# Patient Record
Sex: MALE | Race: WHITE | NOT HISPANIC OR LATINO | Employment: OTHER | URBAN - METROPOLITAN AREA
[De-identification: names, ages, dates, MRNs, and addresses within clinical notes are randomized per-mention and may not be internally consistent; named-entity substitution may affect disease eponyms.]

---

## 2017-02-17 ENCOUNTER — HOSPITAL ENCOUNTER (OUTPATIENT)
Dept: LAB | Facility: MEDICAL CENTER | Age: 67
End: 2017-02-17
Attending: UROLOGY
Payer: MEDICARE

## 2017-02-17 LAB — PSA SERPL DL<=0.01 NG/ML-MCNC: <0.01 NG/ML (ref 0–4)

## 2017-02-17 PROCEDURE — 84153 ASSAY OF PSA TOTAL: CPT

## 2017-02-17 PROCEDURE — 36415 COLL VENOUS BLD VENIPUNCTURE: CPT

## 2017-02-23 DIAGNOSIS — Z01.810 PRE-OPERATIVE CARDIOVASCULAR EXAMINATION: ICD-10-CM

## 2017-02-23 DIAGNOSIS — Z01.812 PRE-OPERATIVE LABORATORY EXAMINATION: ICD-10-CM

## 2017-02-23 DIAGNOSIS — Z01.811 PRE-OPERATIVE RESPIRATORY EXAMINATION: ICD-10-CM

## 2017-02-23 LAB
ALBUMIN SERPL BCP-MCNC: 4.5 G/DL (ref 3.2–4.9)
ALBUMIN/GLOB SERPL: 1.7 G/DL
ALP SERPL-CCNC: 66 U/L (ref 30–99)
ALT SERPL-CCNC: 12 U/L (ref 2–50)
ANION GAP SERPL CALC-SCNC: 7 MMOL/L (ref 0–11.9)
APPEARANCE UR: ABNORMAL
AST SERPL-CCNC: 18 U/L (ref 12–45)
BACTERIA #/AREA URNS HPF: ABNORMAL /HPF
BASOPHILS # BLD AUTO: 1.1 % (ref 0–1.8)
BASOPHILS # BLD: 0.05 K/UL (ref 0–0.12)
BILIRUB SERPL-MCNC: 0.7 MG/DL (ref 0.1–1.5)
BILIRUB UR QL STRIP.AUTO: NEGATIVE
BUN SERPL-MCNC: 17 MG/DL (ref 8–22)
CALCIUM SERPL-MCNC: 11.3 MG/DL (ref 8.5–10.5)
CAOX CRY #/AREA URNS HPF: ABNORMAL /HPF
CHLORIDE SERPL-SCNC: 104 MMOL/L (ref 96–112)
CO2 SERPL-SCNC: 25 MMOL/L (ref 20–33)
COLOR UR: YELLOW
CREAT SERPL-MCNC: 0.65 MG/DL (ref 0.5–1.4)
EOSINOPHIL # BLD AUTO: 0.1 K/UL (ref 0–0.51)
EOSINOPHIL NFR BLD: 2.3 % (ref 0–6.9)
ERYTHROCYTE [DISTWIDTH] IN BLOOD BY AUTOMATED COUNT: 45.9 FL (ref 35.9–50)
GFR SERPL CREATININE-BSD FRML MDRD: >60 ML/MIN/1.73 M 2
GLOBULIN SER CALC-MCNC: 2.6 G/DL (ref 1.9–3.5)
GLUCOSE SERPL-MCNC: 93 MG/DL (ref 65–99)
GLUCOSE UR STRIP.AUTO-MCNC: NEGATIVE MG/DL
HCT VFR BLD AUTO: 43.3 % (ref 42–52)
HGB BLD-MCNC: 14.3 G/DL (ref 14–18)
IMM GRANULOCYTES # BLD AUTO: 0.01 K/UL (ref 0–0.11)
IMM GRANULOCYTES NFR BLD AUTO: 0.2 % (ref 0–0.9)
KETONES UR STRIP.AUTO-MCNC: NEGATIVE MG/DL
LEUKOCYTE ESTERASE UR QL STRIP.AUTO: NEGATIVE
LYMPHOCYTES # BLD AUTO: 1.58 K/UL (ref 1–4.8)
LYMPHOCYTES NFR BLD: 36 % (ref 22–41)
MCH RBC QN AUTO: 32.3 PG (ref 27–33)
MCHC RBC AUTO-ENTMCNC: 33 G/DL (ref 33.7–35.3)
MCV RBC AUTO: 97.7 FL (ref 81.4–97.8)
MICRO URNS: ABNORMAL
MONOCYTES # BLD AUTO: 0.36 K/UL (ref 0–0.85)
MONOCYTES NFR BLD AUTO: 8.2 % (ref 0–13.4)
MUCOUS THREADS #/AREA URNS HPF: ABNORMAL /HPF
NEUTROPHILS # BLD AUTO: 2.29 K/UL (ref 1.82–7.42)
NEUTROPHILS NFR BLD: 52.2 % (ref 44–72)
NITRITE UR QL STRIP.AUTO: NEGATIVE
NRBC # BLD AUTO: 0 K/UL
NRBC BLD AUTO-RTO: 0 /100 WBC
PH UR STRIP.AUTO: 6 [PH]
PLATELET # BLD AUTO: 216 K/UL (ref 164–446)
PMV BLD AUTO: 10.8 FL (ref 9–12.9)
POTASSIUM SERPL-SCNC: 4.4 MMOL/L (ref 3.6–5.5)
PROT SERPL-MCNC: 7.1 G/DL (ref 6–8.2)
PROT UR QL STRIP: NEGATIVE MG/DL
RBC # BLD AUTO: 4.43 M/UL (ref 4.7–6.1)
RBC # URNS HPF: ABNORMAL /HPF
RBC UR QL AUTO: NEGATIVE
SODIUM SERPL-SCNC: 136 MMOL/L (ref 135–145)
SP GR UR STRIP.AUTO: 1.02
WBC # BLD AUTO: 4.4 K/UL (ref 4.8–10.8)

## 2017-02-23 RX ORDER — CYANOCOBALAMIN (VITAMIN B-12) 5000 MCG
5000 TABLET,DISINTEGRATING ORAL DAILY
COMMUNITY
End: 2017-06-21

## 2017-02-24 LAB — EKG IMPRESSION: NORMAL

## 2017-02-25 LAB
BACTERIA UR CULT: NORMAL
SIGNIFICANT IND 70042: NORMAL
SITE SITE: NORMAL
SOURCE SOURCE: NORMAL

## 2017-03-16 ENCOUNTER — HOSPITAL ENCOUNTER (OUTPATIENT)
Dept: RADIOLOGY | Facility: MEDICAL CENTER | Age: 67
End: 2017-03-16
Attending: INTERNAL MEDICINE
Payer: MEDICARE

## 2017-03-16 DIAGNOSIS — I71.40 ABDOMINAL AORTIC ANEURYSM WITHOUT RUPTURE (HCC): ICD-10-CM

## 2017-03-16 PROCEDURE — 76775 US EXAM ABDO BACK WALL LIM: CPT

## 2017-03-20 ENCOUNTER — HOSPITAL ENCOUNTER (OUTPATIENT)
Facility: MEDICAL CENTER | Age: 67
End: 2017-03-20
Attending: UROLOGY | Admitting: UROLOGY
Payer: MEDICARE

## 2017-03-20 VITALS
HEIGHT: 72 IN | BODY MASS INDEX: 20.72 KG/M2 | RESPIRATION RATE: 14 BRPM | DIASTOLIC BLOOD PRESSURE: 70 MMHG | HEART RATE: 48 BPM | WEIGHT: 153 LBS | OXYGEN SATURATION: 95 % | SYSTOLIC BLOOD PRESSURE: 119 MMHG | TEMPERATURE: 98.5 F

## 2017-03-20 PROBLEM — R33.9 RETENTION OF URINE, UNSPECIFIED: Status: ACTIVE | Noted: 2017-03-20

## 2017-03-20 RX ORDER — OXYCODONE HYDROCHLORIDE AND ACETAMINOPHEN 5; 325 MG/1; MG/1
TABLET ORAL
Status: COMPLETED
Start: 2017-03-20 | End: 2017-03-20

## 2017-03-20 RX ORDER — LIDOCAINE HYDROCHLORIDE 10 MG/ML
INJECTION, SOLUTION INFILTRATION; PERINEURAL
Status: COMPLETED
Start: 2017-03-20 | End: 2017-03-20

## 2017-03-20 RX ORDER — ONDANSETRON 2 MG/ML
INJECTION INTRAMUSCULAR; INTRAVENOUS
Status: COMPLETED
Start: 2017-03-20 | End: 2017-03-20

## 2017-03-20 RX ORDER — CEPHALEXIN 250 MG/1
500 CAPSULE ORAL 3 TIMES DAILY
Qty: 15 CAP | Refills: 0 | Status: SHIPPED | OUTPATIENT
Start: 2017-03-20 | End: 2017-03-25

## 2017-03-20 RX ORDER — GENTAMICIN SULFATE 40 MG/ML
INJECTION, SOLUTION INTRAMUSCULAR; INTRAVENOUS
Status: DISCONTINUED | OUTPATIENT
Start: 2017-03-20 | End: 2017-03-20 | Stop reason: HOSPADM

## 2017-03-20 RX ORDER — SODIUM CHLORIDE, SODIUM LACTATE, POTASSIUM CHLORIDE, CALCIUM CHLORIDE 600; 310; 30; 20 MG/100ML; MG/100ML; MG/100ML; MG/100ML
1000 INJECTION, SOLUTION INTRAVENOUS
Status: COMPLETED | OUTPATIENT
Start: 2017-03-20 | End: 2017-03-20

## 2017-03-20 RX ORDER — OXYCODONE HYDROCHLORIDE AND ACETAMINOPHEN 5; 325 MG/1; MG/1
1-2 TABLET ORAL EVERY 6 HOURS PRN
Qty: 20 TAB | Refills: 0 | Status: ON HOLD | OUTPATIENT
Start: 2017-03-20 | End: 2017-04-02

## 2017-03-20 RX ADMIN — FENTANYL CITRATE 50 MCG: 50 INJECTION, SOLUTION INTRAMUSCULAR; INTRAVENOUS at 14:54

## 2017-03-20 RX ADMIN — ONDANSETRON 4 MG: 2 INJECTION, SOLUTION INTRAMUSCULAR; INTRAVENOUS at 14:40

## 2017-03-20 RX ADMIN — LIDOCAINE HYDROCHLORIDE: 10 INJECTION, SOLUTION INFILTRATION; PERINEURAL at 10:35

## 2017-03-20 RX ADMIN — VANCOMYCIN HYDROCHLORIDE 1000 MG: 100 INJECTION, POWDER, LYOPHILIZED, FOR SOLUTION INTRAVENOUS at 11:13

## 2017-03-20 RX ADMIN — SODIUM CHLORIDE, SODIUM LACTATE, POTASSIUM CHLORIDE, CALCIUM CHLORIDE 1000 ML: 600; 310; 30; 20 INJECTION, SOLUTION INTRAVENOUS at 10:35

## 2017-03-20 RX ADMIN — FENTANYL CITRATE 50 MCG: 50 INJECTION, SOLUTION INTRAMUSCULAR; INTRAVENOUS at 14:48

## 2017-03-20 RX ADMIN — OXYCODONE AND ACETAMINOPHEN 2 TABLET: 5; 325 TABLET ORAL at 14:32

## 2017-03-20 RX ADMIN — FENTANYL CITRATE 50 MCG: 50 INJECTION, SOLUTION INTRAMUSCULAR; INTRAVENOUS at 14:39

## 2017-03-20 ASSESSMENT — PAIN SCALES - GENERAL
PAINLEVEL_OUTOF10: 7
PAINLEVEL_OUTOF10: 5
PAINLEVEL_OUTOF10: 4
PAINLEVEL_OUTOF10: 5
PAINLEVEL_OUTOF10: 7
PAINLEVEL_OUTOF10: 1
PAINLEVEL_OUTOF10: 7
PAINLEVEL_OUTOF10: 5

## 2017-03-20 NOTE — IP AVS SNAPSHOT
3/20/2017          Sebastien Horn  [address] [place] [address]    Dear Sebastien:    [facility] wants to ensure your discharge home is safe and you or your loved ones have had all your questions answered regarding your care after you leave the hospital.    You may receive a telephone call within two days of your discharge.  This call is to make certain you understand your discharge instructions as well as ensure we provided you with the best care possible during your stay with us.     The call will only last approximately 3-5 minutes and will be done by a nurse.    Once again, we want to ensure your discharge home is safe and that you have a clear understanding of any next steps in your care.  If you have any questions or concerns, please do not hesitate to contact us, we are here for you.  Thank you for choosing [facility] for your healthcare needs.    Sincerely,    Preston Duron    [facility]

## 2017-03-20 NOTE — OR NURSING
Pt is comfortable, pain at 5/10 in posterior scrotum. Pt to go home with catheter in place, it will be removed in the office tomorrow morning. Pt is on room air O2 saturation 96%, RR 13.

## 2017-03-20 NOTE — DISCHARGE INSTRUCTIONS
ACTIVITY: Rest and take it easy for the first 24 hours.  A responsible adult is recommended to remain with you during that time.  It is normal to feel sleepy.  We encourage you to not do anything that requires balance, judgment or coordination.    MILD FLU-LIKE SYMPTOMS ARE NORMAL. YOU MAY EXPERIENCE GENERALIZED MUSCLE ACHES, THROAT IRRITATION, HEADACHE AND/OR SOME NAUSEA.    FOR 24 HOURS DO NOT:  Drive, operate machinery or run household appliances.  Drink beer or alcoholic beverages.   Make important decisions or sign legal documents.    SPECIAL INSTRUCTIONS:     Follow-up in the office tomorrow for catheter removal at 0900 AM  Ice to wounds 20 minutes on 20 minutes off for 2 days while awake.    Diaz Catheter Care, Adult  A Diaz catheter is a soft, flexible tube that is placed into the bladder to drain urine. A Diaz catheter may be inserted if:  · You leak urine or are not able to control when you urinate (urinary incontinence).  · You are not able to urinate when you need to (urinary retention).  · You had prostate surgery or surgery on the genitals.  · You have certain medical conditions, such as multiple sclerosis, dementia, or a spinal cord injury.  If you are going home with a Diaz catheter in place, follow the instructions below.  TAKING CARE OF THE CATHETER  · Wash your hands with soap and water.  · Using mild soap and warm water on a clean washcloth:  · Clean the area on your body closest to the catheter insertion site using a circular motion, moving away from the catheter. Never wipe toward the catheter because this could sweep bacteria up into the urethra and cause infection.  · Remove all traces of soap. Pat the area dry with a clean towel. For males, reposition the foreskin.  · Attach the catheter to your leg so there is no tension on the catheter. Use adhesive tape or a leg strap. If you are using adhesive tape, remove any sticky residue left behind by the previous tape you used.  · Keep the  drainage bag below the level of the bladder, but keep it off the floor.  · Check throughout the day to be sure the catheter is working and urine is draining freely. Make sure the tubing does not become kinked.  · Do not pull on the catheter or try to remove it. Pulling could damage internal tissues.  TAKING CARE OF THE DRAINAGE BAGS  You will be given two drainage bags to take home. One is a large overnight drainage bag, and the other is a smaller leg bag that fits underneath clothing. You may wear the overnight bag at any time, but you should never wear the smaller leg bag at night. Follow the instructions below for how to empty, change, and clean your drainage bags.  Emptying the Drainage Bag  You must empty your drainage bag when it is  -½ full or at least 2-3 times a day.  · Wash your hands with soap and water.  · Keep the drainage bag below your hips, below the level of your bladder. This stops urine from going back into the tubing and into your bladder.  · Hold the dirty bag over the toilet or a clean container.  · Open the pour spout at the bottom of the bag and empty the urine into the toilet or container. Do not let the pour spout touch the toilet, container, or any other surface. Doing so can place bacteria on the bag, which can cause an infection.  · Clean the pour spout with a gauze pad or cotton ball that has rubbing alcohol on it.  · Close the pour spout.  · Attach the bag to your leg with adhesive tape or a leg strap.  · Wash your hands well.  Changing the Drainage Bag  Change your drainage bag once a month or sooner if it starts to smell bad or look dirty. Below are steps to follow when changing the drainage bag.  1. Wash your hands with soap and water.  2. Pinch off the rubber catheter so that urine does not spill out.  3. Disconnect the catheter tube from the drainage tube at the connection valve. Do not let the tubes touch any surface.  4. Clean the end of the catheter tube with an alcohol wipe.  Use a different alcohol wipe to clean the end of the drainage tube.  5. Connect the catheter tube to the drainage tube of the clean drainage bag.  6. Attach the new bag to the leg with adhesive tape or a leg strap. Avoid attaching the new bag too tightly.  7. Wash your hands well.  Cleaning the Drainage Bag  1. Wash your hands with soap and water.  2. Wash the bag in warm, soapy water.  3. Rinse the bag thoroughly with warm water.  4. Fill the bag with a solution of white vinegar and water (1 cup vinegar to 1 qt warm water [.2 L vinegar to 1 L warm water]). Close the bag and soak it for 30 minutes in the solution.  5. Rinse the bag with warm water.  6. Hang the bag to dry with the pour spout open and hanging downward.  7. Store the clean bag (once it is dry) in a clean plastic bag.  8. Wash your hands well.  PREVENTING INFECTION  · Wash your hands before and after handling your catheter.  · Take showers daily and wash the area where the catheter enters your body. Do not take baths. Replace wet leg straps with dry ones, if this applies.  · Do not use powders, sprays, or lotions on the genital area. Only use creams, lotions, or ointments as directed by your caregiver.  · For females, wipe from front to back after each bowel movement.  · Drink enough fluids to keep your urine clear or pale yellow unless you have a fluid restriction.  · Do not let the drainage bag or tubing touch or lie on the floor.  · Wear cotton underwear to absorb moisture and to keep your .  SEEK MEDICAL CARE IF:   · Your urine is cloudy or smells unusually bad.  · Your catheter becomes clogged.  · You are not draining urine into the bag or your bladder feels full.  · Your catheter starts to leak.  SEEK IMMEDIATE MEDICAL CARE IF:   · You have pain, swelling, redness, or pus where the catheter enters the body.  · You have pain in the abdomen, legs, lower back, or bladder.  · You have a fever.  · You see blood fill the catheter, or your  urine is pink or red.  · You have nausea, vomiting, or chills.  · Your catheter gets pulled out.  MAKE SURE YOU:   · Understand these instructions.  · Will watch your condition.  · Will get help right away if you are not doing well or get worse.     This information is not intended to replace advice given to you by your health care provider. Make sure you discuss any questions you have with your health care provider.    Cystoscopy, Care After  Refer to this sheet in the next few weeks. These instructions provide you with information on caring for yourself after your procedure. Your caregiver may also give you more specific instructions. Your treatment has been planned according to current medical practices, but problems sometimes occur. Call your caregiver if you have any problems or questions after your procedure.  HOME CARE INSTRUCTIONS   Things you can do to ease any discomfort after your procedure include:  · Drinking enough water and fluids to keep your urine clear or pale yellow.  · Taking a warm bath to relieve any burning feelings.  SEEK IMMEDIATE MEDICAL CARE IF:   · You have an increase in blood in your urine.  · You notice blood clots in your urine.  · You have difficulty passing urine.  · You have the chills.  · You have abdominal pain.  · You have a fever or persistent symptoms for more than 2-3 days.  · You have a fever and your symptoms suddenly get worse.  MAKE SURE YOU:   · Understand these instructions.  · Will watch your condition.  · Will get help right away if you are not doing well or get worse.     This information is not intended to replace advice given to you by your health care provider. Make sure you discuss any questions you have with your health care provider.        DIET: To avoid nausea, slowly advance diet as tolerated, avoiding spicy or greasy foods for the first day.  Add more substantial food to your diet according to your physician's instructions.  Babies can be fed formula or  breast milk as soon as they are hungry.  INCREASE FLUIDS AND FIBER TO AVOID CONSTIPATION.    SURGICAL DRESSING/BATHING: Follow instructions given to you by your surgeon.    FOLLOW-UP APPOINTMENT:  A follow-up appointment should be arranged with your doctor tomorrow at 9 am; call to schedule.    You should CALL YOUR PHYSICIAN if you develop:  Fever greater than 101 degrees F.  Pain not relieved by medication, or persistent nausea or vomiting.  Excessive bleeding (blood soaking through dressing) or unexpected drainage from the wound.  Extreme redness or swelling around the incision site, drainage of pus or foul smelling drainage.  Inability to urinate or empty your bladder within 8 hours.  Problems with breathing or chest pain.    You should call 911 if you develop problems with breathing or chest pain.  If you are unable to contact your doctor or surgical center, you should go to the nearest emergency room or urgent care center.  Physician's telephone #: Dr. Lamas [phone]    If any questions arise, call your doctor.  If your doctor is not available, please feel free to call the Surgical Center at [phone].  The Center is open Monday through Friday from 7AM to 7PM.  You can also call the [employer] HOTLINE open 24 hours/day, 7 days/week and speak to a nurse at [phone], or toll free at [phone].    A registered nurse may call you a few days after your surgery to see how you are doing after your procedure.    MEDICATIONS: Resume taking daily medication.  Take prescribed pain medication with food.  If no medication is prescribed, you may take non-aspirin pain medication if needed.  PAIN MEDICATION CAN BE VERY CONSTIPATING.  Take a stool softener or laxative such as senokot, pericolace, or milk of magnesia if needed.    Prescription given for percocet and keflex.  Last pain medication given at 2:32 pm.    If your physician has prescribed pain medication that includes Acetaminophen (Tylenol), do not take  additional Acetaminophen (Tylenol) while taking the prescribed medication.    Depression / Suicide Risk    As you are discharged from this [facility] facility, it is important to learn how to keep safe from harming yourself.    Recognize the warning signs:  · Abrupt changes in personality, positive or negative- including increase in energy   · Giving away possessions  · Change in eating patterns- significant weight changes-  positive or negative  · Change in sleeping patterns- unable to sleep or sleeping all the time   · Unwillingness or inability to communicate  · Depression  · Unusual sadness, discouragement and loneliness  · Talk of wanting to die  · Neglect of personal appearance   · Rebelliousness- reckless behavior  · Withdrawal from people/activities they love  · Confusion- inability to concentrate     If you or a loved one observes any of these behaviors or has concerns about self-harm, here's what you can do:  · Talk about it- your feelings and reasons for harming yourself  · Remove any means that you might use to hurt yourself (examples: pills, rope, extension cords, firearm)  · Get professional help from the community (Mental Health, Substance Abuse, psychological counseling)  · Do not be alone:Call your Safe Contact- someone whom you trust who will be there for you.  · Call your local CRISIS HOTLINE 946-6265 or 215-190-1568  · Call your local Children's Mobile Crisis Response Team [place] (938) 766-5378 or www.[employer]  · Call the toll free National Suicide Prevention Hotlines   · National Suicide Prevention Lifeline 768-650-QLXK (3346)  · National Hope Line Network 800-SUICIDE (857-9776)

## 2017-03-20 NOTE — OR NURSING
Patient educated about catheter care, all questions answered, leg bag provided and patient instructed how to use leg bag and night bag. Patient is aware that he should return to MD office tomorrow morning for catheter removal.

## 2017-03-20 NOTE — OR SURGEON
Immediate Post-Operative Note      PreOp Diagnosis: Post radical prostatectomy stress urinary incontinence    PostOp Diagnosis: As above    Procedure(s):  CYSTOSCOPY -  FLEXIBLE - Wound Class: Clean Contaminated  ARTIFICIAL URINARY SPHINCTER PLACEMENT AMS  - Wound Class: Clean Contaminated    Surgeon(s):  REJI Lindsay M.D.    Anesthesiologist/Type of Anesthesia:  Anesthesiologist: Yfn Mercado M.D./General    Surgical Staff:  Circulator: Rand Overton R.N.  Scrub Person: Gabriela Lance; Óscar Thomas    Specimen: None    Estimated Blood Loss: Less than 50cc    Findings: Functioning sphincter at cystoscopy    Complications: None  Drains: 12 [place]ian to gravity  Reservoir: 61-70 cm   Cuff: 4.5cm at bulbar urethra        3/20/2017 2:14 PM Frank Lamas

## 2017-03-20 NOTE — OR NURSING
Pt is bradycardic at baseline per Dr. Mercado, runs in the 40's. OK with HR in the 40's as long as pt maintains BP.

## 2017-03-20 NOTE — IP AVS SNAPSHOT
Home Care Instructions                                                                                                                Name:Sebastien Horn  Medical Record Number:[id number]  CSN: [id number]    YOB: 1950   Age: 66 y.o.  Sex: male  HT:1.829 m (6') WT: 69.4 kg (153 lb)          Admit Date: 3/20/2017     Discharge Date:   Today's Date: 3/20/2017  Attending Doctor:  Frank Lamas M.D.                  Allergies:  Review of patient's allergies indicates no known allergies.                Discharge Instructions         ACTIVITY: Rest and take it easy for the first 24 hours.  A responsible adult is recommended to remain with you during that time.  It is normal to feel sleepy.  We encourage you to not do anything that requires balance, judgment or coordination.    MILD FLU-LIKE SYMPTOMS ARE NORMAL. YOU MAY EXPERIENCE GENERALIZED MUSCLE ACHES, THROAT IRRITATION, HEADACHE AND/OR SOME NAUSEA.    FOR 24 HOURS DO NOT:  Drive, operate machinery or run household appliances.  Drink beer or alcoholic beverages.   Make important decisions or sign legal documents.    SPECIAL INSTRUCTIONS:     Follow-up in the office tomorrow for catheter removal at 0900 AM  Ice to wounds 20 minutes on 20 minutes off for 2 days while awake.    Diaz Catheter Care, Adult  A Diaz catheter is a soft, flexible tube that is placed into the bladder to drain urine. A Diaz catheter may be inserted if:  · You leak urine or are not able to control when you urinate (urinary incontinence).  · You are not able to urinate when you need to (urinary retention).  · You had prostate surgery or surgery on the genitals.  · You have certain medical conditions, such as multiple sclerosis, dementia, or a spinal cord injury.  If you are going home with a Diaz catheter in place, follow the instructions below.  TAKING CARE OF THE CATHETER  · Wash your hands with soap and water.  · Using mild soap and warm water on a clean washcloth:  · Clean the  area on your body closest to the catheter insertion site using a circular motion, moving away from the catheter. Never wipe toward the catheter because this could sweep bacteria up into the urethra and cause infection.  · Remove all traces of soap. Pat the area dry with a clean towel. For males, reposition the foreskin.  · Attach the catheter to your leg so there is no tension on the catheter. Use adhesive tape or a leg strap. If you are using adhesive tape, remove any sticky residue left behind by the previous tape you used.  · Keep the drainage bag below the level of the bladder, but keep it off the floor.  · Check throughout the day to be sure the catheter is working and urine is draining freely. Make sure the tubing does not become kinked.  · Do not pull on the catheter or try to remove it. Pulling could damage internal tissues.  TAKING CARE OF THE DRAINAGE BAGS  You will be given two drainage bags to take home. One is a large overnight drainage bag, and the other is a smaller leg bag that fits underneath clothing. You may wear the overnight bag at any time, but you should never wear the smaller leg bag at night. Follow the instructions below for how to empty, change, and clean your drainage bags.  Emptying the Drainage Bag  You must empty your drainage bag when it is  -½ full or at least 2-3 times a day.  · Wash your hands with soap and water.  · Keep the drainage bag below your hips, below the level of your bladder. This stops urine from going back into the tubing and into your bladder.  · Hold the dirty bag over the toilet or a clean container.  · Open the pour spout at the bottom of the bag and empty the urine into the toilet or container. Do not let the pour spout touch the toilet, container, or any other surface. Doing so can place bacteria on the bag, which can cause an infection.  · Clean the pour spout with a gauze pad or cotton ball that has rubbing alcohol on it.  · Close the pour spout.  · Attach the  bag to your leg with adhesive tape or a leg strap.  · Wash your hands well.  Changing the Drainage Bag  Change your drainage bag once a month or sooner if it starts to smell bad or look dirty. Below are steps to follow when changing the drainage bag.  1. Wash your hands with soap and water.  2. Pinch off the rubber catheter so that urine does not spill out.  3. Disconnect the catheter tube from the drainage tube at the connection valve. Do not let the tubes touch any surface.  4. Clean the end of the catheter tube with an alcohol wipe. Use a different alcohol wipe to clean the end of the drainage tube.  5. Connect the catheter tube to the drainage tube of the clean drainage bag.  6. Attach the new bag to the leg with adhesive tape or a leg strap. Avoid attaching the new bag too tightly.  7. Wash your hands well.  Cleaning the Drainage Bag  1. Wash your hands with soap and water.  2. Wash the bag in warm, soapy water.  3. Rinse the bag thoroughly with warm water.  4. Fill the bag with a solution of white vinegar and water (1 cup vinegar to 1 qt warm water [.2 L vinegar to 1 L warm water]). Close the bag and soak it for 30 minutes in the solution.  5. Rinse the bag with warm water.  6. Hang the bag to dry with the pour spout open and hanging downward.  7. Store the clean bag (once it is dry) in a clean plastic bag.  8. Wash your hands well.  PREVENTING INFECTION  · Wash your hands before and after handling your catheter.  · Take showers daily and wash the area where the catheter enters your body. Do not take baths. Replace wet leg straps with dry ones, if this applies.  · Do not use powders, sprays, or lotions on the genital area. Only use creams, lotions, or ointments as directed by your caregiver.  · For females, wipe from front to back after each bowel movement.  · Drink enough fluids to keep your urine clear or pale yellow unless you have a fluid restriction.  · Do not let the drainage bag or tubing touch or lie  on the floor.  · Wear cotton underwear to absorb moisture and to keep your .  SEEK MEDICAL CARE IF:   · Your urine is cloudy or smells unusually bad.  · Your catheter becomes clogged.  · You are not draining urine into the bag or your bladder feels full.  · Your catheter starts to leak.  SEEK IMMEDIATE MEDICAL CARE IF:   · You have pain, swelling, redness, or pus where the catheter enters the body.  · You have pain in the abdomen, legs, lower back, or bladder.  · You have a fever.  · You see blood fill the catheter, or your urine is pink or red.  · You have nausea, vomiting, or chills.  · Your catheter gets pulled out.  MAKE SURE YOU:   · Understand these instructions.  · Will watch your condition.  · Will get help right away if you are not doing well or get worse.     This information is not intended to replace advice given to you by your health care provider. Make sure you discuss any questions you have with your health care provider.    Cystoscopy, Care After  Refer to this sheet in the next few weeks. These instructions provide you with information on caring for yourself after your procedure. Your caregiver may also give you more specific instructions. Your treatment has been planned according to current medical practices, but problems sometimes occur. Call your caregiver if you have any problems or questions after your procedure.  HOME CARE INSTRUCTIONS   Things you can do to ease any discomfort after your procedure include:  · Drinking enough water and fluids to keep your urine clear or pale yellow.  · Taking a warm bath to relieve any burning feelings.  SEEK IMMEDIATE MEDICAL CARE IF:   · You have an increase in blood in your urine.  · You notice blood clots in your urine.  · You have difficulty passing urine.  · You have the chills.  · You have abdominal pain.  · You have a fever or persistent symptoms for more than 2-3 days.  · You have a fever and your symptoms suddenly get worse.  MAKE SURE YOU:    · Understand these instructions.  · Will watch your condition.  · Will get help right away if you are not doing well or get worse.     This information is not intended to replace advice given to you by your health care provider. Make sure you discuss any questions you have with your health care provider.        DIET: To avoid nausea, slowly advance diet as tolerated, avoiding spicy or greasy foods for the first day.  Add more substantial food to your diet according to your physician's instructions.  Babies can be fed formula or breast milk as soon as they are hungry.  INCREASE FLUIDS AND FIBER TO AVOID CONSTIPATION.    SURGICAL DRESSING/BATHING: Follow instructions given to you by your surgeon.    FOLLOW-UP APPOINTMENT:  A follow-up appointment should be arranged with your doctor tomorrow at 9 am; call to schedule.    You should CALL YOUR PHYSICIAN if you develop:  Fever greater than 101 degrees F.  Pain not relieved by medication, or persistent nausea or vomiting.  Excessive bleeding (blood soaking through dressing) or unexpected drainage from the wound.  Extreme redness or swelling around the incision site, drainage of pus or foul smelling drainage.  Inability to urinate or empty your bladder within 8 hours.  Problems with breathing or chest pain.    You should call 911 if you develop problems with breathing or chest pain.  If you are unable to contact your doctor or surgical center, you should go to the nearest emergency room or urgent care center.  Physician's telephone #: Dr. Lamas [phone]    If any questions arise, call your doctor.  If your doctor is not available, please feel free to call the Surgical Center at [phone].  The Center is open Monday through Friday from 7AM to 7PM.  You can also call the [facility] HOTLINE open 24 hours/day, 7 days/week and speak to a nurse at [phone], or toll free at [phone].    A registered nurse may call you a few days after your surgery to see how you  are doing after your procedure.    MEDICATIONS: Resume taking daily medication.  Take prescribed pain medication with food.  If no medication is prescribed, you may take non-aspirin pain medication if needed.  PAIN MEDICATION CAN BE VERY CONSTIPATING.  Take a stool softener or laxative such as senokot, pericolace, or milk of magnesia if needed.    Prescription given for percocet and keflex.  Last pain medication given at 2:32 pm.    If your physician has prescribed pain medication that includes Acetaminophen (Tylenol), do not take additional Acetaminophen (Tylenol) while taking the prescribed medication.    Depression / Suicide Risk    As you are discharged from this [facility] facility, it is important to learn how to keep safe from harming yourself.    Recognize the warning signs:  · Abrupt changes in personality, positive or negative- including increase in energy   · Giving away possessions  · Change in eating patterns- significant weight changes-  positive or negative  · Change in sleeping patterns- unable to sleep or sleeping all the time   · Unwillingness or inability to communicate  · Depression  · Unusual sadness, discouragement and loneliness  · Talk of wanting to die  · Neglect of personal appearance   · Rebelliousness- reckless behavior  · Withdrawal from people/activities they love  · Confusion- inability to concentrate     If you or a loved one observes any of these behaviors or has concerns about self-harm, here's what you can do:  · Talk about it- your feelings and reasons for harming yourself  · Remove any means that you might use to hurt yourself (examples: pills, rope, extension cords, firearm)  · Get professional help from the community (Mental Health, Substance Abuse, psychological counseling)  · Do not be alone:Call your Safe Contact- someone whom you trust who will be there for you.  · Call your local CRISIS HOTLINE 347-8510 or 941-207-9108  · Call your local Children's Mobile Crisis  Response Team [facility] (137) 376-4008 or www.[employer]  · Call the toll free National Suicide Prevention Hotlines   · National Suicide Prevention Lifeline 032-141-BNCW (9210)  · National Hope Line Network 800-SUICIDE (192-8307)       Medication List      START taking these medications        Instructions    cephALEXin 250 MG Caps   Commonly known as:  KEFLEX    Take 2 Caps by mouth 3 times a day for 5 days.   Dose:  500 mg       oxycodone-acetaminophen 5-325 MG Tabs   Commonly known as:  PERCOCET    Take 1-2 Tabs by mouth every 6 hours as needed for Moderate Pain.   Dose:  1-2 Tab         CONTINUE taking these medications        Instructions    ALEVE 220 MG Caps   Generic drug:  Naproxen Sodium    Take 220 mg by mouth 1 time daily as needed.   Dose:  220 mg       CENTRUM SILVER PO    Take 1 Tab by mouth every day.   Dose:  1 Tab       Vitamin B-12 5000 MCG Tbdp    Take 5,000 mcg by mouth every day.   Dose:  5000 mcg               Medication Information     Above and/or attached are the medications your physician expects you to take upon discharge. Review all of your home medications and newly ordered medications with your doctor and/or pharmacist. Follow medication instructions as directed by your doctor and/or pharmacist. Please keep your medication list with you and share with your physician. Update the information when medications are discontinued, doses are changed, or new medications (including over-the-counter products) are added; and carry medication information at all times in the event of emergency situations.        Resources     Quit Smoking / Tobacco Use:    I understand the use of any tobacco products increases my chance of suffering from future heart disease or stroke and could cause other illnesses which may shorten my life. Quitting the use of tobacco products is the single most important thing I can do to improve my health. For further information on smoking / tobacco cessation call a  Toll Free Quit Line at 1-615.752.8586 (*[employer] [place]) or 1-600.906.2667 (American Lung Association) or you can access the web based program at www.lungusa.org.    [place] Tobacco Users Help Line:  (876) 295-6663       Toll Free: 1-705.541.5363  Quit Tobacco Program [facility] (501)656-1382    Crisis Hotline:    [place] Crisis Hotline:  7-903-MRODOUS or 1-282.745.4341    [place] Crisis Hotline:    1-232.116.1238 or 916-905-3711    Discharge Survey:   Thank you for choosing [facility]. We hope we did everything we could to make your hospital stay a pleasant one. You may be receiving a survey and we would appreciate your time and participation in answering the questions. Your input is very valuable to us in our efforts to improve our service to our patients and their families.            Signatures     My signature on this form indicates that:    1. I acknowledge receipt and understanding of these Home Care Instruction.  2. My questions regarding this information have been answered to my satisfaction.  3. I have formulated a plan with my discharge nurse to obtain my prescribed medications for home.    __________________________________      __________________________________                   Patient Signature                                 Guardian/Responsible Adult Signature      __________________________________                 __________       ________                       Nurse Signature                                               Date                 Time

## 2017-03-21 ENCOUNTER — HOSPITAL ENCOUNTER (INPATIENT)
Facility: MEDICAL CENTER | Age: 67
LOS: 1 days | DRG: 696 | End: 2017-03-22
Attending: UROLOGY | Admitting: UROLOGY
Payer: MEDICARE

## 2017-03-21 PROBLEM — R31.9 HEMATURIA: Status: ACTIVE | Noted: 2017-03-21

## 2017-03-21 PROBLEM — R33.8 ACUTE URINARY RETENTION: Status: ACTIVE | Noted: 2017-03-21

## 2017-03-21 LAB
ERYTHROCYTE [DISTWIDTH] IN BLOOD BY AUTOMATED COUNT: 45.1 FL (ref 35.9–50)
HCT VFR BLD AUTO: 36.9 % (ref 42–52)
HGB BLD-MCNC: 12.6 G/DL (ref 14–18)
MCH RBC QN AUTO: 32.9 PG (ref 27–33)
MCHC RBC AUTO-ENTMCNC: 34.1 G/DL (ref 33.7–35.3)
MCV RBC AUTO: 96.3 FL (ref 81.4–97.8)
PLATELET # BLD AUTO: 169 K/UL (ref 164–446)
PMV BLD AUTO: 10.3 FL (ref 9–12.9)
RBC # BLD AUTO: 3.83 M/UL (ref 4.7–6.1)
WBC # BLD AUTO: 9.5 K/UL (ref 4.8–10.8)

## 2017-03-21 PROCEDURE — 500042 HCHG BAG, URINARY DRAINAGE (CLOSED): Performed by: UROLOGY

## 2017-03-21 PROCEDURE — 0T9B8ZZ DRAINAGE OF BLADDER, VIA NATURAL OR ARTIFICIAL OPENING ENDOSCOPIC: ICD-10-PCS | Performed by: UROLOGY

## 2017-03-21 PROCEDURE — 160048 HCHG OR STATISTICAL LEVEL 1-5: Performed by: UROLOGY

## 2017-03-21 PROCEDURE — 700102 HCHG RX REV CODE 250 W/ 637 OVERRIDE(OP): Performed by: UROLOGY

## 2017-03-21 PROCEDURE — 36415 COLL VENOUS BLD VENIPUNCTURE: CPT

## 2017-03-21 PROCEDURE — 160009 HCHG ANES TIME/MIN: Performed by: UROLOGY

## 2017-03-21 PROCEDURE — 700111 HCHG RX REV CODE 636 W/ 250 OVERRIDE (IP)

## 2017-03-21 PROCEDURE — A4606 OXYGEN PROBE USED W OXIMETER: HCPCS | Performed by: UROLOGY

## 2017-03-21 PROCEDURE — 160002 HCHG RECOVERY MINUTES (STAT): Performed by: UROLOGY

## 2017-03-21 PROCEDURE — 160039 HCHG SURGERY MINUTES - EA ADDL 1 MIN LEVEL 3: Performed by: UROLOGY

## 2017-03-21 PROCEDURE — 85027 COMPLETE CBC AUTOMATED: CPT

## 2017-03-21 PROCEDURE — 160035 HCHG PACU - 1ST 60 MINS PHASE I: Performed by: UROLOGY

## 2017-03-21 PROCEDURE — 700101 HCHG RX REV CODE 250

## 2017-03-21 PROCEDURE — 160036 HCHG PACU - EA ADDL 30 MINS PHASE I: Performed by: UROLOGY

## 2017-03-21 PROCEDURE — 500879 HCHG PACK, CYSTO: Performed by: UROLOGY

## 2017-03-21 PROCEDURE — C1769 GUIDE WIRE: HCPCS | Performed by: UROLOGY

## 2017-03-21 PROCEDURE — 0T9B80Z DRAINAGE OF BLADDER WITH DRAINAGE DEVICE, VIA NATURAL OR ARTIFICIAL OPENING ENDOSCOPIC: ICD-10-PCS | Performed by: UROLOGY

## 2017-03-21 PROCEDURE — 110371 HCHG SHELL REV 272: Performed by: UROLOGY

## 2017-03-21 PROCEDURE — 110382 HCHG SHELL REV 271: Performed by: UROLOGY

## 2017-03-21 PROCEDURE — 94760 N-INVAS EAR/PLS OXIMETRY 1: CPT

## 2017-03-21 PROCEDURE — A4357 BEDSIDE DRAINAGE BAG: HCPCS | Performed by: UROLOGY

## 2017-03-21 PROCEDURE — 160047 HCHG PACU  - EA ADDL 30 MINS PHASE II: Performed by: UROLOGY

## 2017-03-21 PROCEDURE — 700111 HCHG RX REV CODE 636 W/ 250 OVERRIDE (IP): Performed by: UROLOGY

## 2017-03-21 PROCEDURE — A9270 NON-COVERED ITEM OR SERVICE: HCPCS | Performed by: UROLOGY

## 2017-03-21 PROCEDURE — 770006 HCHG ROOM/CARE - MED/SURG/GYN SEMI*

## 2017-03-21 PROCEDURE — A4338 INDWELLING CATHETER LATEX: HCPCS | Performed by: UROLOGY

## 2017-03-21 PROCEDURE — A6402 STERILE GAUZE <= 16 SQ IN: HCPCS | Performed by: UROLOGY

## 2017-03-21 PROCEDURE — 500188 HCHG CATH, COUNCIL TIP 18FR: Performed by: UROLOGY

## 2017-03-21 PROCEDURE — 160028 HCHG SURGERY MINUTES - 1ST 30 MINS LEVEL 3: Performed by: UROLOGY

## 2017-03-21 PROCEDURE — 501329 HCHG SET, CYSTO IRRIG Y TUR: Performed by: UROLOGY

## 2017-03-21 PROCEDURE — 502240 HCHG MISC OR SUPPLY RC 0272: Performed by: UROLOGY

## 2017-03-21 RX ORDER — OXYCODONE HYDROCHLORIDE 5 MG/1
5 TABLET ORAL
Status: DISCONTINUED | OUTPATIENT
Start: 2017-03-21 | End: 2017-03-22 | Stop reason: HOSPADM

## 2017-03-21 RX ORDER — MEPERIDINE HYDROCHLORIDE 25 MG/ML
INJECTION INTRAMUSCULAR; INTRAVENOUS; SUBCUTANEOUS
Status: COMPLETED
Start: 2017-03-21 | End: 2017-03-21

## 2017-03-21 RX ORDER — MEPERIDINE HYDROCHLORIDE 25 MG/ML
INJECTION INTRAMUSCULAR; INTRAVENOUS; SUBCUTANEOUS
Status: DISPENSED
Start: 2017-03-21 | End: 2017-03-22

## 2017-03-21 RX ORDER — MORPHINE SULFATE 4 MG/ML
INJECTION, SOLUTION INTRAMUSCULAR; INTRAVENOUS
Status: COMPLETED
Start: 2017-03-21 | End: 2017-03-21

## 2017-03-21 RX ORDER — ONDANSETRON 2 MG/ML
4 INJECTION INTRAMUSCULAR; INTRAVENOUS EVERY 6 HOURS PRN
Status: DISCONTINUED | OUTPATIENT
Start: 2017-03-21 | End: 2017-03-22 | Stop reason: HOSPADM

## 2017-03-21 RX ORDER — ATROPA BELLADONNA AND OPIUM 16.2; 6 MG/1; MG/1
30 SUPPOSITORY RECTAL EVERY 8 HOURS PRN
Status: DISCONTINUED | OUTPATIENT
Start: 2017-03-21 | End: 2017-03-22 | Stop reason: HOSPADM

## 2017-03-21 RX ORDER — MORPHINE SULFATE 10 MG/ML
4 INJECTION, SOLUTION INTRAMUSCULAR; INTRAVENOUS ONCE
Status: COMPLETED | OUTPATIENT
Start: 2017-03-21 | End: 2017-03-21

## 2017-03-21 RX ORDER — MORPHINE SULFATE 4 MG/ML
4 INJECTION, SOLUTION INTRAMUSCULAR; INTRAVENOUS ONCE
Status: COMPLETED | OUTPATIENT
Start: 2017-03-21 | End: 2017-03-21

## 2017-03-21 RX ORDER — OXYCODONE HYDROCHLORIDE AND ACETAMINOPHEN 5; 325 MG/1; MG/1
1-2 TABLET ORAL EVERY 6 HOURS PRN
Status: DISCONTINUED | OUTPATIENT
Start: 2017-03-21 | End: 2017-03-22 | Stop reason: HOSPADM

## 2017-03-21 RX ORDER — MAGNESIUM HYDROXIDE 1200 MG/15ML
LIQUID ORAL
Status: DISCONTINUED | OUTPATIENT
Start: 2017-03-21 | End: 2017-03-21 | Stop reason: HOSPADM

## 2017-03-21 RX ORDER — ACETAMINOPHEN 325 MG/1
650 TABLET ORAL EVERY 6 HOURS
Status: DISCONTINUED | OUTPATIENT
Start: 2017-03-21 | End: 2017-03-22 | Stop reason: HOSPADM

## 2017-03-21 RX ORDER — SODIUM CHLORIDE, SODIUM LACTATE, POTASSIUM CHLORIDE, CALCIUM CHLORIDE 600; 310; 30; 20 MG/100ML; MG/100ML; MG/100ML; MG/100ML
INJECTION, SOLUTION INTRAVENOUS CONTINUOUS
Status: DISCONTINUED | OUTPATIENT
Start: 2017-03-21 | End: 2017-03-22 | Stop reason: HOSPADM

## 2017-03-21 RX ORDER — CEPHALEXIN 500 MG/1
500 CAPSULE ORAL 4 TIMES DAILY
Status: DISCONTINUED | OUTPATIENT
Start: 2017-03-21 | End: 2017-03-22 | Stop reason: HOSPADM

## 2017-03-21 RX ADMIN — MORPHINE SULFATE 4 MG: 4 INJECTION INTRAVENOUS at 13:30

## 2017-03-21 RX ADMIN — CEPHALEXIN 500 MG: 500 CAPSULE ORAL at 17:31

## 2017-03-21 RX ADMIN — MORPHINE SULFATE 4 MG: 4 INJECTION, SOLUTION INTRAMUSCULAR; INTRAVENOUS at 13:30

## 2017-03-21 RX ADMIN — CEPHALEXIN 500 MG: 500 CAPSULE ORAL at 20:47

## 2017-03-21 RX ADMIN — MEPERIDINE HYDROCHLORIDE 12.5 MG: 25 INJECTION INTRAMUSCULAR; INTRAVENOUS; SUBCUTANEOUS at 15:00

## 2017-03-21 RX ADMIN — MORPHINE SULFATE 4 MG: 10 INJECTION, SOLUTION INTRAMUSCULAR; INTRAVENOUS at 13:35

## 2017-03-21 RX ADMIN — ACETAMINOPHEN 650 MG: 325 TABLET, FILM COATED ORAL at 17:32

## 2017-03-21 RX ADMIN — MEPERIDINE HYDROCHLORIDE 12.5 MG: 25 INJECTION INTRAMUSCULAR; INTRAVENOUS; SUBCUTANEOUS at 14:52

## 2017-03-21 RX ADMIN — SODIUM CHLORIDE, POTASSIUM CHLORIDE, SODIUM LACTATE AND CALCIUM CHLORIDE: 600; 310; 30; 20 INJECTION, SOLUTION INTRAVENOUS at 17:35

## 2017-03-21 ASSESSMENT — PAIN SCALES - GENERAL
PAINLEVEL_OUTOF10: 0

## 2017-03-21 NOTE — IP AVS SNAPSHOT
Home Care Instructions                                                                                                                  Name:Sebastien Horn  Medical Record Number:[id number]  CSN: [id number]    YOB: 1950   Age: 66 y.o.  Sex: male  HT:1.829 m (6') WT: 70.308 kg (155 lb)          Admit Date: 3/21/2017     Discharge Date:   Today's Date: 3/22/2017  Attending Doctor:  Frank Lamas M.D.                  Allergies:  Review of patient's allergies indicates no known allergies.            Discharge Instructions       Discharge Instructions    Discharged to home by car with self. Discharged via walking, hospital escort: Refused.  Special equipment needed: Not Applicable    Be sure to schedule a follow-up appointment with your primary care doctor or any specialists as instructed.     Discharge Plan:   Diet Plan: Discussed (regular)  Activity Level: Discussed (as tolerated)  Confirmed Follow up Appointment: Patient to Call and Schedule Appointment  Confirmed Symptoms Management: Discussed  Medication Reconciliation Updated: Yes  Influenza Vaccine Indication: Not indicated: Previously immunized this influenza season and > 8 years of age    I understand that a diet low in cholesterol, fat, and sodium is recommended for good health. Unless I have been given specific instructions below for another diet, I accept this instruction as my diet prescription.   Other diet: regular    Special Instructions: None    · Is patient discharged on Warfarin / Coumadin?   No     · Is patient Post Blood Transfusion?  No    Depression / Suicide Risk    As you are discharged from this [facility] facility, it is important to learn how to keep safe from harming yourself.    Recognize the warning signs:  · Abrupt changes in personality, positive or negative- including increase in energy   · Giving away possessions  · Change in eating patterns- significant weight changes-  positive or negative  · Change in sleeping  patterns- unable to sleep or sleeping all the time   · Unwillingness or inability to communicate  · Depression  · Unusual sadness, discouragement and loneliness  · Talk of wanting to die  · Neglect of personal appearance   · Rebelliousness- reckless behavior  · Withdrawal from people/activities they love  · Confusion- inability to concentrate     If you or a loved one observes any of these behaviors or has concerns about self-harm, here's what you can do:  · Talk about it- your feelings and reasons for harming yourself  · Remove any means that you might use to hurt yourself (examples: pills, rope, extension cords, firearm)  · Get professional help from the community (Mental Health, Substance Abuse, psychological counseling)  · Do not be alone:Call your Safe Contact- someone whom you trust who will be there for you.  · Call your local CRISIS HOTLINE 871-8834 or 185-451-8064  · Call your local Children's Mobile Crisis Response Team [place] (756) 960-4502 or www.HESKA  · Call the toll free National Suicide Prevention Hotlines   · National Suicide Prevention Lifeline 974-181-RQWE (8761)  · National Hope Line Network 800-SUICIDE (349-1691)      Hematuria, Adult  Hematuria is blood in your urine. It can be caused by a bladder infection, kidney infection, prostate infection, kidney stone, or cancer of your urinary tract. Infections can usually be treated with medicine, and a kidney stone usually will pass through your urine. If neither of these is the cause of your hematuria, further workup to find out the reason may be needed.  It is very important that you tell your health care provider about any blood you see in your urine, even if the blood stops without treatment or happens without causing pain. Blood in your urine that happens and then stops and then happens again can be a symptom of a very serious condition. Also, pain is not a symptom in the initial stages of many urinary cancers.  HOME CARE  INSTRUCTIONS   · Drink lots of fluid, 3-4 quarts a day. If you have been diagnosed with an infection, cranberry juice is especially recommended, in addition to large amounts of water.  · Avoid caffeine, tea, and carbonated beverages because they tend to irritate the bladder.  · Avoid alcohol because it may irritate the prostate.  · Take all medicines as directed by your health care provider.  · If you were prescribed an antibiotic medicine, finish it all even if you start to feel better.  · If you have been diagnosed with a kidney stone, follow your health care provider's instructions regarding straining your urine to catch the stone.  · Empty your bladder often. Avoid holding urine for long periods of time.  · After a bowel movement, women should cleanse front to back. Use each tissue only once.  · Empty your bladder before and after sexual intercourse if you are a female.  SEEK MEDICAL CARE IF:  · You develop back pain.  · You have a fever.  · You have a feeling of sickness in your stomach (nausea) or vomiting.  · Your symptoms are not better in 3 days. Return sooner if you are getting worse.  SEEK IMMEDIATE MEDICAL CARE IF:   · You develop severe vomiting and are unable to keep the medicine down.  · You develop severe back or abdominal pain despite taking your medicines.  · You begin passing a large amount of blood or clots in your urine.  · You feel extremely weak or faint, or you pass out.  MAKE SURE YOU:   · Understand these instructions.  · Will watch your condition.  · Will get help right away if you are not doing well or get worse.     This information is not intended to replace advice given to you by your health care provider. Make sure you discuss any questions you have with your health care provider.     Document Released: 12/18/2006 Document Revised: 01/08/2016 Document Reviewed: 08/18/2014  [employer] Interactive Patient Education ©2016 [employer]        Follow-up Information     1. Follow up with  Caden Rodarte M.D.. Schedule an appointment as soon as possible for a visit in 1 week.    Specialty:  Family Medicine    Why:  follow up    Contact information    [address]  [place] [address] [phone]           Discharge Medication Instructions:    Below are the medications your physician expects you to take upon discharge:    Review all your home medications and newly ordered medications with your doctor and/or pharmacist. Follow medication instructions as directed by your doctor and/or pharmacist.    Please keep your medication list with you and share with your physician.               Medication List      START taking these medications        Instructions    docusate sodium 100 MG Caps   Last time this was given:  100 mg on 3/22/2017 12:35 PM   Commonly known as:  COLACE    Take 1 Cap by mouth 2 times a day.   Dose:  100 mg       sulfamethoxazole-trimethoprim 800-160 MG tablet   Commonly known as:  BACTRIM DS    Take 1 Tab by mouth 2 times a day.   Dose:  1 Tab         CONTINUE taking these medications        Instructions    ALEVE 220 MG Caps   Generic drug:  Naproxen Sodium    Take 220 mg by mouth 1 time daily as needed.   Dose:  220 mg       CENTRUM SILVER PO    Take 1 Tab by mouth every day.   Dose:  1 Tab       cephALEXin 250 MG Caps   Last time this was given:  500 mg on 3/22/2017 12:45 PM   Commonly known as:  KEFLEX    Take 2 Caps by mouth 3 times a day for 5 days.   Dose:  500 mg       oxycodone-acetaminophen 5-325 MG Tabs   Commonly known as:  PERCOCET    Take 1-2 Tabs by mouth every 6 hours as needed for Moderate Pain.   Dose:  1-2 Tab       Vitamin B-12 5000 MCG Tbdp    Take 5,000 mcg by mouth every day.   Dose:  5000 mcg               Instructions           Diet / Nutrition:    Follow any diet instructions given to you by your doctor or the dietician, including how much salt (sodium) you are allowed each day.    If you are overweight, talk to your doctor about a weight  reduction plan.    Activity:    Remain physically active following your doctor's instructions about exercise and activity.    Rest often.     Any time you become even a little tired or short of breath, SIT DOWN and rest.    Worsening Symptoms:    Report any of the following signs and symptoms to the doctor's office immediately:    *Pain of jaw, arm, or neck  *Chest pain not relieved by medication                               *Dizziness or loss of consciousness  *Difficulty breathing even when at rest   *More tired than usual                                       *Bleeding drainage or swelling of surgical site  *Swelling of feet, ankles, legs or stomach                 *Fever (>100ºF)  *Pink or blood tinged sputum  *Weight gain (3lbs/day or 5lbs /week)           *Shock from internal defibrillator (if applicable)  *Palpitations or irregular heartbeats                *Cool and/or numb extremities    Stroke Awareness    Common Risk Factors for Stroke include:    Age  Atrial Fibrillation  Carotid Artery Stenosis  Diabetes Mellitus  Excessive alcohol consumption  High blood pressure  Overweight   Physical inactivity  Smoking    Warning signs and symptoms of a stroke include:    *Sudden numbness or weakness of the face, arm or leg (especially on one side of the body).  *Sudden confusion, trouble speaking or understanding.  *Sudden trouble seeing in one or both eyes.  *Sudden trouble walking, dizziness, loss of balance or coordination.Sudden severe headache with no known cause.    It is very important to get treatment quickly when a stroke occurs. If you experience any of the above warning signs, call 911 immediately.                   Disclaimer         Quit Smoking / Tobacco Use:    I understand the use of any tobacco products increases my chance of suffering from future heart disease or stroke and could cause other illnesses which may shorten my life. Quitting the use of tobacco products is the single most important  thing I can do to improve my health. For further information on smoking / tobacco cessation call a Toll Free Quit Line at 1-409.279.8458 (*National Cancer [place]) or 1-905.222.5682 (American Lung Association) or you can access the web based program at www.lungusa.org.    [place] Tobacco Users Help Line:  (791) 615-3948       Toll Free: 1-929.594.5492  Quit Tobacco Program [facility] (691)739-0135    Crisis Hotline:    [place] Crisis Hotline:  5-670-OSQLHZX or 1-986.178.8110    [place] Crisis Hotline:    1-244.877.3791 or 948-552-3821    Discharge Survey:   Thank you for choosing [facility]. We hope we did everything we could to make your hospital stay a pleasant one. You may be receiving a phone survey and we would appreciate your time and participation in answering the questions. Your input is very valuable to us in our efforts to improve our service to our patients and their families.        My signature on this form indicates that:    1. I have reviewed and understand the above information.  2. My questions regarding this information have been answered to my satisfaction.  3. I have formulated a plan with my discharge nurse to obtain my prescribed medications for home.                  Disclaimer         __________________________________                     __________       ________                       Patient Signature                                                 Date                    Time

## 2017-03-21 NOTE — IP AVS SNAPSHOT
" <p align=\"LEFT\"><IMG SRC=\"//[email or web address]\" alt=\"Image\" WIDTH=\"50%\" HEIGHT=\"200\" BORDER=\"\"></p>                   Name:Sebastien Horn  Medical Record Number:[id number]  CSN: [id number]    YOB: 1950   Age: 66 y.o.  Sex: male  HT:1.829 m (6') WT: 70.308 kg (155 lb)          Admit Date: 3/21/2017     Discharge Date:   Today's Date: 3/22/2017  Attending Doctor:  Frank Lamas M.D.                  Allergies:  Review of patient's allergies indicates no known allergies.          Follow-up Information     1. Follow up with Caden Rodarte M.D.. Schedule an appointment as soon as possible for a visit in 1 week.    Specialty:  Family Medicine    Why:  follow up    Contact information    [address] [facility] [address]  [facility] [id number] [phone]           Medication List      Take these Medications        Instructions    ALEVE 220 MG Caps   Generic drug:  Naproxen Sodium    Take 220 mg by mouth 1 time daily as needed.   Dose:  220 mg       CENTRUM SILVER PO    Take 1 Tab by mouth every day.   Dose:  1 Tab       cephALEXin 250 MG Caps   Commonly known as:  KEFLEX    Take 2 Caps by mouth 3 times a day for 5 days.   Dose:  500 mg       docusate sodium 100 MG Caps   Commonly known as:  COLACE    Take 1 Cap by mouth 2 times a day.   Dose:  100 mg       oxycodone-acetaminophen 5-325 MG Tabs   Commonly known as:  PERCOCET    Take 1-2 Tabs by mouth every 6 hours as needed for Moderate Pain.   Dose:  1-2 Tab       sulfamethoxazole-trimethoprim 800-160 MG tablet   Commonly known as:  BACTRIM DS    Take 1 Tab by mouth 2 times a day.   Dose:  1 Tab       Vitamin B-12 5000 MCG Tbdp    Take 5,000 mcg by mouth every day.   Dose:  5000 mcg         "

## 2017-03-21 NOTE — PROGRESS NOTES
Direct admit from Dr. Lamas's office. Dr. Lamas accepting for acute urinary retention and hematuria. ADT signed and held 03/1/2017 at 1245, will need to be released upon patient arrival to unit. Patient arriving via wheelchair.

## 2017-03-21 NOTE — IP AVS SNAPSHOT
3/22/2017          Sebastien Horn  [address] [place] [address]    Dear Sebastien:    Replaced by [facility] wants to ensure your discharge home is safe and you or your loved ones have had all your questions answered regarding your care after you leave the hospital.    You may receive a telephone call within two days of your discharge.  This call is to make certain you understand your discharge instructions as well as ensure we provided you with the best care possible during your stay with us.     The call will only last approximately 3-5 minutes and will be done by a nurse.    Once again, we want to ensure your discharge home is safe and that you have a clear understanding of any next steps in your care.  If you have any questions or concerns, please do not hesitate to contact us, we are here for you.  Thank you for choosing [facility] for your healthcare needs.    Sincerely,    Preston Duron    [facility]

## 2017-03-21 NOTE — IP AVS SNAPSHOT
Transparentrees Access Code: Activation code not generated  Current Transparentrees Status: Patient Declined    [employer]  A secure, online tool to manage your health information     [employer]’s Transparentrees® is a secure, online tool that connects you to your personalized health information from the privacy of your home -- day or night - making it very easy for you to manage your healthcare. Once the activation process is completed, you can even access your medical information using the Transparentrees malaika, which is available for free in the Apple Malaika store or Google Play store.     Transparentrees provides the following levels of access (as shown below):   My Chart Features   [facility] Primary Care Doctor [facility]  Specialists [facility]  Urgent  Care Non-[facility]  Primary Care  Doctor   Email your healthcare team securely and privately 24/7 X X X X   Manage appointments: schedule your next appointment; view details of past/upcoming appointments X      Request prescription refills. X      View recent personal medical records, including lab and immunizations X X X X   View health record, including health history, allergies, medications X X X X   Read reports about your outpatient visits, procedures, consult and ER notes X X X X   See your discharge summary, which is a recap of your hospital and/or ER visit that includes your diagnosis, lab results, and care plan. X X       How to register for Transparentrees:  1. Go to  [email or web address].  2. Click on the Sign Up Now box, which takes you to the New Member Sign Up page. You will need to provide the following information:  a. Enter your Transparentrees Access Code exactly as it appears at the top of this page. (You will not need to use this code after you’ve completed the sign-up process. If you do not sign up before the expiration date, you must request a new code.)   b. Enter your date of birth.   c. Enter your home email address.   d. Click Submit, and follow the next screen’s instructions.  3. Create a Transparentrees ID.  This will be your [employer] login ID and cannot be changed, so think of one that is secure and easy to remember.  4. Create a [employer] password. You can change your password at any time.  5. Enter your Password Reset Question and Answer. This can be used at a later time if you forget your password.   6. Enter your e-mail address. This allows you to receive e-mail notifications when new information is available in [employer]ak[employer].  7. Click Sign Up. You can now view your health information.    For assistance activating your [employer] account, call [phone]

## 2017-03-21 NOTE — PROGRESS NOTES
66 year old with AUS placement in OR yesterday who has developed acute urinary retention with gross hematuria.  Direct admit to Medical floor for STAT IV and morphine.  Charles Newbypalma and I have discussed the need for emergent cystoscopy with clot evacuation and catheter placement.  He is aware of the risks of UTI, risk for infection of the AUS requiring removal at a later date, risk of the need for a catheter as well as the potential need for a ureteral stent in the rare event this is a ureteral stone.    He is also aware of the perioperative risks of DVT, PE, aspiration pneumonia, MI, CVA and death.

## 2017-03-22 VITALS
HEART RATE: 62 BPM | RESPIRATION RATE: 16 BRPM | DIASTOLIC BLOOD PRESSURE: 60 MMHG | OXYGEN SATURATION: 99 % | HEIGHT: 72 IN | BODY MASS INDEX: 20.99 KG/M2 | SYSTOLIC BLOOD PRESSURE: 141 MMHG | TEMPERATURE: 98.9 F | WEIGHT: 155 LBS

## 2017-03-22 LAB
ANION GAP SERPL CALC-SCNC: 6 MMOL/L (ref 0–11.9)
BUN SERPL-MCNC: 9 MG/DL (ref 8–22)
CALCIUM SERPL-MCNC: 9.6 MG/DL (ref 8.5–10.5)
CHLORIDE SERPL-SCNC: 106 MMOL/L (ref 96–112)
CO2 SERPL-SCNC: 27 MMOL/L (ref 20–33)
CREAT SERPL-MCNC: 0.51 MG/DL (ref 0.5–1.4)
GFR SERPL CREATININE-BSD FRML MDRD: >60 ML/MIN/1.73 M 2
GLUCOSE SERPL-MCNC: 122 MG/DL (ref 65–99)
POTASSIUM SERPL-SCNC: 4.5 MMOL/L (ref 3.6–5.5)
SODIUM SERPL-SCNC: 139 MMOL/L (ref 135–145)

## 2017-03-22 PROCEDURE — 700112 HCHG RX REV CODE 229: Performed by: PHYSICIAN ASSISTANT

## 2017-03-22 PROCEDURE — 80048 BASIC METABOLIC PNL TOTAL CA: CPT

## 2017-03-22 PROCEDURE — A9270 NON-COVERED ITEM OR SERVICE: HCPCS | Performed by: UROLOGY

## 2017-03-22 PROCEDURE — 36415 COLL VENOUS BLD VENIPUNCTURE: CPT

## 2017-03-22 PROCEDURE — A9270 NON-COVERED ITEM OR SERVICE: HCPCS | Performed by: PHYSICIAN ASSISTANT

## 2017-03-22 PROCEDURE — 700102 HCHG RX REV CODE 250 W/ 637 OVERRIDE(OP): Performed by: UROLOGY

## 2017-03-22 PROCEDURE — 700111 HCHG RX REV CODE 636 W/ 250 OVERRIDE (IP): Performed by: UROLOGY

## 2017-03-22 RX ORDER — DOCUSATE SODIUM 100 MG/1
100 CAPSULE, LIQUID FILLED ORAL 2 TIMES DAILY
Qty: 20 CAP | Refills: 0 | Status: SHIPPED | OUTPATIENT
Start: 2017-03-22 | End: 2017-06-21

## 2017-03-22 RX ORDER — DOCUSATE SODIUM 100 MG/1
100 CAPSULE, LIQUID FILLED ORAL ONCE
Status: COMPLETED | OUTPATIENT
Start: 2017-03-22 | End: 2017-03-22

## 2017-03-22 RX ORDER — SULFAMETHOXAZOLE AND TRIMETHOPRIM 800; 160 MG/1; MG/1
1 TABLET ORAL 2 TIMES DAILY
Qty: 14 TAB | Refills: 0 | Status: ON HOLD | OUTPATIENT
Start: 2017-03-22 | End: 2017-03-28

## 2017-03-22 RX ADMIN — DOCUSATE SODIUM 100 MG: 100 CAPSULE ORAL at 12:35

## 2017-03-22 RX ADMIN — CEPHALEXIN 500 MG: 500 CAPSULE ORAL at 12:45

## 2017-03-22 RX ADMIN — SODIUM CHLORIDE, POTASSIUM CHLORIDE, SODIUM LACTATE AND CALCIUM CHLORIDE: 600; 310; 30; 20 INJECTION, SOLUTION INTRAVENOUS at 09:40

## 2017-03-22 RX ADMIN — CEPHALEXIN 500 MG: 500 CAPSULE ORAL at 09:33

## 2017-03-22 ASSESSMENT — LIFESTYLE VARIABLES
ON A TYPICAL DAY WHEN YOU DRINK ALCOHOL HOW MANY DRINKS DO YOU HAVE: 0
HAVE PEOPLE ANNOYED YOU BY CRITICIZING YOUR DRINKING: NO
EVER HAD A DRINK FIRST THING IN THE MORNING TO STEADY YOUR NERVES TO GET RID OF A HANGOVER: NO
CONSUMPTION TOTAL: NEGATIVE
EVER_SMOKED: YES
EVER FELT BAD OR GUILTY ABOUT YOUR DRINKING: NO
ALCOHOL_USE: YES
TOTAL SCORE: 0
AVERAGE NUMBER OF DAYS PER WEEK YOU HAVE A DRINK CONTAINING ALCOHOL: 0
TOTAL SCORE: 0
HOW MANY TIMES IN THE PAST YEAR HAVE YOU HAD 5 OR MORE DRINKS IN A DAY: 0
HAVE YOU EVER FELT YOU SHOULD CUT DOWN ON YOUR DRINKING: NO
TOTAL SCORE: 0

## 2017-03-22 NOTE — OP REPORT
DATE OF SERVICE:  03/20/2017    PREOPERATIVE DIAGNOSES:   1.  Personal history of malignant neoplasm of the prostate, status post   radical retropubic prostatectomy.    2.  Male post-prostatectomy, stress urinary incontinence, refractory to   medical and behavioral management.      POSTOPERATIVE DIAGNOSES:    1.  Personal history of malignant neoplasm of the prostate, status post   radical retropubic prostatectomy.    2.  Male post-prostatectomy, stress urinary incontinence, refractory to   medical and behavioral management.      OPERATION/PROCEDURE PERFORMED:  1.  Placement of  artificial urinary sphincter.    2.  Flexible cystourethroscopy.      SURGEON:  Frank Lamas MD     ASSISTING SURGEON:  Benigno Grier MD      ANESTHESIA:  General endotracheal.      ANESTHESIOLOGIST:  Yfn Mercado MD      COMPLICATIONS:  None.      DRAINS:  A 12-Divehi pediatric Diaz to gravity drainage.      INDICATIONS:  The patient is a 66-year-old pleasant gentleman diagnosed with   prostate cancer several years ago who was counseled as to risks, benefits,   alternatives of care and underwent a radical retropubic prostatectomy.  The   patient has had persistent post-prostatectomy urinary incontinence which has   failed behavioral therapy as well as medical management.  He has been   counseled in the office regarding the treatment options and is desirous of   proceeding with artificial urinary sphincter.  Prior to surgery, he has been   evaluated with cystoscopy and has an open bladder neck without evidence of   urethral stricture.  In addition, he has a 4-5 pads a day, urinary   incontinence and I discussed the options of a male sling versus the    artificial urinary sphincter.      Prior to surgery, I had multiple lengthy discussions with the patient   regarding the procedure including the risk, benefits and alternatives.      He is aware that the risks include but are not limited to a risk of wound   infection in 2-3% of  cases, a risk of the device becoming infected both in the   perioperative period as well as later requiring device removal.  He is also   aware that there is a risk of chronic perineal pain, bleeding during the   placement, there is a risk during the placement of the reservoir, there is a   risk of injury to the bladder, bowel or major vasculature of the pelvis and   with passage of the tubing on his right side as he is right-handed, there is a   risk of injuring the right spermatic cord.  I have also discussed the   periprocedure risk of scrotal hematoma as well as cuff erosion and we   discussed the risk of delayed complications including device malfunction,   which occurs in approximately 12% of the patients in 10 years as well as cuff   erosion.  There is a risk of persistent incontinence despite the procedure   requiring a pad particularly strong Valsalva maneuvers and he is aware of the   perioperative risk of urethral stricture in the event that erosions should   occur.  In addition, I have discussed the perioperative risk of surgery in   general including deep vein thrombosis, pulmonary embolism, aspiration   pneumonia, heart attack, stroke and death.  Informed consent was given to me   by the patient to proceed with the  artificial urinary sphincter and   flexible cystoscopy at the end of the procedure confirmed function of the   sphincter.      DESCRIPTION OF DETAIL:  After informed consent was obtained and the patient   was actually marked on his right side to put the pump in the proper side of   the scrotum with my initials and letter Y.  He is taken to the operating room   and placed in the supine position.  A general endotracheal anesthetic was   administered in a balanced fashion by Dr. Yfn Mercado and the patient had   received preoperatively vancomycin and gentamicin.      The patient subsequently has bilateral sequential compression devices.  He was   positioned in modified lithotomy and  the operative area including the lower   abdomen, scrotum and perineal area was shaved, Betadine prepped and draped in   usual sterile fashion.      A surgical time-out was called and all members of the operative team agree as   to the patient's name, the procedures to be performed and with no objections   and documented that he has received appropriate antibiotics for placement of   the AMS sphincter, attention was directed to the procedure.      I performed the procedure with loupe magnification and initially passed a   12-Ukrainian Diaz per urethra and left this in the bladder.  I drained the   bladder completely and then clamped this.  I proceeded to make a perineal   incision was approximately 4.5-5 cm in length in the midline and dissection   was carried to the skin sharply with a 15 blade scalpel.      I dissected through Colles fascia using needle tip cautery and dissected down   to the fat overlying the bulbospongiosus muscle.  The muscle was then   identified and split in its exact midline with a right angle, taking down   dissection carried to the urethra, I mobilized the urethra, taking care to   avoid entry the urethra and did this at the level near the end of the corporal   body which would be the distal bulb.  After mobilization both on the left and   right side, I passed a right angle from the patient's left side to the   patient's other side and then pulled the measuring tape to measure urethral   cuff placement.      It sized at 4.5 cm and therefore a 4.5 cm cuff was prepared in the usual   fashion removing all air bubbles and filling this with normal saline.      Next, the components utilized for the AMS on this placement were the   [employer] coated artificial urinary sphincter with a 4.5 cm cuff.  I used a   61-70 cm pressure regulating balloon and the pump as well.      After passing the right angle underneath the urethra, I elevated the urethra   slightly and dissected _____ cuff component could  be positioned adequately and   attention was directed towards evaluation and hemostasis was excellent.  I   passed the cuff in the usual fashion across and then the tab was secured,   rubber shod had been positioned on the appropriate tubing to the cuff.  I then   used a passer to pass this tubing up to the right prepubic area, before doing   this, however, I made a lower quadrant incision 2 fingerbreadths above the   pubic symphysis with a 15 blade scalpel.  This 3.5 cm incision was carried   down sharply.  I dissected Camper's fascia sharply with a combination of   coagulation current for any bleeders, dissected down through Nicole's fascia   to the level of the external oblique.  The external oblique was opened   transversely and the fascia was opened, the muscles were split in a muscle   sparing type procedure with gentle spreading and then stay sutures of 3-0   Vicryl were positioned at this level.  I then because I had the incision   passed the cuff passer up through into this wound, rubber shod was maintained   in the appropriate position and then attention was directed towards piercing   the posterior fascia and I was able to push the bladder medially, palpate an   area and a 61-70 cm pressure regulating balloon was prepared and then   positioned into the patient's right prepubic area.  Once this balloon was in   this area, the fascia was closed overlying it and the balloon was filled with   24 mL of normal saline.  Rubber shods were then positioned.  At this point in   time, attention was directed towards positioning of the pump.      My nondominant index finger was placed down into the patient's right   hemiscrotum into a good position pushing the dissection down inferiorly.  I   then used a ring forcep to make this incision slightly bigger.  I then took my   dominant index finger and pushed this tissue up making sure there was no   bleeding in the dartos tissue.  Hemostasis was excellent and I now  positioned   the palm with the ring forcep and rubber shods applied to the 2 tubings down   into the right hemiscrotum after incision made through the dartos tissue and   once this was in the dartos pouch, the pump was positioned in the lower most   part of the right hemiscrotum and a Troy was placed to hold this in place   and attention was then directed towards performing the connection.      Straight connectors were used, topping fluid was used and after the   connections were _____, attention was directed towards cycling and activating   the artificial urinary sphincter.     Thus with a quick squeeze, I opened the appropriate cuff and at this point in   time, all the wounds were copiously irrigated with genitourinary irrigation   and attention was directed towards flexible cystourethroscopy.      I removed the previously positioned Diaz catheter, deactivated the sphincter   and then passed a 14-Kyrgyz flexible cystoscopy scope per urethra into the   level of the sphincter and could see it widely patent.  The sphincter was   activated, the cuff completely deflated occluding urethra, and there was no   evidence of cuff erosion.  After this was deflated, I then pumped the   sphincter and it opened nicely, this was visualized.  I passed the scope back   into the bladder with the cuff open and the bladder was normal and at this   point in time, I pulled the flexible cystoscope back in the penile urethra and   deactivated the sphincter documenting that it was widely patent and then   withdrew the scope.      At this point in time, I passed a 12-Kyrgyz Diaz per urethra and this was   placed in the bladder.  The bladder drained clear fluids and attention was   directed towards closure.  The upper right lower abdominal wound was closed   with 3-0 Vicryl over the fascia.  The subcutaneous tissues was reapproximated   with 3-0 Vicryl and I did put tissue of Nicole's over the tubing as the   patient is relatively thin  to put this as posterior as possible.  Care was   taken to avoid injury to the tubing at this time.      The Camper's fascia was reapproximated with 3-0 Vicryl and the skin was closed   with a 4-0 Monocryl in a subcutaneous fashion and then benzoin, Steri-Strips,   and a 2x2 dressing were applied.      The perineal incision was closed in layers, the first layer was closing the   bulbospongiosus muscle over the cuff with 4-0 Vicryl in a running locking   fashion.  Hemostasis was excellent.  Subcutaneous tissues were reapproximated   with 4-0 Vicryl and the skin was closed with a 4-0 Monocryl with the knots   buried at the beginning and end of the suture line.       I then placed an inferior wound of a 2x2 dressing and OpSite and after   Steri-Strips had been applied.  At the end of the case, sponge, instrument,   and needle counts were correct x2.  The patient tolerated the procedure well   without complication.  He had clear draining urine, was awakened in the   operating room and transferred to recovery room where he arrived in stable   condition.       ____________________________________     MD KELL BORGES / BECCA    DD:  03/22/2017 08:10:19  DT:  03/22/2017 09:22:29    D#:  [id number]  Job#:  [id number]

## 2017-03-22 NOTE — PROGRESS NOTES
Discharging patient home per physician order. Discharged with self and ford catheter. Demonstrated understanding of discharge instructions, follow up appointments, home medications, prescriptions.  Ambulating with no assistance, voiding without difficulty, pain well controlled, tolerating oral medications, oxygen saturation greater than 90% , tolerating diet.   Educational handouts about hematuria given and discussed.  Verbalized understanding of discharge instructions and educational handouts.  All questions answered.  Belongings with patient at time of discharge.

## 2017-03-22 NOTE — PROGRESS NOTES
Alert and able to let his needs know, denies pain and refuses the scheduled tylenol; he will ask when he is in pain. Diaz draining clear yellow urine without blood, clots or sediments.cont plan of care, call light within reach and visual checks through the night

## 2017-03-22 NOTE — OR SURGEON
Immediate Post-Operative Note      PreOp Diagnosis: Acute urinary retention                                 Gross hematuria                                Status post AUS placement 3/20/2017    PostOp Diagnosis: As above                                   Proximal bulbar urethral bleeding                                    Normal appearance of deactivated AUS without evidence of cuff erosion                                      Procedure(s):  Flexible CYSTO with CLOT EVACUATION  - Wound Class: Clean Contaminated  Urethral catheterization with 14 Mosotho 2 way ford catheter    Surgeon(s):  Frank Lamas M.D.    Anesthesiologist/Type of Anesthesia:  Anesthesiologist: Abebe Donahue M.D./General ETT    Surgical Staff:  Circulator: Zaria Peter R.N.  Scrub Person: Gabriela Lance; Quan Bangura    Specimen: None    Estimated Blood Loss: 75cc    Findings:Normal anterior urethra, normal distal bulb with deactivated AUS cuff and proximal posterior bulbar urethral bleeding an localized trauma    Complications: None  Drains: 14 Mosotho ford to gravity        3/21/2017 8:22 PM Frank Lamas

## 2017-03-22 NOTE — PROGRESS NOTES
Patient arrived from OR, S.   Comfortable, no complaints of pain currently.   Diaz catheter patent, draining light yellow urine- no clots noted.   Regular diet ordered.   Patient oriented to unit.

## 2017-03-22 NOTE — PROGRESS NOTES
Pt is AOx4, no complains of pain, tolerated all oral medications, skin and sacral assessment done, ford catheter in place draining to gravity, noted a little old drainage on tip of penis, per pt report it's not draining a lot, will CTM, urine with no noted blood clots or sediments, on RA, call light in use, no bed alarm on, steady gait, charge RN aware, treaded socks on, bed locked in low position, plan of care discussed, all needs met at this time. Awaiting urology to round on pt.

## 2017-03-22 NOTE — DISCHARGE SUMMARY
ADMISSION DATE:  03/21/2017  DISCHARGE DATE:  03/22/2017    SURGEON:  Frank Lamas M.D.    PROCEDURE:  Cysto clot evacuation    REASON FOR ADMISSION:  AUR with gross hematuria, s/p AUS placement 3/20/2017    HOSPITAL PROGRESS:    Post op Day # 1    Overnight Events: None    S: No fevers, chills, nausea or vomiting.  -flatus.  -BM.  Pt is very anxious for discharge.  Admits good pain control.  O:   Blood pressure 141/60, pulse 62, temperature 37.2 °C (98.9 °F), resp. rate 16, height 1.829 m (6'), weight 70.308 kg (155 lb), SpO2 99 %.  Recent Labs      03/22/17   0241   SODIUM  139   POTASSIUM  4.5   CHLORIDE  106   CO2  27   GLUCOSE  122*   BUN  9   CREATININE  0.51   CALCIUM  9.6     Recent Labs      03/21/17   1650   WBC  9.5   RBC  3.83*   HEMOGLOBIN  12.6*   HEMATOCRIT  36.9*   MCV  96.3   MCH  32.9   MCHC  34.1   RDW  45.1   PLATELETCT  169   MPV  10.3         Intake/Output Summary (Last 24 hours) at 03/22/17 1202  Last data filed at 03/22/17 0600   Gross per 24 hour   Intake   3160 ml   Output   5025 ml   Net  -1865 ml       Exam:  General: well developed male in no acute distress   Urine: clear with ford catheter in place      A/P:    Active Hospital Problems    Diagnosis   • Acute urinary retention [R33.8]   • Hematuria [R31.9]       PLAN:  1.  DC home with ford and leg bag.    2.  Home with colace 100mg 1 PO BID #10 and Bactrim DS 1 PO BID #14  3.  F/U with Dr Lamas's office on Monday for TOV in clinic  4.  Call with fever >100.5, uncontrolled n/v or pain.  Call with catheter issues.

## 2017-03-22 NOTE — DISCHARGE PLANNING
Care Transition Team Assessment    IHD met with patient bedside as he was getting ready to leave.  He gave an extra emergency contact for his friend Red Chavez ([phone]) since his sister lives in [place]. He lives alone, takes care of himself and did not have any major needs.    Information Source  Orientation : Oriented x 4  Information Given By: Patient  Informant's Name: Haider  Who is responsible for making decisions for patient? : Patient    Readmission Evaluation  Is this a readmission?: No    Elopement Risk  Legal Hold: No  Ambulatory or Self Mobile in Wheelchair: Yes  Disoriented: No  Psychiatric Symptoms: None  History of Wandering: No  Elopement this Admit: No  Vocalizing Wanting to Leave: No  Displays Behaviors, Body Language Wanting to Leave: No-Not at Risk for Elopement  Elopement Risk: Not at Risk for Elopement    Interdisciplinary Discharge Planning  Does Admitting Nurse Feel This Could be a Complex Discharge?: No  Primary Care Physician: Dr. Caden Rodarte  Lives with - Patient's Self Care Capacity: Alone and Able to Care For Self  Patient or legal guardian wants to designate a caregiver (see row info): No  Support Systems: Friends / Neighbors  Housing / Facility: [address] [place]  Do You Take your Prescribed Medications Regularly: No  Reasons Why Not Taking Medications :  (only daily vitamins before surgery)  Able to Return to Previous ADL's: Yes  Mobility Issues: No  Prior Services: None  Patient Expects to be Discharged to:: Home  Assistance Needed: No  Durable Medical Equipment: Not Applicable    Discharge Preparedness  What is your plan after discharge?: Home with help  What are your discharge supports?: Other (comment) (Friend Red)  Prior Functional Level: Drives Self, Independent with Activities of Daily Living, Ambulatory  Difficulity with ADLs: None  Difficulity with IADLs: None    Functional Assesment  Prior Functional Level: Drives Self, Independent with Activities of Daily Living,  Ambulatory    Finances  Financial Barriers to Discharge: No  Prescription Coverage: Yes ([employer])    Vision / Hearing Impairment  Vision Impairment : Yes  Right Eye Vision: Wears Glasses  Left Eye Vision: Wears Glasses  Hearing Impairment : Yes (slightly)  Hearing Impairment: Both Ears (more left)              Domestic Abuse  Have you ever been the victim of abuse or violence?: No  Physical Abuse or Sexual Abuse: No  Verbal Abuse or Emotional Abuse: No  Possible Abuse Reported to:: Not Applicable    Psychological Assessment  History of Substance Abuse: None  History of Psychiatric Problems: No  Non-compliant with Treatment: No  Newly Diagnosed Illness: No    Discharge Risks or Barriers  Discharge risks or barriers?: No    Anticipated Discharge Information  Anticipated discharge disposition: Home  Discharge Address: [address] [place] [id number]  Discharge Contact Phone Number: [phone]

## 2017-03-23 NOTE — OP REPORT
DATE OF SERVICE:  03/22/2017    PREOPERATIVE DIAGNOSES:  1.  Acute urinary retention.  2.  Gross hematuria.  3.  Status post artificial urinary sphincter placement on 03/20/2017.    OPERATION AND PROCEDURE PERFORMED:  1.  Flexible cystourethroscopy with placement of 16-Sri Lankan Councill catheter   and clot evacuation.  2.  Urethral catheterization with 14-Sri Lankan 2-way Diaz.    SURGEON:  Frank Lamas MD    ANESTHESIOLOGIST:  Abebe Donahue MD    ANESTHESIA:  General endotracheal.    POSTOPERATIVE DIAGNOSES:  As above and proximal bulbar urethral bleeding,   normal appearance of deactivated artificial urinary sphincter without evidence   of cuff erosion.    ESTIMATED BLOOD LOSS:  75 mL.    SPECIMENS:  None.    INTRAOPERATIVE FINDINGS:  Patient has a normal anterior urethra, normal distal   bulb with deactivated artificial urinary sphincter cuff, but the proximal   bulb posteriorly has urethral bleeding with localized trauma, possibly from   above Diaz.    DRAINS:  A 14-Sri Lankan Diaz.    INDICATIONS:  The patient is a very pleasant 66-year-old gentleman with a   history of adenocarcinoma of the prostate, status post radical retropubic   prostatectomy couple years ago.  He has had severe postoperative urinary   incontinence and underwent an artificial urinary sphincter in an uncomplicated   fashion on 03/20/2017.  At the time of surgery, a 12-Sri Lankan Diaz was left in   place.  Patient was discharged postoperatively to home and presented to my   office on 03/21/2017 for voiding trial.  When the catheter was removed, there   was a small amount of urethral bleeding.  He had difficulty voiding, so he was   allowed to leave the office and after discharged the office hoping that he   would void.  He returned in acute retention.  I attempted one time documenting   that the sphincter was deactivated to pass a 14-Sri Lankan Diaz and I was able   to get into the bladder, but there was significant clot retention, which could    not be evacuated.  Because of the patient having a significant recent surgery   with a risk of loss of the sphincter, I explained to him that I would   recommend exam under anesthesia with clot evacuation and probable   catheterization.  The patient has a history of stones.  I explained to him if   I cannot find an active cause of bleeding in the urethra or bladder that I   would proceed with retrograde ureteropyelogram.  The patient had consented for   and understood the risk of the procedure.  He is aware of the risks include   periprocedural urinary tract infection.  There is certainly a risk of cuff   infection or sphincter infection.  He is aware there is the potential need for   removal of components if the reservoir is in the bladder, which is highly   unlikely or if there is a cuff erosion.  He is aware of the perioperative risk   of deep vein thrombosis, pulmonary embolism, aspiration pneumonia and death.    An informed consent was given to me by the patient to proceed.    DESCRIPTION IN DETAIL:  After informed consent was obtained, the patient was   brought down from the ____ tower to the preoperative holding area.  He is seen   in consultation, Dr. Donahue brought to the operating room and placed in   supine position.  A general endotracheal anesthetic was administered in   balanced fashion by Dr. Dnoahue.  The patient received intravenous   antibiotics.  Bilateral sequential compression devices are in place and   connected and the patient's position modified lithotomy.  His perineal   incision is evaluated as the dressing is removed.  It is clean, dry, and   intact.  The suprapubic dressing is left intact on the right lower quadrant.    Prior to his prep, I did again document that sphincter is clearly deactivated   that was and the operative area was Betadine prepped and draped in usual   sterile fashion.    Attention is directed towards the procedure.  I elected to pass the flexible   cystoscope per  urethra.  The anterior urethra is normal and the distal bulb   you could see where the sphincter cuff is open, but where there is slight   indentation.  There is no cuff erosion, but just distal in the proximal bulb,   there is a posterior urethral tear with some clot and I passed the scope   anteriorly as there is upwards slope to his bladder neck.  Upon entering the   bladder, patient was noted to have significant amount of clot.  Through the   cystoscope, I passed a 0.35 guidewire and coiled this in the bladder, withdrew   the cystoscope and passed a Councill catheter 16-Guyanese over the wire.  Once   this was in the bladder, I hand irrigated about 75 mL of clot from the bladder   until was clear.  I then passed the wire back into the bladder, withdrew the   Councill catheter and passed the cystoscope over the wire into the bladder.    Serial evaluation of the bladder showed no evidence of reservoir injury,   bladder injury, both ureteral orifices were orthotopic, all the clot had been   removed.  The bladder neck was normal.  I withdrew the scope to the level of   bulbar urethra where there was a tear, possibly from the catheter being pulled   down and causing some trauma in the proximal bulbar urethra.  I did not see   any cuff visualized and so I passed the scope back in the bladder and was   about 150 mL of fluid in the bladder, passed a wire through the scope then I   used a 16-Guyanese Angiocath through a 14-Guyanese Diaz, backloaded the wire   through it and with Seldinger technique passed a 14-Guyanese Diaz into the   bladder.  The balloon was inflated with 7 mL of sterile water and was clearly   in the bladder in the urine draining crystal clear.  I irrigated this numerous   times and used a very small catheter, hoping to avoid any trauma to the cuff.    At the end of the case, the catheter was left to gravity drainage.  He   tolerated the procedure well without complication, was awakened in the   operating  room and transferred to recovery room where he arrived in stable   condition.       ____________________________________     MD KELL BORGES / BECCA    DD:  03/22/2017 17:42:09  DT:  03/22/2017 20:14:04    D#:  [id number]  Job#:  [id number]

## 2017-03-28 ENCOUNTER — HOSPITAL ENCOUNTER (INPATIENT)
Facility: MEDICAL CENTER | Age: 67
LOS: 5 days | DRG: 671 | End: 2017-04-02
Attending: UROLOGY | Admitting: UROLOGY
Payer: MEDICARE

## 2017-03-28 ENCOUNTER — HOSPITAL ENCOUNTER (OUTPATIENT)
Dept: RADIOLOGY | Facility: MEDICAL CENTER | Age: 67
DRG: 671 | End: 2017-03-28
Attending: UROLOGY
Payer: MEDICARE

## 2017-03-28 DIAGNOSIS — T83.9XXD: ICD-10-CM

## 2017-03-28 DIAGNOSIS — T81.40XA POST-OPERATIVE INFECTION: ICD-10-CM

## 2017-03-28 DIAGNOSIS — R10.31 PAIN IN THE GROIN, RIGHT: ICD-10-CM

## 2017-03-28 DIAGNOSIS — R31.0 GROSS HEMATURIA: ICD-10-CM

## 2017-03-28 DIAGNOSIS — T83.9XXA: ICD-10-CM

## 2017-03-28 PROBLEM — I50.30 (HFPEF) HEART FAILURE WITH PRESERVED EJECTION FRACTION (HCC): Status: ACTIVE | Noted: 2017-03-28

## 2017-03-28 LAB
ALBUMIN SERPL BCP-MCNC: 4.9 G/DL (ref 3.2–4.9)
ALBUMIN/GLOB SERPL: 1.7 G/DL
ALP SERPL-CCNC: 70 U/L (ref 30–99)
ALT SERPL-CCNC: 15 U/L (ref 2–50)
ANION GAP SERPL CALC-SCNC: 9 MMOL/L (ref 0–11.9)
AST SERPL-CCNC: 17 U/L (ref 12–45)
BASOPHILS # BLD AUTO: 0.1 % (ref 0–1.8)
BASOPHILS # BLD: 0.01 K/UL (ref 0–0.12)
BILIRUB SERPL-MCNC: 1 MG/DL (ref 0.1–1.5)
BLOOD CULTURE HOLD CXBCH: NORMAL
BUN SERPL-MCNC: 15 MG/DL (ref 8–22)
CALCIUM SERPL-MCNC: 11.2 MG/DL (ref 8.5–10.5)
CHLORIDE SERPL-SCNC: 96 MMOL/L (ref 96–112)
CO2 SERPL-SCNC: 26 MMOL/L (ref 20–33)
CREAT SERPL-MCNC: 1.12 MG/DL (ref 0.5–1.4)
EOSINOPHIL # BLD AUTO: 0.03 K/UL (ref 0–0.51)
EOSINOPHIL NFR BLD: 0.4 % (ref 0–6.9)
ERYTHROCYTE [DISTWIDTH] IN BLOOD BY AUTOMATED COUNT: 45.1 FL (ref 35.9–50)
GFR SERPL CREATININE-BSD FRML MDRD: >60 ML/MIN/1.73 M 2
GLOBULIN SER CALC-MCNC: 2.9 G/DL (ref 1.9–3.5)
GLUCOSE SERPL-MCNC: 107 MG/DL (ref 65–99)
GRAM STN SPEC: NORMAL
HCT VFR BLD AUTO: 41.9 % (ref 42–52)
HGB BLD-MCNC: 14.3 G/DL (ref 14–18)
IMM GRANULOCYTES # BLD AUTO: 0.01 K/UL (ref 0–0.11)
IMM GRANULOCYTES NFR BLD AUTO: 0.1 % (ref 0–0.9)
LYMPHOCYTES # BLD AUTO: 0.96 K/UL (ref 1–4.8)
LYMPHOCYTES NFR BLD: 13.9 % (ref 22–41)
MCH RBC QN AUTO: 32.6 PG (ref 27–33)
MCHC RBC AUTO-ENTMCNC: 34.1 G/DL (ref 33.7–35.3)
MCV RBC AUTO: 95.4 FL (ref 81.4–97.8)
MONOCYTES # BLD AUTO: 0.48 K/UL (ref 0–0.85)
MONOCYTES NFR BLD AUTO: 6.9 % (ref 0–13.4)
NEUTROPHILS # BLD AUTO: 5.44 K/UL (ref 1.82–7.42)
NEUTROPHILS NFR BLD: 78.6 % (ref 44–72)
NRBC # BLD AUTO: 0 K/UL
NRBC BLD AUTO-RTO: 0 /100 WBC
PLATELET # BLD AUTO: 258 K/UL (ref 164–446)
PMV BLD AUTO: 10.7 FL (ref 9–12.9)
POTASSIUM SERPL-SCNC: 4 MMOL/L (ref 3.6–5.5)
PROT SERPL-MCNC: 7.8 G/DL (ref 6–8.2)
RBC # BLD AUTO: 4.39 M/UL (ref 4.7–6.1)
SIGNIFICANT IND 70042: NORMAL
SITE SITE: NORMAL
SODIUM SERPL-SCNC: 131 MMOL/L (ref 135–145)
SOURCE SOURCE: NORMAL
WBC # BLD AUTO: 6.9 K/UL (ref 4.8–10.8)

## 2017-03-28 PROCEDURE — 96374 THER/PROPH/DIAG INJ IV PUSH: CPT

## 2017-03-28 PROCEDURE — 87205 SMEAR GRAM STAIN: CPT

## 2017-03-28 PROCEDURE — 700105 HCHG RX REV CODE 258: Performed by: EMERGENCY MEDICINE

## 2017-03-28 PROCEDURE — 700102 HCHG RX REV CODE 250 W/ 637 OVERRIDE(OP)

## 2017-03-28 PROCEDURE — 700105 HCHG RX REV CODE 258: Performed by: UROLOGY

## 2017-03-28 PROCEDURE — 700111 HCHG RX REV CODE 636 W/ 250 OVERRIDE (IP)

## 2017-03-28 PROCEDURE — 110382 HCHG SHELL REV 271: Performed by: UROLOGY

## 2017-03-28 PROCEDURE — 0TPD8LZ REMOVAL OF ARTIFICIAL SPHINCTER FROM URETHRA, VIA NATURAL OR ARTIFICIAL OPENING ENDOSCOPIC: ICD-10-PCS | Performed by: UROLOGY

## 2017-03-28 PROCEDURE — 501486 HCHG STRYKER IRRIG SET HC W/TUBING: Performed by: UROLOGY

## 2017-03-28 PROCEDURE — 87077 CULTURE AEROBIC IDENTIFY: CPT

## 2017-03-28 PROCEDURE — 500380 HCHG DRAIN, PENROSE 1/4X12: Performed by: UROLOGY

## 2017-03-28 PROCEDURE — 500187 HCHG CATH, COUNCIL TIP 16FR: Performed by: UROLOGY

## 2017-03-28 PROCEDURE — 160048 HCHG OR STATISTICAL LEVEL 1-5: Performed by: UROLOGY

## 2017-03-28 PROCEDURE — 501838 HCHG SUTURE GENERAL: Performed by: UROLOGY

## 2017-03-28 PROCEDURE — 87086 URINE CULTURE/COLONY COUNT: CPT

## 2017-03-28 PROCEDURE — 160035 HCHG PACU - 1ST 60 MINS PHASE I: Performed by: UROLOGY

## 2017-03-28 PROCEDURE — 501445 HCHG STAPLER, SKIN DISP: Performed by: UROLOGY

## 2017-03-28 PROCEDURE — 87186 SC STD MICRODIL/AGAR DIL: CPT

## 2017-03-28 PROCEDURE — 770006 HCHG ROOM/CARE - MED/SURG/GYN SEMI*

## 2017-03-28 PROCEDURE — 110371 HCHG SHELL REV 272: Performed by: UROLOGY

## 2017-03-28 PROCEDURE — 160041 HCHG SURGERY MINUTES - EA ADDL 1 MIN LEVEL 4: Performed by: UROLOGY

## 2017-03-28 PROCEDURE — 500122 HCHG BOVIE, BLADE: Performed by: UROLOGY

## 2017-03-28 PROCEDURE — 85025 COMPLETE CBC W/AUTO DIFF WBC: CPT

## 2017-03-28 PROCEDURE — 99291 CRITICAL CARE FIRST HOUR: CPT

## 2017-03-28 PROCEDURE — 700111 HCHG RX REV CODE 636 W/ 250 OVERRIDE (IP): Performed by: EMERGENCY MEDICINE

## 2017-03-28 PROCEDURE — 502240 HCHG MISC OR SUPPLY RC 0272: Performed by: UROLOGY

## 2017-03-28 PROCEDURE — 501487 HCHG STRYKER TIP: Performed by: UROLOGY

## 2017-03-28 PROCEDURE — A9270 NON-COVERED ITEM OR SERVICE: HCPCS | Performed by: UROLOGY

## 2017-03-28 PROCEDURE — 80053 COMPREHEN METABOLIC PANEL: CPT

## 2017-03-28 PROCEDURE — 501330 HCHG SET, CYSTO IRRIG TUBING: Performed by: UROLOGY

## 2017-03-28 PROCEDURE — 88300 SURGICAL PATH GROSS: CPT

## 2017-03-28 PROCEDURE — 160009 HCHG ANES TIME/MIN: Performed by: UROLOGY

## 2017-03-28 PROCEDURE — 160036 HCHG PACU - EA ADDL 30 MINS PHASE I: Performed by: UROLOGY

## 2017-03-28 PROCEDURE — 160029 HCHG SURGERY MINUTES - 1ST 30 MINS LEVEL 4: Performed by: UROLOGY

## 2017-03-28 PROCEDURE — 700111 HCHG RX REV CODE 636 W/ 250 OVERRIDE (IP): Performed by: UROLOGY

## 2017-03-28 PROCEDURE — A9270 NON-COVERED ITEM OR SERVICE: HCPCS

## 2017-03-28 PROCEDURE — 96375 TX/PRO/DX INJ NEW DRUG ADDON: CPT

## 2017-03-28 PROCEDURE — 94760 N-INVAS EAR/PLS OXIMETRY 1: CPT

## 2017-03-28 PROCEDURE — A4606 OXYGEN PROBE USED W OXIMETER: HCPCS | Performed by: UROLOGY

## 2017-03-28 PROCEDURE — 160002 HCHG RECOVERY MINUTES (STAT): Performed by: UROLOGY

## 2017-03-28 PROCEDURE — 87075 CULTR BACTERIA EXCEPT BLOOD: CPT

## 2017-03-28 PROCEDURE — 700112 HCHG RX REV CODE 229: Performed by: UROLOGY

## 2017-03-28 PROCEDURE — 500042 HCHG BAG, URINARY DRAINAGE (CLOSED): Performed by: UROLOGY

## 2017-03-28 PROCEDURE — 87070 CULTURE OTHR SPECIMN AEROBIC: CPT

## 2017-03-28 PROCEDURE — 700101 HCHG RX REV CODE 250

## 2017-03-28 PROCEDURE — 700102 HCHG RX REV CODE 250 W/ 637 OVERRIDE(OP): Performed by: UROLOGY

## 2017-03-28 PROCEDURE — 500888 HCHG PACK, MINOR BASIN: Performed by: UROLOGY

## 2017-03-28 RX ORDER — SODIUM CHLORIDE, SODIUM LACTATE, POTASSIUM CHLORIDE, CALCIUM CHLORIDE 600; 310; 30; 20 MG/100ML; MG/100ML; MG/100ML; MG/100ML
INJECTION, SOLUTION INTRAVENOUS CONTINUOUS
Status: DISCONTINUED | OUTPATIENT
Start: 2017-03-28 | End: 2017-04-02 | Stop reason: HOSPADM

## 2017-03-28 RX ORDER — ONDANSETRON 2 MG/ML
4 INJECTION INTRAMUSCULAR; INTRAVENOUS ONCE
Status: COMPLETED | OUTPATIENT
Start: 2017-03-28 | End: 2017-03-28

## 2017-03-28 RX ORDER — DOCUSATE SODIUM 100 MG/1
100 CAPSULE, LIQUID FILLED ORAL 2 TIMES DAILY
Status: DISCONTINUED | OUTPATIENT
Start: 2017-03-28 | End: 2017-04-02 | Stop reason: HOSPADM

## 2017-03-28 RX ORDER — ATROPA BELLADONNA AND OPIUM 16.2; 6 MG/1; MG/1
60 SUPPOSITORY RECTAL EVERY 12 HOURS PRN
Status: DISCONTINUED | OUTPATIENT
Start: 2017-03-28 | End: 2017-04-02 | Stop reason: HOSPADM

## 2017-03-28 RX ORDER — CHOLECALCIFEROL (VITAMIN D3) 125 MCG
5000 CAPSULE ORAL DAILY
Status: DISCONTINUED | OUTPATIENT
Start: 2017-03-29 | End: 2017-04-02 | Stop reason: HOSPADM

## 2017-03-28 RX ORDER — OXYCODONE HYDROCHLORIDE AND ACETAMINOPHEN 5; 325 MG/1; MG/1
1-2 TABLET ORAL EVERY 6 HOURS PRN
Status: DISCONTINUED | OUTPATIENT
Start: 2017-03-28 | End: 2017-03-29

## 2017-03-28 RX ORDER — OXYCODONE HYDROCHLORIDE 5 MG/1
2.5 TABLET ORAL
Status: DISCONTINUED | OUTPATIENT
Start: 2017-03-28 | End: 2017-04-02 | Stop reason: HOSPADM

## 2017-03-28 RX ORDER — NAPROXEN 250 MG/1
250 TABLET ORAL
Status: DISCONTINUED | OUTPATIENT
Start: 2017-03-28 | End: 2017-03-29

## 2017-03-28 RX ORDER — OXYCODONE HCL 5 MG/5 ML
SOLUTION, ORAL ORAL
Status: COMPLETED
Start: 2017-03-28 | End: 2017-03-28

## 2017-03-28 RX ORDER — M-VIT,TX,IRON,MINS/CALC/FOLIC 27MG-0.4MG
1 TABLET ORAL DAILY
Status: DISCONTINUED | OUTPATIENT
Start: 2017-03-29 | End: 2017-04-02 | Stop reason: HOSPADM

## 2017-03-28 RX ORDER — FAMOTIDINE 20 MG/1
20 TABLET, FILM COATED ORAL EVERY 12 HOURS
Status: DISCONTINUED | OUTPATIENT
Start: 2017-03-28 | End: 2017-04-02 | Stop reason: HOSPADM

## 2017-03-28 RX ORDER — TRAMADOL HYDROCHLORIDE 50 MG/1
50-100 TABLET ORAL EVERY 4 HOURS PRN
COMMUNITY
End: 2017-06-21

## 2017-03-28 RX ORDER — ZOLPIDEM TARTRATE 5 MG/1
5 TABLET ORAL NIGHTLY PRN
Status: DISCONTINUED | OUTPATIENT
Start: 2017-03-28 | End: 2017-04-02 | Stop reason: HOSPADM

## 2017-03-28 RX ORDER — MORPHINE SULFATE 4 MG/ML
4 INJECTION, SOLUTION INTRAMUSCULAR; INTRAVENOUS
Status: DISPENSED | OUTPATIENT
Start: 2017-03-28 | End: 2017-03-28

## 2017-03-28 RX ORDER — MIDAZOLAM HYDROCHLORIDE 1 MG/ML
INJECTION INTRAMUSCULAR; INTRAVENOUS
Status: DISCONTINUED
Start: 2017-03-28 | End: 2017-03-29 | Stop reason: HOSPADM

## 2017-03-28 RX ORDER — ONDANSETRON 2 MG/ML
4 INJECTION INTRAMUSCULAR; INTRAVENOUS EVERY 6 HOURS PRN
Status: DISCONTINUED | OUTPATIENT
Start: 2017-03-28 | End: 2017-04-02 | Stop reason: HOSPADM

## 2017-03-28 RX ORDER — SIMETHICONE 80 MG
80 TABLET,CHEWABLE ORAL EVERY 8 HOURS PRN
Status: DISCONTINUED | OUTPATIENT
Start: 2017-03-28 | End: 2017-04-02 | Stop reason: HOSPADM

## 2017-03-28 RX ORDER — SODIUM CHLORIDE 9 MG/ML
1000 INJECTION, SOLUTION INTRAVENOUS ONCE
Status: COMPLETED | OUTPATIENT
Start: 2017-03-28 | End: 2017-03-28

## 2017-03-28 RX ORDER — SULFAMETHOXAZOLE AND TRIMETHOPRIM 800; 160 MG/1; MG/1
1 TABLET ORAL 2 TIMES DAILY
Status: ON HOLD | COMMUNITY
End: 2017-04-02

## 2017-03-28 RX ORDER — MEPERIDINE HYDROCHLORIDE 25 MG/ML
INJECTION INTRAMUSCULAR; INTRAVENOUS; SUBCUTANEOUS
Status: COMPLETED
Start: 2017-03-28 | End: 2017-03-28

## 2017-03-28 RX ADMIN — ONDANSETRON 4 MG: 2 INJECTION, SOLUTION INTRAMUSCULAR; INTRAVENOUS at 13:13

## 2017-03-28 RX ADMIN — MORPHINE SULFATE 4 MG: 4 INJECTION INTRAVENOUS at 13:13

## 2017-03-28 RX ADMIN — MEPERIDINE HYDROCHLORIDE 12.5 MG: 25 INJECTION INTRAMUSCULAR; INTRAVENOUS; SUBCUTANEOUS at 16:00

## 2017-03-28 RX ADMIN — OXYCODONE HYDROCHLORIDE 10 MG: 5 SOLUTION ORAL at 16:15

## 2017-03-28 RX ADMIN — DOCUSATE SODIUM 100 MG: 100 CAPSULE ORAL at 19:06

## 2017-03-28 RX ADMIN — IOHEXOL 100 ML: 350 INJECTION, SOLUTION INTRAVENOUS at 11:04

## 2017-03-28 RX ADMIN — OXYCODONE HYDROCHLORIDE AND ACETAMINOPHEN 2 TABLET: 5; 325 TABLET ORAL at 19:07

## 2017-03-28 RX ADMIN — SODIUM CHLORIDE 1000 ML: 9 INJECTION, SOLUTION INTRAVENOUS at 13:13

## 2017-03-28 RX ADMIN — SODIUM CHLORIDE, POTASSIUM CHLORIDE, SODIUM LACTATE AND CALCIUM CHLORIDE: 600; 310; 30; 20 INJECTION, SOLUTION INTRAVENOUS at 19:06

## 2017-03-28 RX ADMIN — FAMOTIDINE 20 MG: 20 TABLET, FILM COATED ORAL at 19:06

## 2017-03-28 RX ADMIN — PIPERACILLIN AND TAZOBACTAM 4.5 G: 4; .5 INJECTION, POWDER, LYOPHILIZED, FOR SOLUTION INTRAVENOUS; PARENTERAL at 14:00

## 2017-03-28 RX ADMIN — HYDROMORPHONE HYDROCHLORIDE 0.5 MG: 1 INJECTION, SOLUTION INTRAMUSCULAR; INTRAVENOUS; SUBCUTANEOUS at 23:00

## 2017-03-28 RX ADMIN — VANCOMYCIN HYDROCHLORIDE 1800 MG: 100 INJECTION, POWDER, LYOPHILIZED, FOR SOLUTION INTRAVENOUS at 16:30

## 2017-03-28 RX ADMIN — CEFTRIAXONE SODIUM 1 G: 1 INJECTION, POWDER, FOR SOLUTION INTRAMUSCULAR; INTRAVENOUS at 20:50

## 2017-03-28 RX ADMIN — ZOLPIDEM TARTRATE 5 MG: 5 TABLET, FILM COATED ORAL at 22:27

## 2017-03-28 ASSESSMENT — PAIN SCALES - GENERAL
PAINLEVEL_OUTOF10: 7
PAINLEVEL_OUTOF10: 0
PAINLEVEL_OUTOF10: 0
PAINLEVEL_OUTOF10: 7
PAINLEVEL_OUTOF10: 3
PAINLEVEL_OUTOF10: 0

## 2017-03-28 ASSESSMENT — LIFESTYLE VARIABLES
EVER_SMOKED: NEVER
ALCOHOL_USE: NO
EVER_SMOKED: NEVER

## 2017-03-28 ASSESSMENT — COPD QUESTIONNAIRES
DURING THE PAST 4 WEEKS HOW MUCH DID YOU FEEL SHORT OF BREATH: NONE/LITTLE OF THE TIME
COPD SCREENING SCORE: 2
HAVE YOU SMOKED AT LEAST 100 CIGARETTES IN YOUR ENTIRE LIFE: NO/DON'T KNOW
DO YOU EVER COUGH UP ANY MUCUS OR PHLEGM?: NO/ONLY WITH OCCASIONAL COLDS OR INFECTIONS

## 2017-03-28 NOTE — IP AVS SNAPSHOT
4/2/2017          Sebastien Horn  [address] [place] [address]    Dear Sebastien:    [facility] wants to ensure your discharge home is safe and you or your loved ones have had all your questions answered regarding your care after you leave the hospital.    You may receive a telephone call within two days of your discharge.  This call is to make certain you understand your discharge instructions as well as ensure we provided you with the best care possible during your stay with us.     The call will only last approximately 3-5 minutes and will be done by a nurse.    Once again, we want to ensure your discharge home is safe and that you have a clear understanding of any next steps in your care.  If you have any questions or concerns, please do not hesitate to contact us, we are here for you.  Thank you for choosing [facility] for your healthcare needs.    Sincerely,    Preston Duron    [facility]

## 2017-03-28 NOTE — ED NOTES
Pt to triage  Chief Complaint   Patient presents with   • Sent by MD   sent by Dr Grier for surgery today to remove sphincter  Pt asked to wait in lobby, pt updated on triage process and pt asked to inform RN of any changes.

## 2017-03-28 NOTE — OR SURGEON
Immediate Post-Operative Note      PreOp Diagnosis: Infected urinary sphincter    PostOp Diagnosis: same    Procedure(s):  URINARY SPHINCTER PROSTHESIS REMOVAL - Wound Class: Dirty or Infected  CYSTOSCOPY - Wound Class: Dirty or Infected    Surgeon(s):  Benigno Grier M.D.    Anesthesiologist/Type of Anesthesia:  Anesthesiologist: Geno Carolina M.D./General    Surgical Staff:  Circulator: Larisa Escobar R.N.  Scrub Person: Michelle Burgos    Specimen: sphincter    Estimated Blood Loss: <30 cc    Findings: see op note    Complications: none        3/28/2017 3:50 PM Benigno Grier

## 2017-03-28 NOTE — IP AVS SNAPSHOT
" <p align=\"LEFT\"><IMG SRC=\"//[email or web address]$/[email or web address]\" alt=\"Image\" WIDTH=\"50%\" HEIGHT=\"200\" BORDER=\"\"></p>                   Name:Sebastien Horn  Medical Record Number:[id number]  CSN: [id number]    YOB: 1950   Age: 66 y.o.  Sex: male  HT:1.829 m (6' 0.01\") WT: 70.6 kg (155 lb 10.3 oz)          Admit Date: 3/28/2017     Discharge Date:   Today's Date: 4/2/2017  Attending Doctor:  Benigno Grier M.D.                  Allergies:  Review of patient's allergies indicates no known allergies.          Your appointments     Apr 03, 2017 10:00 AM   Wound New Evaluation with Yesi Quispe M.D., WOUND CARE PER [facility] ([address] [place] [address])    [address]  [facility] [id number] [phone]              Follow-up Information     1. Follow up with [facility]. Go on 4/3/2017.    Why:  Wound Care.  Appt at 10:00 AM check in at 09:45    Contact information    [facility]  [address] [facility] [address]  [facility]  [phone]        2. Follow up with Benigno Grier M.D. In 1 day.    Specialty:  Urology    Contact information    [address]  [address]  [facility] [address]  [phone]           Medication List      Take these Medications        Instructions    ALEVE 220 MG Caps   Generic drug:  Naproxen Sodium    Take 220 mg by mouth 1 time daily as needed.   Dose:  220 mg       amoxicillin-clavulanate 875-125 MG Tabs   Commonly known as:  AUGMENTIN    Take 1 Tab by mouth 2 times a day.   Dose:  1 Tab       CENTRUM SILVER PO    Take 1 Tab by mouth every day.   Dose:  1 Tab       docusate sodium 100 MG Caps   Commonly known as:  COLACE    Take 1 Cap by mouth 2 times a day.   Dose:  100 mg       oxycodone-acetaminophen 5-325 MG Tabs   What changed:  when to take this   Commonly known as:  PERCOCET    Take 1-2 Tabs by mouth every four hours as needed for Moderate Pain.   Dose:  1-2 Tab       tramadol 50 MG Tabs   Commonly known as:  ULTRAM    Take  mg by mouth every four hours " as needed (4-6 hrs).   Dose:   mg       Vitamin B-12 5000 MCG Tbdp    Take 5,000 mcg by mouth every day.   Dose:  5000 mcg

## 2017-03-28 NOTE — H&P
UROLOGY H & PNote:    Benigno Grier  Date & Time note created:    3/28/2017   3:52 PM         Patient ID:   Name:             Sebastien Horn   YOB: 1950  Age:                 66 y.o.  male   MRN:               [id number]                                                             Reason for Consult:      Infected sphincter    History of Present Illness:    As above over the past 5 days. Unable to urinate. Increased pain.    Review of Systems:      Constitutional: Denies fevers, Denies weight changes  Eyes: Denies changes in vision, no eye pain  Ears/Nose/Throat/Mouth: Denies nasal congestion or sore throat   Cardiovascular: no chest pain, no palpitations   Respiratory: no shortness of breath , Denies cough  Gastrointestinal/Hepatic: Denies abdominal pain, nausea, vomiting, diarrhea, constipation or GI bleeding   Genitourinary: Increased scrotal pain and swelling.  Musculoskeletal/Rheum: Denies  joint pain and swelling, no edema  Skin: Denies rash  Neurological: Denies headache, confusion, memory loss or focal weakness/parasthesias  Psychiatric: denies mood disorder   Endocrine: Rabia thyroid problems  Heme/Oncology/Lymph Nodes: Denies enlarged lymph nodes, denies brusing or known bleeding disorder  All other systems were reviewed and are negative (AMA/CMS criteria)                Past Medical History:   Past Medical History   Diagnosis Date   • Unspecified cataract      Bilateral IOL's   • Urinary incontinence    • Hemorrhagic disorder (CMS-[facility])    • Arthritis      knees   • Cancer (CMS-[facility]) 07/13     Prostate     Active Hospital Problems    Diagnosis   • (HFpEF) heart failure with preserved ejection fraction (CMS-[facility]) [I50.30]       Past Surgical History:  Past Surgical History   Procedure Laterality Date   • Other orthopedic surgery  2003     L Shoulder Repair   • Other orthopedic surgery       R Rotator Cuff repair   • Knee arthroplasty total  7/3/2014     Performed by Theodore San M.D. at  [facility]   • Knee arthroscopy  7/3/2014     Performed by Theodore San M.D. at [facility]   • Meniscectomy  7/3/2014     Performed by Theodore San M.D. at [facility]   • Knee replacement, total Right 2014   • Inguinal hernia repair  6/1/2009     Performed by RISHI COOK at [facility]   • Inguinal hernia laparoscopic bilateral Bilateral    • Prostatectomy, radical retro  8/3/2015     Procedure: PROSTATECTOMY RADICAL RETROPUBIC;  Surgeon: Sage Ngo M.D.;  Location: [facility];  Service:    • Node dissection Bilateral 8/3/2015     Procedure: NODE DISSECTION LYMPH;  Surgeon: Sage Ngo M.D.;  Location: [facility];  Service:    • Cystoscopy  3/20/2017     Procedure: CYSTOSCOPY -  FLEXIBLE;  Surgeon: Frank Lamas M.D.;  Location: [facility];  Service:    • Sphincter prosthesis placement  3/20/2017     Procedure: SPHINCTER PROSTHESIS PLACEMENT - ARTIFICIAL URINARY SPHINCTER;  Surgeon: Frank Lamas M.D.;  Location: [facility];  Service:    • Evacuation of hematoma  3/21/2017     Procedure: EVACUATION OF HEMATOMA-CYSTO CLOT EVACUATION AND SMALL CYSTOSCOPE;  Surgeon: Frank Lamas M.D.;  Location: [facility];  Service:    • Cystoscopy  3/21/2017     Procedure: CYSTOSCOPY;  Surgeon: Frank Lamas M.D.;  Location: [facility];  Service:        Hospital Medications:    Current facility-administered medications:   •  morphine (pf) 4 mg/ml injection 4 mg, 4 mg, Intravenous, Q2HRS PRN, Alvaro Sheth M.D., 4 mg at 03/28/17 1313  •  vancomycin 1,800 mg in  mL IVPB, 25 mg/kg, Intravenous, Once, Alvaro Sheth M.D.  •  piperacillin-tazobactam (ZOSYN) 4.5 g in  mL IVPB, 4.5 g, Intravenous, Once, Alvaro Sheth M.D.  •  Vitamin B-12 TBDP 5,000 mcg, 5,000 mcg, Oral, DAILY, Benigno Grier M.D.  •  docusate sodium (COLACE) capsule 100 mg, 100 mg, Oral, BID, Benigno Grier,  M.D.  •  CENTRUM SILVER TABS 1 Tab, 1 Tab, Oral, DAILY, Benigno Grier M.D.  •  Naproxen Sodium CAPS 220 mg, 220 mg, Oral, QDAY PRN, Benigno Grier M.D.  •  oxycodone-acetaminophen (PERCOCET) 5-325 MG per tablet 1-2 Tab, 1-2 Tab, Oral, Q6HRS PRN, Benigno Grier M.D.  •  Pharmacy Consult Request ...Pain Management Review 1 Each, 1 Each, Other, PRN, Benigno Grier M.D.  •  Respiratory Care per Protocol, , Nebulization, Continuous RT, Benigno Grier M.D.  •  lactated ringers infusion, , Intravenous, Continuous, Benigno Grier M.D.  •  oxycodone immediate-release (ROXICODONE) tablet 2.5 mg, 2.5 mg, Oral, Q3HRS PRN, Benigno Grier M.D.  •  HYDROmorphone (DILAUDID) injection 0.5 mg, 0.5 mg, Intravenous, Q3HRS PRN, Benigno Grier M.D.  •  opium-belladonna (B&O SUPPRETTES) suppository 60 mg, 60 mg, Rectal, Q12HRS PRN, Benigno Grier M.D.  •  ondansetron (ZOFRAN) syringe/vial injection 4 mg, 4 mg, Intravenous, Q6HRS PRN, Benigno Grier M.D.  •  zolpidem (AMBIEN) tablet 5 mg, 5 mg, Oral, HS PRN, Benigno Grier M.D.  •  simethicone (MYLICON) chewable tab 80 mg, 80 mg, Oral, Q8HRS PRN, Benigno Grier M.D.  •  famotidine (PEPCID) tablet 20 mg, 20 mg, Oral, Q12HRS, Benigno Grier M.D.  •  gentamicin (GARAMYCIN) 353 mg in  mL IVPB, 5 mg/kg, Intravenous, Q24HR, Benigno Grier M.D.  •  vancomycin 1,100 mg in  mL IVPB, 15 mg/kg, Intravenous, Q12HR, Benigno Grier M.D.    Facility-Administered Medications Ordered in Other Encounters:   •  MIDAZOLAM HCL 2 MG/2ML INJ SOLN, , , ,   •  FENTANYL CITRATE 0.05 MG/ML INJ SOLN, , , ,     Current Outpatient Medications:  Prescriptions prior to admission   Medication Sig Dispense Refill Last Dose   • docusate sodium (COLACE) 100 MG Cap Take 1 Cap by mouth 2 times a day. 20 Cap 0    • oxycodone-acetaminophen (PERCOCET) 5-325 MG Tab Take 1-2 Tabs by mouth every 6 hours as needed for Moderate Pain. 20 Tab 0    • Cyanocobalamin (VITAMIN B-12) 5000 MCG TABLET DISPERSIBLE Take 5,000 mcg by mouth every day.   3/19/2017  "at am   • Multiple Vitamins-Minerals (CENTRUM SILVER PO) Take 1 Tab by mouth every day.   3/19/2017 at am   • Naproxen Sodium (ALEVE) 220 MG CAPS Take 220 mg by mouth 1 time daily as needed.   3/10/2017 at Unknown time       Medication Allergy:  No Known Allergies    Family History:  Family History   Problem Relation Age of Onset   • Hyperlipidemia Mother    • Hyperlipidemia Father    • Cancer Father      Prostate and Lung   • Lung Disease Father    • Other Sister      Lupus       Social History:  Social History     Social History   • Marital Status:      Spouse Name: N/A   • Number of Children: N/A   • Years of Education: N/A     Occupational History   • Not on file.     Social History Main Topics   • Smoking status: Never Smoker    • Smokeless tobacco: Never Used   • Alcohol Use: No   • Drug Use: No   • Sexual Activity: Not Currently     Other Topics Concern   • Not on file     Social History Narrative         Physical Exam:  Vitals/ General Appearance:   Weight/BMI: Body mass index is 21.1 kg/(m^2).  Blood pressure 132/69, pulse 63, temperature 36.9 °C (98.4 °F), resp. rate 16, height 1.829 m (6' 0.01\"), weight 70.6 kg (155 lb 10.3 oz), SpO2 98 %.  Filed Vitals:    03/28/17 1135 03/28/17 1136 03/28/17 1322 03/28/17 1340   BP: 146/70  142/66 132/69   Pulse: 68  65 63   Temp: 36.8 °C (98.2 °F)   36.9 °C (98.4 °F)   Resp: 16  16 16   Height: 1.829 m (6') 1.829 m (6' 0.01\")     Weight:  70.6 kg (155 lb 10.3 oz)     SpO2: 96%   98%     Oxygen Therapy:  Pulse Oximetry: 98 %, O2 (LPM): 0, O2 Delivery: None (Room Air)    Constitutional:   Well developed, Well nourished, No acute distress  HENMT:  Normocephalic, Atraumatic, Oropharynx moist mucous membranes, No oral exudates, Nose normal.  No thyromegaly.  Eyes:  EOMI, Conjunctiva normal, No discharge.  Neck:  Normal range of motion, No cervical tenderness,  no JVD.  Cardiovascular:  Normal heart rate, Normal rhythm, No murmurs, No rubs, No gallops.   Extremitites " with intact distal pulses, no cyanosis, or edema.  Lungs:  Normal breath sounds, breath sounds clear to auscultation bilaterally,  no crackles, no wheezing.   Abdomen: Bowel sounds normal, Soft, No tenderness, No guarding, No rebound, No masses, No hepatosplenomegaly.  : Swelling right groin and scrotum. Increased erythema of the scrotum.  Skin: Warm, Dry, No erythema, No rash, no induration.  Neurologic: Alert & oriented x 3, No focal deficits noted, cranial nerves II through X are grossly intact.  Psychiatric: Affect normal, Judgment normal, Mood normal.      MDM (Data Review):     Records reviewed and summarized in current documentation    Lab Data Review:  Recent Results (from the past 24 hour(s))   CBC WITH DIFFERENTIAL    Collection Time: 03/28/17 12:20 PM   Result Value Ref Range    WBC 6.9 4.8 - 10.8 K/uL    RBC 4.39 (L) 4.70 - 6.10 M/uL    Hemoglobin 14.3 14.0 - 18.0 g/dL    Hematocrit 41.9 (L) 42.0 - 52.0 %    MCV 95.4 81.4 - 97.8 fL    MCH 32.6 27.0 - 33.0 pg    MCHC 34.1 33.7 - 35.3 g/dL    RDW 45.1 35.9 - 50.0 fL    Platelet Count 258 164 - 446 K/uL    MPV 10.7 9.0 - 12.9 fL    Neutrophils-Polys 78.60 (H) 44.00 - 72.00 %    Lymphocytes 13.90 (L) 22.00 - 41.00 %    Monocytes 6.90 0.00 - 13.40 %    Eosinophils 0.40 0.00 - 6.90 %    Basophils 0.10 0.00 - 1.80 %    Immature Granulocytes 0.10 0.00 - 0.90 %    Nucleated RBC 0.00 /100 WBC    Neutrophils (Absolute) 5.44 1.82 - 7.42 K/uL    Lymphs (Absolute) 0.96 (L) 1.00 - 4.80 K/uL    Monos (Absolute) 0.48 0.00 - 0.85 K/uL    Eos (Absolute) 0.03 0.00 - 0.51 K/uL    Baso (Absolute) 0.01 0.00 - 0.12 K/uL    Immature Granulocytes (abs) 0.01 0.00 - 0.11 K/uL    NRBC (Absolute) 0.00 K/uL   COMP METABOLIC PANEL    Collection Time: 03/28/17 12:20 PM   Result Value Ref Range    Sodium 131 (L) 135 - 145 mmol/L    Potassium 4.0 3.6 - 5.5 mmol/L    Chloride 96 96 - 112 mmol/L    Co2 26 20 - 33 mmol/L    Anion Gap 9.0 0.0 - 11.9    Glucose 107 (H) 65 - 99 mg/dL    Bun  15 8 - 22 mg/dL    Creatinine 1.12 0.50 - 1.40 mg/dL    Calcium 11.2 (H) 8.5 - 10.5 mg/dL    AST(SGOT) 17 12 - 45 U/L    ALT(SGPT) 15 2 - 50 U/L    Alkaline Phosphatase 70 30 - 99 U/L    Total Bilirubin 1.0 0.1 - 1.5 mg/dL    Albumin 4.9 3.2 - 4.9 g/dL    Total Protein 7.8 6.0 - 8.2 g/dL    Globulin 2.9 1.9 - 3.5 g/dL    A-G Ratio 1.7 g/dL   ESTIMATED GFR    Collection Time: 03/28/17 12:20 PM   Result Value Ref Range    GFR If African American >60 >60 mL/min/1.73 m 2    GFR If Non African American >60 >60 mL/min/1.73 m 2   BLOOD CULTURE,HOLD    Collection Time: 03/28/17 12:20 PM   Result Value Ref Range    Blood Culture Hold Collected        Imaging/Procedures Review:    Reviewed    MDM (Assessment and Plan):     Active Hospital Problems    Diagnosis   • (HFpEF) heart failure with preserved ejection fraction (CMS-HCC) [I50.30]     Will take to OR and remove artificial urinary sphincter.Gent/Vanco and pain meds.  R&B's discussed. All questions answered.

## 2017-03-28 NOTE — PROGRESS NOTES
"Pharmacy Kinetics 66 y.o. male on vancomycin day # 1 3/28/2017    Currently on Vancomycin 1100 mg iv q12hr    Indication for Treatment: infected urinary prosthesis    Pertinent history per medical record: Admitted on 3/28/2017 for increasing pain, and difficulty urination.  Patient had urinary sphincter placed on 3/22 for management of urinary incontinence (s/p prostatectomy).  On 3/25 patient returned to surgery to address hematuria and for clot evacuation.    Patient returned to OR today for removal of infected urinary sphincter.    Other antibiotics: Ceftriaxone 1g IV q24h    Allergies: Review of patient's allergies indicates no known allergies.     List concerns for renal function: received contrast today    Pertinent cultures to date:   n/a    Recent Labs      17   1220   WBC  6.9   NEUTSPOLYS  78.60*     Recent Labs      17   1220   BUN  15   CREATININE  1.12   ALBUMIN  4.9     No results for input(s): VANCOTROUGH, VANCOPEAK, VANCORANDOM in the last 72 hours.  Intake/Output Summary (Last 24 hours) at 17 1644  Last data filed at 17 1541   Gross per 24 hour   Intake    900 ml   Output      0 ml   Net    900 ml      Blood pressure 132/69, pulse 56, temperature 37.8 °C (100 °F), resp. rate 16, height 1.829 m (6' 0.01\"), weight 70.6 kg (155 lb 10.3 oz), SpO2 97 %. Temp (24hrs), Av.2 °C (98.9 °F), Min:36.8 °C (98.2 °F), Max:37.8 °C (100 °F)      A/P   1. Vancomycin dose change: Vancomycin initiated with load of 1800 mg, then to continue with 1100 mg IV q12h  2. Next vancomycin level: 3/29/17 @ 1630  3. Goal trough: 12-16 mcg/ml  4. Comments: Pharmacy to continue to monitor and adjust vancomycin per protocol.    Preethi Escalera, PharmD      "

## 2017-03-28 NOTE — IP AVS SNAPSHOT
" Home Care Instructions                                                                                                                  Name:Sebastien Horn  Medical Record Number:[id number]  CSN: [id number]    YOB: 1950   Age: 66 y.o.  Sex: male  HT:1.829 m (6' 0.01\") WT: 70.6 kg (155 lb 10.3 oz)          Admit Date: 3/28/2017     Discharge Date:   Today's Date: 4/2/2017  Attending Doctor:  Benigno Grier M.D.                  Allergies:  Review of patient's allergies indicates no known allergies.            Discharge Instructions       Discharge Instructions    Discharged to home by car with self. Discharged via walking, hospital escort: Refused.  Special equipment needed: Not Applicable    Be sure to schedule a follow-up appointment with your primary care doctor or any specialists as instructed.     Discharge Plan:   Diet Plan: Discussed  Activity Level: Discussed  Confirmed Follow up Appointment: Appointment Scheduled  Confirmed Symptoms Management: Discussed  Medication Reconciliation Updated: Yes  Influenza Vaccine Indication: Not indicated: Previously immunized this influenza season and > 8 years of age    I understand that a diet low in cholesterol, fat, and sodium is recommended for good health. Unless I have been given specific instructions below for another diet, I accept this instruction as my diet prescription.   Other diet: Regular    Special Instructions: None    · Is patient discharged on Warfarin / Coumadin?   No     · Is patient Post Blood Transfusion?  No    Depression / Suicide Risk    As you are discharged from this [facility] facility, it is important to learn how to keep safe from harming yourself.    Recognize the warning signs:  · Abrupt changes in personality, positive or negative- including increase in energy   · Giving away possessions  · Change in eating patterns- significant weight changes-  positive or negative  · Change in sleeping patterns- unable to sleep or sleeping all " the time   · Unwillingness or inability to communicate  · Depression  · Unusual sadness, discouragement and loneliness  · Talk of wanting to die  · Neglect of personal appearance   · Rebelliousness- reckless behavior  · Withdrawal from people/activities they love  · Confusion- inability to concentrate     If you or a loved one observes any of these behaviors or has concerns about self-harm, here's what you can do:  · Talk about it- your feelings and reasons for harming yourself  · Remove any means that you might use to hurt yourself (examples: pills, rope, extension cords, firearm)  · Get professional help from the community (Mental Health, Substance Abuse, psychological counseling)  · Do not be alone:Call your Safe Contact- someone whom you trust who will be there for you.  · Call your local CRISIS HOTLINE 805-6872 or 569-997-9169  · Call your local Children's Mobile Crisis Response Team [place] (810) 804-3573 or www.[employer]  · Call the toll free National Suicide Prevention Hotlines   · National Suicide Prevention Lifeline 745-895-HSXR (9836)  · [employer] Hope Line Network 800-SUICIDE (694-8099)        Your appointments     Apr 03, 2017 10:00 AM   Wound New Evaluation with Yesi Quispe M.D., WOUND CARE PER [facility]   Wound Care Center ([facility] [address])    [address]  [facility] [id number] [phone]              Follow-up Information     1. Follow up with [facility]. Go on 4/3/2017.    Why:  Wound Care.  Appt at 10:00 AM check in at 09:45    Contact information    [facility]  [address] [facility] [address]  [facility]  [phone]        2. Follow up with Benigno Grier M.D. In 1 day.    Specialty:  Urology    Contact information    [address]  [address]  [facility] [id number] [phone]           Discharge Medication Instructions:    Below are the medications your physician expects you to take upon discharge:    Review all your home medications and newly ordered medications  with your doctor and/or pharmacist. Follow medication instructions as directed by your doctor and/or pharmacist.    Please keep your medication list with you and share with your physician.               Medication List      START taking these medications        Instructions    amoxicillin-clavulanate 875-125 MG Tabs   Last time this was given:  1 Tab on 4/2/2017  8:11 AM   Commonly known as:  AUGMENTIN    Take 1 Tab by mouth 2 times a day.   Dose:  1 Tab         CHANGE how you take these medications        Instructions    oxycodone-acetaminophen 5-325 MG Tabs   What changed:  when to take this   Last time this was given:  2 Tabs on 4/1/2017  2:22 PM   Commonly known as:  PERCOCET    Take 1-2 Tabs by mouth every four hours as needed for Moderate Pain.   Dose:  1-2 Tab         CONTINUE taking these medications        Instructions    ALEVE 220 MG Caps   Generic drug:  Naproxen Sodium    Take 220 mg by mouth 1 time daily as needed.   Dose:  220 mg       CENTRUM SILVER PO   Last time this was given:  1 Tab on 4/2/2017  8:11 AM    Take 1 Tab by mouth every day.   Dose:  1 Tab       docusate sodium 100 MG Caps   Last time this was given:  100 mg on 4/1/2017  7:36 PM   Commonly known as:  COLACE    Take 1 Cap by mouth 2 times a day.   Dose:  100 mg       tramadol 50 MG Tabs   Commonly known as:  ULTRAM    Take  mg by mouth every four hours as needed (4-6 hrs).   Dose:   mg       Vitamin B-12 5000 MCG Tbdp    Take 5,000 mcg by mouth every day.   Dose:  5000 mcg         STOP taking these medications     sulfamethoxazole-trimethoprim 800-160 MG tablet   Commonly known as:  BACTRIM DS               Instructions           Diet / Nutrition:    Follow any diet instructions given to you by your doctor or the dietician, including how much salt (sodium) you are allowed each day.    If you are overweight, talk to your doctor about a weight reduction plan.    Activity:    Remain physically active following your doctor's  instructions about exercise and activity.    Rest often.     Any time you become even a little tired or short of breath, SIT DOWN and rest.    Worsening Symptoms:    Report any of the following signs and symptoms to the doctor's office immediately:    *Pain of jaw, arm, or neck  *Chest pain not relieved by medication                               *Dizziness or loss of consciousness  *Difficulty breathing even when at rest   *More tired than usual                                       *Bleeding drainage or swelling of surgical site  *Swelling of feet, ankles, legs or stomach                 *Fever (>100ºF)  *Pink or blood tinged sputum  *Weight gain (3lbs/day or 5lbs /week)           *Shock from internal defibrillator (if applicable)  *Palpitations or irregular heartbeats                *Cool and/or numb extremities    Stroke Awareness    Common Risk Factors for Stroke include:    Age  Atrial Fibrillation  Carotid Artery Stenosis  Diabetes Mellitus  Excessive alcohol consumption  High blood pressure  Overweight   Physical inactivity  Smoking    Warning signs and symptoms of a stroke include:    *Sudden numbness or weakness of the face, arm or leg (especially on one side of the body).  *Sudden confusion, trouble speaking or understanding.  *Sudden trouble seeing in one or both eyes.  *Sudden trouble walking, dizziness, loss of balance or coordination.Sudden severe headache with no known cause.    It is very important to get treatment quickly when a stroke occurs. If you experience any of the above warning signs, call 911 immediately.                   Disclaimer         Quit Smoking / Tobacco Use:    I understand the use of any tobacco products increases my chance of suffering from future heart disease or stroke and could cause other illnesses which may shorten my life. Quitting the use of tobacco products is the single most important thing I can do to improve my health. For further information on smoking / tobacco  cessation call a Toll Free Quit Line at 1-686.735.8921 (*National Cancer [place]) or 1-522.194.4013 (American Lung Association) or you can access the web based program at www.lungusa.org.    [place] Tobacco Users Help Line:  (105) 808-8624       Toll Free: 1-743.979.2610  Quit Tobacco Program [facility] ([phone]    Crisis Hotline:    [place] Crisis Hotline:  2-807-BWMIMOE or 1-437.388.2169    [place] Crisis Hotline:    1-970.409.2994 or 182-689-2800    Discharge Survey:   Thank you for choosing [facility]. We hope we did everything we could to make your hospital stay a pleasant one. You may be receiving a phone survey and we would appreciate your time and participation in answering the questions. Your input is very valuable to us in our efforts to improve our service to our patients and their families.        My signature on this form indicates that:    1. I have reviewed and understand the above information.  2. My questions regarding this information have been answered to my satisfaction.  3. I have formulated a plan with my discharge nurse to obtain my prescribed medications for home.                  Disclaimer         __________________________________                     __________       ________                       Patient Signature                                                 Date                    Time

## 2017-03-28 NOTE — ED PROVIDER NOTES
ED Provider Note    Scribed for Alvaro Sheth M.D. by Jesus Hinojosa. 3/28/2017  12:51 PM    Primary care provider: Caden Rodarte M.D.  Means of arrival: walk-in  History obtained from: Patient  History limited by: None    CHIEF COMPLAINT  Chief Complaint   Patient presents with   • Sent by MD SOMERS  Sebastien Horn is a 66 y.o. male who presents to the Emergency Department for evaluation of post-op pain. Per patient, he had a penile sphincter put in yesterday as a consequence of prostate surgery from a few years ago. He was rushed back into surgery after due to bleeding and clotting, but states that has resolved. He is experiencing associated redness and swelling in the affected area. The patient states he is feeling discomfort currently, but no pain. However, last night, he had to take seven of his pain pills to relieve his pain. The patient was sent over by Dr. Grier (Urology) to have the sphincter removed. He reports his last meal was at 8:00AM, but he could not eat as much as he usually could, and his last drink of water was at 9:00AM. He adds that he has been urinating, but has not been urinating like he normally does. The patient denies dysuria or frequency with urination.      REVIEW OF SYSTEMS  Pertinent negatives include no dysuria, frequency with urination. As above, all other systems reviewed and are negative.   See HPI for further details.     PAST MEDICAL HISTORY   has a past medical history of Unspecified cataract; Urinary incontinence; Hemorrhagic disorder (CMS-HCC); Arthritis; and Cancer (CMS-HCC) (07/13).    SURGICAL HISTORY   has past surgical history that includes other orthopedic surgery (2003); other orthopedic surgery; knee arthroplasty total (7/3/2014); knee arthroscopy (7/3/2014); meniscectomy (7/3/2014); knee replacement, total (Right, 2014); inguinal hernia repair (6/1/2009); inguinal hernia laparoscopic bilateral (Bilateral); prostatectomy, radical retro (8/3/2015); node dissection  "(Bilateral, 8/3/2015); cystoscopy (3/20/2017); sphincter prosthesis placement (3/20/2017); evacuation of hematoma (3/21/2017); and cystoscopy (3/21/2017).    SOCIAL HISTORY  Social History   Substance Use Topics   • Smoking status: Never Smoker    • Smokeless tobacco: Never Used   • Alcohol Use: No      History   Drug Use No       FAMILY HISTORY  Family History   Problem Relation Age of Onset   • Hyperlipidemia Mother    • Hyperlipidemia Father    • Cancer Father      Prostate and Lung   • Lung Disease Father    • Other Sister      Lupus       CURRENT MEDICATIONS  No current facility-administered medications on file prior to encounter.     Current Outpatient Prescriptions on File Prior to Encounter   Medication Sig Dispense Refill   • sulfamethoxazole-trimethoprim (BACTRIM DS) 800-160 MG tablet Take 1 Tab by mouth 2 times a day. 14 Tab 0   • docusate sodium (COLACE) 100 MG Cap Take 1 Cap by mouth 2 times a day. 20 Cap 0   • oxycodone-acetaminophen (PERCOCET) 5-325 MG Tab Take 1-2 Tabs by mouth every 6 hours as needed for Moderate Pain. 20 Tab 0   • Cyanocobalamin (VITAMIN B-12) 5000 MCG TABLET DISPERSIBLE Take 5,000 mcg by mouth every day.     • Multiple Vitamins-Minerals (CENTRUM SILVER PO) Take 1 Tab by mouth every day.     • Naproxen Sodium (ALEVE) 220 MG CAPS Take 220 mg by mouth 1 time daily as needed.        ALLERGIES  No Known Allergies    PHYSICAL EXAM  VITAL SIGNS: /70 mmHg  Pulse 68  Temp(Src) 36.8 °C (98.2 °F)  Resp 16  Ht 1.829 m (6' 0.01\")  Wt 70.6 kg (155 lb 10.3 oz)  BMI 21.10 kg/m2  SpO2 96%    Constitutional: Well developed, Well nourished, No acute distress, Non-toxic appearance.   HENT: Normocephalic, Atraumatic, Bilateral external ears normal, Oropharynx is clear mucous membranes are dry. No oral exudates or nasal discharge.   Cardiovascular: Regular rate and rhythm without murmur rub or gallop.  Thorax & Lungs: Clear breath sounds bilaterally without wheezes, rhonchi or rales. There " is no chest wall tenderness.   Abdomen: Soft non-tender non-distended. There is no rebound or guarding. No organomegaly is appreciated. Bowel sounds are normal.  Skin: Normal without rash.   Genitourinary: Horizontal 5 cm incision to the right of midline, swollen scrotum with erythema, swollen bilateral inguinal right greater than left. No wound dehiscence, no discharge  Musculoskeletal: Good range of motion in all major joints. No tenderness to palpation or major deformities noted.   Neurologic: Alert & oriented x 3, Normal motor function, Normal sensory function, No focal deficits noted. Reflexes are normal.  Psychiatric: Affect normal, Judgment normal, Mood normal. There is no suicidal ideation or patient reported hallucinations.       DIAGNOSTIC STUDIES / PROCEDURES    LABS  Labs Reviewed   CBC WITH DIFFERENTIAL - Abnormal; Notable for the following:     RBC 4.39 (*)     Hematocrit 41.9 (*)     Neutrophils-Polys 78.60 (*)     Lymphocytes 13.90 (*)     Lymphs (Absolute) 0.96 (*)     All other components within normal limits   COMP METABOLIC PANEL   ESTIMATED GFR      All labs reviewed by me.    COURSE & MEDICAL DECISION MAKING  Nursing notes, VS, PMSFHx reviewed in chart.    12:51 PM Patient seen and examined at bedside. Ordered for CBC with differential, CMP to evaluate. Patient was treated with 4 mg Zofran, 1000 mL NS infusion, 4 mg Morphine for his symptoms.       1:06 PM - I discussed the patient's case and the above findings with Dr. Grier (Urology) who advises the patient be sent to the OR to evacuate his seroma which may be infected. The patient understands this plan of care. His white blood cell count is not elevated but he does have a left shift of 79% PMNs. Metabolic panel is pending but will not impede his migration to the operating room where the patient can get further postoperative course advised    1:10 PM - I reevaluated the patient at bedside. He is resting comfortably. I updated him on Dr. Grier's  advise to send him to the OR to have his sphincter removed. He understood and verbalized agreement.     Patient has had high blood pressure while in the emergency department, felt likely secondary to medical condition. Counseled patient to monitor blood pressure at home and follow up with primary care physician.      The patient will return for new or worsening symptoms and is stable at the time of discharge.    DISPOSITION:  Patient will be discharged home in stable condition.    FINAL IMPRESSION  1. Post-operative infection    2. Pain in the groin, right          Jesus YOUNG (Scribe), am scribing for, and in the presence of, Alvaro Sheth M.D..    Electronically signed by: Jesus Hinojosa (Scottieibe), 3/28/2017    Alvaro YOUNG M.D. personally performed the services described in this documentation, as scribed by Jesus Hinojosa in my presence, and it is both accurate and complete.    The note accurately reflects work and decisions made by me.  Alvaro Sheth  3/28/2017  1:35 PM

## 2017-03-29 PROBLEM — T83.9XXA: Status: ACTIVE | Noted: 2017-03-29

## 2017-03-29 PROBLEM — I50.30 (HFPEF) HEART FAILURE WITH PRESERVED EJECTION FRACTION (HCC): Chronic | Status: ACTIVE | Noted: 2017-03-28

## 2017-03-29 LAB
ANION GAP SERPL CALC-SCNC: 7 MMOL/L (ref 0–11.9)
BUN SERPL-MCNC: 9 MG/DL (ref 8–22)
CALCIUM SERPL-MCNC: 10.1 MG/DL (ref 8.5–10.5)
CHLORIDE SERPL-SCNC: 98 MMOL/L (ref 96–112)
CO2 SERPL-SCNC: 27 MMOL/L (ref 20–33)
CREAT SERPL-MCNC: 0.72 MG/DL (ref 0.5–1.4)
GFR SERPL CREATININE-BSD FRML MDRD: >60 ML/MIN/1.73 M 2
GLUCOSE SERPL-MCNC: 154 MG/DL (ref 65–99)
POTASSIUM SERPL-SCNC: 3.7 MMOL/L (ref 3.6–5.5)
SODIUM SERPL-SCNC: 132 MMOL/L (ref 135–145)

## 2017-03-29 PROCEDURE — 36415 COLL VENOUS BLD VENIPUNCTURE: CPT

## 2017-03-29 PROCEDURE — 700102 HCHG RX REV CODE 250 W/ 637 OVERRIDE(OP): Performed by: PHYSICIAN ASSISTANT

## 2017-03-29 PROCEDURE — 700112 HCHG RX REV CODE 229: Performed by: UROLOGY

## 2017-03-29 PROCEDURE — 700111 HCHG RX REV CODE 636 W/ 250 OVERRIDE (IP): Performed by: UROLOGY

## 2017-03-29 PROCEDURE — A9270 NON-COVERED ITEM OR SERVICE: HCPCS | Performed by: PHYSICIAN ASSISTANT

## 2017-03-29 PROCEDURE — A9270 NON-COVERED ITEM OR SERVICE: HCPCS | Performed by: UROLOGY

## 2017-03-29 PROCEDURE — 700111 HCHG RX REV CODE 636 W/ 250 OVERRIDE (IP): Performed by: PHARMACIST

## 2017-03-29 PROCEDURE — 770001 HCHG ROOM/CARE - MED/SURG/GYN PRIV*

## 2017-03-29 PROCEDURE — 700102 HCHG RX REV CODE 250 W/ 637 OVERRIDE(OP): Performed by: UROLOGY

## 2017-03-29 PROCEDURE — 700111 HCHG RX REV CODE 636 W/ 250 OVERRIDE (IP): Performed by: PHYSICIAN ASSISTANT

## 2017-03-29 PROCEDURE — 700105 HCHG RX REV CODE 258: Performed by: PHARMACIST

## 2017-03-29 PROCEDURE — 80048 BASIC METABOLIC PNL TOTAL CA: CPT

## 2017-03-29 PROCEDURE — 700105 HCHG RX REV CODE 258: Performed by: UROLOGY

## 2017-03-29 RX ORDER — OXYCODONE HYDROCHLORIDE AND ACETAMINOPHEN 5; 325 MG/1; MG/1
1-2 TABLET ORAL EVERY 4 HOURS PRN
Status: DISCONTINUED | OUTPATIENT
Start: 2017-03-29 | End: 2017-04-02 | Stop reason: HOSPADM

## 2017-03-29 RX ORDER — LORAZEPAM 2 MG/ML
1 INJECTION INTRAMUSCULAR EVERY 4 HOURS PRN
Status: DISCONTINUED | OUTPATIENT
Start: 2017-03-29 | End: 2017-04-02 | Stop reason: HOSPADM

## 2017-03-29 RX ORDER — MORPHINE SULFATE 4 MG/ML
4 INJECTION, SOLUTION INTRAMUSCULAR; INTRAVENOUS 2 TIMES DAILY PRN
Status: DISCONTINUED | OUTPATIENT
Start: 2017-03-29 | End: 2017-04-02 | Stop reason: HOSPADM

## 2017-03-29 RX ADMIN — OXYCODONE HYDROCHLORIDE AND ACETAMINOPHEN 2 TABLET: 5; 325 TABLET ORAL at 07:31

## 2017-03-29 RX ADMIN — LORAZEPAM 1 MG: 2 INJECTION INTRAMUSCULAR; INTRAVENOUS at 15:39

## 2017-03-29 RX ADMIN — ATROPA BELLADONNA AND OPIUM 60 MG: 16.2; 6 SUPPOSITORY RECTAL at 15:03

## 2017-03-29 RX ADMIN — VANCOMYCIN HYDROCHLORIDE 1100 MG: 100 INJECTION, POWDER, LYOPHILIZED, FOR SOLUTION INTRAVENOUS at 17:18

## 2017-03-29 RX ADMIN — FAMOTIDINE 20 MG: 20 TABLET, FILM COATED ORAL at 08:51

## 2017-03-29 RX ADMIN — ENOXAPARIN SODIUM 40 MG: 100 INJECTION SUBCUTANEOUS at 14:08

## 2017-03-29 RX ADMIN — CYANOCOBALAMIN TAB 500 MCG 5000 MCG: 500 TAB at 12:20

## 2017-03-29 RX ADMIN — OXYCODONE HYDROCHLORIDE AND ACETAMINOPHEN 2 TABLET: 5; 325 TABLET ORAL at 12:19

## 2017-03-29 RX ADMIN — SODIUM CHLORIDE, POTASSIUM CHLORIDE, SODIUM LACTATE AND CALCIUM CHLORIDE: 600; 310; 30; 20 INJECTION, SOLUTION INTRAVENOUS at 20:45

## 2017-03-29 RX ADMIN — MULTIPLE VITAMINS W/ MINERALS TAB 1 TABLET: TAB at 08:51

## 2017-03-29 RX ADMIN — FAMOTIDINE 20 MG: 20 TABLET, FILM COATED ORAL at 20:42

## 2017-03-29 RX ADMIN — SODIUM CHLORIDE, POTASSIUM CHLORIDE, SODIUM LACTATE AND CALCIUM CHLORIDE: 600; 310; 30; 20 INJECTION, SOLUTION INTRAVENOUS at 03:59

## 2017-03-29 RX ADMIN — CEFTRIAXONE SODIUM 1 G: 1 INJECTION, POWDER, FOR SOLUTION INTRAMUSCULAR; INTRAVENOUS at 08:51

## 2017-03-29 RX ADMIN — DOCUSATE SODIUM 100 MG: 100 CAPSULE ORAL at 08:51

## 2017-03-29 RX ADMIN — HYDROMORPHONE HYDROCHLORIDE 0.5 MG: 1 INJECTION, SOLUTION INTRAMUSCULAR; INTRAVENOUS; SUBCUTANEOUS at 04:00

## 2017-03-29 RX ADMIN — OXYCODONE HYDROCHLORIDE AND ACETAMINOPHEN 2 TABLET: 5; 325 TABLET ORAL at 01:11

## 2017-03-29 RX ADMIN — VANCOMYCIN HYDROCHLORIDE 1100 MG: 100 INJECTION, POWDER, LYOPHILIZED, FOR SOLUTION INTRAVENOUS at 03:59

## 2017-03-29 RX ADMIN — DOCUSATE SODIUM 100 MG: 100 CAPSULE ORAL at 20:42

## 2017-03-29 RX ADMIN — OXYCODONE HYDROCHLORIDE AND ACETAMINOPHEN 2 TABLET: 5; 325 TABLET ORAL at 17:18

## 2017-03-29 RX ADMIN — MORPHINE SULFATE 4 MG: 4 INJECTION INTRAVENOUS at 14:08

## 2017-03-29 ASSESSMENT — PAIN SCALES - GENERAL
PAINLEVEL_OUTOF10: 7
PAINLEVEL_OUTOF10: 6
PAINLEVEL_OUTOF10: 3
PAINLEVEL_OUTOF10: 7

## 2017-03-29 NOTE — PROGRESS NOTES
Report received from day shift RN, patient care assumed at 1900. Patient assessment as documented. Patient denies pain or discomfort at this time. Breathing is even and unlabored on 2L02 with . Patient is ambulatory with SBA. SCDs in use. Patient is resting in bed. Bed in low position, call light within reach. Hourly rounding in place. Patient has no further complaints at this time, will continue to monitor.    Lower abdominal incision with CDI dressing noted with moderate serosanguinous drainage.  Penrose drain x2 noted to lower abdomen.   CDI dressing noted to scrotum.

## 2017-03-29 NOTE — PROGRESS NOTES
Patient P.D admitted to T409 just before 1900.  AO x 4, ford to gravity with + clear yellow urine. Medicated for 7/10 low abdomen and scrotal pain.  Up to edge of bed and took several steps for bed to [facility].  Steady gait.  Oriented to unit and introduced to staff.  Safety and fall precautions discussed. Admit profile completed.  White board and hourly rounding discussed.  Plan of care discussed, questions answered, verbalized understanding.

## 2017-03-29 NOTE — PROGRESS NOTES
"Pharmacy Kinetics 66 y.o. male on vancomycin day # 2 3/29/2017    Currently on Vancomycin 1100 mg iv q12hr    Indication for Treatment: infected urinary prosthesis    Pertinent history per medical record: Admitted on 3/28/2017 for increasing pain, and difficulty urination.  Patient had urinary sphincter placed on 3/22 for management of urinary incontinence (s/p prostatectomy).  On 3/25 patient returned to surgery to address hematuria and for clot evacuation.  Patient returned to OR today for removal of infected urinary sphincter.    Other antibiotics: ceftriaxone 1000 mg iv Q24H    Allergies: Review of patient's allergies indicates no known allergies.     List concerns for renal function: SCr is double usual baseline, contrast on 3/28     Pertinent cultures to date:   3/28/17 wound, abdominal: rare WBC's    Recent Labs      17   1220   WBC  6.9   NEUTSPOLYS  78.60*     Recent Labs      17   1220   BUN  15   CREATININE  1.12   ALBUMIN  4.9     No results for input(s): VANCOTROUGH, VANCOPEAK, VANCORANDOM in the last 72 hours.  Intake/Output Summary (Last 24 hours) at 17 0945  Last data filed at 17 0800   Gross per 24 hour   Intake   1720 ml   Output   2250 ml   Net   -530 ml      Blood pressure 134/67, pulse 62, temperature 37.5 °C (99.5 °F), resp. rate 16, height 1.829 m (6' 0.01\"), weight 70.6 kg (155 lb 10.3 oz), SpO2 99 %. Temp (24hrs), Av.9 °C (98.4 °F), Min:36.2 °C (97.2 °F), Max:37.8 °C (100 °F)      A/P   1. Vancomycin dose change: not indicated at this time  2. Next vancomycin level: 0430 tomorrow   3. Goal trough: 12-16 mcg/mL  4. Comments: I have ordered a stat BMP.  If his SCr is trending down toward baseline, I will reschedule to trough level to prior to the 0500 dose tomorrow.    Jacqueline León, PharmD.        "

## 2017-03-29 NOTE — WOUND TEAM
"TC from staff RN regarding removing and re packing surgical wounds x 3 on this patient.  She reports she is unable to remove packing as it is stuck.  Removed perineal packing completely wlith penrose drain intact.  Note that two matthew of strip gauze extending from RLQ wound and two penrose drains intact.  Also note strip gauze protuding as a loop at distal side of right scrotum.  Removed abdominal packing completely with some difficulty as seemed to be caught in penrose drain.  Was able to remove ~15\" packing from scrotal wound as loop of packing removed from scrotal wound but then unable to remove completely as it seems involved with drain as scrotum pulling up with tension on packing.  TC to Alexsander Griffith PA-C who reports to leave packing in place and fill other wounds as ordered and he will evaluate wound in AM.  Filled both wounds with 1/4\" iodoform strip gauze and then note that distal scrotal wound unable to probe except ~1 cm.  TC Alexsander Griffith PA-C again with report of inability to fill scrotal wound from scrotal opening.  Staff RN completed outer dressing change.  "

## 2017-03-29 NOTE — PROGRESS NOTES
"Urology Progress Note     65 y/o M s/p Removal of Infected AUS POD #1    Overnight Events: None    S: Patient is doing well.  His pain is not fully controlled on Percocet q 6 hours.  He was taking q 4 at home which was better.  No problems with the Diaz.  No fever or chills.  No N/V/D.  NO CP/HA/SOB.  He is tolerating a regular diet.      All other systems were reviewed and are negative except for above.    O:   Blood pressure 142/77, pulse 65, temperature 37.4 °C (99.3 °F), resp. rate 16, height 1.829 m (6' 0.01\"), weight 70.6 kg (155 lb 10.3 oz), SpO2 97 %.  Recent Labs      03/28/17   1220  03/29/17   0952   SODIUM  131*  132*   POTASSIUM  4.0  3.7   CHLORIDE  96  98   CO2  26  27   GLUCOSE  107*  154*   BUN  15  9   CREATININE  1.12  0.72   CALCIUM  11.2*  10.1     Recent Labs      03/28/17   1220   WBC  6.9   RBC  4.39*   HEMOGLOBIN  14.3   HEMATOCRIT  41.9*   MCV  95.4   MCH  32.6   MCHC  34.1   RDW  45.1   PLATELETCT  258   MPV  10.7         Intake/Output Summary (Last 24 hours) at 03/29/17 1231  Last data filed at 03/29/17 1141   Gross per 24 hour   Intake   1720 ml   Output   2810 ml   Net  -1090 ml       Exam:  A & O x 3, NAD   Abdomen soft, NTTP x 4.  RLQ inicision site with penrose drains in place and serous drainage at the site of a  iodoform packing.   Diaz in place with clear urine.  Scrotal wound with iodoform packing and serous drainage.  Right Testicle is  tender to palpation with slight induration. Scrotum with mild ecchymosis, no gangrene.     Perineal tenderness to palpation with serous drainage at the penrose and iodoform packing site.    A/P:    Active Hospital Problems    Diagnosis   • Complication of implanted penile prosthesis (HCC) [T83.9XXA]   • (HFpEF) heart failure with preserved ejection fraction (CMS-HCC) [I50.30]       Impression    1) S/P Removal of Infected AUS POD #1    Plan    1) Will increase PO Percocet to q 4 hours and add Morhpine 4mg IV for dressing changes, RN given " verbal order.  2) Iodoform packing to be removed and replaced today.  3) Continue Diaz.  4) Continue antibiotics.  5) AM labs  6) Social work to set up home health for dressing changes.  Face to face and order done.   7) Possible home tomorrow if doing well.

## 2017-03-29 NOTE — HEART FAILURE PROGRAM
Cardiovascular Nurse Navigator () Progress Note:     This [employer] has paged DANIEL Beltre with [facility] [place] regarding patient's Heart Failure with Preserved EF admission diagnosis. Per Alexsander, pt is not in exacerbation of HF and it will be changed to Chronic in the problems list.

## 2017-03-29 NOTE — DISCHARGE PLANNING
Attempted to complete screening on three different occasions. Each time the patient was with medical staff with the door closed. Will attempt to revisit at a later time.

## 2017-03-30 LAB
ANION GAP SERPL CALC-SCNC: 5 MMOL/L (ref 0–11.9)
BACTERIA UR CULT: NORMAL
BASOPHILS # BLD AUTO: 0.5 % (ref 0–1.8)
BASOPHILS # BLD: 0.04 K/UL (ref 0–0.12)
BUN SERPL-MCNC: 8 MG/DL (ref 8–22)
CALCIUM SERPL-MCNC: 9.8 MG/DL (ref 8.5–10.5)
CHLORIDE SERPL-SCNC: 99 MMOL/L (ref 96–112)
CO2 SERPL-SCNC: 29 MMOL/L (ref 20–33)
CREAT SERPL-MCNC: 0.53 MG/DL (ref 0.5–1.4)
EOSINOPHIL # BLD AUTO: 0.12 K/UL (ref 0–0.51)
EOSINOPHIL NFR BLD: 1.4 % (ref 0–6.9)
ERYTHROCYTE [DISTWIDTH] IN BLOOD BY AUTOMATED COUNT: 45.7 FL (ref 35.9–50)
GFR SERPL CREATININE-BSD FRML MDRD: >60 ML/MIN/1.73 M 2
GLUCOSE SERPL-MCNC: 111 MG/DL (ref 65–99)
HCT VFR BLD AUTO: 37.9 % (ref 42–52)
HGB BLD-MCNC: 12.9 G/DL (ref 14–18)
IMM GRANULOCYTES # BLD AUTO: 0.02 K/UL (ref 0–0.11)
IMM GRANULOCYTES NFR BLD AUTO: 0.2 % (ref 0–0.9)
LYMPHOCYTES # BLD AUTO: 1.02 K/UL (ref 1–4.8)
LYMPHOCYTES NFR BLD: 12.2 % (ref 22–41)
MCH RBC QN AUTO: 33.2 PG (ref 27–33)
MCHC RBC AUTO-ENTMCNC: 34 G/DL (ref 33.7–35.3)
MCV RBC AUTO: 97.4 FL (ref 81.4–97.8)
MONOCYTES # BLD AUTO: 0.99 K/UL (ref 0–0.85)
MONOCYTES NFR BLD AUTO: 11.8 % (ref 0–13.4)
NEUTROPHILS # BLD AUTO: 6.18 K/UL (ref 1.82–7.42)
NEUTROPHILS NFR BLD: 73.9 % (ref 44–72)
NRBC # BLD AUTO: 0 K/UL
NRBC BLD AUTO-RTO: 0 /100 WBC
PLATELET # BLD AUTO: 224 K/UL (ref 164–446)
PMV BLD AUTO: 10.2 FL (ref 9–12.9)
POTASSIUM SERPL-SCNC: 4.1 MMOL/L (ref 3.6–5.5)
RBC # BLD AUTO: 3.89 M/UL (ref 4.7–6.1)
SIGNIFICANT IND 70042: NORMAL
SITE SITE: NORMAL
SODIUM SERPL-SCNC: 133 MMOL/L (ref 135–145)
SOURCE SOURCE: NORMAL
VANCOMYCIN TROUGH SERPL-MCNC: 7.1 UG/ML (ref 10–20)
WBC # BLD AUTO: 8.4 K/UL (ref 4.8–10.8)

## 2017-03-30 PROCEDURE — 700102 HCHG RX REV CODE 250 W/ 637 OVERRIDE(OP): Performed by: UROLOGY

## 2017-03-30 PROCEDURE — 700105 HCHG RX REV CODE 258: Performed by: UROLOGY

## 2017-03-30 PROCEDURE — A9270 NON-COVERED ITEM OR SERVICE: HCPCS | Performed by: UROLOGY

## 2017-03-30 PROCEDURE — 700112 HCHG RX REV CODE 229: Performed by: UROLOGY

## 2017-03-30 PROCEDURE — 80202 ASSAY OF VANCOMYCIN: CPT

## 2017-03-30 PROCEDURE — 770001 HCHG ROOM/CARE - MED/SURG/GYN PRIV*

## 2017-03-30 PROCEDURE — 700111 HCHG RX REV CODE 636 W/ 250 OVERRIDE (IP): Performed by: PHARMACIST

## 2017-03-30 PROCEDURE — 700111 HCHG RX REV CODE 636 W/ 250 OVERRIDE (IP): Performed by: UROLOGY

## 2017-03-30 PROCEDURE — 700105 HCHG RX REV CODE 258: Performed by: PHARMACIST

## 2017-03-30 PROCEDURE — 36415 COLL VENOUS BLD VENIPUNCTURE: CPT

## 2017-03-30 PROCEDURE — A9270 NON-COVERED ITEM OR SERVICE: HCPCS | Performed by: PHYSICIAN ASSISTANT

## 2017-03-30 PROCEDURE — 85025 COMPLETE CBC W/AUTO DIFF WBC: CPT

## 2017-03-30 PROCEDURE — 700111 HCHG RX REV CODE 636 W/ 250 OVERRIDE (IP)

## 2017-03-30 PROCEDURE — 700102 HCHG RX REV CODE 250 W/ 637 OVERRIDE(OP): Performed by: PHYSICIAN ASSISTANT

## 2017-03-30 PROCEDURE — 700105 HCHG RX REV CODE 258

## 2017-03-30 PROCEDURE — 700111 HCHG RX REV CODE 636 W/ 250 OVERRIDE (IP): Performed by: PHYSICIAN ASSISTANT

## 2017-03-30 PROCEDURE — 80048 BASIC METABOLIC PNL TOTAL CA: CPT

## 2017-03-30 RX ADMIN — MORPHINE SULFATE 2 MG: 4 INJECTION INTRAVENOUS at 08:31

## 2017-03-30 RX ADMIN — MORPHINE SULFATE 4 MG: 4 INJECTION INTRAVENOUS at 08:23

## 2017-03-30 RX ADMIN — ENOXAPARIN SODIUM 40 MG: 100 INJECTION SUBCUTANEOUS at 10:49

## 2017-03-30 RX ADMIN — VANCOMYCIN HYDROCHLORIDE 1100 MG: 100 INJECTION, POWDER, LYOPHILIZED, FOR SOLUTION INTRAVENOUS at 05:09

## 2017-03-30 RX ADMIN — OXYCODONE HYDROCHLORIDE AND ACETAMINOPHEN 2 TABLET: 5; 325 TABLET ORAL at 15:37

## 2017-03-30 RX ADMIN — DOCUSATE SODIUM 100 MG: 100 CAPSULE ORAL at 10:48

## 2017-03-30 RX ADMIN — CYANOCOBALAMIN TAB 500 MCG 5000 MCG: 500 TAB at 10:47

## 2017-03-30 RX ADMIN — VANCOMYCIN HYDROCHLORIDE 900 MG: 100 INJECTION, POWDER, LYOPHILIZED, FOR SOLUTION INTRAVENOUS at 20:03

## 2017-03-30 RX ADMIN — MULTIPLE VITAMINS W/ MINERALS TAB 1 TABLET: TAB at 10:48

## 2017-03-30 RX ADMIN — SODIUM CHLORIDE, POTASSIUM CHLORIDE, SODIUM LACTATE AND CALCIUM CHLORIDE: 600; 310; 30; 20 INJECTION, SOLUTION INTRAVENOUS at 05:09

## 2017-03-30 RX ADMIN — FAMOTIDINE 20 MG: 20 TABLET, FILM COATED ORAL at 20:03

## 2017-03-30 RX ADMIN — CEFTRIAXONE SODIUM 1 G: 1 INJECTION, POWDER, FOR SOLUTION INTRAMUSCULAR; INTRAVENOUS at 10:46

## 2017-03-30 RX ADMIN — VANCOMYCIN HYDROCHLORIDE 900 MG: 100 INJECTION, POWDER, LYOPHILIZED, FOR SOLUTION INTRAVENOUS at 12:31

## 2017-03-30 RX ADMIN — FAMOTIDINE 20 MG: 20 TABLET, FILM COATED ORAL at 10:48

## 2017-03-30 RX ADMIN — OXYCODONE HYDROCHLORIDE AND ACETAMINOPHEN 2 TABLET: 5; 325 TABLET ORAL at 05:42

## 2017-03-30 RX ADMIN — DOCUSATE SODIUM 100 MG: 100 CAPSULE ORAL at 20:03

## 2017-03-30 RX ADMIN — SODIUM CHLORIDE, POTASSIUM CHLORIDE, SODIUM LACTATE AND CALCIUM CHLORIDE: 600; 310; 30; 20 INJECTION, SOLUTION INTRAVENOUS at 20:03

## 2017-03-30 ASSESSMENT — PAIN SCALES - GENERAL
PAINLEVEL_OUTOF10: 8
PAINLEVEL_OUTOF10: 4
PAINLEVEL_OUTOF10: 0
PAINLEVEL_OUTOF10: 8

## 2017-03-30 ASSESSMENT — COPD QUESTIONNAIRES
COPD SCREENING SCORE: 2
DO YOU EVER COUGH UP ANY MUCUS OR PHLEGM?: NO/ONLY WITH OCCASIONAL COLDS OR INFECTIONS
HAVE YOU SMOKED AT LEAST 100 CIGARETTES IN YOUR ENTIRE LIFE: NO/DON'T KNOW
DURING THE PAST 4 WEEKS HOW MUCH DID YOU FEEL SHORT OF BREATH: NONE/LITTLE OF THE TIME

## 2017-03-30 NOTE — PROGRESS NOTES
Assumed care of Mr Horn at 1900.    Pt is A&O x4.  Pain 6/10.  Pt describes as tolerable   Nausea denied  Tolerating Diet   Lower abdominal incision.  Dressing in place, clean dry intact  Taint incision, Open to air, penrose drain, scant amount of serous drainage  Positive Urine output into ford  Negative BM Void   Negative Flatus  Up Self   Bed in lowest position and locked.    ** Bed alarm refused despite pt education on fall risk.  Pt is A&O x4 with steady gait and demonstrates appropriate use of call light to call for assistance.  CLIP, hourly rounding in place.    Pt resting comfortably now.  Review plan of care with patient  Call light within reach  Hourly rounds in place  All needs met at this time

## 2017-03-30 NOTE — PROGRESS NOTES
IV was leaking this morning at 0830. After giving morning meds, I placed a new IV with Professor. Patient tolerated well. Other IV was taken out. ABX (See MAR) is running now. Pt. Is alert and oriented, pleasant, and is not in pain. Bed is in lowest position, bedrails up, call light within in reach.

## 2017-03-30 NOTE — PROGRESS NOTES
Dr. Grier at bedside 0830 - DrCharles removed old wound strip packing from abdominal incision, and replaced with new.  Pre-medicated for pain with 6 mg morphine.  Penrose drains in place.  Dry gauze and brenna pad replaced.  Diaz intact to gravity with moderate clear urine present.  VSS.  SpO2: 95% on room air.

## 2017-03-30 NOTE — CARE PLAN
Problem: Safety  Goal: Will remain free from falls  Outcome: PROGRESSING AS EXPECTED  Pt remains free from fall at this time.  Pt educated on fall risk.  Pt demonstrates appropriate use of call light to call for assistance.  CLIP, hourly rounding in place.      Problem: Venous Thromboembolism (VTW)/Deep Vein Thrombosis (DVT) Prevention:  Goal: Patient will participate in Venous Thrombosis (VTE)/Deep Vein Thrombosis (DVT)Prevention Measures  Outcome: PROGRESSING AS EXPECTED    03/29/17 2051   OTHER   Risk Assessment Score 4   VTE RISK High   Mechanical Prophylaxis SCDs, Sequentials (Intermittent Pneumatic Compression Devices)   Pharmacologic Prophylaxis Used LMWH: Enoxaparin(Lovenox)

## 2017-03-30 NOTE — DISCHARGE PLANNING
Care Transition Team Assessment    Information Source  Orientation : Oriented x 4  Information Given By: Patient  Informant's Name: Sebastien  Who is responsible for making decisions for patient? : Patient    Readmission Evaluation  Is this a readmission?: Yes - unplanned readmission  Why do you think you were readmitted?: unknown  Was an appointment arranged for you prior to discharge?:  (unknown)  Were there new prescriptions you were supposed to fill after you were discharged?: Yes, prescriptions filled  Did you understand your discharge instructions?: Yes  Did you have enough support after your last discharge?: Yes    Elopement Risk  Legal Hold: No  Ambulatory or Self Mobile in Wheelchair: Yes  Disoriented: No  Psychiatric Symptoms: None  History of Wandering: No  Elopement this Admit: No  Vocalizing Wanting to Leave: No  Displays Behaviors, Body Language Wanting to Leave: No-Not at Risk for Elopement  Elopement Risk: Not at Risk for Elopement    Interdisciplinary Discharge Planning  Does Admitting Nurse Feel This Could be a Complex Discharge?: No  Primary Care Physician: Dr. Loco Rodarte  Lives with - Patient's Self Care Capacity: Alone and Able to Care For Self  Patient or legal guardian wants to designate a caregiver (see row info): No  Support Systems: None  Housing / Facility: 2 Story House  Do You Take your Prescribed Medications Regularly: Yes  Able to Return to Previous ADL's: No  Mobility Issues: No  Prior Services: None  Patient Expects to be Discharged to:: home  Assistance Needed: No  Durable Medical Equipment: Not Applicable    Discharge Preparedness  What is your plan after discharge?: Home with help  What are your discharge supports?: Other (comment) (friends)  Prior Functional Level: Ambulatory  Difficulity with ADLs: None  Difficulity with IADLs: None    Functional Assesment  Prior Functional Level: Ambulatory    Finances  Financial Barriers to Discharge: No  Prescription Coverage: Yes    Vision  / Hearing Impairment  Vision Impairment : Yes  Right Eye Vision: Wears Glasses  Left Eye Vision: Wears Glasses  Hearing Impairment : Yes  Hearing Impairment: Both Ears, Hearing Device Not Available  Does Pt Need Special Equipment for the Hearing Impaired?: No (has trouble with backgound noise, otherwise, states he is ok)    Values / Beliefs / Concerns  Values / Beliefs Concerns : No    Advance Directive  Advance Directive?: Living Will    Domestic Abuse  Have you ever been the victim of abuse or violence?: No  Physical Abuse or Sexual Abuse: No  Verbal Abuse or Emotional Abuse: No  Possible Abuse Reported to:: Not Applicable    Psychological Assessment  History of Substance Abuse: None  History of Psychiatric Problems: No  Non-compliant with Treatment: No  Newly Diagnosed Illness: No    Discharge Risks or Barriers  Discharge risks or barriers?: No  Patient risk factors: Readmission    Anticipated Discharge Information  Anticipated discharge disposition: Home  Discharge Address: [facility] [address] [place], [address]  Discharge Contact Phone Number: [phone]

## 2017-03-30 NOTE — PROGRESS NOTES
"Urology Progress Note    67 y/o M s/p Removal of Infected AUS POD #2    Overnight Events: None    S: Doing well.  Scrotal pain controlled with oral pain medication.  NO fever or chills. No N/V/D.  NO CP/HA/SOB.  He is tolerating a regular diet and passing gas.  His dressing was changed with Dr. Grier this morning.  Home health is being arranged.     All other systems were reviewed and are negative except for above.    O:   Blood pressure 111/60, pulse 60, temperature 36.7 °C (98 °F), resp. rate 18, height 1.829 m (6' 0.01\"), weight 70.6 kg (155 lb 10.3 oz), SpO2 98 %.  Recent Labs      03/28/17   1220  03/29/17   0952  03/30/17   0414   SODIUM  131*  132*  133*   POTASSIUM  4.0  3.7  4.1   CHLORIDE  96  98  99   CO2  26  27  29   GLUCOSE  107*  154*  111*   BUN  15  9  8   CREATININE  1.12  0.72  0.53   CALCIUM  11.2*  10.1  9.8     Recent Labs      03/28/17   1220  03/30/17   0414   WBC  6.9  8.4   RBC  4.39*  3.89*   HEMOGLOBIN  14.3  12.9*   HEMATOCRIT  41.9*  37.9*   MCV  95.4  97.4   MCH  32.6  33.2*   MCHC  34.1  34.0   RDW  45.1  45.7   PLATELETCT  258  224   MPV  10.7  10.2     Component Results      Component     Significant Indicator (Positive)     POS     Source     WND     Site     Abdominal Wound     Culture Result Wound (Abnormal)     Gram Stain Result     Rare WBCs.   No organisms seen.        Culture Result Wound (Abnormal)     Group D Enterococcus species   Rare growth   Combination therapy with ampicillin, penicillin, or   vancomycin (for susceptible strains) plus an aminoglycoside   is usually indicated for serious enterococcal infections,   such as endocarditis unless high-level resistance to both   gentamicin and streptomycin is documented; such combinations   are predicted to result in synergistic killing of the   Enterococcus.                Intake/Output Summary (Last 24 hours) at 03/30/17 1433  Last data filed at 03/30/17 1200   Gross per 24 hour   Intake   3440 ml   Output   4150 ml   Net   " -710 ml       Exam:  A & O x 3, NAD              Abdomen soft, NTTP x 4.  RLQ inicision site with penrose drains in place and minimal serous drainage.              Diaz in place with clear urine.  Scrotal wound with iodoform packing and serous drainage.  Right testicle with  decreased induration and tenderness to palpation. Scrotum with minimal ecchymosis, no gangrene.                 Perineal tenderness to palpation with serous drainage at the penrose and iodoform packing site.    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  Will follow cultures.  2) Continue Diaz.  3) Dressing change done by Dr. Grier this morning and according to the patient will be done by him tomorrow.  4) Will plan for home when stable and cultures report.

## 2017-03-31 LAB
BACTERIA WND AEROBE CULT: ABNORMAL
BACTERIA WND AEROBE CULT: ABNORMAL
GRAM STN SPEC: ABNORMAL
SIGNIFICANT IND 70042: ABNORMAL
SITE SITE: ABNORMAL
SOURCE SOURCE: ABNORMAL

## 2017-03-31 PROCEDURE — 770001 HCHG ROOM/CARE - MED/SURG/GYN PRIV*

## 2017-03-31 PROCEDURE — 700111 HCHG RX REV CODE 636 W/ 250 OVERRIDE (IP): Performed by: UROLOGY

## 2017-03-31 PROCEDURE — 700102 HCHG RX REV CODE 250 W/ 637 OVERRIDE(OP): Performed by: UROLOGY

## 2017-03-31 PROCEDURE — 700111 HCHG RX REV CODE 636 W/ 250 OVERRIDE (IP): Performed by: PHYSICIAN ASSISTANT

## 2017-03-31 PROCEDURE — A9270 NON-COVERED ITEM OR SERVICE: HCPCS | Performed by: UROLOGY

## 2017-03-31 PROCEDURE — 700112 HCHG RX REV CODE 229: Performed by: UROLOGY

## 2017-03-31 PROCEDURE — 700105 HCHG RX REV CODE 258: Performed by: UROLOGY

## 2017-03-31 PROCEDURE — 700111 HCHG RX REV CODE 636 W/ 250 OVERRIDE (IP)

## 2017-03-31 PROCEDURE — 700105 HCHG RX REV CODE 258

## 2017-03-31 RX ORDER — SODIUM CHLORIDE 9 MG/ML
INJECTION, SOLUTION INTRAVENOUS
Status: COMPLETED
Start: 2017-03-31 | End: 2017-03-31

## 2017-03-31 RX ADMIN — DOCUSATE SODIUM 100 MG: 100 CAPSULE ORAL at 09:54

## 2017-03-31 RX ADMIN — FAMOTIDINE 20 MG: 20 TABLET, FILM COATED ORAL at 20:40

## 2017-03-31 RX ADMIN — MULTIPLE VITAMINS W/ MINERALS TAB 1 TABLET: TAB at 09:54

## 2017-03-31 RX ADMIN — VANCOMYCIN HYDROCHLORIDE 900 MG: 100 INJECTION, POWDER, LYOPHILIZED, FOR SOLUTION INTRAVENOUS at 05:07

## 2017-03-31 RX ADMIN — SODIUM CHLORIDE, POTASSIUM CHLORIDE, SODIUM LACTATE AND CALCIUM CHLORIDE: 600; 310; 30; 20 INJECTION, SOLUTION INTRAVENOUS at 20:39

## 2017-03-31 RX ADMIN — SODIUM CHLORIDE 500 ML: 9 INJECTION, SOLUTION INTRAVENOUS at 11:02

## 2017-03-31 RX ADMIN — CEFTRIAXONE SODIUM 1 G: 1 INJECTION, POWDER, FOR SOLUTION INTRAMUSCULAR; INTRAVENOUS at 09:54

## 2017-03-31 RX ADMIN — VANCOMYCIN HYDROCHLORIDE 900 MG: 100 INJECTION, POWDER, LYOPHILIZED, FOR SOLUTION INTRAVENOUS at 12:30

## 2017-03-31 RX ADMIN — SODIUM CHLORIDE, POTASSIUM CHLORIDE, SODIUM LACTATE AND CALCIUM CHLORIDE: 600; 310; 30; 20 INJECTION, SOLUTION INTRAVENOUS at 05:08

## 2017-03-31 RX ADMIN — VANCOMYCIN HYDROCHLORIDE 900 MG: 100 INJECTION, POWDER, LYOPHILIZED, FOR SOLUTION INTRAVENOUS at 18:51

## 2017-03-31 RX ADMIN — FAMOTIDINE 20 MG: 20 TABLET, FILM COATED ORAL at 09:54

## 2017-03-31 RX ADMIN — DOCUSATE SODIUM 100 MG: 100 CAPSULE ORAL at 20:40

## 2017-03-31 RX ADMIN — ENOXAPARIN SODIUM 40 MG: 100 INJECTION SUBCUTANEOUS at 09:55

## 2017-03-31 RX ADMIN — MORPHINE SULFATE 4 MG: 4 INJECTION INTRAVENOUS at 07:38

## 2017-03-31 RX ADMIN — CYANOCOBALAMIN TAB 500 MCG 5000 MCG: 500 TAB at 09:54

## 2017-03-31 ASSESSMENT — COPD QUESTIONNAIRES
DO YOU EVER COUGH UP ANY MUCUS OR PHLEGM?: NO/ONLY WITH OCCASIONAL COLDS OR INFECTIONS
DURING THE PAST 4 WEEKS HOW MUCH DID YOU FEEL SHORT OF BREATH: NONE/LITTLE OF THE TIME

## 2017-03-31 ASSESSMENT — PAIN SCALES - GENERAL
PAINLEVEL_OUTOF10: 0

## 2017-03-31 NOTE — CARE PLAN
Problem: Safety  Goal: Will remain free from falls  Outcome: PROGRESSING AS EXPECTED  Pt remains free from fall at this time.  Pt educated on fall risk.  Pt demonstrates understanding by appropriate us of call light to call for assistance.  CLIP, Hourly rounding in place.    Problem: Venous Thromboembolism (VTW)/Deep Vein Thrombosis (DVT) Prevention:  Goal: Patient will participate in Venous Thrombosis (VTE)/Deep Vein Thrombosis (DVT)Prevention Measures  Outcome: PROGRESSING AS EXPECTED    03/30/17 1947 03/30/17 1950   OTHER   Risk Assessment Score 4 --    VTE RISK High --    Mechanical Prophylaxis --  Patient educated regarding VTE risk and need for SCDs, understands and continues to refuse   Pharmacologic Prophylaxis Used LMWH: Enoxaparin(Lovenox) --

## 2017-03-31 NOTE — PROGRESS NOTES
"Pharmacy Kinetics 66 y.o. male on vancomycin day # 4 3/31/2017    Currently on Vancomycin 900 mg q8 hrs    Indication for Treatment: infected urinary prosthesis    Pertinent history per medical record: Admitted on 3/28/2017 for increasing pain, and difficulty urination.  Patient had urinary sphincter placed on 3/22 for management of urinary incontinence (s/p prostatectomy).  On 3/25 patient returned to surgery to address hematuria and for clot evacuation.  Patient returned to OR today for removal of infected urinary sphincter.    Other antibiotics: ceftriaxone 1000 mg iv Q24H    Allergies: Review of patient's allergies indicates no known allergies.     List concerns for renal function: elevated SCr (improved), contrast on 3/28     Pertinent cultures to date:    3/28/17 wound, abdominal: Enterococcus faecalis    Recent Labs      17   0414   WBC  8.4   NEUTSPOLYS  73.90*     Recent Labs      17   0952  17   0414   BUN  9  8   CREATININE  0.72  0.53     Recent Labs      17   0414   VANCOTROUGH  7.1*     Intake/Output Summary (Last 24 hours) at 17 1554  Last data filed at 17 1415   Gross per 24 hour   Intake   3240 ml   Output   2225 ml   Net   1015 ml      Blood pressure 122/75, pulse 63, temperature 36.7 °C (98 °F), resp. rate 16, height 1.829 m (6' 0.01\"), weight 70.6 kg (155 lb 10.3 oz), SpO2 97 %. Temp (24hrs), Av.8 °C (98.3 °F), Min:36.7 °C (98 °F), Max:37.1 °C (98.7 °F)      A/P   1. Vancomycin dose change: continue 900 mg q8 hrs  2. Next vancomycin level: 1-2 days if remains admitted  3. Goal trough: 12-16 mcg/mL  4. Comments: Renal function continues to improve. Wound cultures growing Enterococcus faecalis, awaiting final sensitivities. Pt stable and likely to discharge tomorrow, no further levels planned.     Srinivasan Guthrie, PharmD        "

## 2017-03-31 NOTE — PROGRESS NOTES
Patient A&Ox4  Patient denied any pain, was given pain meds for dressing change on lower abdominal incision.  Patient ford D/C, voiding into urinal   Had two BM in the morning

## 2017-03-31 NOTE — PROGRESS NOTES
Assumed care of Mr Horn at 1900.     Pt is A&O x4.  Pain denied  Nausea denied  Tolerating Diet    Lower abdominal incision.  Dressing changed small amount of serosanguineous drainage  Taint incision, Open to air, penrose drain, scant amount of serous drainage  Positive Urine output into ford  Negative BM Void  Positive Flatus  Up Self    Bed in lowest position and locked.    ** Bed alarm refused despite pt education on fall risk.  Pt is A&O x4 with steady gait and demonstrates appropriate use of call light to call for assistance.  CLIP, hourly rounding in place.    Pt resting comfortably now.  Review plan of care with patient  Call light within reach  Hourly rounds in place  All needs met at this time

## 2017-03-31 NOTE — PROGRESS NOTES
"Urology Progress Note    65 y/o M s/p Removal of Infected AUS POD #3    Overnight Events: None    S: Doing well.  NO pain.  Diaz has been removed and patient is voiding. No dysuria or hematuria. No fever or chills.  No N/V/D.  No CP/HA/SOB.  His dressing was changed by Dr. Grier this morning.  Patient is not comfortable being discharged today and would like to be discharged tomorrow.    All other systems were reviewed and are negative except for above.      O:   Blood pressure 111/62, pulse 58, temperature 37.1 °C (98.7 °F), resp. rate 16, height 1.829 m (6' 0.01\"), weight 70.6 kg (155 lb 10.3 oz), SpO2 95 %.  Recent Labs      03/29/17   0952  03/30/17   0414   SODIUM  132*  133*   POTASSIUM  3.7  4.1   CHLORIDE  98  99   CO2  27  29   GLUCOSE  154*  111*   BUN  9  8   CREATININE  0.72  0.53   CALCIUM  10.1  9.8     Recent Labs      03/30/17   0414   WBC  8.4   RBC  3.89*   HEMOGLOBIN  12.9*   HEMATOCRIT  37.9*   MCV  97.4   MCH  33.2*   MCHC  34.0   RDW  45.7   PLATELETCT  224   MPV  10.2         Intake/Output Summary (Last 24 hours) at 03/31/17 1434  Last data filed at 03/31/17 1415   Gross per 24 hour   Intake   3240 ml   Output   2225 ml   Net   1015 ml       Exam:  A & O x 3, NAD   Abdomen soft, NTTP x 4.  No rebound or guarding.   RLQ wound with C/D/I with penrose in place and serous drainage.     Scrotal and perineal incisions healing well with minimal erythema.  Perineal penrose with scant serous  drainage.    A/P:    Active Hospital Problems    Diagnosis   • Complication of implanted penile prosthesis ([facility]) [T83.9XXA]   • (HFpEF) heart failure with preserved ejection fraction (CMS-[facility]) [I50.30]       Impression    1) S/P Removal of Infected AUS POD #3  2) Group D Enterococcus wound infection pending final sensitivities    Plan    1) Continue antibiotics and will follow final cultures  2) Monitor voiding.  3) Ambulate.  4) Will plan for home tomorrow if doing well.  5) Case and plan discussed with patient " and RN.  6) Outpatient wound care is arranged for this patient.

## 2017-03-31 NOTE — DOCUMENTATION QUERY
DOCUMENTATION QUERY    PROVIDERS: Please select “Cosign w/ note”to reply to query.    To better represent the severity of illness of your patient, please review the following information and exercise your independent professional judgment in responding to this query.     Sodium levels of 131, 132, and 133 with concurrent administration of IVFs is noted in the Lab Results and MAR. Based upon the clinical findings, risk factors, and treatment, can a diagnosis be provided to support this finding?    • Hyponatremia  • Findings of no clinical significance   • Other explanation of clinical findings  • Unable to determine (no explanation for clinical findings)    The medical record reflects the following:   Clinical Findings Lab result ranges noted between 3/28-->3/30: Sodium 131--133   Treatment  Lactated Ringers 100 ml/hr   Risk Factors  Age, recent surgery   Location within medical record  Lab Results  and MAR     Thank you,   Yoly Tee RN  Clinical Documentation Improvement  [phone]

## 2017-03-31 NOTE — PROGRESS NOTES
Dr. Grier at bedside - dressing changed, ford removed.  Patient may shower but cover abdominal wound with plastic to keep packing & drains dry.  Incontinence pads must be changed frequently or patient may try condom catheter.  Wound team consult ordered.  ALISA.  Large BM in a.m.

## 2017-03-31 NOTE — WOUND TEAM
Wound consult placed for wound care.  TC staff RN who reports that the order was meant to arrange outpatient wound clinic follow up.  Instructed her that MD needs to place order for this and requested her to involve case management.  MD will be providing daily wound care while patient in hospital per RN.

## 2017-03-31 NOTE — PROGRESS NOTES
"S: Post-op day # 2.  Passing flatus, Bowel movement and Pain well controlled.    O: Blood pressure 107/63, pulse 60, temperature 36.9 °C (98.4 °F), resp. rate 16, height 1.829 m (6' 0.01\"), weight 70.6 kg (155 lb 10.3 oz), SpO2 96 %.    Intake/Output Summary (Last 24 hours) at 03/31/17 0756  Last data filed at 03/31/17 0439   Gross per 24 hour   Intake   3600 ml   Output   2050 ml   Net   1550 ml     Recent Labs      03/28/17   1220  03/30/17   0414   WBC  6.9  8.4   RBC  4.39*  3.89*   HEMOGLOBIN  14.3  12.9*   HEMATOCRIT  41.9*  37.9*   MCV  95.4  97.4   MCH  32.6  33.2*   MCHC  34.1  34.0   RDW  45.1  45.7   PLATELETCT  258  224   MPV  10.7  10.2     Recent Labs      03/28/17   1220  03/29/17   0952  03/30/17   0414   SODIUM  131*  132*  133*   POTASSIUM  4.0  3.7  4.1   CHLORIDE  96  98  99   CO2  26  27  29   GLUCOSE  107*  154*  111*   BUN  15  9  8   CREATININE  1.12  0.72  0.53   CALCIUM  11.2*  10.1  9.8   wound culture enterococcus.  A:   Active Hospital Problems    Diagnosis   • Complication of implanted penile prosthesis (HCC) [T83.9XXA]   • (HFpEF) heart failure with preserved ejection fraction (CMS-HCC) [I50.30]       P: Continue antibiotics and wound care.  "

## 2017-03-31 NOTE — DISCHARGE PLANNING
Pt requires oupt Wound Clinic rather than Home Health. Placed verbal order for outpt wound clinic, wound clinic willing to hold appt at this time but the order must be signed by the MD to be valid and requires a prior auth. Contacted Supervisor of Bed Day Management to request a prior auth. Spoke with Dr Grier who is not able to place this order at this time and stated that he will place this order later today.     Spoke with Alexsander SANCHEZ who was able to place outpt wound clinic order now in order to obtain prior auth in a timely manner.

## 2017-04-01 PROCEDURE — 700111 HCHG RX REV CODE 636 W/ 250 OVERRIDE (IP): Performed by: PHYSICIAN ASSISTANT

## 2017-04-01 PROCEDURE — 700102 HCHG RX REV CODE 250 W/ 637 OVERRIDE(OP): Performed by: PHYSICIAN ASSISTANT

## 2017-04-01 PROCEDURE — A9270 NON-COVERED ITEM OR SERVICE: HCPCS | Performed by: UROLOGY

## 2017-04-01 PROCEDURE — 700111 HCHG RX REV CODE 636 W/ 250 OVERRIDE (IP): Performed by: UROLOGY

## 2017-04-01 PROCEDURE — 770001 HCHG ROOM/CARE - MED/SURG/GYN PRIV*

## 2017-04-01 PROCEDURE — 700102 HCHG RX REV CODE 250 W/ 637 OVERRIDE(OP): Performed by: UROLOGY

## 2017-04-01 PROCEDURE — 700105 HCHG RX REV CODE 258: Performed by: UROLOGY

## 2017-04-01 PROCEDURE — A9270 NON-COVERED ITEM OR SERVICE: HCPCS | Performed by: PHYSICIAN ASSISTANT

## 2017-04-01 PROCEDURE — 700111 HCHG RX REV CODE 636 W/ 250 OVERRIDE (IP)

## 2017-04-01 PROCEDURE — 700105 HCHG RX REV CODE 258

## 2017-04-01 PROCEDURE — 700112 HCHG RX REV CODE 229: Performed by: UROLOGY

## 2017-04-01 RX ORDER — AMOXICILLIN AND CLAVULANATE POTASSIUM 875; 125 MG/1; MG/1
1 TABLET, FILM COATED ORAL EVERY 12 HOURS
Status: DISCONTINUED | OUTPATIENT
Start: 2017-04-01 | End: 2017-04-02 | Stop reason: HOSPADM

## 2017-04-01 RX ADMIN — MORPHINE SULFATE 4 MG: 4 INJECTION INTRAVENOUS at 08:16

## 2017-04-01 RX ADMIN — VANCOMYCIN HYDROCHLORIDE 900 MG: 100 INJECTION, POWDER, LYOPHILIZED, FOR SOLUTION INTRAVENOUS at 11:15

## 2017-04-01 RX ADMIN — AMOXICILLIN AND CLAVULANATE POTASSIUM 1 TABLET: 875; 125 TABLET, FILM COATED ORAL at 19:36

## 2017-04-01 RX ADMIN — SODIUM CHLORIDE, POTASSIUM CHLORIDE, SODIUM LACTATE AND CALCIUM CHLORIDE: 600; 310; 30; 20 INJECTION, SOLUTION INTRAVENOUS at 18:45

## 2017-04-01 RX ADMIN — MULTIPLE VITAMINS W/ MINERALS TAB 1 TABLET: TAB at 08:16

## 2017-04-01 RX ADMIN — FAMOTIDINE 20 MG: 20 TABLET, FILM COATED ORAL at 19:36

## 2017-04-01 RX ADMIN — CEFTRIAXONE SODIUM 1 G: 1 INJECTION, POWDER, FOR SOLUTION INTRAMUSCULAR; INTRAVENOUS at 08:19

## 2017-04-01 RX ADMIN — CYANOCOBALAMIN TAB 500 MCG 5000 MCG: 500 TAB at 11:15

## 2017-04-01 RX ADMIN — FAMOTIDINE 20 MG: 20 TABLET, FILM COATED ORAL at 08:16

## 2017-04-01 RX ADMIN — ENOXAPARIN SODIUM 40 MG: 100 INJECTION SUBCUTANEOUS at 08:19

## 2017-04-01 RX ADMIN — VANCOMYCIN HYDROCHLORIDE 900 MG: 100 INJECTION, POWDER, LYOPHILIZED, FOR SOLUTION INTRAVENOUS at 02:21

## 2017-04-01 RX ADMIN — OXYCODONE HYDROCHLORIDE AND ACETAMINOPHEN 2 TABLET: 5; 325 TABLET ORAL at 14:22

## 2017-04-01 RX ADMIN — SODIUM CHLORIDE, POTASSIUM CHLORIDE, SODIUM LACTATE AND CALCIUM CHLORIDE: 600; 310; 30; 20 INJECTION, SOLUTION INTRAVENOUS at 07:49

## 2017-04-01 RX ADMIN — DOCUSATE SODIUM 100 MG: 100 CAPSULE ORAL at 08:16

## 2017-04-01 RX ADMIN — DOCUSATE SODIUM 100 MG: 100 CAPSULE ORAL at 19:36

## 2017-04-01 ASSESSMENT — PAIN SCALES - GENERAL
PAINLEVEL_OUTOF10: 6
PAINLEVEL_OUTOF10: 0
PAINLEVEL_OUTOF10: 0
PAINLEVEL_OUTOF10: 3
PAINLEVEL_OUTOF10: 3
PAINLEVEL_OUTOF10: 0
PAINLEVEL_OUTOF10: 2

## 2017-04-01 NOTE — PROGRESS NOTES
Dr. Grier at bedside.  Patient pre-medicated for pain prior to dressing change performed by Dr. Grier.  Wound care RN called to bedside.  Dr. Grier, bedside RN and wound RN discussed wound care and outpatient follow up.  Patient to stay in hospital today and have wound care perform dressing change tomorrow prior to discharge tomorrow.  Patient to follow up on Monday with Dr. Grier for dressing change and post op.  Pt verbalized understanding of new plan.

## 2017-04-01 NOTE — CARE PLAN
Problem: Safety  Goal: Will remain free from falls  Outcome: PROGRESSING AS EXPECTED  Pt remains free from fall at this time.  Pt educated on fall risk.  Pt demonstrates understanding by appropriate use of call light to call for assistance.  CLIP, hourly rounding in place.      Problem: Venous Thromboembolism (VTW)/Deep Vein Thrombosis (DVT) Prevention:  Goal: Patient will participate in Venous Thrombosis (VTE)/Deep Vein Thrombosis (DVT)Prevention Measures  Outcome: PROGRESSING SLOWER THAN EXPECTED    03/31/17 2030   OTHER   Risk Assessment Score 2   VTE RISK Moderate   Mechanical Prophylaxis Patient educated regarding VTE risk and need for SCDs, understands and continues to refuse   Pharmacologic Prophylaxis Used LMWH: Enoxaparin(Lovenox)

## 2017-04-01 NOTE — PROGRESS NOTES
"S: Post-op day # 4  Passing flatus, Bowel movement and Pain well controlled. Tolerating diet  Feeling better now after a bout of malease this morning.    O: Blood pressure 133/87, pulse 66, temperature 36.9 °C (98.4 °F), resp. rate 18, height 1.829 m (6' 0.01\"), weight 70.6 kg (155 lb 10.3 oz), SpO2 97 %.    Intake/Output Summary (Last 24 hours) at 04/01/17 0833  Last data filed at 04/01/17 0400   Gross per 24 hour   Intake   2720 ml   Output   1775 ml   Net    945 ml     Recent Labs      03/30/17   0414   WBC  8.4   RBC  3.89*   HEMOGLOBIN  12.9*   HEMATOCRIT  37.9*   MCV  97.4   MCH  33.2*   MCHC  34.0   RDW  45.7   PLATELETCT  224   MPV  10.2     Recent Labs      03/29/17   0952  03/30/17   0414   SODIUM  132*  133*   POTASSIUM  3.7  4.1   CHLORIDE  98  99   CO2  27  29   GLUCOSE  154*  111*   BUN  9  8   CREATININE  0.72  0.53   CALCIUM  10.1  9.8     Dressing changed today. Marked improvement in swelling and erythema.Perineal and abdominal drains advanced 1 inch.  A:   Active Hospital Problems    Diagnosis   • Complication of implanted penile prosthesis (HCC) [T83.9XXA]   • (HFpEF) heart failure with preserved ejection fraction (CMS-HCC) [I50.30]       P:Wound change in AM and home in AM. I will do dressing changes in the office.  Change to oral antibiotic once enterococcus sensitivities available.  "

## 2017-04-01 NOTE — PROGRESS NOTES
"Received report from night shift RN. Assumed care of patient. Pt assessed and stable. VSS. Patient reports 0/10 pain at this time.  Discussed plan of care for day with patient and received verbal understanding. Call light within reach, bed in low position.  Educated pt re fall precautions and received verbal understanding.  However, pt continues to refuse bed alarms.  Pt is alert, oriented, steady on feet and calls appropriately.  Pt states:  \"I just don't feel right today.\"    "

## 2017-04-01 NOTE — CARE PLAN
Problem: Communication  Goal: The ability to communicate needs accurately and effectively will improve  Pt able to verbally communicate in English    Problem: Infection  Goal: Will remain free from infection  No s/s of infection at this time.  However, pt here with scrotal infection.    Problem: Bowel/Gastric:  Goal: Normal bowel function is maintained or improved  Pt reports normal bowel function.    Problem: Knowledge Deficit  Goal: Knowledge of disease process/condition, treatment plan, diagnostic tests, and medications will improve  Pt educated re disease process/condition, treatment plan and medications.  Received verbal understanding.    Problem: Discharge Barriers/Planning  Goal: Patient’s continuum of care needs will be met  Pt unable to discharge home today as he requires daily dressing changes.  No outpatient facility able to do dressing change on Sunday.  Will discharge tomorrow after wound care RN performs dressing change.

## 2017-04-01 NOTE — WOUND TEAM
Wound Team asked by Dr. Grier to see patient so that Wound Team can change strip packing around drains  Coming out of wound over mons pubis. This RN observed the dressing change and discussed performing the dressing change with [facility] RN. She will change this dressing in the morning of 04/02/2017.

## 2017-04-01 NOTE — PROGRESS NOTES
"Pharmacy Kinetics     66 y.o. male on vancomycin day #5 3/31/2017    Currently on Vancomycin 900 mg q8 hrs    Indication for Treatment: infected urinary prosthesis    Pertinent history per medical record: Admitted on 3/28/2017 for increasing pain, and difficulty urination.  Patient had urinary sphincter placed on 3/22 for management of urinary incontinence (s/p prostatectomy).  On 3/25 patient returned to surgery to address hematuria and for clot evacuation.  Patient returned to OR today for removal of infected urinary sphincter.    Other antibiotics: ceftriaxone 1000 mg iv Q24H    Allergies: Review of patient's allergies indicates no known allergies.     List concerns for renal function: elevated SCr (improved), contrast on 3/28     Pertinent cultures to date:    3/28/17 wound, abdominal: Enterococcus faecalis    Ampicillin Sensitive <=2 mcg/mL Final      Daptomycin Sensitive 2 mcg/mL Final     Gent Synergy Sensitive <=500 mcg/mL Final     Penicillin Sensitive 2 mcg/mL Final     Vancomycin Sensitive 2 mcg/mL Final            Recent Labs      17   0414   WBC  8.4   NEUTSPOLYS  73.90*     Recent Labs      17   0414   BUN  8   CREATININE  0.53     Recent Labs      17   0414   VANCOTROUGH  7.1*     Intake/Output Summary (Last 24 hours) at 17 1406  Last data filed at 17 1100   Gross per 24 hour   Intake   2200 ml   Output   2375 ml   Net   -175 ml      Blood pressure 133/87, pulse 66, temperature 36.9 °C (98.4 °F), resp. rate 18, height 1.829 m (6' 0.01\"), weight 70.6 kg (155 lb 10.3 oz), SpO2 97 %. Temp (24hrs), Av.7 °C (98.1 °F), Min:36.6 °C (97.9 °F), Max:36.9 °C (98.4 °F)      A/P   1. Vancomycin dose change: continue 900 mg q8 hrs  2. Next vancomycin level: If remains on vancomycin tomorrow.  3. Goal trough: 12-16 mcg/mL  4. Comments: Renal function stable previously. Wound cultures growing Enterococcus faecalis, final sensitivities as above, should be able to discharge on po abx. " Pt stable and likely to discharge tomorrow, no further levels planned. Paged urology to discuss.    Harvey Jackson, PharmD, BCPS    Addendum: change to Augmentin BID per urology PA

## 2017-04-01 NOTE — PROGRESS NOTES
Assumed care of Mr Horn at 1900.     Pt is A&O x4.  Pain denied  Nausea denied  Tolerating Diet    Lower abdominal incision.  Dressing in place, clean dry intact  Taint incision, Open to air, penrose drain, scant amount of serous drainage  Positive Urine output into ford  Negative BM Void  Positive Flatus  Up Self    Bed in lowest position and locked.    ** Bed alarm refused despite pt education on fall risk.  Pt is A&O x4 with steady gait and demonstrates appropriate use of call light to call for assistance.  CLIP, hourly rounding in place.    Pt resting comfortably now.  Review plan of care with patient  Call light within reach  Hourly rounds in place  All needs met at this time

## 2017-04-02 VITALS
HEIGHT: 72 IN | OXYGEN SATURATION: 96 % | RESPIRATION RATE: 16 BRPM | DIASTOLIC BLOOD PRESSURE: 79 MMHG | TEMPERATURE: 97.7 F | HEART RATE: 53 BPM | SYSTOLIC BLOOD PRESSURE: 126 MMHG | BODY MASS INDEX: 21.08 KG/M2 | WEIGHT: 155.65 LBS

## 2017-04-02 LAB
BACTERIA SPEC ANAEROBE CULT: NORMAL
SIGNIFICANT IND 70042: NORMAL
SITE SITE: NORMAL
SOURCE SOURCE: NORMAL

## 2017-04-02 PROCEDURE — 700102 HCHG RX REV CODE 250 W/ 637 OVERRIDE(OP): Performed by: UROLOGY

## 2017-04-02 PROCEDURE — 700102 HCHG RX REV CODE 250 W/ 637 OVERRIDE(OP): Performed by: PHYSICIAN ASSISTANT

## 2017-04-02 PROCEDURE — A9270 NON-COVERED ITEM OR SERVICE: HCPCS | Performed by: PHYSICIAN ASSISTANT

## 2017-04-02 PROCEDURE — A9270 NON-COVERED ITEM OR SERVICE: HCPCS | Performed by: UROLOGY

## 2017-04-02 PROCEDURE — 700111 HCHG RX REV CODE 636 W/ 250 OVERRIDE (IP): Performed by: PHYSICIAN ASSISTANT

## 2017-04-02 PROCEDURE — 700111 HCHG RX REV CODE 636 W/ 250 OVERRIDE (IP): Performed by: UROLOGY

## 2017-04-02 RX ORDER — OXYCODONE HYDROCHLORIDE AND ACETAMINOPHEN 5; 325 MG/1; MG/1
1-2 TABLET ORAL EVERY 4 HOURS PRN
Qty: 30 TAB | Refills: 0 | Status: SHIPPED | OUTPATIENT
Start: 2017-04-02 | End: 2017-06-21

## 2017-04-02 RX ORDER — AMOXICILLIN AND CLAVULANATE POTASSIUM 875; 125 MG/1; MG/1
1 TABLET, FILM COATED ORAL 2 TIMES DAILY
Qty: 28 TAB | Refills: 0 | Status: SHIPPED | OUTPATIENT
Start: 2017-04-02 | End: 2017-07-13

## 2017-04-02 RX ADMIN — MULTIPLE VITAMINS W/ MINERALS TAB 1 TABLET: TAB at 08:11

## 2017-04-02 RX ADMIN — SODIUM CHLORIDE, POTASSIUM CHLORIDE, SODIUM LACTATE AND CALCIUM CHLORIDE: 600; 310; 30; 20 INJECTION, SOLUTION INTRAVENOUS at 05:10

## 2017-04-02 RX ADMIN — ENOXAPARIN SODIUM 40 MG: 100 INJECTION SUBCUTANEOUS at 08:11

## 2017-04-02 RX ADMIN — AMOXICILLIN AND CLAVULANATE POTASSIUM 1 TABLET: 875; 125 TABLET, FILM COATED ORAL at 08:11

## 2017-04-02 RX ADMIN — FAMOTIDINE 20 MG: 20 TABLET, FILM COATED ORAL at 08:11

## 2017-04-02 RX ADMIN — MORPHINE SULFATE 4 MG: 4 INJECTION INTRAVENOUS at 09:48

## 2017-04-02 ASSESSMENT — PAIN SCALES - GENERAL: PAINLEVEL_OUTOF10: 0

## 2017-04-02 NOTE — PROGRESS NOTES
Pt discharged to home by private vehicle. IV removed intact, 2x2 with tape applied. Discharge instructions reviewed with pt.. Verbalized understanding.

## 2017-04-02 NOTE — PROGRESS NOTES
A+Ox4. VSS. Pt denies pain at this time. Dressing and drain intact to abd and scrotal incision KENY; brenna pad in place. Voiding with no issues. Up self, steady gait. Hourly rounding ongoing. Call bell in reach.

## 2017-04-02 NOTE — DISCHARGE INSTRUCTIONS
Discharge Instructions    Discharged to home by car with self. Discharged via walking, hospital escort: Refused.  Special equipment needed: Not Applicable    Be sure to schedule a follow-up appointment with your primary care doctor or any specialists as instructed.     Discharge Plan:   Diet Plan: Discussed  Activity Level: Discussed  Confirmed Follow up Appointment: Appointment Scheduled  Confirmed Symptoms Management: Discussed  Medication Reconciliation Updated: Yes  Influenza Vaccine Indication: Not indicated: Previously immunized this influenza season and > 8 years of age    I understand that a diet low in cholesterol, fat, and sodium is recommended for good health. Unless I have been given specific instructions below for another diet, I accept this instruction as my diet prescription.   Other diet: Regular    Special Instructions: None    · Is patient discharged on Warfarin / Coumadin?   No     · Is patient Post Blood Transfusion?  No    Depression / Suicide Risk    As you are discharged from this [facility] facility, it is important to learn how to keep safe from harming yourself.    Recognize the warning signs:  · Abrupt changes in personality, positive or negative- including increase in energy   · Giving away possessions  · Change in eating patterns- significant weight changes-  positive or negative  · Change in sleeping patterns- unable to sleep or sleeping all the time   · Unwillingness or inability to communicate  · Depression  · Unusual sadness, discouragement and loneliness  · Talk of wanting to die  · Neglect of personal appearance   · Rebelliousness- reckless behavior  · Withdrawal from people/activities they love  · Confusion- inability to concentrate     If you or a loved one observes any of these behaviors or has concerns about self-harm, here's what you can do:  · Talk about it- your feelings and reasons for harming yourself  · Remove any means that you might use to hurt yourself (examples:  pills, rope, extension cords, firearm)  · Get professional help from the community (Mental Health, Substance Abuse, psychological counseling)  · Do not be alone:Call your Safe Contact- someone whom you trust who will be there for you.  · Call your local CRISIS HOTLINE 620-4654 or 768-520-4705  · Call your local Children's Mobile Crisis Response Team [place] (893) 962-2697 or www.[employer]  · Call the toll free National Suicide Prevention Hotlines   · National Suicide Prevention Lifeline 256-259-BKRC (4381)  · National Hope Line Network 800-SUICIDE (583-8561)

## 2017-04-02 NOTE — CARE PLAN
Problem: Communication  Goal: The ability to communicate needs accurately and effectively will improve  Outcome: PROGRESSING AS EXPECTED  Pt able to effectively communicate all needs. POC discussed and pt verbalizes understanding.     Problem: Safety  Goal: Will remain free from falls  Outcome: PROGRESSING AS EXPECTED  Pt remains free from falls. Calls appropriately for assistance.     Problem: Infection  Goal: Will remain free from infection  Outcome: PROGRESSING AS EXPECTED  No s/s new infection. Switched to PO abx this am.     Problem: Urinary Elimination:  Goal: Ability to reestablish a normal urinary elimination pattern will improve  Outcome: PROGRESSING AS EXPECTED  Voiding with no issues.     Problem: Pain Management  Goal: Pain level will decrease to patient’s comfort goal  Outcome: PROGRESSING AS EXPECTED  Pt denies pain at this time.

## 2017-04-02 NOTE — DISCHARGE SUMMARY
Urology Discharge Summary    Admission Date: 3/28/17    Discharge Date: 4/2/17    Admission Diagnosis: Infected Artificial Urinary Sphincter    Discharge Diagnosis: Infected Artificial Urinary Sphincter    Procedure: Removal of AUS    Surgeon: Benigno Grier MD    Hospital Course:       Patient was admitted to the hospital after the above procedure.  He was give broad spectrum antibiotics until his cultures reported which showed enterococcus faecalis sensitive to penicillin and vancomycin.  His suprapubic, scrotal, and perineal wounds were healing well and the dressings were changed daily.  His perineal penrose drain fell out prior to discharge but his suprapubic penrose drains remained in place.  His vitals and labs remained stable.  His Diaz was removed prior to discharge and the patient voided without difficulty.  No CP/HA/SOB.  NO N/V/D.  He tolerated a regular diet and passed flatus and BM.      Discharge Plan:     Patient to be discharged home today, his dressing was changed by myself prior to discharge.  He will be given Augmentin and Percocet on discharge.  He can follow up with Dr. Grier and the wound care center as previously scheduled.

## 2017-04-03 ENCOUNTER — APPOINTMENT (OUTPATIENT)
Dept: WOUND CARE | Facility: MEDICAL CENTER | Age: 67
End: 2017-04-03
Attending: UROLOGY
Payer: MEDICARE

## 2017-04-08 NOTE — OP REPORT
DATE OF SERVICE:  03/21/2017    PREOPERATIVE DIAGNOSES:  1.  Acute urinary retention.  2.  Gross hematuria.  3.  Status post artificial urinary sphincter placement on 03/20/2017.    OPERATION AND PROCEDURE PERFORMED:  1.  Flexible cystourethroscopy with placement of 16-Bahamian Councill catheter   and clot evacuation.  2.  Urethral catheterization with 14-Bahamian 2-way Diaz.    SURGEON:  Frank Lamas MD    ANESTHESIOLOGIST:  Abebe Donahue MD    ANESTHESIA:  General endotracheal.    POSTOPERATIVE DIAGNOSES:  As above and proximal bulbar urethral bleeding,   normal appearance of deactivated artificial urinary sphincter without evidence   of cuff erosion.    ESTIMATED BLOOD LOSS:  75 mL.    SPECIMENS:  None.    INTRAOPERATIVE FINDINGS:  Patient has a normal anterior urethra, normal distal   bulb with deactivated artificial urinary sphincter cuff, but the proximal   bulb posteriorly has urethral bleeding with localized trauma, possibly from   above Diaz.    DRAINS:  A 14-Bahamian Diaz.    INDICATIONS:  The patient is a very pleasant 66-year-old gentleman with a   history of adenocarcinoma of the prostate, status post radical retropubic   prostatectomy couple years ago.  He has had severe postoperative urinary   incontinence and underwent an artificial urinary sphincter in an uncomplicated   fashion on 03/20/2017.  At the time of surgery, a 12-Bahamian Diaz was left in   place.  Patient was discharged postoperatively to home and presented to my   office on 03/21/2017 for voiding trial.  When the catheter was removed, there   was a small amount of urethral bleeding.  He had difficulty voiding, so he was   allowed to leave the office and after discharged the office hoping that he   would void.  He returned in acute retention.  I attempted one time documenting   that the sphincter was deactivated to pass a 14-Bahamian Diaz and I was able   to get into the bladder, but there was significant clot retention, which could    not be evacuated.  Because of the patient having a significant recent surgery   with a risk of loss of the sphincter, I explained to him that I would   recommend exam under anesthesia with clot evacuation and probable   catheterization.  The patient has a history of stones.  I explained to him if   I cannot find an active cause of bleeding in the urethra or bladder that I   would proceed with retrograde ureteropyelogram.  The patient had consented for   and understood the risk of the procedure.  He is aware of the risks include   periprocedural urinary tract infection.  There is certainly a risk of cuff   infection or sphincter infection.  He is aware there is the potential need for   removal of components if the reservoir is in the bladder, which is highly   unlikely or if there is a cuff erosion.  He is aware of the perioperative risk   of deep vein thrombosis, pulmonary embolism, aspiration pneumonia and death.    An informed consent was given to me by the patient to proceed.    DESCRIPTION IN DETAIL:  After informed consent was obtained, the patient was   brought down from the ____ tower to the preoperative holding area.  He is seen   in consultation, Dr. Donahue brought to the operating room and placed in   supine position.  A general endotracheal anesthetic was administered in   balanced fashion by Dr. Donahue.  The patient received intravenous   antibiotics.  Bilateral sequential compression devices are in place and   connected and the patient's position modified lithotomy.  His perineal   incision is evaluated as the dressing is removed.  It is clean, dry, and   intact.  The suprapubic dressing is left intact on the right lower quadrant.    Prior to his prep, I did again document that sphincter is clearly deactivated   that was and the operative area was Betadine prepped and draped in usual   sterile fashion.    Attention is directed towards the procedure.  I elected to pass the flexible   cystoscope per  urethra.  The anterior urethra is normal and the distal bulb   you could see where the sphincter cuff is open, but where there is slight   indentation.  There is no cuff erosion, but just distal in the proximal bulb,   there is a posterior urethral tear with some clot and I passed the scope   anteriorly as there is upwards slope to his bladder neck.  Upon entering the   bladder, patient was noted to have significant amount of clot.  Through the   cystoscope, I passed a 0.35 guidewire and coiled this in the bladder, withdrew   the cystoscope and passed a Councill catheter 16-Slovak over the wire.  Once   this was in the bladder, I hand irrigated about 75 mL of clot from the bladder   until was clear.  I then passed the wire back into the bladder, withdrew the   Councill catheter and passed the cystoscope over the wire into the bladder.    Serial evaluation of the bladder showed no evidence of reservoir injury,   bladder injury, both ureteral orifices were orthotopic, all the clot had been   removed.  The bladder neck was normal.  I withdrew the scope to the level of   bulbar urethra where there was a tear, possibly from the catheter being pulled   down and causing some trauma in the proximal bulbar urethra.  I did not see   any cuff visualized and so I passed the scope back in the bladder and was   about 150 mL of fluid in the bladder, passed a wire through the scope then I   used a 16-Slovak Angiocath through a 14-Slovak Diaz, backloaded the wire   through it and with Seldinger technique passed a 14-Slovak Diaz into the   bladder.  The balloon was inflated with 7 mL of sterile water and was clearly   in the bladder in the urine draining crystal clear.  I irrigated this numerous   times and used a very small catheter, hoping to avoid any trauma to the cuff.    At the end of the case, the catheter was left to gravity drainage.  He   tolerated the procedure well without complication, was awakened in the   operating  room and transferred to recovery room where he arrived in stable   condition.       ____________________________________     MD KELL BORGES / BECCA    DD:  03/22/2017 17:42:09  DT:  03/22/2017 20:14:04    D#:  [id number]  Job#:  [id number]

## 2017-04-12 ENCOUNTER — OFFICE VISIT (OUTPATIENT)
Dept: WOUND CARE | Facility: MEDICAL CENTER | Age: 67
End: 2017-04-12
Attending: PHYSICIAN ASSISTANT
Payer: MEDICARE

## 2017-04-12 DIAGNOSIS — Z85.46 H/O PROSTATE CANCER: ICD-10-CM

## 2017-04-12 DIAGNOSIS — T81.89XA SURGICAL WOUND, NON HEALING, INITIAL ENCOUNTER: ICD-10-CM

## 2017-04-12 DIAGNOSIS — T83.9XXD: ICD-10-CM

## 2017-04-12 PROCEDURE — 1101F PT FALLS ASSESS-DOCD LE1/YR: CPT | Performed by: NURSE PRACTITIONER

## 2017-04-12 PROCEDURE — 1036F TOBACCO NON-USER: CPT | Performed by: NURSE PRACTITIONER

## 2017-04-12 PROCEDURE — G8420 CALC BMI NORM PARAMETERS: HCPCS | Performed by: NURSE PRACTITIONER

## 2017-04-12 PROCEDURE — 4040F PNEUMOC VAC/ADMIN/RCVD: CPT | Performed by: NURSE PRACTITIONER

## 2017-04-12 PROCEDURE — 97602 WOUND(S) CARE NON-SELECTIVE: CPT

## 2017-04-12 PROCEDURE — G8432 DEP SCR NOT DOC, RNG: HCPCS | Performed by: NURSE PRACTITIONER

## 2017-04-12 PROCEDURE — 99215 OFFICE O/P EST HI 40 MIN: CPT | Performed by: NURSE PRACTITIONER

## 2017-04-12 PROCEDURE — 3017F COLORECTAL CA SCREEN DOC REV: CPT | Performed by: NURSE PRACTITIONER

## 2017-04-12 PROCEDURE — A6212 FOAM DRG <=16 SQ IN W/BORDER: HCPCS

## 2017-04-12 PROCEDURE — 303972 HCHG HYPAFIX RET DRST 18SQ PC"

## 2017-04-12 PROCEDURE — A6407 PACKING STRIPS, NON-IMPREG: HCPCS

## 2017-04-12 PROCEDURE — 1111F DSCHRG MED/CURRENT MED MERGE: CPT | Performed by: NURSE PRACTITIONER

## 2017-04-12 ASSESSMENT — ENCOUNTER SYMPTOMS
CONSTIPATION: 0
DEPRESSION: 0
DIARRHEA: 0
FEVER: 0
SHORTNESS OF BREATH: 0
DIZZINESS: 0
COUGH: 0
NAUSEA: 0
CHILLS: 0
NERVOUS/ANXIOUS: 1
VOMITING: 0

## 2017-04-12 NOTE — CERTIFICATION
"Advanced Wound Care  [facility]  [address]  [address]  [place] [address]  [phone] Fax: [phone]      Initial Evaluation  For Certification Period:  04/12/2017-05/12/2017      Referring Physician: Alexsander Griffith PA-C  Primary Physician:  Caden Rodarte MD      Consulting Physicians: Bebe SWIFT, Frank Lamas MD, Benigno Grier MD; surgeons        Wound(s): open surgical incision lower abdomen, T83.9XXA (complication of implanted penile prosthesis, (artificial urinary sphincter)  Start of Care: 4/12/2017       Subjective:        HPI:  Pt is a very pleasant, healthy appearing 67 y/o male with hx of prostate cancer, post radical prostatectomy 8/3/2015. Since surgery, pt has had urinary incontinence refractory to medical and behavorial interventions. Pt underwent placement of  AUS ( artificial urinary sphincter) by Dr. Lamas on 3/20/2017. Procedure was completed as an outpatient and on 3/21 returned to doctor's office for removal of ford post procedure. Per pt, 2 hours after ford removal, pt developed gross hematuria, dysuria and extreme pain and was told that he had a \" torn urethra.\"  Pt was directly admitted to hospital for emergent cystoscopy with clot evacuation and ford catheter placement by Dr. Lamas.  Pt was then discharged from [facility] on 3/22/17 with ford in place. Pt then presented to ED on 3/28 with post op infection of implant and was admitted again, from 3/28-4/2, implant was removed on 3/28 by Dr. Grier. Pt presents today with one small opening to low abdomen that he has been packing with iodoform at home.    Pain:  Denies today          Past Medical History:   Past Medical History   Diagnosis Date   • Unspecified cataract      Bilateral IOL's   • Urinary incontinence    • Hemorrhagic disorder (CMS-HCC)    • Arthritis      knees   • Cancer (CMS-[facility]) 07/13     Prostate     Current Medications:   Current outpatient prescriptions:   •  amoxicillin-clavulanate (AUGMENTIN) " 875-125 MG Tab, Take 1 Tab by mouth 2 times a day., Disp: 28 Tab, Rfl: 0  •  Cyanocobalamin (VITAMIN B-12) 5000 MCG TABLET DISPERSIBLE, Take 5,000 mcg by mouth every day., Disp: , Rfl:   •  Multiple Vitamins-Minerals (CENTRUM SILVER PO), Take 1 Tab by mouth every day., Disp: , Rfl:   •  Naproxen Sodium (ALEVE) 220 MG CAPS, Take 220 mg by mouth 1 time daily as needed., Disp: , Rfl:   •  oxycodone-acetaminophen (PERCOCET) 5-325 MG Tab, Take 1-2 Tabs by mouth every four hours as needed for Moderate Pain., Disp: 30 Tab, Rfl: 0  •  tramadol (ULTRAM) 50 MG Tab, Take  mg by mouth every four hours as needed (4-6 hrs)., Disp: , Rfl:   •  docusate sodium (COLACE) 100 MG Cap, Take 1 Cap by mouth 2 times a day., Disp: 20 Cap, Rfl: 0     Allergies: Review of patient's allergies indicates no known allergies.    Past Surgical History:   Past Surgical History   Procedure Laterality Date   • Other orthopedic surgery  2003     L Shoulder Repair   • Other orthopedic surgery       R Rotator Cuff repair   • Knee arthroplasty total  7/3/2014     Performed by Theodore San M.D. at [facility]   • Knee arthroscopy  7/3/2014     Performed by Theodore San M.D. at [facility]   • Meniscectomy  7/3/2014     Performed by Theodore San M.D. at [facility]   • Knee replacement, total Right 2014   • Inguinal hernia repair  6/1/2009     Performed by RISHI COOK at [facility]   • Inguinal hernia laparoscopic bilateral Bilateral    • Prostatectomy, radical retro  8/3/2015     Procedure: PROSTATECTOMY RADICAL RETROPUBIC;  Surgeon: Sage Ngo M.D.;  Location: [facility];  Service:    • Node dissection Bilateral 8/3/2015     Procedure: NODE DISSECTION LYMPH;  Surgeon: Sage Ngo M.D.;  Location: [facility];  Service:    • Cystoscopy  3/20/2017     Procedure: CYSTOSCOPY -  FLEXIBLE;  Surgeon: Frank Lamas M.D.;  Location: [facility];  Service:    • Sphincter prosthesis placement  3/20/2017     Procedure: SPHINCTER PROSTHESIS PLACEMENT - ARTIFICIAL URINARY SPHINCTER;  Surgeon: Frank Lamas M.D.;  Location: [facility];  Service:    • Evacuation of hematoma  3/21/2017     Procedure: EVACUATION OF HEMATOMA-CYSTO CLOT EVACUATION AND SMALL CYSTOSCOPE;  Surgeon: Frank Lamas M.D.;  Location: SUR[facility]ERY [facility];  Service:    • Cystoscopy  3/21/2017     Procedure: CYSTOSCOPY;  Surgeon: Frank Lamas M.D.;  Location: SUR[facility]ERY [facility];  Service:    • Sphincter prosthesis removal  3/28/2017     Procedure: URINARY SPHINCTER PROSTHESIS REMOVAL;  Surgeon: Benigno Grier M.D.;  Location: SUR[facility]ERY [facility];  Service:    • Cystoscopy  3/28/2017     Procedure: CYSTOSCOPY;  Surgeon: Benigno Grier M.D.;  Location: SUR[facility];  Service:        Social History:    Social History     Social History   • Marital Status:      Spouse Name: N/A   • Number of Children: N/A   • Years of Education: N/A     Occupational History   • Not on file.     Social History Main Topics   • Smoking status: Never Smoker    • Smokeless tobacco: Never Used   • Alcohol Use: No   • Drug Use: No   • Sexual Activity: Not Currently     Other Topics Concern   • Not on file     Social History Narrative         Objective:      Tests and Measures: culture deferred today, no clinical s/s of infections    Orthotic, protective, supportive devices: none    Fall Risk Assessment (ariana all that apply with an X):              X 65 years or older                Fall within the last 2 years, uses   Ambulatory devices   Loss of protective sensation in feet,    Use of prostethic/orthotic, years               Presence of lower extremity/foot/toe amputation              Taking medication that increases risk (per facility policy)       Wound Characteristics                                                    Location: lower mid abdomen   Initial  Evaluation  Date: 04/12/2017   Tissue Type and %: 100% red granular tissue   Periwound: intact   Drainage: Scant ss   Exposed structures None (pt does however have penis clamp in place due to incontinence, please see photo)   Wound Edges:   open   Odor: none   S&S of Infection:   none   Edema: none   Sensation: intact               Measurements:  Lower mid abdomen Initial Evaluation  Date: 04/12/2017   Length (cm) 0.6   Width (cm) 1.5   Depth (cm) 1.2   Area (cm2) 0.9 cm2   Tract/undermine none        Procedures:     Debridement :  Non selective with gauze and cotton tip applicator     Cleansed with:  NS                                                                        Periwound protected with: no sting skin prep, moisture barrier cream   Primary dressing: Aquacel packing strip, packed loosely into wound and over top of wound bed   Secondary Dressing: adhesive foam   Other: hypafix tape     Patient Education: Initial evaluation of wound, pt instructed on increased protein diet, use of MVI if ok with MD to optimize wound healing; s/s infection, increased erythema and edema/fever/chills/N+V when to call MD/go to ER. Instructed on rationale for wound care products.. Instructed to make appts for 2 times per week. Instructed to keep dressings clean and dry, shower on clinic days right before coming in for appts. Pt given handout on nutrition guidelines for wound healing and wound care. Pt also given left over dressing supplies to change dressing if pt is unable to be seen later this week. Pt is knowledgeable with wound care and how to perform dressing changes. Pt verbalized understanding to all instructions.    Professional Collaboration: joint visit with Bebe SWIFT for new wound evaluation, initial evaluation sent to referring provider via EPIC      Assessment:      Wound etiology: open surgical incision    Wound Progress:  Initial evaluation, to be determined    Rationale for Treatment: AqAg to manage  bioburden, absorb exudate, and maintain moist wound environment without laterally wicking exudate therefore reducing brenna-wound maceration    Patient tolerance/compliance: pt tolerated procedure well, eager to listen to all instructions    Complicating factors: age, wound location    Need for ongoing Advanced Wound Care services: continued skilled wound care for debridement as needed, dressing management and skilled clinical observation to prevent complications and expedite healing.      Plan:      Treatment Plan and Recommendations:  Diagnosis/ICD9:  T83.9XXA;complication of implanted penile prosthesis, (artificial urinary sphincter)     Procedures/CPT: non selective debridement 64331    Frequency: twice weekly      Treatment Goals: STG 2 Weeks  LTG 4 Weeks   Granulation Tissue: 100% 100%   Decrease Necrotic Tissue to: 0% 0%   Wound Phase:  proliferative maturation   Decrease Size by: 25% 50%   Periwound:  intact intact   Decrease tracts/undermining by: n/a n/a   Decrease Pain:  0 0       At the time of each visit a thorough assessment of the patient is completed to assure the  appropriateness of our plan of care.  The dressings or modalities may need to be adapted   from the original plan to address any significant changes in the wound environment.          Clinician Signature:_______________________________Date__________________      Physician Signature:______________________________Date:__________________

## 2017-04-13 PROBLEM — T81.89XA SURGICAL WOUND, NON HEALING: Status: ACTIVE | Noted: 2017-04-13

## 2017-04-15 ENCOUNTER — NON-PROVIDER VISIT (OUTPATIENT)
Dept: WOUND CARE | Facility: MEDICAL CENTER | Age: 67
End: 2017-04-15
Attending: PHYSICIAN ASSISTANT
Payer: MEDICARE

## 2017-04-15 PROCEDURE — 302804 HCHG HYDROFIBER SILVER 6X6

## 2017-04-15 PROCEDURE — 97602 WOUND(S) CARE NON-SELECTIVE: CPT

## 2017-04-15 PROCEDURE — A6402 STERILE GAUZE <= 16 SQ IN: HCPCS

## 2017-04-15 NOTE — WOUND TEAM
"Advanced Wound Care  [facility]  [address]  [address]  [place] [address]  [phone] Fax: [phone]      Encounter  For Certification Period:  04/12/2017-05/12/2017      Referring Physician: Alexsander Griffith PA-C  Primary Physician:  Caden Rodarte MD      Consulting Physicians: Bebe SWIFT, Frank Lamas MD, Benigno Grier MD; surgeons        Wound(s): open surgical incision lower abdomen, T83.9XXA (complication of implanted penile prosthesis, (artificial urinary sphincter)  Start of Care: 4/12/2017       Subjective:        HPI:  Pt is a very pleasant, healthy appearing 65 y/o male with hx of prostate cancer, post radical prostatectomy 8/3/2015. Since surgery, pt has had urinary incontinence refractory to medical and behavorial interventions. Pt underwent placement of  AUS ( artificial urinary sphincter) by Dr. Lamas on 3/20/2017. Procedure was completed as an outpatient and on 3/21 returned to doctor's office for removal of ford post procedure. Per pt, 2 hours after ford removal, pt developed gross hematuria, dysuria and extreme pain and was told that he had a \" torn urethra.\"  Pt was directly admitted to hospital for emergent cystoscopy with clot evacuation and ford catheter placement by Dr. Lamas.  Pt was then discharged from [facility] on 3/22/17 with ford in place. Pt then presented to ED on 3/28 with post op infection of implant and was admitted again, from 3/28-4/2, implant was removed on 3/28 by Dr. Grier. Pt presents today with one small opening to low abdomen that he has been packing with iodoform at home.    Pain:  Denies today          Past Medical History:   Past Medical History   Diagnosis Date   • Unspecified cataract      Bilateral IOL's   • Urinary incontinence    • Hemorrhagic disorder (CMS-HCC)    • Arthritis      knees   • Cancer (CMS-HCC) 07/13     Prostate     Current Medications:   Current outpatient prescriptions:   •  oxycodone-acetaminophen (PERCOCET) 5-325 MG " Tab, Take 1-2 Tabs by mouth every four hours as needed for Moderate Pain., Disp: 30 Tab, Rfl: 0  •  amoxicillin-clavulanate (AUGMENTIN) 875-125 MG Tab, Take 1 Tab by mouth 2 times a day., Disp: 28 Tab, Rfl: 0  •  tramadol (ULTRAM) 50 MG Tab, Take  mg by mouth every four hours as needed (4-6 hrs)., Disp: , Rfl:   •  docusate sodium (COLACE) 100 MG Cap, Take 1 Cap by mouth 2 times a day., Disp: 20 Cap, Rfl: 0  •  Cyanocobalamin (VITAMIN B-12) 5000 MCG TABLET DISPERSIBLE, Take 5,000 mcg by mouth every day., Disp: , Rfl:   •  Multiple Vitamins-Minerals (CENTRUM SILVER PO), Take 1 Tab by mouth every day., Disp: , Rfl:   •  Naproxen Sodium (ALEVE) 220 MG CAPS, Take 220 mg by mouth 1 time daily as needed., Disp: , Rfl:      Allergies: Review of patient's allergies indicates no known allergies.    Past Surgical History:   Past Surgical History   Procedure Laterality Date   • Other orthopedic surgery  2003     L Shoulder Repair   • Other orthopedic surgery       R Rotator Cuff repair   • Knee arthroplasty total  7/3/2014     Performed by Theodore San M.D. at [facility]   • Knee arthroscopy  7/3/2014     Performed by Theodore San M.D. at [facility]   • Meniscectomy  7/3/2014     Performed by Theodore San M.D. at [facility]   • Knee replacement, total Right 2014   • Inguinal hernia repair  6/1/2009     Performed by RISHI COOK at [facility]   • Inguinal hernia laparoscopic bilateral Bilateral    • Prostatectomy, radical retro  8/3/2015     Procedure: PROSTATECTOMY RADICAL RETROPUBIC;  Surgeon: Sage Ngo M.D.;  Location: [facility];  Service:    • Node dissection Bilateral 8/3/2015     Procedure: NODE DISSECTION LYMPH;  Surgeon: Sage Ngo M.D.;  Location: [facility];  Service:    • Cystoscopy  3/20/2017     Procedure: CYSTOSCOPY -  FLEXIBLE;  Surgeon: Frank Lamas M.D.;  Location: [facility]  [facility];  Service:    • Sphincter prosthesis placement  3/20/2017     Procedure: SPHINCTER PROSTHESIS PLACEMENT - ARTIFICIAL URINARY SPHINCTER;  Surgeon: Frank Lamas M.D.;  [place] [facility];  Service:    • Evacuation of hematoma  3/21/2017     Procedure: EVACUATION OF HEMATOMA-CYSTO CLOT EVACUATION AND SMALL CYSTOSCOPE;  Surgeon: Frank Lamas M.D.;  [place] [facility] [place];  Service:    • Cystoscopy  3/21/2017     Procedure: CYSTOSCOPY;  Surgeon: Frank Lamas M.D.;  [place] [facility] [place];  Service:    • Sphincter prosthesis removal  3/28/2017     Procedure: URINARY SPHINCTER PROSTHESIS REMOVAL;  Surgeon: Benigno Grier M.D.;  [place] [facility] [place];  Service:    • Cystoscopy  3/28/2017     Procedure: CYSTOSCOPY;  Surgeon: Benigno Grier M.D.;  [place] [facility] [place];  Service:        Social History:    Social History     Social History   • Marital Status:      Spouse Name: N/A   • Number of Children: N/A   • Years of Education: N/A     Occupational History   • Not on file.     Social History Main Topics   • Smoking status: Never Smoker    • Smokeless tobacco: Never Used   • Alcohol Use: No   • Drug Use: No   • Sexual Activity: Not Currently     Other Topics Concern   • Not on file     Social History Narrative         Objective:      Tests and Measures: culture deferred today, no clinical s/s of infections    Orthotic, protective, supportive devices: none    Fall Risk Assessment (ariana all that apply with an X):              X 65 years or older                Fall within the last 2 years, uses   Ambulatory devices   Loss of protective sensation in feet,    Use of prostethic/orthotic, years               Presence of lower extremity/foot/toe amputation              Taking medication that increases risk (per facility policy)       Wound Characteristics                                                    Location: lower mid abdomen   Initial Evaluation  Date:  04/12/2017 Encounter  4/15/17   Tissue Type and %: 100% red granular tissue 100% red granulation   Periwound: intact intact   Drainage: Scant ss Small ss   Exposed structures None (pt does however have penis clamp in place due to incontinence, please see photo) none   Wound Edges:   open open   Odor: none none   S&S of Infection:   none none   Edema: none none   Sensation: intact intact               Measurements:  Lower mid abdomen Initial Evaluation  Date: 04/12/2017   Length (cm) 0.6   Width (cm) 1.5   Depth (cm) 1.2   Area (cm2) 0.9 cm2   Tract/undermine none        Procedures:     Debridement :  Non selective with gauze and cotton tip applicator     Cleansed with:  NS                                                                        Periwound protected with: no sting skin prep, moisture barrier cream   Primary dressing: Aquacel packing strip, packed loosely into wound and over top of wound bed   Secondary Dressing: adhesive foam   Other: hypafix tape     Patient Education:Pt. Verbalized signs and symptoms of infection, including erythema, induration, increased pain, increased drainage and fever and to go to ER if any of these occur. Initial evaluation of wound, pt instructed on increased protein diet, use of MVI if ok with MD to optimize wound healing; s/s infection, increased erythema and edema/fever/chills/N+V when to call MD/go to ER. Instructed on rationale for wound care products.. Instructed to make appts for 2 times per week. Instructed to keep dressings clean and dry, shower on clinic days right before coming in for appts. Pt given handout on nutrition guidelines for wound healing and wound care. Pt also given left over dressing supplies to change dressing if pt is unable to be seen later this week. Pt is knowledgeable with wound care and how to perform dressing changes. Pt verbalized understanding to all instructions.    Professional Collaboration: joint visit with Bebe SWIFT for new  wound evaluation, initial evaluation sent to referring provider via EPIC      Assessment:      Wound etiology: open surgical incision    Wound Progress: Red viable wound bed.  Rationale for Treatment: AqAg to manage bioburden, absorb exudate, and maintain moist wound environment without laterally wicking exudate therefore reducing brenna-wound maceration    Patient tolerance/compliance: pt tolerated procedure well, eager to listen to all instructions    Complicating factors: age, wound location    Need for ongoing Advanced Wound Care services: continued skilled wound care for debridement as needed, dressing management and skilled clinical observation to prevent complications and expedite healing.      Plan:      Treatment Plan and Recommendations:  Diagnosis/ICD9:  T83.9XXA;complication of implanted penile prosthesis, (artificial urinary sphincter)     Procedures/CPT: non selective debridement 79455    Frequency: twice weekly      Treatment Goals: STG 2 Weeks  LTG 4 Weeks   Granulation Tissue: 100% 100%   Decrease Necrotic Tissue to: 0% 0%   Wound Phase:  proliferative maturation   Decrease Size by: 25% 50%   Periwound:  intact intact   Decrease tracts/undermining by: n/a n/a   Decrease Pain:  0 0       At the time of each visit a thorough assessment of the patient is completed to assure the  appropriateness of our plan of care.  The dressings or modalities may need to be adapted   from the original plan to address any significant changes in the wound environment.          Clinician Signature:_______________________________Date__________________      Physician Signature:______________________________Date:__________________

## 2017-04-17 ENCOUNTER — NON-PROVIDER VISIT (OUTPATIENT)
Dept: WOUND CARE | Facility: MEDICAL CENTER | Age: 67
End: 2017-04-17
Attending: PHYSICIAN ASSISTANT
Payer: MEDICARE

## 2017-04-17 PROCEDURE — A6257 TRANSPARENT FILM <= 16 SQ IN: HCPCS

## 2017-04-17 PROCEDURE — A6402 STERILE GAUZE <= 16 SQ IN: HCPCS

## 2017-04-17 PROCEDURE — A6212 FOAM DRG <=16 SQ IN W/BORDER: HCPCS

## 2017-04-17 PROCEDURE — 97602 WOUND(S) CARE NON-SELECTIVE: CPT

## 2017-04-17 PROCEDURE — A6209 FOAM DRSG <=16 SQ IN W/O BDR: HCPCS

## 2017-04-17 PROCEDURE — 306258

## 2017-04-17 NOTE — WOUND TEAM
"Advanced Wound Care  [facility]  [address]  [address]  [place] [address]  [phone] Fax: [phone]      Encounter  For Certification Period:  04/12/2017-05/12/2017      Referring Physician: Alexsander Griffith PA-C  Primary Physician:  Caden Rodarte MD      Consulting Physicians: Bebe SWIFT, Frank Lamas MD & Benigno Grier MD surgeons, Dr. REBECCA Quispe        Wound(s): open surgical incision lower abdomen, T83.9XXA (complication of implanted penile prosthesis, (artificial urinary sphincter); open post surgical drain site perineum wound  Start of Care: 4/12/2017       Subjective:        HPI:  Pt is a very pleasant, healthy appearing 65 y/o male with hx of prostate cancer, post radical prostatectomy 8/3/2015. Since surgery, pt has had urinary incontinence refractory to medical and behavorial interventions. Pt underwent placement of  AUS ( artificial urinary sphincter) by Dr. Lamas on 3/20/2017. Procedure was completed as an outpatient and on 3/21 returned to doctor's office for removal of ford post procedure. Per pt, 2 hours after ford removal, pt developed gross hematuria, dysuria and extreme pain and was told that he had a \" torn urethra.\"  Pt was directly admitted to hospital for emergent cystoscopy with clot evacuation and ford catheter placement by Dr. Lamas.  Pt was then discharged from [facility] on 3/22/17 with ford in place. Pt then presented to ED on 3/28 with post op infection of implant and was admitted again, from 3/28-4/2, implant was removed on 3/28 by Dr. Grier. Pt presents today for care of his lower abdominal wound; as well as patient stated he has been wearing depends due to drainage from something on his scrotum. Location was evaluated and there was found to be a wound located on his perineum, order received to treat.    Pain:  Denies today          Past Medical History:   Past Medical History   Diagnosis Date   • Unspecified cataract      Bilateral IOL's   • Urinary " incontinence    • Hemorrhagic disorder (CMS-[facility])    • Arthritis      knees   • Cancer (CMS-HCC) 07/13     Prostate     Current Medications:   Current outpatient prescriptions:   •  oxycodone-acetaminophen (PERCOCET) 5-325 MG Tab, Take 1-2 Tabs by mouth every four hours as needed for Moderate Pain., Disp: 30 Tab, Rfl: 0  •  amoxicillin-clavulanate (AUGMENTIN) 875-125 MG Tab, Take 1 Tab by mouth 2 times a day., Disp: 28 Tab, Rfl: 0  •  tramadol (ULTRAM) 50 MG Tab, Take  mg by mouth every four hours as needed (4-6 hrs)., Disp: , Rfl:   •  docusate sodium (COLACE) 100 MG Cap, Take 1 Cap by mouth 2 times a day., Disp: 20 Cap, Rfl: 0  •  Cyanocobalamin (VITAMIN B-12) 5000 MCG TABLET DISPERSIBLE, Take 5,000 mcg by mouth every day., Disp: , Rfl:   •  Multiple Vitamins-Minerals (CENTRUM SILVER PO), Take 1 Tab by mouth every day., Disp: , Rfl:   •  Naproxen Sodium (ALEVE) 220 MG CAPS, Take 220 mg by mouth 1 time daily as needed., Disp: , Rfl:      Allergies: Review of patient's allergies indicates no known allergies.    Past Surgical History:   Past Surgical History   Procedure Laterality Date   • Other orthopedic surgery  2003     L Shoulder Repair   • Other orthopedic surgery       R Rotator Cuff repair   • Knee arthroplasty total  7/3/2014     Performed by Theodore San M.D. at [facility]   • Knee arthroscopy  7/3/2014     Performed by Theodore San M.D. at [facility]   • Meniscectomy  7/3/2014     Performed by Theodore San M.D. at [facility]   • Knee replacement, total Right 2014   • Inguinal hernia repair  6/1/2009     Performed by RISHI COOK at [facility]   • Inguinal hernia laparoscopic bilateral Bilateral    • Prostatectomy, radical retro  8/3/2015     Procedure: PROSTATECTOMY RADICAL RETROPUBIC;  Surgeon: Sage Ngo M.D.;  Location: [facility];  Service:    • Node dissection Bilateral 8/3/2015     Procedure: NODE  DISSECTION LYMPH;  Surgeon: Sage Ngo M.D.;  Location: SURGERY [facility];  Service:    • Cystoscopy  3/20/2017     Procedure: CYSTOSCOPY -  FLEXIBLE;  Surgeon: Frank Lamas M.D.;  Location: SURGERY [facility];  Service:    • Sphincter prosthesis placement  3/20/2017     Procedure: SPHINCTER PROSTHESIS PLACEMENT - ARTIFICIAL URINARY SPHINCTER;  Surgeon: Frank Lamas M.D.;  Location: SURGERY [facility];  Service:    • Evacuation of hematoma  3/21/2017     Procedure: EVACUATION OF HEMATOMA-CYSTO CLOT EVACUATION AND SMALL CYSTOSCOPE;  Surgeon: Frank Lamas M.D.;  Location: SURGERY [facility];  Service:    • Cystoscopy  3/21/2017     Procedure: CYSTOSCOPY;  Surgeon: Frank Lamas M.D.;  Location: SURGERY [facility];  Service:    • Sphincter prosthesis removal  3/28/2017     Procedure: URINARY SPHINCTER PROSTHESIS REMOVAL;  Surgeon: Benigno Grier M.D.;  Location: SURGERY [facility];  Service:    • Cystoscopy  3/28/2017     Procedure: CYSTOSCOPY;  Surgeon: Benigno Grier M.D.;  Location: SURGERY [facility];  Service:        Social History:    Social History     Social History   • Marital Status:      Spouse Name: N/A   • Number of Children: N/A   • Years of Education: N/A     Occupational History   • Not on file.     Social History Main Topics   • Smoking status: Never Smoker    • Smokeless tobacco: Never Used   • Alcohol Use: No   • Drug Use: No   • Sexual Activity: Not Currently     Other Topics Concern   • Not on file     Social History Narrative         Objective:      Tests and Measures: culture deferred today, no clinical s/s of infections    Orthotic, protective, supportive devices: none    Fall Risk Assessment (ariana all that apply with an X):              X 65 years or older                Fall within the last 2 years, uses   Ambulatory devices   Loss of protective sensation in feet,    Use of prostethic/orthotic, years               Presence of lower  extremity/foot/toe amputation              Taking medication that increases risk (per facility policy)       Wound Characteristics                                                    Location: lower mid abdomen   Initial Evaluation  Date: 04/12/2017 Encounter Date:  4/17/17   Tissue Type and %: 100% red granular tissue 100% red granulation   Periwound: intact irritation   Drainage: Scant ss Minimal serous   Exposed structures None (pt does however have penis clamp in place due to incontinence, please see photo) none   Wound Edges:   open open   Odor: none none   S&S of Infection:   none none   Edema: none none   Sensation: intact intact               Measurements:  Lower mid abdomen Initial Evaluation  Date: 04/12/2017 Encounter Date:  4/17/17   Length (cm) 0.6 1.4   Width (cm) 1.5 0.4   Depth (cm) 1.2 1.0   Area (cm2) 0.9 cm2 0.56   Tract/undermine none 1.5 tract to depth and left lateral side     Wound Characteristics                                                    Location: perineum   Initial Evaluation  Date: 04/17/2017   Tissue Type and %: 100% red granular tissue with hypergranulation tissue bleb   Periwound: intact   Drainage: Scant ss   Exposed structures None    Wound Edges:   open   Odor: none   S&S of Infection:   none   Edema: none   Sensation: intact               Measurements:  perineum Initial Evaluation  Date: 04/17/2017   Length (cm) 2.5   Width (cm) 0.3   Depth (cm) 0.1   Area (cm2) 0.75 cm2   Tract/undermine none        Procedures:     Debridement : non-selective with moist gauze & q-tip to both wounds; silver nitrate to hypergranulation tissue on perineum    Cleansed with:  NS                                                                        Periwound protected with: no sting skin prep   Primary dressing: Endoform & strip of hydrofera blue to abdomen, Aquacel AG to perineum   Secondary Dressing: adhesive foam to abdomen; gauze covered with Tegaderm film to perineum   Other:      Patient  Education: Pt. Verbalized signs and symptoms of infection, including erythema, induration, increased pain, increased drainage and fever and to go to ER if any of these occur. Instructed on s/s infection, increased erythema and edema/fever/chills/N+V when to call MD/go to ER. Instructed on rationale for wound care products. Instructed to make appts for 2 times per week. Instructed to keep dressings clean and dry, with how to shower. Discussed nutrition guidelines for wound healing and wound care with increased protein. Pt verbalized understanding to all instructions.    Professional Collaboration: Dr. Quispe for order to treat perineum      Assessment:      Wound etiology: open surgical incisions    Wound Progress: increased size to abdomen & wound located on perineum    Rationale for Treatment: Endoform for collagen matrix & hydrofera blue for antimicrobial. AqAg to manage bioburden, absorb exudate, and maintain moist wound environment without laterally wicking exudate therefore reducing brenna-wound maceration    Patient tolerance/compliance: pt tolerated procedure well, eager to listen to all instructions    Complicating factors: age, wound location    Need for ongoing Advanced Wound Care services: continued skilled wound care for debridement as needed, dressing management and skilled clinical observation to prevent complications and expedite healing.      Plan:      Treatment Plan and Recommendations:  Diagnosis/ICD9:  T83.9XXA;complication of implanted penile prosthesis, (artificial urinary sphincter)     Procedures/CPT: non selective debridement 24575    Frequency: twice weekly      Treatment Goals: STG 2 Weeks  LTG 4 Weeks   Granulation Tissue: 100% 100%   Decrease Necrotic Tissue to: 0% 0%   Wound Phase:  proliferative proliferation   Decrease Size by: 25% 50%   Periwound:  intact intact   Decrease tracts/undermining by: n/a n/a   Decrease Pain:  0 0       At the time of each visit a thorough assessment of the  patient is completed to assure the  appropriateness of our plan of care.  The dressings or modalities may need to be adapted   from the original plan to address any significant changes in the wound environment.          Clinician Signature:_______________________________Date__________________      Physician Signature:______________________________Date:__________________

## 2017-04-20 ENCOUNTER — NON-PROVIDER VISIT (OUTPATIENT)
Dept: WOUND CARE | Facility: MEDICAL CENTER | Age: 67
End: 2017-04-20
Attending: PHYSICIAN ASSISTANT
Payer: MEDICARE

## 2017-04-20 PROCEDURE — 97602 WOUND(S) CARE NON-SELECTIVE: CPT

## 2017-04-20 PROCEDURE — A6212 FOAM DRG <=16 SQ IN W/BORDER: HCPCS

## 2017-04-20 PROCEDURE — 303972 HCHG HYPAFIX RET DRST 18SQ PC"

## 2017-04-20 PROCEDURE — A6402 STERILE GAUZE <= 16 SQ IN: HCPCS

## 2017-04-20 NOTE — WOUND TEAM
"Advanced Wound Care  [facility]  [address]  [address]  [place] [address]  [phone] Fax: [phone]      Encounter  For Certification Period:  04/12/2017-05/12/2017      Referring Physician: Alexsander Griffith PA-C  Primary Physician:  Caden Rodarte MD      Consulting Physicians: Bebe SWIFT, Frank Lamas MD & Benigno Grier MD surgeons, Dr. REBECCA Quispe        Wound(s): open surgical incision lower abdomen, T83.9XXA (complication of implanted penile prosthesis, (artificial urinary sphincter); open post surgical drain site perineum wound  Start of Care: 4/12/2017       Subjective:        HPI:  Pt is a very pleasant, healthy appearing 65 y/o male with hx of prostate cancer, post radical prostatectomy 8/3/2015. Since surgery, pt has had urinary incontinence refractory to medical and behavorial interventions. Pt underwent placement of  AUS ( artificial urinary sphincter) by Dr. Lamas on 3/20/2017. Procedure was completed as an outpatient and on 3/21 returned to doctor's office for removal of ford post procedure. Per pt, 2 hours after ford removal, pt developed gross hematuria, dysuria and extreme pain and was told that he had a \" torn urethra.\"  Pt was directly admitted to hospital for emergent cystoscopy with clot evacuation and ford catheter placement by Dr. Lamas.  Pt was then discharged from [facility] on 3/22/17 with ford in place. Pt then presented to ED on 3/28 with post op infection of implant and was admitted again, from 3/28-4/2, implant was removed on 3/28 by Dr. Grier. Pt presents today for care of his lower abdominal wound; as well as patient stated he has been wearing depends due to drainage from something on his scrotum. Location was evaluated and there was found to be a wound located on his perineum, order received to treat.    Pain:  Denies today          Current Medications: no changes per pt     Allergies: Review of patient's allergies indicates no known allergies.    Past " Surgical History:   Past Surgical History   Procedure Laterality Date   • Other orthopedic surgery  2003     L Shoulder Repair   • Other orthopedic surgery       R Rotator Cuff repair   • Knee arthroplasty total  7/3/2014     Performed by Theodore San M.D. at [facility]   • Knee arthroscopy  7/3/2014     Performed by Theodore San M.D. at [facility]   • Meniscectomy  7/3/2014     Performed by Theodore San M.D. at [facility]   • Knee replacement, total Right 2014   • Inguinal hernia repair  6/1/2009     Performed by RISHI COOK at [facility]   • Inguinal hernia laparoscopic bilateral Bilateral    • Prostatectomy, radical retro  8/3/2015     Procedure: PROSTATECTOMY RADICAL RETROPUBIC;  Surgeon: Sage Ngo M.D.;  Location: [facility];  Service:    • Node dissection Bilateral 8/3/2015     Procedure: NODE DISSECTION LYMPH;  Surgeon: Sage Ngo M.D.;  Location: [facility];  Service:    • Cystoscopy  3/20/2017     Procedure: CYSTOSCOPY -  FLEXIBLE;  Surgeon: Frank Lamas M.D.;  Location: [facility];  Service:    • Sphincter prosthesis placement  3/20/2017     Procedure: SPHINCTER PROSTHESIS PLACEMENT - ARTIFICIAL URINARY SPHINCTER;  Surgeon: Frank Lamas M.D.;  Location: [facility];  Service:    • Evacuation of hematoma  3/21/2017     Procedure: EVACUATION OF HEMATOMA-CYSTO CLOT EVACUATION AND SMALL CYSTOSCOPE;  Surgeon: Frank Lamas M.D.;  Location: [facility];  Service:    • Cystoscopy  3/21/2017     Procedure: CYSTOSCOPY;  Surgeon: Frank Lamas M.D.;  Location: [facility];  Service:    • Sphincter prosthesis removal  3/28/2017     Procedure: URINARY SPHINCTER PROSTHESIS REMOVAL;  Surgeon: Benigno Grier M.D.;  Location: [facility];  Service:    • Cystoscopy  3/28/2017     Procedure: CYSTOSCOPY;  Surgeon: Benigno Grier M.D.;  Location:  [facility];  Service:          Objective:      Tests and Measures: culture deferred today, no clinical s/s of infections    Orthotic, protective, supportive devices: none    Fall Risk Assessment (ariana all that apply with an X):              X 65 years or older                Fall within the last 2 years, uses   Ambulatory devices   Loss of protective sensation in feet,    Use of prostethic/orthotic, years               Presence of lower extremity/foot/toe amputation              Taking medication that increases risk (per facility policy)       Wound Characteristics                                                    Location: lower mid abdomen   Initial Evaluation  Date: 04/12/2017 Encounter note Date: 4/20/17   Tissue Type and %: 100% red granular tissue 100% red granulation   Periwound: intact intact   Drainage: Scant ss Minimal serous   Exposed structures None (pt does however have penis clamp in place due to incontinence, please see photo) None, ( clamp to base base of penis to control incontinence   Wound Edges:   open open   Odor: none none   S&S of Infection:   none none   Edema: none none   Sensation: intact intact               Measurements:  Lower mid abdomen Initial Evaluation  Date: 04/12/2017 Encounter note  Date: 4/20/17   Length (cm) 0.6 0.3   Width (cm) 1.5 1.3   Depth (cm) 1.2 0.9   Area (cm2) 0.9 cm2 0.39 cm2   Tract/undermine none UM @ 4:00- 0.9 cm     Wound Characteristics                                                    Location: perineum   Initial Evaluation  Date: 04/17/2017 Encounter note  Date: 04/20/2017   Tissue Type and %: 100% red granular tissue with hypergranulation tissue bleb 100% hypergranular tissue/bleb   Periwound: intact intact   Drainage: Scant ss Scant ss   Exposed structures None  none   Wound Edges:   Open open   Odor: None none   S&S of Infection:   None none   Edema: None none   Sensation: intact intact               Measurements:  perineum Initial  Evaluation  Date: 04/17/2017 Encounter note  Date: 04/20/2017   Length (cm) 2.5 2   Width (cm) 0.3 0.4   Depth (cm) 0.1 n/a   Area (cm2) 0.75 cm2 0.8 cm2   Tract/undermine none none        Procedures:     Debridement : non-selective with moist gauze & cotton tip applicator to both wounds    Cleansed with:  NS to wounds, no rinse foam cleanser to both areas                                                                        Periwound protected with: no sting skin prep, moisture barrier cream   Primary dressing:  Aquacel AG to both wounds   Secondary Dressing: adhesive foam to both wounds, covered with hypafix tape   Other: none     Patient Education: Discussed POC, wound care rationale, dressing selection and to return to [facility] twice weekly for appts. Pt instructed to keep dressing CDI and to return to ER for any s/s infection, n/v, fever or chills. Pt may shower on clinic days prior to appts. Pt verbalized understanding to all.    Professional Collaboration: none today      Assessment:      Wound etiology: open surgical incisions    Wound Progress: wounds smaller per measurements    Rationale for Treatment:  AqAg to manage bioburden, absorb exudate, and maintain moist wound environment without laterally wicking exudate therefore reducing brenna-wound maceration    Patient tolerance/compliance: pt tolerated procedure well, eager to listen to all instructions    Complicating factors: age, wound location    Need for ongoing Advanced Wound Care services: continued skilled wound care for debridement as needed, dressing management and skilled clinical observation to prevent complications and expedite healing.      Plan:      Treatment Plan and Recommendations:  Diagnosis/ICD9:  T83.9XXA;complication of implanted penile prosthesis, (artificial urinary sphincter)     Procedures/CPT: non selective debridement 19934    Frequency: twice weekly      Treatment Goals: STG 2 Weeks  LTG 4 Weeks   Granulation Tissue: 100% 100%    Decrease Necrotic Tissue to: 0% 0%   Wound Phase:  proliferative proliferation   Decrease Size by: 25% 50%   Periwound:  intact intact   Decrease tracts/undermining by: n/a n/a   Decrease Pain:  0 0       At the time of each visit a thorough assessment of the patient is completed to assure the  appropriateness of our plan of care.  The dressings or modalities may need to be adapted   from the original plan to address any significant changes in the wound environment.          Clinician Signature:_______________________________Date__________________      Physician Signature:______________________________Date:__________________

## 2017-04-22 NOTE — OP REPORT
DATE OF SERVICE:  03/28/2017    PREOPERATIVE DIAGNOSIS:  Infected artificial urinary sphincter.    POSTOPERATIVE DIAGNOSIS:  Infected artificial urinary sphincter.    PROCEDURE PERFORMED:  Cystoscopy and removal of the artificial urinary   sphincter.    PROCEDURE IN DETAIL:  The patient was brought into the operating room and   transferred from the [facility] to the OR table.  He was then given an excellent   general anesthetic by Dr. Geno Carolina.  He was then prepped and draped in   the usual manner in the supine position.  A flexible cystoscope was   lubricated, passed through the urethra and the urethra was observed.  There   was a small tear in the urethra proximal to the artificial urinary sphincter.    Otherwise, the cystoscopy was unremarkable except for his prostate is   surgically removed.  I then went to the right lower quadrant abdominal wound   where the reservoir had been placed.  I opened this wound, removing the suture   material.  Immediately upon opening the wound, a large amount of clear yellow   fluid escaped.  This was cultured for both aerobes and anaerobes.  I then   identified the tubing that led to the reservoir.  Followed this tubing down to   the reservoir space below the rectus fascia.  The rectus fascial sutures were   removed and the reservoir was removed without difficulty.  I then cut the   tubing leading to the pump mechanism and the sphincter.  The pump mechanism   was removed by simply pulling out of its base in the right hemiscrotum.  I   then put the patient up in lithotomy and opened the perineal incision and went   down through the fascial layers and the bulbocavernosus muscle until I got to   the sphincter.  This was simply removed without difficulty and the tubing was   pulled out.  I then placed quarter inch Penrose drains in all the wounds,   copiously irrigated the wounds with antibiotic irrigation solution.  I then   sutured the drains into the skin so they would not fall  out, put dressings   over them and put an ABD pad over the perineum followed by mesh panties to   keep the dressings in place.  Prep is cleaned off the patient prior to that.    The patient was then taken out of lithotomy position, transferred back to the   [facility] and back to recovery in stable condition.    ESTIMATED BLOOD LOSS:  From this case was essentially 0.    DRAINS:  Two Penrose drains, one going to the right hemiscrotum, one going to   the reservoir underneath the right rectus fascia, muscle and another in the   perineal incision.    SPECIMEN:  The artificial urinary sphincter.    COMPLICATIONS:  None.       ____________________________________     MD REAGAN JAIN / BECCA    DD:  04/21/2017 17:00:39  DT:  04/21/2017 19:23:52    D#:  [id number]  Job#:  [id number]

## 2017-04-24 ENCOUNTER — NON-PROVIDER VISIT (OUTPATIENT)
Dept: WOUND CARE | Facility: MEDICAL CENTER | Age: 67
End: 2017-04-24
Attending: PHYSICIAN ASSISTANT
Payer: MEDICARE

## 2017-04-24 PROCEDURE — 97602 WOUND(S) CARE NON-SELECTIVE: CPT

## 2017-04-24 PROCEDURE — A6402 STERILE GAUZE <= 16 SQ IN: HCPCS

## 2017-04-24 PROCEDURE — A6212 FOAM DRG <=16 SQ IN W/BORDER: HCPCS

## 2017-04-24 NOTE — WOUND TEAM
"Advanced Wound Care  [facility]  [address]  [address]  [place] [address]  [phone] Fax: [phone]      Encounter  For Certification Period:  04/12/2017-05/12/2017      Referring Physician: Alexsander Griffith PA-C  Primary Physician:  Caden Rodarte MD      Consulting Physicians: Bebe SWIFT, Frank Lamas MD & Benigno Grier MD surgeons, Dr. REBECCA Quispe        Wound(s): open surgical incision lower abdomen, T83.9XXA (complication of implanted penile prosthesis, (artificial urinary sphincter); open post surgical drain site perineum wound  Start of Care: 4/12/2017       Subjective:        HPI:  Pt is a very pleasant, healthy appearing 65 y/o male with hx of prostate cancer, post radical prostatectomy 8/3/2015. Since surgery, pt has had urinary incontinence refractory to medical and behavorial interventions. Pt underwent placement of  AUS ( artificial urinary sphincter) by Dr. Lamas on 3/20/2017. Procedure was completed as an outpatient and on 3/21 returned to doctor's office for removal of ford post procedure. Per pt, 2 hours after ford removal, pt developed gross hematuria, dysuria and extreme pain and was told that he had a \" torn urethra.\"  Pt was directly admitted to hospital for emergent cystoscopy with clot evacuation and ford catheter placement by Dr. Lamas.  Pt was then discharged from [facility] on 3/22/17 with ford in place. Pt then presented to ED on 3/28 with post op infection of implant and was admitted again, from 3/28-4/2, implant was removed on 3/28 by Dr. Grier. Pt presents today for care of his lower abdominal wound; as well as patient stated he has been wearing depends due to drainage from something on his scrotum. Location was evaluated and there was found to be a wound located on his perineum, order received to treat.    Pain:  Denies today          Current Medications: no changes per pt     Allergies: Review of patient's allergies indicates no known allergies.    Past " Surgical History:   Past Surgical History   Procedure Laterality Date   • Other orthopedic surgery  2003     L Shoulder Repair   • Other orthopedic surgery       R Rotator Cuff repair   • Knee arthroplasty total  7/3/2014     Performed by Theodore San M.D. at [facility]   • Knee arthroscopy  7/3/2014     Performed by Theodore San M.D. at [facility]   • Meniscectomy  7/3/2014     Performed by Theodore San M.D. at [facility]   • Knee replacement, total Right 2014   • Inguinal hernia repair  6/1/2009     Performed by RISHI COOK at [facility]   • Inguinal hernia laparoscopic bilateral Bilateral    • Prostatectomy, radical retro  8/3/2015     Procedure: PROSTATECTOMY RADICAL RETROPUBIC;  Surgeon: Sage Ngo M.D.;  Location: [facility];  Service:    • Node dissection Bilateral 8/3/2015     Procedure: NODE DISSECTION LYMPH;  Surgeon: Sage Ngo M.D.;  Location: [facility];  Service:    • Cystoscopy  3/20/2017     Procedure: CYSTOSCOPY -  FLEXIBLE;  Surgeon: Frank Lamas M.D.;  Location: [facility];  Service:    • Sphincter prosthesis placement  3/20/2017     Procedure: SPHINCTER PROSTHESIS PLACEMENT - ARTIFICIAL URINARY SPHINCTER;  Surgeon: Frank Lamas M.D.;  Location: [facility];  Service:    • Evacuation of hematoma  3/21/2017     Procedure: EVACUATION OF HEMATOMA-CYSTO CLOT EVACUATION AND SMALL CYSTOSCOPE;  Surgeon: Frank Lamas M.D.;  Location: [facility];  Service:    • Cystoscopy  3/21/2017     Procedure: CYSTOSCOPY;  Surgeon: Frank Lamas M.D.;  Location: [facility];  Service:    • Sphincter prosthesis removal  3/28/2017     Procedure: URINARY SPHINCTER PROSTHESIS REMOVAL;  Surgeon: Benigno Grier M.D.;  Location: [facility];  Service:    • Cystoscopy  3/28/2017     Procedure: CYSTOSCOPY;  Surgeon: Benigno Grier M.D.;  Location:  [facility];  Service:          Objective:      Tests and Measures: culture deferred today, no clinical s/s of infections    Orthotic, protective, supportive devices: none    Fall Risk Assessment (ariana all that apply with an X):              X 65 years or older                Fall within the last 2 years, uses   Ambulatory devices   Loss of protective sensation in feet,    Use of prostethic/orthotic, years               Presence of lower extremity/foot/toe amputation              Taking medication that increases risk (per facility policy)       Wound Characteristics                                                    Location: lower mid abdomen   Initial Evaluation  Date: 04/12/2017 Encounter note Date: 4/20/17 Encounter  4/24/17   Tissue Type and %: 100% red granular tissue 100% red granulation 100% red granulation   Periwound: intact intact intact   Drainage: Scant ss Minimal serous Minimal serous   Exposed structures None (pt does however have penis clamp in place due to incontinence, please see photo) None, ( clamp to base base of penis to control incontinence None(Penis clamp in place)   Wound Edges:   open open open   Odor: none none none   S&S of Infection:   none none none   Edema: none none none   Sensation: intact intact intact               Measurements:  Lower mid abdomen Initial Evaluation  Date: 04/12/2017 Encounter note  Date: 4/20/17   Length (cm) 0.6 0.3   Width (cm) 1.5 1.3   Depth (cm) 1.2 0.9   Area (cm2) 0.9 cm2 0.39 cm2   Tract/undermine none UM @ 4:00- 0.9 cm     Wound Characteristics                                                    Location: perineum   Initial Evaluation  Date: 04/17/2017 Encounter note  Date: 04/20/2017 Encounter  4/24/17   Tissue Type and %: 100% red granular tissue with hypergranulation tissue bleb 100% hypergranular tissue/bleb 100% red   Periwound: intact intact intact   Drainage: Scant ss Scant ss Scant ss   Exposed structures None  none none   Wound Edges:    Open open open   Odor: None none none   S&S of Infection:   None none none   Edema: None none none   Sensation: intact intact intact               Measurements:  perineum Initial Evaluation  Date: 04/17/2017 Encounter note  Date: 04/20/2017   Length (cm) 2.5 2   Width (cm) 0.3 0.4   Depth (cm) 0.1 n/a   Area (cm2) 0.75 cm2 0.8 cm2   Tract/undermine none none        Procedures:     Debridement : non-selective with moist gauze & cotton tip applicator to both wounds    Cleansed with:  NS to wounds                                                            Periwound protected with: no sting skin prep, moisture barrier cream   Primary dressing:  Aquacel AG to both wounds   Secondary Dressing: adhesive foam to both wounds, covered with hypafix tape   Other: none     Patient Education: Discussed POC, wound care rationale, dressing selection and to return to [facility] twice weekly for appts. Pt instructed to keep dressing CDI and to return to ER for any s/s infection, n/v, fever or chills. Pt may shower on clinic days prior to appts. Pt verbalized understanding to all.    Professional Collaboration: none today      Assessment:      Wound etiology: open surgical incisions    Wound Progress: wound beds with viable tissue    Rationale for Treatment:  AqAg to manage bioburden, absorb exudate, and maintain moist wound environment without laterally wicking exudate therefore reducing brenna-wound maceration    Patient tolerance/compliance: pt tolerated procedure well, eager to listen to all instructions    Complicating factors: age, wound location    Need for ongoing Advanced Wound Care services: continued skilled wound care for debridement as needed, dressing management and skilled clinical observation to prevent complications and expedite healing.      Plan:      Treatment Plan and Recommendations:  Diagnosis/ICD9:  T83.9XXA;complication of implanted penile prosthesis, (artificial urinary sphincter)     Procedures/CPT: non selective  debridement 66245    Frequency: twice weekly      Treatment Goals: STG 2 Weeks  LTG 4 Weeks   Granulation Tissue: 100% 100%   Decrease Necrotic Tissue to: 0% 0%   Wound Phase:  proliferative proliferation   Decrease Size by: 25% 50%   Periwound:  intact intact   Decrease tracts/undermining by: n/a n/a   Decrease Pain:  0 0       At the time of each visit a thorough assessment of the patient is completed to assure the  appropriateness of our plan of care.  The dressings or modalities may need to be adapted   from the original plan to address any significant changes in the wound environment.          Clinician Signature:_______________________________Date__________________      Physician Signature:______________________________Date:__________________

## 2017-04-27 ENCOUNTER — NON-PROVIDER VISIT (OUTPATIENT)
Dept: WOUND CARE | Facility: MEDICAL CENTER | Age: 67
End: 2017-04-27
Attending: PHYSICIAN ASSISTANT
Payer: MEDICARE

## 2017-04-27 PROCEDURE — 303972 HCHG HYPAFIX RET DRST 18SQ PC"

## 2017-04-27 PROCEDURE — 97602 WOUND(S) CARE NON-SELECTIVE: CPT

## 2017-04-27 PROCEDURE — A6402 STERILE GAUZE <= 16 SQ IN: HCPCS

## 2017-04-27 PROCEDURE — A6212 FOAM DRG <=16 SQ IN W/BORDER: HCPCS

## 2017-04-27 NOTE — WOUND TEAM
"Advanced Wound Care  [facility]  [address]  [address]  [place] [address]  [phone] Fax: [phone]      Encounter  For Certification Period:  04/12/2017-05/12/2017      Referring Physician: Alexsander Griffith PA-C  Primary Physician:  Caden Rodarte MD      Consulting Physicians: Bebe SWIFT, Frank Lamas MD & Benigno Grier MD surgeons, Dr. REBECCA Quispe        Wound(s): open surgical incision lower abdomen, T83.9XXA (complication of implanted penile prosthesis, (artificial urinary sphincter); open post surgical drain site perineum wound  Start of Care: 4/12/2017       Subjective:        HPI:  Pt is a very pleasant, healthy appearing 65 y/o male with hx of prostate cancer, post radical prostatectomy 8/3/2015. Since surgery, pt has had urinary incontinence refractory to medical and behavorial interventions. Pt underwent placement of  AUS ( artificial urinary sphincter) by Dr. Lamas on 3/20/2017. Procedure was completed as an outpatient and on 3/21 returned to doctor's office for removal of ford post procedure. Per pt, 2 hours after ford removal, pt developed gross hematuria, dysuria and extreme pain and was told that he had a \" torn urethra.\"  Pt was directly admitted to hospital for emergent cystoscopy with clot evacuation and ford catheter placement by Dr. Lamas.  Pt was then discharged from [facility] on 3/22/17 with ford in place. Pt then presented to ED on 3/28 with post op infection of implant and was admitted again, from 3/28-4/2, implant was removed on 3/28 by Dr. Grier. Pt presents today for care of his lower abdominal wound; as well as patient stated he has been wearing depends due to drainage from something on his scrotum. Location was evaluated and there was found to be a wound located on his perineum, order received to treat.    Pain:  Denies today          Current Medications: no changes per pt     Allergies: Review of patient's allergies indicates no known allergies.    Past " Surgical History:   Past Surgical History   Procedure Laterality Date   • Other orthopedic surgery  2003     L Shoulder Repair   • Other orthopedic surgery       R Rotator Cuff repair   • Knee arthroplasty total  7/3/2014     Performed by Theodore San M.D. at [facility]   • Knee arthroscopy  7/3/2014     Performed by Theodore San M.D. at [facility]   • Meniscectomy  7/3/2014     Performed by Theodore San M.D. at [facility]   • Knee replacement, total Right 2014   • Inguinal hernia repair  6/1/2009     Performed by RISHI COOK at [facility]   • Inguinal hernia laparoscopic bilateral Bilateral    • Prostatectomy, radical retro  8/3/2015     Procedure: PROSTATECTOMY RADICAL RETROPUBIC;  Surgeon: Sage Ngo M.D.;  Location: [facility];  Service:    • Node dissection Bilateral 8/3/2015     Procedure: NODE DISSECTION LYMPH;  Surgeon: Sage Ngo M.D.;  Location: [facility];  Service:    • Cystoscopy  3/20/2017     Procedure: CYSTOSCOPY -  FLEXIBLE;  Surgeon: Frank Lamas M.D.;  Location: [facility];  Service:    • Sphincter prosthesis placement  3/20/2017     Procedure: SPHINCTER PROSTHESIS PLACEMENT - ARTIFICIAL URINARY SPHINCTER;  Surgeon: Frank Lamas M.D.;  Location: [facility];  Service:    • Evacuation of hematoma  3/21/2017     Procedure: EVACUATION OF HEMATOMA-CYSTO CLOT EVACUATION AND SMALL CYSTOSCOPE;  Surgeon: Frank Lamas M.D.;  Location: [facility];  Service:    • Cystoscopy  3/21/2017     Procedure: CYSTOSCOPY;  Surgeon: Frank Lamas M.D.;  Location: [facility];  Service:    • Sphincter prosthesis removal  3/28/2017     Procedure: URINARY SPHINCTER PROSTHESIS REMOVAL;  Surgeon: Benigno Grier M.D.;  Location: [facility];  Service:    • Cystoscopy  3/28/2017     Procedure: CYSTOSCOPY;  Surgeon: Benigno Grier M.D.;  Location:  [facility];  Service:          Objective:      Tests and Measures: culture deferred today, no clinical s/s of infections    Orthotic, protective, supportive devices: none    Fall Risk Assessment (ariana all that apply with an X):              X 65 years or older                Fall within the last 2 years, uses   Ambulatory devices   Loss of protective sensation in feet,    Use of prostethic/orthotic, years               Presence of lower extremity/foot/toe amputation              Taking medication that increases risk (per facility policy)       Wound Characteristics                                                    Location: lower mid abdomen   Initial Evaluation  Date: 04/12/2017 Encounter note Date: 4/20/17 Encounter note  Date: 4/27/17   Tissue Type and %: 100% red granular tissue 100% red granulation 100% red viable tissue   Periwound: intact intact intact   Drainage: Scant ss Minimal serous Minimal serous   Exposed structures None (pt does however have penis clamp in place due to incontinence, please see photo) None, ( clamp to base base of penis to control incontinence None(Penis clamp in place)   Wound Edges:   open open open   Odor: none none none   S&S of Infection:   none none none   Edema: none none none   Sensation: intact intact intact               Measurements:  Lower mid abdomen Initial Evaluation  Date: 04/12/2017 Encounter note  Date: 4/27/17   Length (cm) 0.6 0.3   Width (cm) 1.5 0.8   Depth (cm) 1.2 0.4   Area (cm2) 0.9 cm2 0.24 cm2   Tract/undermine none none     Wound Characteristics                                                    Location: perineum   Initial Evaluation  Date: 04/17/2017 Encounter note  Date: 04/20/2017 Encounter note  Date: 4/27/17   Tissue Type and %: 100% red granular tissue with hypergranulation tissue bleb 100% hypergranular tissue/bleb 100% red, bleb like tissue   Periwound: intact intact intact   Drainage: Scant ss Scant ss Scant ss   Exposed structures  None  none none   Wound Edges:   Open open open   Odor: None none none   S&S of Infection:   None none none   Edema: None none none   Sensation: intact intact intact               Measurements:  perineum Initial Evaluation  Date: 04/17/2017 Encounter note  Date: 04/27/2017   Length (cm) 2.5 1.5   Width (cm) 0.3 0.3   Depth (cm) 0.1 N/a bleb like tissue   Area (cm2) 0.75 cm2 0.45 cm2   Tract/undermine none none        Procedures:     Debridement : non-selective with moist gauze & cotton tip applicator to both wounds    Cleansed with:  NS to wounds, no rinse foam cleanser to abdomen and perineum                                                             Periwound protected with: no sting skin prep, moisture barrier cream   Primary dressing:  Aquacel AG to both wounds   Secondary Dressing: adhesive foam to both wounds, covered with hypafix tape to perineum, drape over abdomen per pt request for water proofing   Other: none     Patient Education: Discussed POC, wound care rationale, dressing selection and to return to [facility] twice weekly for appts. Pt instructed to keep dressing CDI and to return to ER for any s/s infection, n/v, fever or chills. Pt may shower on clinic days prior to appts. Pt verbalized understanding to all.    Professional Collaboration: none today      Assessment:      Wound etiology: open surgical incisions    Wound Progress: smaller per measurements    Rationale for Treatment:  AqAg to manage bioburden, absorb exudate, and maintain moist wound environment without laterally wicking exudate therefore reducing brenna-wound maceration    Patient tolerance/compliance: pt tolerated procedure well, eager to listen to all instructions    Complicating factors: age, wound location    Need for ongoing Advanced Wound Care services: continued skilled wound care for debridement as needed, dressing management and skilled clinical observation to prevent complications and expedite healing.      Plan:      Treatment  Plan and Recommendations:  Diagnosis/ICD9:  T83.9XXA;complication of implanted penile prosthesis, (artificial urinary sphincter)     Procedures/CPT: non selective debridement 50225    Frequency: twice weekly      Treatment Goals: STG 2 Weeks  LTG 4 Weeks   Granulation Tissue: 100% 100%   Decrease Necrotic Tissue to: 0% 0%   Wound Phase:  proliferative proliferation   Decrease Size by: 25% 50%   Periwound:  intact intact   Decrease tracts/undermining by: n/a n/a   Decrease Pain:  0 0       At the time of each visit a thorough assessment of the patient is completed to assure the  appropriateness of our plan of care.  The dressings or modalities may need to be adapted   from the original plan to address any significant changes in the wound environment.          Clinician Signature:_______________________________Date__________________      Physician Signature:______________________________Date:__________________

## 2017-05-01 ENCOUNTER — NON-PROVIDER VISIT (OUTPATIENT)
Dept: WOUND CARE | Facility: MEDICAL CENTER | Age: 67
End: 2017-05-01
Attending: PHYSICIAN ASSISTANT
Payer: MEDICARE

## 2017-05-01 NOTE — WOUND TEAM
"Advanced Wound Care  [facility]  [address]  [address]  [place] [address]  [phone] Fax: [phone]      Encounter  For Certification Period:  04/12/2017-05/12/2017      Referring Physician: Alexsander Griffith PA-C  Primary Physician:  Caden Rodarte MD      Consulting Physicians: Bebe SWIFT, Frank Lamas MD & Benigno Grier MD surgeons, Dr. REBECCA Quispe        Wound(s): open surgical incision lower abdomen, T83.9XXA (complication of implanted penile prosthesis, (artificial urinary sphincter); open post surgical drain site perineum wound  Start of Care: 4/12/2017       Subjective:        HPI:  Pt is a very pleasant, healthy appearing 65 y/o male with hx of prostate cancer, post radical prostatectomy 8/3/2015. Since surgery, pt has had urinary incontinence refractory to medical and behavorial interventions. Pt underwent placement of  AUS ( artificial urinary sphincter) by Dr. Lamas on 3/20/2017. Procedure was completed as an outpatient and on 3/21 returned to doctor's office for removal of ford post procedure. Per pt, 2 hours after ford removal, pt developed gross hematuria, dysuria and extreme pain and was told that he had a \" torn urethra.\"  Pt was directly admitted to hospital for emergent cystoscopy with clot evacuation and ford catheter placement by Dr. Lamas.  Pt was then discharged from [facility] on 3/22/17 with ford in place. Pt then presented to ED on 3/28 with post op infection of implant and was admitted again, from 3/28-4/2, implant was removed on 3/28 by Dr. Grier. Pt presents today for care of his lower abdominal wound; as well as patient stated he has been wearing depends due to drainage from something on his scrotum. Location was evaluated and there was found to be a wound located on his perineum, order received to treat.    Pain:  Denies today          Current Medications: no changes per pt     Allergies: Review of patient's allergies indicates no known allergies.    Past " Surgical History:   Past Surgical History   Procedure Laterality Date   • Other orthopedic surgery  2003     L Shoulder Repair   • Other orthopedic surgery       R Rotator Cuff repair   • Knee arthroplasty total  7/3/2014     Performed by Theodore San M.D. at [facility]   • Knee arthroscopy  7/3/2014     Performed by Theodore San M.D. at [facility]   • Meniscectomy  7/3/2014     Performed by Theodore San M.D. at [facility]   • Knee replacement, total Right 2014   • Inguinal hernia repair  6/1/2009     Performed by RISHI COOK at [facility]   • Inguinal hernia laparoscopic bilateral Bilateral    • Prostatectomy, radical retro  8/3/2015     Procedure: PROSTATECTOMY RADICAL RETROPUBIC;  Surgeon: Sage Ngo M.D.;  Location: [facility];  Service:    • Node dissection Bilateral 8/3/2015     Procedure: NODE DISSECTION LYMPH;  Surgeon: Sage Ngo M.D.;  Location: [facility];  Service:    • Cystoscopy  3/20/2017     Procedure: CYSTOSCOPY -  FLEXIBLE;  Surgeon: Frank Lamas M.D.;  Location: [facility];  Service:    • Sphincter prosthesis placement  3/20/2017     Procedure: SPHINCTER PROSTHESIS PLACEMENT - ARTIFICIAL URINARY SPHINCTER;  Surgeon: Frank Lamas M.D.;  Location: [facility];  Service:    • Evacuation of hematoma  3/21/2017     Procedure: EVACUATION OF HEMATOMA-CYSTO CLOT EVACUATION AND SMALL CYSTOSCOPE;  Surgeon: Frank Lamas M.D.;  Location: [facility];  Service:    • Cystoscopy  3/21/2017     Procedure: CYSTOSCOPY;  Surgeon: Frank Lamas M.D.;  Location: [facility];  Service:    • Sphincter prosthesis removal  3/28/2017     Procedure: URINARY SPHINCTER PROSTHESIS REMOVAL;  Surgeon: Benigno Grier M.D.;  Location: [facility];  Service:    • Cystoscopy  3/28/2017     Procedure: CYSTOSCOPY;  Surgeon: Benigno Grier M.D.;  Location:  [facility];  Service:          Objective:      Tests and Measures: culture deferred today, no clinical s/s of infections    Orthotic, protective, supportive devices: none    Fall Risk Assessment (ariana all that apply with an X):              X 65 years or older                Fall within the last 2 years, uses   Ambulatory devices   Loss of protective sensation in feet,    Use of prostethic/orthotic, years               Presence of lower extremity/foot/toe amputation              Taking medication that increases risk (per facility policy)       Wound Characteristics                                                    Location: lower mid abdomen   Initial Evaluation  Date: 04/12/2017 Encounter note Date: 4/20/17 Encounter note  Date: 05/01/17   Tissue Type and %: 100% red granular tissue 100% red granulation Resolved   Periwound: intact intact intact   Drainage: Scant ss Minimal serous None   Exposed structures None (pt does however have penis clamp in place due to incontinence, please see photo) None, ( clamp to base base of penis to control incontinence None(Penis clamp in place)   Wound Edges:   open open Intact.   Odor: none none None   S&S of Infection:   none none None   Edema: none none None   Sensation: intact intact Intact               Measurements:  Lower mid abdomen Initial Evaluation  Date: 04/12/2017 Encounter note  Date: 05/01/17   Length (cm) 0.6 Resolved   Width (cm) 1.5 Resolved   Depth (cm) 1.2 Resolved   Area (cm2) 0.9 cm2 Resolved   Tract/undermine none none     Wound Characteristics                                                    Location: perineum   Initial Evaluation  Date: 04/17/2017 Encounter note  Date: 04/20/2017 Encounter note  Date: 05/01/17   Tissue Type and %: 100% red granular tissue with hypergranulation tissue bleb 100% hypergranular tissue/bleb 100% red, resolved.   Periwound: intact intact intact   Drainage: Scant ss Scant ss None   Exposed structures None  none none    Wound Edges:   Open open Intact.   Odor: None none none   S&S of Infection:   None none none   Edema: None none none   Sensation: intact intact intact               Measurements:  perineum Initial Evaluation  Date: 04/17/2017 Encounter note  Date: 05/01/2017   Length (cm) 2.5 Resolved   Width (cm) 0.3 Resolved   Depth (cm) 0.1 Resolved   Area (cm2) 0.75 cm2 Resolved.   Tract/undermine none None        Procedures:     Debridement : None    Cleansed with:  NS and gauze.                                                             Periwound protected with:    Primary dressing:  None.   Secondary Dressing:      Patient Education: Discussed with patient that the wounds no longer need advanced wound care, to cover abdominal wound with over the counter adhesive bandage for comfort PRN. Educated patient to keep areas clean with soap and water, rinse well. Patient very happy to be discharged.    Professional Collaboration: none today      Assessment:      Wound etiology: open surgical incisions    Wound Progress: smaller per measurements    Rationale for Treatment:  Wounds so small, able to discharge patient from C.    Patient tolerance/compliance: pt tolerated procedure well, eager to listen to all instructions    Complicating factors: age, wound location    Need for ongoing Advanced Wound Care services: None.    Plan:      Treatment Plan and Recommendations:  Diagnosis/ICD9:  T83.9XXA;complication of implanted penile prosthesis, (artificial urinary sphincter)     Procedures/CPT: non selective debridement 27460    Frequency: twice weekly      Treatment Goals: STG 2 Weeks  LTG 4 Weeks   Granulation Tissue: 100% 100%   Decrease Necrotic Tissue to: 0% 0%   Wound Phase:  proliferative proliferation   Decrease Size by: 25% 50%   Periwound:  intact intact   Decrease tracts/undermining by: n/a n/a   Decrease Pain:  0 0       At the time of each visit a thorough assessment of the patient is completed to assure  the  appropriateness of our plan of care.  The dressings or modalities may need to be adapted   from the original plan to address any significant changes in the wound environment.          Clinician Signature:_______________________________Date__________________      Physician Signature:______________________________Date:__________________

## 2017-05-10 ENCOUNTER — APPOINTMENT (OUTPATIENT)
Dept: WOUND CARE | Facility: MEDICAL CENTER | Age: 67
End: 2017-05-10
Attending: PHYSICIAN ASSISTANT
Payer: MEDICARE

## 2017-05-26 ENCOUNTER — HOSPITAL ENCOUNTER (OUTPATIENT)
Dept: LAB | Facility: MEDICAL CENTER | Age: 67
End: 2017-05-26
Attending: SURGERY
Payer: MEDICARE

## 2017-05-26 LAB
25(OH)D3 SERPL-MCNC: 31 NG/ML (ref 30–100)
CALCIUM SERPL-MCNC: 10.5 MG/DL (ref 8.5–10.5)
PTH-INTACT SERPL-MCNC: 93.6 PG/ML (ref 14–72)

## 2017-05-26 PROCEDURE — 83970 ASSAY OF PARATHORMONE: CPT

## 2017-05-26 PROCEDURE — 82310 ASSAY OF CALCIUM: CPT

## 2017-05-26 PROCEDURE — 82306 VITAMIN D 25 HYDROXY: CPT

## 2017-05-26 PROCEDURE — 36415 COLL VENOUS BLD VENIPUNCTURE: CPT

## 2017-05-26 PROCEDURE — 82340 ASSAY OF CALCIUM IN URINE: CPT

## 2017-05-27 LAB
CALCIUM 24H UR-MCNC: 16.3 MG/DL
CALCIUM 24H UR-MRATE: 216 MG/D
CALCIUM/CREAT 24H UR: 190 MG/G (ref 20–240)
CREAT 24H UR-MCNC: 86 MG/DL
CREAT 24H UR-MRATE: 1140 MG/D (ref 800–2100)
SPECIMEN VOL ?TM UR: 1325 ML

## 2017-06-21 DIAGNOSIS — Z01.812 PRE-PROCEDURAL LABORATORY EXAMINATION: ICD-10-CM

## 2017-06-21 LAB
ANION GAP SERPL CALC-SCNC: 7 MMOL/L (ref 0–11.9)
BUN SERPL-MCNC: 14 MG/DL (ref 8–22)
CALCIUM SERPL-MCNC: 10.4 MG/DL (ref 8.5–10.5)
CHLORIDE SERPL-SCNC: 104 MMOL/L (ref 96–112)
CO2 SERPL-SCNC: 26 MMOL/L (ref 20–33)
CREAT SERPL-MCNC: 0.62 MG/DL (ref 0.5–1.4)
ERYTHROCYTE [DISTWIDTH] IN BLOOD BY AUTOMATED COUNT: 44.8 FL (ref 35.9–50)
GFR SERPL CREATININE-BSD FRML MDRD: >60 ML/MIN/1.73 M 2
GLUCOSE SERPL-MCNC: 95 MG/DL (ref 65–99)
HCT VFR BLD AUTO: 40.2 % (ref 42–52)
HGB BLD-MCNC: 13.6 G/DL (ref 14–18)
MCH RBC QN AUTO: 32.3 PG (ref 27–33)
MCHC RBC AUTO-ENTMCNC: 33.8 G/DL (ref 33.7–35.3)
MCV RBC AUTO: 95.5 FL (ref 81.4–97.8)
PLATELET # BLD AUTO: 216 K/UL (ref 164–446)
PMV BLD AUTO: 10.5 FL (ref 9–12.9)
POTASSIUM SERPL-SCNC: 4.1 MMOL/L (ref 3.6–5.5)
RBC # BLD AUTO: 4.21 M/UL (ref 4.7–6.1)
SODIUM SERPL-SCNC: 137 MMOL/L (ref 135–145)
WBC # BLD AUTO: 4.6 K/UL (ref 4.8–10.8)

## 2017-06-21 PROCEDURE — 36415 COLL VENOUS BLD VENIPUNCTURE: CPT

## 2017-06-21 PROCEDURE — 80048 BASIC METABOLIC PNL TOTAL CA: CPT

## 2017-06-21 PROCEDURE — 85027 COMPLETE CBC AUTOMATED: CPT

## 2017-06-27 ENCOUNTER — PATIENT OUTREACH (OUTPATIENT)
Dept: HEALTH INFORMATION MANAGEMENT | Facility: OTHER | Age: 67
End: 2017-06-27

## 2017-06-27 NOTE — PROGRESS NOTES
Attempt #:1    WebIZ Checked & Epic Updated: yes  HealthConnect Verified: yes  Verify PCP: yes    Communication Preference Obtained: yes     Review Care Team: yes    Annual Wellness Visit Scheduling  1. Scheduling Status:Scheduled    Care Gap Scheduling      Health Maintenance Due   Topic Date Due   • IMM DTaP/Tdap/Td Vaccine (1 - Tdap) SCHEDULED   • IMM ZOSTER VACCINE  SCHEDULED         MyChart Activation: sent activation code  [employer] [facility] [place]: yes  Virtual Visits: yes  Opt In to Text Messages: yes

## 2017-07-05 ENCOUNTER — HOSPITAL ENCOUNTER (OUTPATIENT)
Facility: MEDICAL CENTER | Age: 67
End: 2017-07-05
Attending: SURGERY | Admitting: SURGERY
Payer: MEDICARE

## 2017-07-05 VITALS
TEMPERATURE: 98.4 F | WEIGHT: 152.12 LBS | BODY MASS INDEX: 20.6 KG/M2 | OXYGEN SATURATION: 94 % | HEIGHT: 72 IN | HEART RATE: 60 BPM | RESPIRATION RATE: 16 BRPM | SYSTOLIC BLOOD PRESSURE: 130 MMHG | DIASTOLIC BLOOD PRESSURE: 81 MMHG

## 2017-07-05 PROBLEM — M85.80 OSTEOPENIA OF THE ELDERLY: Status: ACTIVE | Noted: 2017-07-05

## 2017-07-05 LAB
COLLECT TME BLD: ABNORMAL HH:MM
PTH-INTACT IO % DIF SERPL: 67 %
PTH-INTACT IO % DIF SERPL: 78 %
PTH-INTACT IO % DIF SERPL: 85 %
PTH-INTACT SP1 SERPL-MCNC: 72.8 PG/ML (ref 14–72)
PTH-INTACT SP2 SERPL-MCNC: 79.5 PG/ML
PTH-INTACT SP3 SERPL-MCNC: 26.6 PG/ML
PTH-INTACT SP4 SERPL-MCNC: 17.6 PG/ML
PTH-INTACT SP5 SERPL-MCNC: 11.6 PG/ML

## 2017-07-05 PROCEDURE — A6402 STERILE GAUZE <= 16 SQ IN: HCPCS | Performed by: SURGERY

## 2017-07-05 PROCEDURE — A9270 NON-COVERED ITEM OR SERVICE: HCPCS

## 2017-07-05 PROCEDURE — 700101 HCHG RX REV CODE 250

## 2017-07-05 PROCEDURE — 88305 TISSUE EXAM BY PATHOLOGIST: CPT

## 2017-07-05 PROCEDURE — 88331 PATH CONSLTJ SURG 1 BLK 1SPC: CPT

## 2017-07-05 PROCEDURE — 160048 HCHG OR STATISTICAL LEVEL 1-5: Performed by: SURGERY

## 2017-07-05 PROCEDURE — 502590 HCHG PROBE, PRASS STANDARD: Performed by: SURGERY

## 2017-07-05 PROCEDURE — 83970 ASSAY OF PARATHORMONE: CPT

## 2017-07-05 PROCEDURE — 160035 HCHG PACU - 1ST 60 MINS PHASE I: Performed by: SURGERY

## 2017-07-05 PROCEDURE — 502240 HCHG MISC OR SUPPLY RC 0272: Performed by: SURGERY

## 2017-07-05 PROCEDURE — 160009 HCHG ANES TIME/MIN: Performed by: SURGERY

## 2017-07-05 PROCEDURE — 502627 HCHG HEMOSTAT, SURGICEL 4X4: Performed by: SURGERY

## 2017-07-05 PROCEDURE — 160041 HCHG SURGERY MINUTES - EA ADDL 1 MIN LEVEL 4: Performed by: SURGERY

## 2017-07-05 PROCEDURE — 160036 HCHG PACU - EA ADDL 30 MINS PHASE I: Performed by: SURGERY

## 2017-07-05 PROCEDURE — 160002 HCHG RECOVERY MINUTES (STAT): Performed by: SURGERY

## 2017-07-05 PROCEDURE — 700111 HCHG RX REV CODE 636 W/ 250 OVERRIDE (IP)

## 2017-07-05 PROCEDURE — 500002 HCHG ADHESIVE, DERMABOND: Performed by: SURGERY

## 2017-07-05 PROCEDURE — 160029 HCHG SURGERY MINUTES - 1ST 30 MINS LEVEL 4: Performed by: SURGERY

## 2017-07-05 PROCEDURE — 700102 HCHG RX REV CODE 250 W/ 637 OVERRIDE(OP)

## 2017-07-05 PROCEDURE — 502594 HCHG SCISSOR HANDLE, HARMONIC ACE: Performed by: SURGERY

## 2017-07-05 PROCEDURE — 501838 HCHG SUTURE GENERAL: Performed by: SURGERY

## 2017-07-05 PROCEDURE — 500122 HCHG BOVIE, BLADE: Performed by: SURGERY

## 2017-07-05 RX ORDER — LIDOCAINE HYDROCHLORIDE 10 MG/ML
INJECTION, SOLUTION INFILTRATION; PERINEURAL
Status: DISCONTINUED
Start: 2017-07-05 | End: 2017-07-05 | Stop reason: HOSPADM

## 2017-07-05 RX ORDER — SODIUM CHLORIDE, SODIUM LACTATE, POTASSIUM CHLORIDE, CALCIUM CHLORIDE 600; 310; 30; 20 MG/100ML; MG/100ML; MG/100ML; MG/100ML
INJECTION, SOLUTION INTRAVENOUS CONTINUOUS
Status: DISCONTINUED | OUTPATIENT
Start: 2017-07-05 | End: 2017-07-05 | Stop reason: HOSPADM

## 2017-07-05 RX ORDER — LIDOCAINE AND PRILOCAINE 25; 25 MG/G; MG/G
1 CREAM TOPICAL
Status: DISCONTINUED | OUTPATIENT
Start: 2017-07-05 | End: 2017-07-05 | Stop reason: HOSPADM

## 2017-07-05 RX ORDER — OXYCODONE HYDROCHLORIDE AND ACETAMINOPHEN 5; 325 MG/1; MG/1
1-2 TABLET ORAL EVERY 4 HOURS PRN
Qty: 15 TAB | Refills: 0 | Status: SHIPPED | OUTPATIENT
Start: 2017-07-05 | End: 2017-07-13

## 2017-07-05 RX ORDER — OXYCODONE HYDROCHLORIDE AND ACETAMINOPHEN 5; 325 MG/1; MG/1
TABLET ORAL
Status: COMPLETED
Start: 2017-07-05 | End: 2017-07-05

## 2017-07-05 RX ORDER — HYDROCODONE BITARTRATE AND ACETAMINOPHEN 5; 325 MG/1; MG/1
1-2 TABLET ORAL EVERY 6 HOURS PRN
Qty: 15 TAB | Refills: 0 | Status: SHIPPED | OUTPATIENT
Start: 2017-07-05 | End: 2017-07-05

## 2017-07-05 RX ORDER — LIDOCAINE HYDROCHLORIDE 10 MG/ML
0.5 INJECTION, SOLUTION INFILTRATION; PERINEURAL
Status: DISCONTINUED | OUTPATIENT
Start: 2017-07-05 | End: 2017-07-05 | Stop reason: HOSPADM

## 2017-07-05 RX ORDER — BUPIVACAINE HYDROCHLORIDE AND EPINEPHRINE 5; 5 MG/ML; UG/ML
INJECTION, SOLUTION PERINEURAL
Status: DISCONTINUED | OUTPATIENT
Start: 2017-07-05 | End: 2017-07-05 | Stop reason: HOSPADM

## 2017-07-05 RX ADMIN — OXYCODONE AND ACETAMINOPHEN 2 TABLET: 5; 325 TABLET ORAL at 11:15

## 2017-07-05 RX ADMIN — FENTANYL CITRATE 50 MCG: 50 INJECTION, SOLUTION INTRAMUSCULAR; INTRAVENOUS at 11:15

## 2017-07-05 RX ADMIN — SODIUM CHLORIDE, SODIUM LACTATE, POTASSIUM CHLORIDE, CALCIUM CHLORIDE 1000 ML: 600; 310; 30; 20 INJECTION, SOLUTION INTRAVENOUS at 07:30

## 2017-07-05 RX ADMIN — FENTANYL CITRATE 50 MCG: 50 INJECTION, SOLUTION INTRAMUSCULAR; INTRAVENOUS at 11:30

## 2017-07-05 ASSESSMENT — PAIN SCALES - GENERAL
PAINLEVEL_OUTOF10: 0
PAINLEVEL_OUTOF10: 8
PAINLEVEL_OUTOF10: 6
PAINLEVEL_OUTOF10: 2
PAINLEVEL_OUTOF10: 2
PAINLEVEL_OUTOF10: 4
PAINLEVEL_OUTOF10: 2
PAINLEVEL_OUTOF10: 0
PAINLEVEL_OUTOF10: 2

## 2017-07-05 NOTE — OR NURSING
1102 received pt from OR with Dr. Naranjo, VSS breathing even and unlabored. Dermabond to anterior neck CDI.  1103 airway removed without difficulty.  1115 pt medicated for 8/10 pain see-MAR. Pt sipping on water, tolerating well.  1152 pt states pain 4/10 and tolerable, friend called for ride home.  1300 pt up dressed and in recliner, waiting for ride.  1330 Friend at bedside, discharge instructions given- all questions and concerns addressed.   1343 pt ambulated out with steady gait, friends at side.

## 2017-07-05 NOTE — OP REPORT
Operative Report    Date: 7/5/2017    Surgeon: Josh Blank M.D.    Assistant: Francois Rose    Anesthesiologist: Dr. Naranjo    Pre-operative Diagnosis:  E 210. primary hyperparathyroidism    Post-operative Diagnosis: same     Procedure:   1. Minimally invasive  parathyroid exploration with intraoperative PTH monitoring   2.  unilateral recurrent laryngeal nerve monitoring  3.  venous catheterization for selective organ sampling    Findings:  Intraoperative PTH monitoring was performed with levels drawn at appropriate intervals. The hghest pre-excision level was 79.5 picograms/deciliter. There was a 85.5% drop from the highest level, with the final level being 11.6 picograms/deciliter. A left superior parathyroid adenoma was identified in the anatomic position and removed.       Procedure in detail: The patient was identified in the pre-operative holding area and brought to the operating room. Correct site was identified.  GETA was smoothly induced. The patient was prepped and draped in the usual sterile fashion.    With the patient in the supine position, the head of the bed slightly elevated, the neck slightly extended, and the patient intubated with a NIM endotracheal tube, the precordial electrodes were placed by the surgical assistant, who then monitored the recurrent laryngeal nerves bilaterally throughout the procedure.     The neck was prepared with betadiene and draped in the usual fashion. The neck was entered through a short lower transverse cervical incision and carried down through the platysma. Subplatysmal flaps were dissected superiorly and inferiorly down to the sternal notch. The midline cervical fascia was incised down to the capsule of the thyroid.    The strap muscles were mobilized in layers off the left thyroid lobe. Venous samples for intraoperative paarathyroid hormone levels were drawn from the internal jugular vein.  The left inferior parathyroid gland was seen to be slightly enlarged,  and was removed. On further dissection, the left superior gland was seen and was obviously enlarged with a normal rim of tissue seen. The vascular pedicles were divided with the Ligasure device. The gland was sent for immediate pathologic exam, which revealed  a hypercellular enlarged parathyroid gland.    Intraoperative PTH monitoring was performed with levels drawn at appropriate intervals. There was a 85.5% drop from the highest level, with the final level being 11.6 picograms/deciliter.     The integrity of the left recurrent laryngeal nerve was documented by the surgical assistant. Small pieces of Surgicel Fibrillar were placed in both sides of the neck. The midline cervical fascia was reapproximated with running 3-0 Vicryl. Platysma was reapproximated with interrupted 3-0 Vicryl.  The wound was dressed with surgical glue.     The patient was awakened from general anesthetic, and was taken to the recovery room in stable condition.    Sponge and needle counts were correct at the end of the case.     Specimen: left inferior and superior parathyroid glands.     EBL: 5 cc    Dispo: stable, extubated, to PACU    Josh Blank M.D.  [facility]  [phone]

## 2017-07-05 NOTE — IP AVS SNAPSHOT
Home Care Instructions                                                                                                                Name:Sebastien Horn  Medical Record Number:[id number]  CSN: [id number]    YOB: 1950   Age: 67 y.o.  Sex: male  HT:1.829 m (6') WT: 69 kg (152 lb 1.9 oz)          Admit Date: 7/5/2017     Discharge Date:   Today's Date: 7/5/2017  Attending Doctor:  Josh Blank M.D.                  Allergies:  Review of patient's allergies indicates no known allergies.                Discharge Instructions         ACTIVITY: Rest and take it easy for the first 24 hours.  A responsible adult is recommended to remain with you during that time.  It is normal to feel sleepy.  We encourage you to not do anything that requires balance, judgment or coordination.    MILD FLU-LIKE SYMPTOMS ARE NORMAL. YOU MAY EXPERIENCE GENERALIZED MUSCLE ACHES, THROAT IRRITATION, HEADACHE AND/OR SOME NAUSEA.    FOR 24 HOURS DO NOT:  Drive, operate machinery or run household appliances.  Drink beer or alcoholic beverages.     Discharge instructions after parathyroidectomy:      Recovering After Surgery:    Get plenty of rest.    Care for your incision as directed.    Avoid heavy lifting and strenuous activity for 2 weeks.    Walk a few times daily. But don’t push yourself too hard. Slowly increase your pace and distance, as you feel able.    Return to work when your doctor says it’s okay.    Pain Control:  Most patients do well after thyroid surgery. You may take ibuprofen or Tylenol as needed for pain. If you need stronger pain medication, call your doctor's office.         Keeping Your Doctor’s Appointments:    See your doctor for regular visits. These are needed to monitor your health.    Have routine blood tests done. These check the level of thyroid hormone in your body. This helps your doctor know whether to adjust the dosage of your medication if needed.    Tell your doctor about any signs of further  thyroid problems.    Signs that you may have too much thyroid hormone in your body include:                Restlessness                Rapid weight loss                Sweating                Signs that you may have too little thyroid hormone in your body include:                Fatigue or sluggishness                Puffy hands, face, or feet                Hoarseness                Muscle pain                Slow pulse (less than 60 beats per minute)    When to Call Your Doctor:  Call your doctor right away if you have any of the following:  Fever above 101°F  Swelling or bleeding at the incision site  Choking  Trouble breathing  A sore throat that lasts longer than 7 days  Tingling or cramps in your hands, feet, or lips    Calcium Supplementation  After parathyroidectomy, you may have low calcium levels. Symptoms of low calcium levels include tingling or numbness in the fingers or lips, or muscle spasms.  To prevent low calcium levels, you should take Citracal Max. The usual dosage is 2 tablets three times daily. You may buy this OTC. If you have symptoms of low calcium, take two extra tablets and call your doctor's office.     Your doctor may also prescribe Rocaltrol to help with your calcium levels. Take this as prescribed.    FOLLOW-UP:  · Please call my office at [phone] to make an appointment in 1 week    Office address:   [place] [address]    Josh Blank M.D.  [facility]  [phone]  Make important decisions or sign legal documents.          DIET: To avoid nausea, slowly advance diet as tolerated, avoiding spicy or greasy foods for the first day.  Add more substantial food to your diet according to your physician's instructions.  Babies can be fed formula or breast milk as soon as they are hungry.  INCREASE FLUIDS AND FIBER TO AVOID CONSTIPATION.    SURGICAL DRESSING/BATHING: no soaking incision area    FOLLOW-UP APPOINTMENT:  A follow-up appointment should be arranged with  your doctor in keep as scheduled.    You should CALL YOUR PHYSICIAN if you develop:  Fever greater than 101 degrees F.  Pain not relieved by medication, or persistent nausea or vomiting.  Excessive bleeding (blood soaking through dressing) or unexpected drainage from the wound.  Extreme redness or swelling around the incision site, drainage of pus or foul smelling drainage.  Inability to urinate or empty your bladder within 8 hours.  Problems with breathing or chest pain.    You should call 911 if you develop problems with breathing or chest pain.  If you are unable to contact your doctor or surgical center, you should go to the nearest emergency room or urgent care center.  Physician's telephone #: [phone]    If any questions arise, call your doctor.  If your doctor is not available, please feel free to call the Surgical Center at [phone].  The Center is open Monday through Friday from 7AM to 7PM.  You can also call the [employer] HOTLINE open 24 hours/day, 7 days/week and speak to a nurse at [phone], or toll free at [phone].    A registered nurse may call you a few days after your surgery to see how you are doing after your procedure.    MEDICATIONS: Resume taking daily medication.  Take prescribed pain medication with food.  If no medication is prescribed, you may take non-aspirin pain medication if needed.  PAIN MEDICATION CAN BE VERY CONSTIPATING.  Take a stool softener or laxative such as senokot, pericolace, or milk of magnesia if needed.    Prescription given for NORCO PERCOCET.  Last pain medication given at 11:15, may take next dose at 5:15pm.    Your physician has prescribed pain medication that includes Acetaminophen (Tylenol), do not take additional Acetaminophen (Tylenol) while taking the prescribed medication.    Depression / Suicide Risk    As you are discharged from this [facility] facility, it is important to learn how to keep safe from harming yourself.    Recognize the warning  signs:  · Abrupt changes in personality, positive or negative- including increase in energy   · Giving away possessions  · Change in eating patterns- significant weight changes-  positive or negative  · Change in sleeping patterns- unable to sleep or sleeping all the time   · Unwillingness or inability to communicate  · Depression  · Unusual sadness, discouragement and loneliness  · Talk of wanting to die  · Neglect of personal appearance   · Rebelliousness- reckless behavior  · Withdrawal from people/activities they love  · Confusion- inability to concentrate     If you or a loved one observes any of these behaviors or has concerns about self-harm, here's what you can do:  · Talk about it- your feelings and reasons for harming yourself  · Remove any means that you might use to hurt yourself (examples: pills, rope, extension cords, firearm)  · Get professional help from the community (Mental Health, Substance Abuse, psychological counseling)  · Do not be alone:Call your Safe Contact- someone whom you trust who will be there for you.  · Call your local CRISIS HOTLINE 551-1275 or 204-838-2556  · Call your local Children's Mobile Crisis Response Team [place] (604) 335-7443 or www.[employer]  · Call the toll free National Suicide Prevention Hotlines   · National Suicide Prevention Lifeline 867-974-DBEF (2664)  · National Hope Line Network 800-SUICIDE (715-9645)       Medication List      START taking these medications        Instructions    Morning Afternoon Evening Bedtime    oxycodone-acetaminophen 5-325 MG Tabs   Last time this was given:  2 Tabs on 7/5/2017 11:15 AM   Commonly known as:  PERCOCET        Take 1-2 Tabs by mouth every four hours as needed for Moderate Pain.   Dose:  1-2 Tab                          CONTINUE taking these medications        Instructions    Morning Afternoon Evening Bedtime    ALEVE 220 MG Caps   Generic drug:  Naproxen Sodium        Take 220 mg by mouth 1 time daily as  needed.   Dose:  220 mg                        amoxicillin-clavulanate 875-125 MG Tabs   Commonly known as:  AUGMENTIN        Take 1 Tab by mouth 2 times a day.   Dose:  1 Tab                        CENTRUM SILVER PO        Take 1 Tab by mouth every day.   Dose:  1 Tab                        VITAMIN B 12 PO        Take 2,500 Units by mouth every day.   Dose:  2500 Units                             Where to Get Your Medications      You can get these medications from any pharmacy     Bring a paper prescription for each of these medications    - oxycodone-acetaminophen 5-325 MG Tabs            Medication Information     Above and/or attached are the medications your physician expects you to take upon discharge. Review all of your home medications and newly ordered medications with your doctor and/or pharmacist. Follow medication instructions as directed by your doctor and/or pharmacist. Please keep your medication list with you and share with your physician. Update the information when medications are discontinued, doses are changed, or new medications (including over-the-counter products) are added; and carry medication information at all times in the event of emergency situations.        Resources     Quit Smoking / Tobacco Use:    I understand the use of any tobacco products increases my chance of suffering from future heart disease or stroke and could cause other illnesses which may shorten my life. Quitting the use of tobacco products is the single most important thing I can do to improve my health. For further information on smoking / tobacco cessation call a Toll Free Quit Line at 1-949.292.7538 (*[employer] [place]) or 1-322.671.1896 (American Lung Association) or you can access the web based program at www.lungusa.org.    [place] Tobacco Users Help Line:  (564) 415-9011       Toll Free: 1-804.557.2062  Quit Tobacco Program [facility] (810)014-1978    Crisis Hotline:    National  Crisis Hotline:  7-797-PAVFLOG or 1-459.257.8929    [place] Crisis Hotline:    1-181.179.6223 or 982-648-2465    Discharge Survey:   Thank you for choosing [facility]. We hope we did everything we could to make your hospital stay a pleasant one. You may be receiving a survey and we would appreciate your time and participation in answering the questions. Your input is very valuable to us in our efforts to improve our service to our patients and their families.            Signatures     My signature on this form indicates that:    1. I acknowledge receipt and understanding of these Home Care Instruction.  2. My questions regarding this information have been answered to my satisfaction.  3. I have formulated a plan with my discharge nurse to obtain my prescribed medications for home.    __________________________________      __________________________________                   Patient Signature                                 Guardian/Responsible Adult Signature      __________________________________                 __________       ________                       Nurse Signature                                               Date                 Time

## 2017-07-05 NOTE — DISCHARGE SUMMARY
Discharge instructions after parathyroidectomy:      Recovering After Surgery:    Get plenty of rest.    Care for your incision as directed.    Avoid heavy lifting and strenuous activity for 2 weeks.    Walk a few times daily. But don’t push yourself too hard. Slowly increase your pace and distance, as you feel able.    Return to work when your doctor says it’s okay.    Pain Control:  Most patients do well after thyroid surgery. You may take ibuprofen or Tylenol as needed for pain. If you need stronger pain medication, call your doctor's office.         Keeping Your Doctor’s Appointments:    See your doctor for regular visits. These are needed to monitor your health.    Have routine blood tests done. These check the level of thyroid hormone in your body. This helps your doctor know whether to adjust the dosage of your medication if needed.    Tell your doctor about any signs of further thyroid problems.    Signs that you may have too much thyroid hormone in your body include:     Restlessness     Rapid weight loss     Sweating     Signs that you may have too little thyroid hormone in your body include:     Fatigue or sluggishness     Puffy hands, face, or feet     Hoarseness     Muscle pain     Slow pulse (less than 60 beats per minute)    When to Call Your Doctor:  Call your doctor right away if you have any of the following:  Fever above 101°F  Swelling or bleeding at the incision site  Choking  Trouble breathing  A sore throat that lasts longer than 7 days  Tingling or cramps in your hands, feet, or lips    Calcium Supplementation  After parathyroidectomy, you may have low calcium levels. Symptoms of low calcium levels include tingling or numbness in the fingers or lips, or muscle spasms.  To prevent low calcium levels, you should take Citracal Max. The usual dosage is 2 tablets three times daily. You may buy this OTC. If you have symptoms of low calcium, take two extra tablets and call your doctor's office.      Your doctor may also prescribe Rocaltrol to help with your calcium levels. Take this as prescribed.    FOLLOW-UP:  ? Please call my office at [phone] to make an appointment in 1 week    Office address:  [address] [place] [address]    Josh Blank M.D.  [facility]  [phone]

## 2017-07-05 NOTE — IP AVS SNAPSHOT
7/5/2017    Sebastien Horn  [address] [place] [address]    Dear Sebastien:    [facility] wants to ensure your discharge home is safe and you or your loved ones have had all of your questions answered regarding your care after you leave the hospital.    Below is a list of resources and contact information should you have any questions regarding your hospital stay, follow-up instructions, or active medical symptoms.    Questions or Concerns Regarding… Contact   Medical Questions Related to Your Discharge  (7 days a week, 8am-5pm) Contact a Nurse Care Coordinator   [phone]   Medical Questions Not Related to Your Discharge  (24 hours a day / 7 days a week)  Contact the Nurse Health Line   [phone]    Medications or Discharge Instructions Refer to your discharge packet   or contact your [facility] Primary Care Provider   [phone]   Follow-up Appointment(s) Schedule your appointment via [employer]   or contact Scheduling [phone]   Billing Review your statement via [employer]  or contact Billing [phone]   Medical Records Review your records via [employer]   or contact Medical Records [phone]     You may receive a telephone call within two days of discharge. This call is to make certain you understand your discharge instructions and have the opportunity to have any questions answered. You can also easily access your medical information, test results and upcoming appointments via the [employer] free online health management tool. You can learn more and sign up at [employer]. For assistance setting up your [employer] account, please call [phone].    Once again, we want to ensure your discharge home is safe and that you have a clear understanding of any next steps in your care. If you have any questions or concerns, please do not hesitate to contact us, we are here for you. Thank you for choosing [facility] for your healthcare needs.    Sincerely,    Your [facility] Healthcare Team

## 2017-07-07 ENCOUNTER — TELEPHONE (OUTPATIENT)
Dept: MEDICAL GROUP | Facility: MEDICAL CENTER | Age: 67
End: 2017-07-07

## 2017-07-13 ENCOUNTER — OFFICE VISIT (OUTPATIENT)
Dept: MEDICAL GROUP | Facility: MEDICAL CENTER | Age: 67
End: 2017-07-13
Payer: MEDICARE

## 2017-07-13 ENCOUNTER — HOSPITAL ENCOUNTER (OUTPATIENT)
Dept: LAB | Facility: MEDICAL CENTER | Age: 67
End: 2017-07-13
Attending: SURGERY
Payer: MEDICARE

## 2017-07-13 VITALS
HEART RATE: 57 BPM | SYSTOLIC BLOOD PRESSURE: 108 MMHG | DIASTOLIC BLOOD PRESSURE: 60 MMHG | OXYGEN SATURATION: 98 % | HEIGHT: 71 IN | WEIGHT: 156 LBS | TEMPERATURE: 97.7 F | BODY MASS INDEX: 21.84 KG/M2

## 2017-07-13 DIAGNOSIS — E78.00 PURE HYPERCHOLESTEROLEMIA: ICD-10-CM

## 2017-07-13 DIAGNOSIS — E55.9 VITAMIN D INSUFFICIENCY: ICD-10-CM

## 2017-07-13 DIAGNOSIS — L57.0 AK (ACTINIC KERATOSIS): ICD-10-CM

## 2017-07-13 DIAGNOSIS — N52.31 ERECTILE DYSFUNCTION FOLLOWING RADICAL PROSTATECTOMY: ICD-10-CM

## 2017-07-13 DIAGNOSIS — Z00.00 MEDICARE ANNUAL WELLNESS VISIT, SUBSEQUENT: ICD-10-CM

## 2017-07-13 DIAGNOSIS — I50.30 (HFPEF) HEART FAILURE WITH PRESERVED EJECTION FRACTION (HCC): Chronic | ICD-10-CM

## 2017-07-13 DIAGNOSIS — I34.1 MVP (MITRAL VALVE PROLAPSE): ICD-10-CM

## 2017-07-13 DIAGNOSIS — R51.9 LEFT-SIDED HEADACHE: ICD-10-CM

## 2017-07-13 DIAGNOSIS — Z85.46 H/O PROSTATE CANCER: ICD-10-CM

## 2017-07-13 DIAGNOSIS — E89.2 H/O PARATHYROIDECTOMY (HCC): ICD-10-CM

## 2017-07-13 DIAGNOSIS — M85.80 OSTEOPENIA OF THE ELDERLY: ICD-10-CM

## 2017-07-13 DIAGNOSIS — N39.498 OTHER URINARY INCONTINENCE: ICD-10-CM

## 2017-07-13 PROBLEM — R33.9 RETENTION OF URINE, UNSPECIFIED: Status: RESOLVED | Noted: 2017-03-20 | Resolved: 2017-07-13

## 2017-07-13 PROBLEM — T83.9XXA: Status: RESOLVED | Noted: 2017-03-29 | Resolved: 2017-07-13

## 2017-07-13 PROBLEM — T81.89XA SURGICAL WOUND, NON HEALING: Status: RESOLVED | Noted: 2017-04-13 | Resolved: 2017-07-13

## 2017-07-13 PROBLEM — R31.9 HEMATURIA: Status: RESOLVED | Noted: 2017-03-21 | Resolved: 2017-07-13

## 2017-07-13 PROBLEM — R33.8 ACUTE URINARY RETENTION: Status: RESOLVED | Noted: 2017-03-21 | Resolved: 2017-07-13

## 2017-07-13 LAB
CALCIUM SERPL-MCNC: 9.2 MG/DL (ref 8.5–10.5)
PTH-INTACT SERPL-MCNC: 56.5 PG/ML (ref 14–72)

## 2017-07-13 PROCEDURE — 36415 COLL VENOUS BLD VENIPUNCTURE: CPT

## 2017-07-13 PROCEDURE — 83970 ASSAY OF PARATHORMONE: CPT

## 2017-07-13 PROCEDURE — G0439 PPPS, SUBSEQ VISIT: HCPCS | Performed by: FAMILY MEDICINE

## 2017-07-13 PROCEDURE — 82310 ASSAY OF CALCIUM: CPT

## 2017-07-13 ASSESSMENT — PATIENT HEALTH QUESTIONNAIRE - PHQ9: CLINICAL INTERPRETATION OF PHQ2 SCORE: 0

## 2017-07-13 NOTE — PROGRESS NOTES
Chief Complaint   Patient presents with   • Annual Wellness Visit         HPI:  Sebastien Horn is a 67 y.o. male here for Medicare Annual Wellness Visit        Patient Active Problem List    Diagnosis Date Noted   • Osteopenia of the elderly 07/05/2017   • (HFpEF) heart failure with preserved ejection fraction (CMS-HCC) 03/28/2017   • AK (actinic keratosis) 12/14/2016   • H/O parathyroidectomy 09/08/2016   • Urinary incontinence 09/29/2015   • Erectile dysfunction following radical prostatectomy 09/29/2015   • Vitamin D insufficiency 09/29/2015   • H/O prostate cancer 06/02/2015   • Hyperlipidemia 06/02/2015   • MVP (mitral valve prolapse) 06/02/2015   • Left-sided headache 06/02/2015       Current Outpatient Prescriptions   Medication Sig Dispense Refill   • Cyanocobalamin (VITAMIN B 12 PO) Take 2,500 Units by mouth every day.     • Multiple Vitamins-Minerals (CENTRUM SILVER PO) Take 1 Tab by mouth every day.     • Naproxen Sodium (ALEVE) 220 MG CAPS Take 220 mg by mouth 1 time daily as needed.       No current facility-administered medications for this visit.        Patient is taking medications as noted in medication list.  Current supplements as per medication list.   Chronic narcotic pain medicines: no    Allergies: Review of patient's allergies indicates no known allergies.    Current social contact/activities: pt plays drums and keeps himself active outdoors.      Is patient current with immunizations?  No, due for TDAP and ZOSTAVAX (Shingles). Patient is interested in receiving NONE today.     He  reports that he has never smoked. He has never used smokeless tobacco. He reports that he does not drink alcohol or use illicit drugs.  Counseling given: Not Answered        DPA/Advanced Directive:  Patient has Advanced Directive, but it is not on file. Instructed to bring in a copy to scan into their chart.    ROS:    Gait: Uses no assistive device   Ostomy: no   Other tubes: no   Amputations: no   Chronic  oxygen use: no   Last eye exam: 1 year ago    Wears hearing aids: no   : Denies incontinence.       Depression Screening    Little interest or pleasure in doing things?  0 - not at all  Feeling down, depressed, or hopeless?  0 - not at all  Patient Health Questionnaire Score: 0  If depressive symptoms identified deferred to follow up visit unless specifically addressed in assessment and plan.    Interpretation of PHQ-9 Total Score   Score Severity   1-4 Minimal Depression   5-9 Mild Depression   10-14 Moderate Depression   15-19 Moderately Severe Depression   20-27 Severe Depression    Screening for Cognitive Impairment    Three Minute Recall (banana, sunrise, fence)  3/3    Draw clock face with all 12 numbers set to the hand to show 10 minutes past 11 o'clock  1 5/5  If cognitive concerns identified deferred to follow up visit unless specifically addressed in assessment and plan.    Fall Risk Assessment    Has the patient had two or more falls in the last year or any fall with injury in the last year?  No  If Fall Risk identified deferred to follow up visit unless specifically addressed in assessment and plan.    Safety Assessment    Throw rugs on floor.  Yes  Handrails on all stairs.  Yes  Good lighting in all hallways.  Yes  Difficulty hearing.  No  Patient counseled about all safety risks that were identified.    Functional Assessment ADLs    Are there any barriers preventing you from cooking for yourself or meeting nutritional needs?  No.    Are there any barriers preventing you from driving safely or obtaining transportation?  No.    Are there any barriers preventing you from using a telephone or calling for help?  No.    Are there any barriers preventing you from shopping?  No.    Are there any barriers preventing you from taking care of your own finances?  No.    Are there any barriers preventing you from managing your medications?  No.    Are currently engaging any exercise or physical activity?  Yes.  Pt  hikes on a daily basis.     Health Maintenance Summary                IMM DTaP/Tdap/Td Vaccine Overdue 7/4/1968     IMM ZOSTER VACCINE Overdue 7/4/2009     IMM INFLUENZA Next Due 9/1/2017      Done 11/29/2016 Imm Admin: Influenza Vaccine Adult HD    IMM PNEUMOCOCCAL 65+ (ADULT) LOW/MEDIUM RISK SERIES Next Due 9/2/2017      Done 9/2/2016 Imm Admin: Pneumococcal polysaccharide vaccine (PPSV-23)     Patient has more history with this topic...    Annual Wellness Visit Next Due 9/3/2017      Done 9/2/2016 Visit Dx: Medicare annual wellness visit, initial    COLONOSCOPY Next Due 4/10/2024      Done 4/10/2014 REFERRAL TO GI FOR COLONOSCOPY     Patient has more history with this topic...          Patient Care Team:  Caden Rodarte M.D. as PCP - General (Family Medicine)  Sage Ngo M.D. as Consulting Physician (Urology)  Abebe Araiza M.D. as Consulting Physician (Gastroenterology)  Morris Chavarria M.D. as Consulting Physician (Cardiology)  Larisa Ureña O.D. as Consulting Physician (Optometry)  Josh Blank M.D. as Consulting Physician (Surgery)    Social History   Substance Use Topics   • Smoking status: Never Smoker    • Smokeless tobacco: Never Used   • Alcohol Use: No     Family History   Problem Relation Age of Onset   • Hyperlipidemia Mother    • Hyperlipidemia Father    • Cancer Father      Prostate and Lung   • Lung Disease Father    • Other Sister      Lupus     He  has a past medical history of Unspecified cataract; Urinary incontinence; Arthritis; Cancer (CMS-[facility]) (07/13); Hiatus hernia syndrome; Disorder of thyroid; Hemorrhagic disorder (CMS-[facility]); and Heart valve disease.   Past Surgical History   Procedure Laterality Date   • Other orthopedic surgery  2003     L Shoulder Repair   • Other orthopedic surgery       R Rotator Cuff repair   • Knee arthroplasty total  7/3/2014     Performed by Theodore San M.D. at SURGERY [facility]   • Knee arthroscopy  7/3/2014     Performed by Theodore LEIVA  REJI San at [facility]   • Meniscectomy  7/3/2014     Performed by Theodore San M.D. at [facility]   • Knee replacement, total Right 2014   • Inguinal hernia repair  6/1/2009     Performed by RISHI COOK at [facility]   • Inguinal hernia laparoscopic bilateral Bilateral    • Prostatectomy, radical retro  8/3/2015     Procedure: PROSTATECTOMY RADICAL RETROPUBIC;  Surgeon: Sage Ngo M.D.;  Location: [facility];  Service:    • Node dissection Bilateral 8/3/2015     Procedure: NODE DISSECTION LYMPH;  Surgeon: Sage Ngo M.D.;  Location: [facility];  Service:    • Cystoscopy  3/20/2017     Procedure: CYSTOSCOPY -  FLEXIBLE;  Surgeon: Frank Lamas M.D.;  Location: [facility];  Service:    • Sphincter prosthesis placement  3/20/2017     Procedure: SPHINCTER PROSTHESIS PLACEMENT - ARTIFICIAL URINARY SPHINCTER;  Surgeon: Frank Lamas M.D.;  Location: [facility];  Service:    • Evacuation of hematoma  3/21/2017     Procedure: EVACUATION OF HEMATOMA-CYSTO CLOT EVACUATION AND SMALL CYSTOSCOPE;  Surgeon: Frank Lamas M.D.;  Location: [facility];  Service:    • Cystoscopy  3/21/2017     Procedure: CYSTOSCOPY;  Surgeon: Frank Lamas M.D.;  Location: [facility];  Service:    • Sphincter prosthesis removal  3/28/2017     Procedure: URINARY SPHINCTER PROSTHESIS REMOVAL;  Surgeon: Benigno Grier M.D.;  Location: SURGERY [facility];  Service:    • Cystoscopy  3/28/2017     Procedure: CYSTOSCOPY;  Surgeon: Benigno Grier M.D.;  Location: SURGERY [facility];  Service:    • Parathyroidectomy N/A 7/5/2017     Procedure: PARATHYROIDECTOMY - MINIMALLY INVASIVE W/RECURRENT LARYNGEAL NERVE MONITORING;  Surgeon: Josh Blank M.D.;  Location: SURGERY SAME DAY [facility];  Service:            Exam:     Blood pressure 108/60, pulse 57, temperature 36.5 °C (97.7 °F), height 1.803 m  "(5' 10.98\"), weight 70.761 kg (156 lb), SpO2 98 %. Body mass index is 21.77 kg/(m^2).    Hearing good.    Dentition good  Alert, oriented in no acute distress.  Eye contact is good, speech goal directed, affect calm      Assessment and Plan. The following treatment and monitoring plan is recommended:    1. Medicare annual wellness visit, subsequent      2. H/O prostate cancer  No evidence for recurrence with undetectable PSA earlier this year.    3. Other urinary incontinence  Stable related to prostatectomy. Continue monitoring.    4. Pure hypercholesterolemia  Check labs in one month and call with results. Patient is wondering if there has been a change with cholesterol levels based on parathyroid removal.    5. Vitamin D insufficiency  On multivitamin.    6. Erectile dysfunction following radical prostatectomy  Stable. Continue to monitor.    7. Osteopenia of the elderly  Stable. Parathyroid removed.    8. H/O parathyroidectomy  Controlled. Continue following up with surgeon.    9. (HFpEF) heart failure with preserved ejection fraction (CMS-HCC)  Stable and asymptomatic. Continue monitoring.    10. AK (actinic keratosis)  No recurrence.    11. MVP (mitral valve prolapse)  Asymptomatic. Continue to monitor.    12. Left-sided headache  Improved after parathyroidectomy.        Services suggested: No services needed at this time  Health Care Screening recommendations as per orders if indicated.  Referrals offered: PT/OT/Nutrition counseling/Behavioral Health/Smoking cessation as per orders if indicated.    Discussion today about general wellness and lifestyle habits:    · Prevent falls and reduce trip hazards; Cautioned about securing or removing rugs.  · Have a working fire alarm and carbon monoxide detector;   · Engage in regular physical activity and social activities       Follow-up: Return in about 1 year (around 7/13/2018) for Annual, Short.    "

## 2017-07-13 NOTE — MR AVS SNAPSHOT
"        Sebastien GONSALEZ Kory   2017 1:00 PM   Office Visit   MRN: [id number]    Department:  [facility]   Dept Phone:  [phone]    Description:  Male : 1950   Provider:  Caden Rodarte M.D.; [facility]            Reason for Visit     Annual Wellness Visit           Allergies as of 2017     No Known Allergies      You were diagnosed with     Medicare annual wellness visit, subsequent   [512238]       H/O prostate cancer   [290363]       Other urinary incontinence   [788.39.ICD-9-CM]       Pure hypercholesterolemia   [272.0.ICD-9-CM]       Vitamin D insufficiency   [302779]       Erectile dysfunction following radical prostatectomy   [483016]       Osteopenia of the elderly   [009002]       H/O parathyroidectomy   [928649]       (HFpEF) heart failure with preserved ejection fraction (CMS-HCC)   [9931004]       AK (actinic keratosis)   [817251]       MVP (mitral valve prolapse)   [086254]       Left-sided headache   [068277]         Vital Signs     Blood Pressure Pulse Temperature Height Weight Body Mass Index    108/60 mmHg 57 36.5 °C (97.7 °F) 1.803 m (5' 10.98\") 70.761 kg (156 lb) 21.77 kg/m2    Oxygen Saturation Smoking Status                98% Never Smoker           Basic Information     Date Of Birth Sex Race Ethnicity Preferred Language    1950 Male White Non- English      Problem List              ICD-10-CM Priority Class Noted - Resolved    H/O prostate cancer Z85.46   2015 - Present    Hyperlipidemia E78.5   2015 - Present    MVP (mitral valve prolapse) I34.1   2015 - Present    Left-sided headache R51   2015 - Present    Urinary incontinence R32   2015 - Present    Erectile dysfunction following radical prostatectomy N52.31   2015 - Present    Vitamin D insufficiency E55.9   2015 - Present    H/O parathyroidectomy E89.2   2016 - Present    AK (actinic keratosis) L57.0   2016 - Present    (HFpEF) heart failure with " preserved ejection fraction (CMS-HCC) (Chronic) I50.30   3/28/2017 - Present    Osteopenia of the elderly M85.80   7/5/2017 - Present      Health Maintenance        Date Due Completion Dates    IMM DTaP/Tdap/Td Vaccine (1 - Tdap) 7/4/1968 ---    IMM ZOSTER VACCINE 7/4/2009 ---    IMM INFLUENZA (1) 9/1/2017 11/29/2016    IMM PNEUMOCOCCAL 65+ (ADULT) LOW/MEDIUM RISK SERIES (2 of 2 - PCV13) 9/2/2017 9/2/2016, 7/3/2012    COLONOSCOPY 4/10/2024 4/10/2014, 4/10/2014            Current Immunizations     Influenza Vaccine Adult HD 11/29/2016    Pneumococcal polysaccharide vaccine (PPSV-23) 9/2/2016, 7/3/2012      Below and/or attached are the medications your provider expects you to take. Review all of your home medications and newly ordered medications with your provider and/or pharmacist. Follow medication instructions as directed by your provider and/or pharmacist. Please keep your medication list with you and share with your provider. Update the information when medications are discontinued, doses are changed, or new medications (including over-the-counter products) are added; and carry medication information at all times in the event of emergency situations     Allergies:  No Known Allergies          Medications  Valid as of: July 13, 2017 -  1:51 PM    Generic Name Brand Name Tablet Size Instructions for use    Cyanocobalamin   Take 2,500 Units by mouth every day.        Multiple Vitamins-Minerals   Take 1 Tab by mouth every day.        Naproxen Sodium (Cap) Naproxen Sodium 220 MG Take 220 mg by mouth 1 time daily as needed.        .                 Medicines prescribed today were sent to:     [id number] - [place] - [address]    [address] [place] [address]    Phone: [phone] Fax: [phone]    Open 24 Hours?: No    [id number] - [place] - [address] [facility] [address]    [address] [facility] [address] [place] [address]    Phone: [phone] Fax: [phone]    Open 24 Hours?: No      Medication refill  instructions:       If your prescription bottle indicates you have medication refills left, it is not necessary to call your provider’s office. Please contact your pharmacy and they will refill your medication.    If your prescription bottle indicates you do not have any refills left, you may request refills at any time through one of the following ways: The online [employer] system (except Urgent Care), by calling your provider’s office, or by asking your pharmacy to contact your provider’s office with a refill request. Medication refills are processed only during regular business hours and may not be available until the next business day. Your provider may request additional information or to have a follow-up visit with you prior to refilling your medication.   *Please Note: Medication refills are assigned a new Rx number when refilled electronically. Your pharmacy may indicate that no refills were authorized even though a new prescription for the same medication is available at the pharmacy. Please request the medicine by name with the pharmacy before contacting your provider for a refill.        Your To Do List     Future Labs/Procedures Complete By Expires    COMP METABOLIC PANEL  As directed 7/14/2018    LIPID PROFILE  As directed 7/14/2018         [employer] Access Code: Activation code not generated  Current [employer] Status: Active

## 2017-07-18 ENCOUNTER — PATIENT MESSAGE (OUTPATIENT)
Dept: MEDICAL GROUP | Facility: MEDICAL CENTER | Age: 67
End: 2017-07-18

## 2017-07-18 NOTE — TELEPHONE ENCOUNTER
From: Sebastien Horn  To: Caden Rodarte M.D.  Sent: 7/18/2017 4:31 AM PDT  Subject: Test Result Question    My history shows a hemorrhagic disorder??? That would be news to me! This is the 3rd time I have tried to clear this up and get it off my record within the [facility] organization. If this is a problem someone should tell me otherwise remove it from my history.    Also you scheduled me for a shingles vaccine which I got then found out I only need one in my lifetime and have already had one? So an unnecessary vaccine and $45 down the drain. I am not feeling confidant about my health care with [facility].

## 2017-07-19 ENCOUNTER — PATIENT MESSAGE (OUTPATIENT)
Dept: MEDICAL GROUP | Facility: MEDICAL CENTER | Age: 67
End: 2017-07-19

## 2017-07-19 NOTE — TELEPHONE ENCOUNTER
From: Bernabe Lacey  To: Caden Rodarte M.D.  Sent: 7/19/2017 3:50 AM PDT  Subject: Test Result Question    7/15/17 for the shingles vaccine. Was the pharmacist correct when he said you only need one shot for a lifetime? And something worth noting. The bruising I would experience almost DAILY has completely stopped since the thyroid surgery. I actually banged my hand pretty hard after seeing you on a dock getting out of a kayak and even cut it a bit on the splinters and it never bruised. Previously this would have turned my entire hand purple. Amazing!!  ----- Message -----  From: Caden Rodarte M.D.  Sent: 7/18/2017 7:25 AM PDT  To: Bernabe Lacey  Subject: RE: Test Result Question    I removed it from your history. Somebody put it on your chart because you said you bruise easily and that was the closest thing they could find in the chart to correlate with bruising easily.    Please let us know what date you had the shingles vaccine so we can update your chart.    Thank you,  Dr. Rodarte      ----- Message -----   From: BERNABE LACEY   Sent: 7/18/2017 4:31 AM PDT   To: Caden Rodarte M.D.  Subject: Test Result Question    My history shows a hemorrhagic disorder??? That would be news to me! This is the 3rd time I have tried to clear this up and get it off my record within the [facility] organization. If this is a problem someone should tell me otherwise remove it from my history.    Also you scheduled me for a shingles vaccine which I got then found out I only need one in my lifetime and have already had one? So an unnecessary vaccine and $45 down the drain. I am not feeling confidant about my health care with [facility].

## 2017-07-20 ENCOUNTER — PATIENT MESSAGE (OUTPATIENT)
Dept: MEDICAL GROUP | Facility: MEDICAL CENTER | Age: 67
End: 2017-07-20

## 2017-07-20 NOTE — TELEPHONE ENCOUNTER
From: Sebastien Lacey  To: Caden Rodarte M.D.  Sent: 7/20/2017 4:05 AM PDT  Subject: Test Result Question    7/15/17 as I mentioned in the email for this shot. I'm guessing around 2014 for the first shingles vaccination.  THX  ----- Message -----  From: Caden Rodarte M.D.  Sent: 7/19/2017 7:39 AM PDT  To: Sebastien WALLACE Kory  Subject: RE: Test Result Question    Yes you only need one shingles vaccine for your entire life. Do you have the date or the location you received the vaccine so we can update your chart?  I'm glad to hear your bruising is improving.    Dr. Rodarte      ----- Message -----   From: SEBASTIEN LACEY   Sent: 7/19/2017 3:50 AM PDT   To: Caden Rodarte M.D.  Subject: Test Result Question    7/15/17 for the shingles vaccine. Was the pharmacist correct when he said you only need one shot for a lifetime? And something worth noting. The bruising I would experience almost DAILY has completely stopped since the thyroid surgery. I actually banged my hand pretty hard after seeing you on a dock getting out of a kayak and even cut it a bit on the splinters and it never bruised. Previously this would have turned my entire hand purple. Amazing!!  ----- Message -----  From: Caden Rodarte M.D.  Sent: 7/18/2017 7:25 AM PDT  To: Sebastien Lacey  Subject: RE: Test Result Question    I removed it from your history. Somebody put it on your chart because you said you bruise easily and that was the closest thing they could find in the chart to correlate with bruising easily.    Please let us know what date you had the shingles vaccine so we can update your chart.    Thank you,  Dr. Rodarte      ----- Message -----   From: SEBASTIEN LACEY   Sent: 7/18/2017 4:31 AM PDT   To: Caden Rodarte M.D.  Subject: Test Result Question    My history shows a hemorrhagic disorder??? That would be news to me! This is the 3rd time I have tried to clear this up and get it off my record within the [facility] organization. If this is a  problem someone should tell me otherwise remove it from my history.    Also you scheduled me for a shingles vaccine which I got then found out I only need one in my lifetime and have already had one? So an unnecessary vaccine and $45 down the drain. I am not feeling confidant about my health care with [facility].

## 2017-08-02 ENCOUNTER — HOSPITAL ENCOUNTER (OUTPATIENT)
Dept: LAB | Facility: MEDICAL CENTER | Age: 67
End: 2017-08-02
Attending: UROLOGY
Payer: MEDICARE

## 2017-08-02 LAB — PSA SERPL-MCNC: <0.01 NG/ML (ref 0–4)

## 2017-08-02 PROCEDURE — 84153 ASSAY OF PSA TOTAL: CPT

## 2017-08-02 PROCEDURE — 36415 COLL VENOUS BLD VENIPUNCTURE: CPT

## 2017-10-09 ENCOUNTER — APPOINTMENT (OUTPATIENT)
Dept: ADMISSIONS | Facility: MEDICAL CENTER | Age: 67
End: 2017-10-09
Attending: ORTHOPAEDIC SURGERY
Payer: MEDICARE

## 2017-10-09 ENCOUNTER — HOSPITAL ENCOUNTER (OUTPATIENT)
Dept: LAB | Facility: MEDICAL CENTER | Age: 67
End: 2017-10-09
Attending: FAMILY MEDICINE
Payer: MEDICARE

## 2017-10-09 DIAGNOSIS — Z01.810 PRE-OPERATIVE CARDIOVASCULAR EXAMINATION: ICD-10-CM

## 2017-10-09 DIAGNOSIS — E78.00 PURE HYPERCHOLESTEROLEMIA: ICD-10-CM

## 2017-10-09 LAB
ALBUMIN SERPL BCP-MCNC: 4.2 G/DL (ref 3.2–4.9)
ALBUMIN/GLOB SERPL: 1.8 G/DL
ALP SERPL-CCNC: 53 U/L (ref 30–99)
ALT SERPL-CCNC: 13 U/L (ref 2–50)
ANION GAP SERPL CALC-SCNC: 5 MMOL/L (ref 0–11.9)
AST SERPL-CCNC: 16 U/L (ref 12–45)
BILIRUB SERPL-MCNC: 0.8 MG/DL (ref 0.1–1.5)
BUN SERPL-MCNC: 16 MG/DL (ref 8–22)
CALCIUM SERPL-MCNC: 9.4 MG/DL (ref 8.5–10.5)
CHLORIDE SERPL-SCNC: 106 MMOL/L (ref 96–112)
CHOLEST SERPL-MCNC: 206 MG/DL (ref 100–199)
CO2 SERPL-SCNC: 27 MMOL/L (ref 20–33)
CREAT SERPL-MCNC: 0.65 MG/DL (ref 0.5–1.4)
EKG IMPRESSION: NORMAL
GFR SERPL CREATININE-BSD FRML MDRD: >60 ML/MIN/1.73 M 2
GLOBULIN SER CALC-MCNC: 2.4 G/DL (ref 1.9–3.5)
GLUCOSE SERPL-MCNC: 97 MG/DL (ref 65–99)
HDLC SERPL-MCNC: 67 MG/DL
LDLC SERPL CALC-MCNC: 125 MG/DL
POTASSIUM SERPL-SCNC: 4.6 MMOL/L (ref 3.6–5.5)
PROT SERPL-MCNC: 6.6 G/DL (ref 6–8.2)
SODIUM SERPL-SCNC: 138 MMOL/L (ref 135–145)
TRIGL SERPL-MCNC: 72 MG/DL (ref 0–149)

## 2017-10-09 PROCEDURE — 36415 COLL VENOUS BLD VENIPUNCTURE: CPT

## 2017-10-09 PROCEDURE — 93005 ELECTROCARDIOGRAM TRACING: CPT

## 2017-10-09 PROCEDURE — 93010 ELECTROCARDIOGRAM REPORT: CPT | Performed by: INTERNAL MEDICINE

## 2017-10-09 PROCEDURE — 80053 COMPREHEN METABOLIC PANEL: CPT

## 2017-10-09 PROCEDURE — 80061 LIPID PANEL: CPT

## 2017-10-09 NOTE — OR NURSING
preadmit appointment: Pt. Instructed to continue regularly prescribed medication through the day before surgery, instructed to take the following medications, the day of surgery, with a sip of water per anesthesia protocol :no meds

## 2017-10-18 ENCOUNTER — HOSPITAL ENCOUNTER (OUTPATIENT)
Facility: MEDICAL CENTER | Age: 67
End: 2017-10-18
Attending: ORTHOPAEDIC SURGERY | Admitting: ORTHOPAEDIC SURGERY
Payer: MEDICARE

## 2017-10-18 ENCOUNTER — PATIENT OUTREACH (OUTPATIENT)
Dept: HEALTH INFORMATION MANAGEMENT | Facility: OTHER | Age: 67
End: 2017-10-18

## 2017-10-18 VITALS
HEART RATE: 66 BPM | TEMPERATURE: 97.2 F | SYSTOLIC BLOOD PRESSURE: 135 MMHG | WEIGHT: 156.53 LBS | BODY MASS INDEX: 21.2 KG/M2 | DIASTOLIC BLOOD PRESSURE: 77 MMHG | OXYGEN SATURATION: 97 % | HEIGHT: 72 IN | RESPIRATION RATE: 16 BRPM

## 2017-10-18 PROBLEM — M75.122 COMPLETE ROTATOR CUFF TEAR OF LEFT SHOULDER: Status: ACTIVE | Noted: 2017-10-18

## 2017-10-18 PROCEDURE — 160029 HCHG SURGERY MINUTES - 1ST 30 MINS LEVEL 4: Performed by: ORTHOPAEDIC SURGERY

## 2017-10-18 PROCEDURE — 700111 HCHG RX REV CODE 636 W/ 250 OVERRIDE (IP)

## 2017-10-18 PROCEDURE — 501835 HCHG SUTURE PLASTIC: Performed by: ORTHOPAEDIC SURGERY

## 2017-10-18 PROCEDURE — 160002 HCHG RECOVERY MINUTES (STAT): Performed by: ORTHOPAEDIC SURGERY

## 2017-10-18 PROCEDURE — 160047 HCHG PACU  - EA ADDL 30 MINS PHASE II: Performed by: ORTHOPAEDIC SURGERY

## 2017-10-18 PROCEDURE — 700101 HCHG RX REV CODE 250

## 2017-10-18 PROCEDURE — L3670 SO ACRO/CLAV CAN WEB PRE OTS: HCPCS | Performed by: ORTHOPAEDIC SURGERY

## 2017-10-18 PROCEDURE — 160035 HCHG PACU - 1ST 60 MINS PHASE I: Performed by: ORTHOPAEDIC SURGERY

## 2017-10-18 PROCEDURE — 160025 RECOVERY II MINUTES (STATS): Performed by: ORTHOPAEDIC SURGERY

## 2017-10-18 PROCEDURE — 502000 HCHG MISC OR IMPLANTS RC 0278: Performed by: ORTHOPAEDIC SURGERY

## 2017-10-18 PROCEDURE — 700105 HCHG RX REV CODE 258: Performed by: ORTHOPAEDIC SURGERY

## 2017-10-18 PROCEDURE — 160036 HCHG PACU - EA ADDL 30 MINS PHASE I: Performed by: ORTHOPAEDIC SURGERY

## 2017-10-18 PROCEDURE — 160048 HCHG OR STATISTICAL LEVEL 1-5: Performed by: ORTHOPAEDIC SURGERY

## 2017-10-18 PROCEDURE — 160022 HCHG BLOCK: Performed by: ORTHOPAEDIC SURGERY

## 2017-10-18 PROCEDURE — 160046 HCHG PACU - 1ST 60 MINS PHASE II: Performed by: ORTHOPAEDIC SURGERY

## 2017-10-18 PROCEDURE — 500028 HCHG ARTHROWAND TURBOVAC 3.5/90 SUCT.: Performed by: ORTHOPAEDIC SURGERY

## 2017-10-18 PROCEDURE — 502581 HCHG PACK, SHOULDER ARTHROSCOPY: Performed by: ORTHOPAEDIC SURGERY

## 2017-10-18 PROCEDURE — 160009 HCHG ANES TIME/MIN: Performed by: ORTHOPAEDIC SURGERY

## 2017-10-18 PROCEDURE — 302135 SEQUENTIAL COMPRESSION MACHINE: Performed by: ORTHOPAEDIC SURGERY

## 2017-10-18 PROCEDURE — 501336 HCHG SHAVER: Performed by: ORTHOPAEDIC SURGERY

## 2017-10-18 PROCEDURE — 160041 HCHG SURGERY MINUTES - EA ADDL 1 MIN LEVEL 4: Performed by: ORTHOPAEDIC SURGERY

## 2017-10-18 PROCEDURE — 500152 HCHG CANNULA, TIB TUNNEL: Performed by: ORTHOPAEDIC SURGERY

## 2017-10-18 DEVICE — SUTURE ANCHOR 2.8MM: Type: IMPLANTABLE DEVICE | Status: FUNCTIONAL

## 2017-10-18 DEVICE — SUTURE ANCHOR MULTIFIX 5.5 S KNOTLESS: Type: IMPLANTABLE DEVICE | Status: FUNCTIONAL

## 2017-10-18 RX ORDER — BUPIVACAINE HYDROCHLORIDE AND EPINEPHRINE 2.5; 5 MG/ML; UG/ML
INJECTION, SOLUTION EPIDURAL; INFILTRATION; INTRACAUDAL; PERINEURAL
Status: DISCONTINUED | OUTPATIENT
Start: 2017-10-18 | End: 2017-10-18 | Stop reason: HOSPADM

## 2017-10-18 RX ORDER — LIDOCAINE HYDROCHLORIDE 10 MG/ML
INJECTION, SOLUTION INFILTRATION; PERINEURAL
Status: COMPLETED
Start: 2017-10-18 | End: 2017-10-18

## 2017-10-18 RX ORDER — SODIUM CHLORIDE, SODIUM LACTATE, POTASSIUM CHLORIDE, CALCIUM CHLORIDE 600; 310; 30; 20 MG/100ML; MG/100ML; MG/100ML; MG/100ML
1000 INJECTION, SOLUTION INTRAVENOUS
Status: DISCONTINUED | OUTPATIENT
Start: 2017-10-18 | End: 2017-10-18 | Stop reason: HOSPADM

## 2017-10-18 RX ADMIN — LIDOCAINE HYDROCHLORIDE 0.1 ML: 10 INJECTION, SOLUTION INFILTRATION; PERINEURAL at 06:00

## 2017-10-18 RX ADMIN — SODIUM CHLORIDE, POTASSIUM CHLORIDE, SODIUM LACTATE AND CALCIUM CHLORIDE 1000 ML: 600; 310; 30; 20 INJECTION, SOLUTION INTRAVENOUS at 06:00

## 2017-10-18 ASSESSMENT — PAIN SCALES - GENERAL
PAINLEVEL_OUTOF10: 0

## 2017-10-18 NOTE — OP REPORT
DATE OF SERVICE:  10/18/2017    SURGEON:  Sergio Daniel MD    ASSISTANT:  Sabino Vasquez PA-C    PREOPERATIVE DIAGNOSES:  1.  Left shoulder massive rotator cuff tear.  2.  Left shoulder biceps tendon tear.  3.  Left shoulder subacromial impingement.    POSTOPERATIVE DIAGNOSES:  1.  Left shoulder massive rotator cuff tear.  2.  Left shoulder biceps tendon tear.  3.  Left shoulder subacromial impingement.  4.  Left shoulder superior labrum anterior and posterior tear.    PROCEDURES:  1.  Left shoulder arthroscopy with rotator cuff repair.  2.  Left shoulder arthroscopy with biceps tenodesis.  3.  Left shoulder arthroscopy with subacromial decompression.  4.  Left shoulder arthroscopy with limited debridement of the glenohumeral   joint.    ANESTHESIOLOGIST:  Randall Cuello MD    ANESTHETIC:  General endotracheal anesthesia along with interscalene block for   postoperative pain control.    INDICATIONS:  Patient has had left shoulder pain for quite some time.  He has   failed nonoperative treatment and elects to proceed with operative   intervention.  He was explained the risks, benefits, alternatives to procedure   and recovery in detail.  All questions were answered, informed consent was   obtained.  Site verification per protocol was done in the preop holding area   and the operating room.  Patient received appropriate preoperative   antibiotics.    DESCRIPTION OF OPERATION:  After successful anesthesia, patient was placed   carefully in a modified beach chair position.  All bony prominences were well   padded and supported, head and neck were maintained in neutral position.  He   had chronic AC separation that was quite obvious and reducible that we elected   to treat nonoperatively.  He also may have had an abnormality of his pec.    His arm was then prepped and draped in the usual sterile fashion.  A posterior   portal was established with knife and blunt trocar in the glenohumeral space.    Diagnostic  arthroscopy revealed normal articular cartilage.  He had a very   torn biceps. He had already torn it.  The remainder of   the biceps was in the groove and was fairly straight.  He had a large rotator   cuff tear of the supra and infraspinatus and it was tracked to about 2 cm.    The labrum had some minor fraying that was gently smoothed out with a shaver,   the subscap was normal.  At that point, I lavaged out the joint with the   subacromial space.  I was actually able to pull off the biceps to an area   where I had pretty good tendon.  I roughened up the bicipital groove and   placed the Q-Fix 2.8 anchor.  I then double passed running locking sutures   into the tendon at the appropriate tension.  I tied that securely, and it   seemed to hold quite well.  The remainder of the biceps was removed.    I did a gentle acromioplasty leaving the CA ligament, just getting rid of the   spur.  I then continued the bursectomy and got good mobility of the rotator   cuff via a large U-shaped tear.  I roughened up the tuberosity in the cortical   shell and placed a posterior Q-Fix 2.8 with 2 mattresses delamination   posterior of the infraspinatus.  I then put 2 more Q-Fix, one in the center   and one anterior end of tuberosity and passed 4 simples and then did inverted   mattress and then left the tails of one of the other mattresses.  I tied all   simples and pulled the rotator cuff down and then pulled the other mattresses   over to a MultiFix 5.5 as a lateral row to really get good compression of the   dog ears and the tendon.  At that point, I probed it, put it through range of   motion, and I was happy with the repair.  I lavaged out the joint, suctioned   out the fluid, closed the portals with 3-0 Prolene in interrupted fashion,   Xeroform, 4x4's, Medipore tape and UltraSling was applied.    ESTIMATED BLOOD LOSS:  Minimal.    COMPLICATIONS:  None.    Sabino Vasquez PA-C, was medically necessary for the case.  He helped  with   instrumentation, retraction, anchor insertion, and suture management.  Without   his help, the case would not have been as technically successful.       ____________________________________     MD EDIN PHAM / BECCA    DD:  10/18/2017 09:13:59  DT:  10/18/2017 10:15:49    D#:  [id number]  Job#:  [id number]

## 2017-10-18 NOTE — DISCHARGE INSTRUCTIONS
ACTIVITY: Rest and take it easy for the first 24 hours.  A responsible adult is recommended to remain with you during that time.  It is normal to feel sleepy.  We encourage you to not do anything that requires balance, judgment or coordination.    MILD FLU-LIKE SYMPTOMS ARE NORMAL. YOU MAY EXPERIENCE GENERALIZED MUSCLE ACHES, THROAT IRRITATION, HEADACHE AND/OR SOME NAUSEA.    FOR 24 HOURS DO NOT:  Drive, operate machinery or run household appliances.  Drink beer or alcoholic beverages.   Make important decisions or sign legal documents.    SPECIAL INSTRUCTIONS: Ice and elevate extremity. Non-weight bearing to left upper extremity.    DIET: To avoid nausea, slowly advance diet as tolerated, avoiding spicy or greasy foods for the first day.  Add more substantial food to your diet according to your physician's instructions.  INCREASE FLUIDS AND FIBER TO AVOID CONSTIPATION.    SURGICAL DRESSING/BATHING: May remove dressings Post op Day #2 (Friday, October 19th) and Shower with wound uncovered.  Apply bandaids after shower.  Do not soak or submerge incisions for two weeks.    FOLLOW-UP APPOINTMENT:  A follow-up appointment should be arranged with your doctor in 7-10 days; call to schedule.    You should CALL YOUR PHYSICIAN if you develop:  Fever greater than 101 degrees F.  Pain not relieved by medication, or persistent nausea or vomiting.  Excessive bleeding (blood soaking through dressing) or unexpected drainage from the wound.  Extreme redness or swelling around the incision site, drainage of pus or foul smelling drainage.  Inability to urinate or empty your bladder within 8 hours -return to ER.  Problems with breathing or chest pain- call 911.    You should call 911 if you develop problems with breathing or chest pain.  If you are unable to contact your doctor or surgical center, you should go to the nearest emergency room or urgent care center. Dr. Daniel's telephone #: [phone]    If any questions arise, call your  doctor.  If your doctor is not available, please feel free to call the Surgical Center at [phone].  The Center is open Monday through Friday from 7AM to 7PM.  You can also call the HEALTH HOTLINE open 24 hours/day, 7 days/week and speak to a nurse at [phone], or toll free at [phone].    A registered nurse may call you a few days after your surgery to see how you are doing after your procedure.    MEDICATIONS: Resume taking daily medication.  Take prescribed pain medication with food.  If no medication is prescribed, you may take non-aspirin pain medication if needed.  PAIN MEDICATION CAN BE VERY CONSTIPATING.  Take a stool softener or laxative such as senokot, pericolace, or milk of magnesia if needed.    Prescription given for N/A.  Last pain medication given at N/A, may have pain medication at any time.    If your physician has prescribed pain medication that includes Acetaminophen (Tylenol), do not take additional Acetaminophen (Tylenol) while taking the prescribed medication.    Depression / Suicide Risk    As you are discharged from this [facility] facility, it is important to learn how to keep safe from harming yourself.    Recognize the warning signs:  · Abrupt changes in personality, positive or negative- including increase in energy   · Giving away possessions  · Change in eating patterns- significant weight changes-  positive or negative  · Change in sleeping patterns- unable to sleep or sleeping all the time   · Unwillingness or inability to communicate  · Depression  · Unusual sadness, discouragement and loneliness  · Talk of wanting to die  · Neglect of personal appearance   · Rebelliousness- reckless behavior  · Withdrawal from people/activities they love  · Confusion- inability to concentrate     If you or a loved one observes any of these behaviors or has concerns about self-harm, here's what you can do:  · Talk about it- your feelings and reasons for harming yourself  · Remove  any means that you might use to hurt yourself (examples: pills, rope, extension cords, firearm)  · Get professional help from the community (Mental Health, Substance Abuse, psychological counseling)  · Do not be alone:Call your Safe Contact- someone whom you trust who will be there for you.  · Call your local CRISIS HOTLINE 800-6570 or 582-824-2035  · Call your local Children's Mobile Crisis Response Team [place] (858) 451-2497 or wwwHlidacky.cz  · Call the toll free National Suicide Prevention Hotlines   · National Suicide Prevention Lifeline 283-090-SWPS (4040)  · National Hope Line Network 800-SUICIDE (961-2681)      Peripheral Nerve Block Discharge Instructions from Same Day Surgery and Inpatient :    What to Expect - Upper Extremity  · You may experience numbness and weakness in shoulder, arm and hand  on the same side as your surgery  · This is normal. For some people, this may be an unpleasant sensation. Be very careful with your numb limb  · Ask for help when you need it  Shoulder Surgery Side Effects  · In addition to numbness and weakness you may experience other symptoms  · Other nerves that are close to those nerves injected can also be affected by local anesthesia  · You may experience a hoarseness in your voice  · Your breathing may feel different  · You may also notice drooping of your eyelid, pupil constriction, and decreased sweating, on the side of your surgery  · All of these side effects are normal and will resolve when the local anesthetic wears off   Prevent Injury  · Protect the limb like a baby  · Beware of exposing your limb to extreme heat or cold or trauma  · The limb may be injured without you noticing because it is numb  · Keep the limb elevated whenever possible  · Do not sleep on the limb  · Change the position of the limb regularly  · Avoid putting pressure on your surgical limb  Pain Control  · The initial block on the day of surgery will make your extremity feel  "\"numb\"  · Any consecutive injection including prior to discharge from the hospital will make your extremity feel \"numb\"  · You may feel an aching or burning when the local anesthesia starts to wear off  · Take pain pills as prescribed by your surgeon  · Call your surgeon or anesthesiologist if you do not have adequate pain control    "

## 2017-10-18 NOTE — PROGRESS NOTES
"10/18/2017 1258 - Received call from patient - expressing concern about arm being \"dead weight\" after rotator cuff surgery this am. Discussed nerve block effects and that it usually lasts anywhere from 10-24 hours. Advised to call surgeon if numbness does not start wearing off over next 10-14 hours.   "

## 2017-10-19 NOTE — OR NURSING
Follow up phone call; patient states that last night he was taking 2 Percocet every 2-3 hours. Pt instructed to call Dr Daniel to ask if ibuprofen allowed and instructed to take Percocet as ordered every 6 hours and educated on tylenol max dose for safety. Pt states verbal understanding and will review paperwork from Dr Daniel for ibuprofen information. States elevating and icing as directed. No further questions. Instructed to call Dr Daniel office with any other problems/questions; states verbal understanding.

## 2017-11-30 ENCOUNTER — APPOINTMENT (OUTPATIENT)
Dept: SOCIAL WORK | Facility: CLINIC | Age: 67
End: 2017-11-30
Payer: MEDICARE

## 2017-11-30 PROCEDURE — G0008 ADMIN INFLUENZA VIRUS VAC: HCPCS | Performed by: REGISTERED NURSE

## 2017-11-30 PROCEDURE — 90662 IIV NO PRSV INCREASED AG IM: CPT | Performed by: REGISTERED NURSE

## 2018-01-30 ENCOUNTER — PATIENT OUTREACH (OUTPATIENT)
Dept: HEALTH INFORMATION MANAGEMENT | Facility: OTHER | Age: 68
End: 2018-01-30

## 2018-02-06 ENCOUNTER — HOSPITAL ENCOUNTER (OUTPATIENT)
Dept: LAB | Facility: MEDICAL CENTER | Age: 68
End: 2018-02-06
Attending: ORTHOPAEDIC SURGERY
Payer: MEDICARE

## 2018-02-06 ENCOUNTER — HOSPITAL ENCOUNTER (INPATIENT)
Facility: MEDICAL CENTER | Age: 68
LOS: 3 days | DRG: 507 | End: 2018-02-09
Attending: ORTHOPAEDIC SURGERY | Admitting: ORTHOPAEDIC SURGERY
Payer: MEDICARE

## 2018-02-06 DIAGNOSIS — M75.122 COMPLETE ROTATOR CUFF TEAR OF LEFT SHOULDER: ICD-10-CM

## 2018-02-06 LAB
ANION GAP SERPL CALC-SCNC: 10 MMOL/L (ref 0–11.9)
APPEARANCE FLD: NORMAL
BASOPHILS # BLD AUTO: 0.4 % (ref 0–1.8)
BASOPHILS # BLD: 0.03 K/UL (ref 0–0.12)
BODY FLD TYPE: NORMAL
BUN SERPL-MCNC: 10 MG/DL (ref 8–22)
CALCIUM SERPL-MCNC: 8.5 MG/DL (ref 8.4–10.2)
CHLORIDE SERPL-SCNC: 95 MMOL/L (ref 96–112)
CO2 SERPL-SCNC: 24 MMOL/L (ref 20–33)
COLOR FLD: NORMAL
CREAT SERPL-MCNC: 0.6 MG/DL (ref 0.5–1.4)
CRP SERPL HS-MCNC: 24.43 MG/DL (ref 0–0.75)
CSF COMMENTS 1658: NORMAL
EOSINOPHIL # BLD AUTO: 0.02 K/UL (ref 0–0.51)
EOSINOPHIL NFR BLD: 0.3 % (ref 0–6.9)
ERYTHROCYTE [DISTWIDTH] IN BLOOD BY AUTOMATED COUNT: 43 FL (ref 35.9–50)
ERYTHROCYTE [SEDIMENTATION RATE] IN BLOOD BY WESTERGREN METHOD: 115 MM/HOUR (ref 0–20)
GLUCOSE SERPL-MCNC: 116 MG/DL (ref 65–99)
GRAM STN SPEC: NORMAL
HCT VFR BLD AUTO: 33.7 % (ref 42–52)
HGB BLD-MCNC: 11.5 G/DL (ref 14–18)
IMM GRANULOCYTES # BLD AUTO: 0.02 K/UL (ref 0–0.11)
IMM GRANULOCYTES NFR BLD AUTO: 0.3 % (ref 0–0.9)
LYMPHOCYTES # BLD AUTO: 0.69 K/UL (ref 1–4.8)
LYMPHOCYTES NFR BLD: 9.6 % (ref 22–41)
MCH RBC QN AUTO: 32.4 PG (ref 27–33)
MCHC RBC AUTO-ENTMCNC: 34.1 G/DL (ref 33.7–35.3)
MCV RBC AUTO: 94.9 FL (ref 81.4–97.8)
MONOCYTES # BLD AUTO: 1.04 K/UL (ref 0–0.85)
MONOCYTES NFR BLD AUTO: 14.4 % (ref 0–13.4)
NEUTROPHILS # BLD AUTO: 5.41 K/UL (ref 1.82–7.42)
NEUTROPHILS NFR BLD: 75 % (ref 44–72)
NRBC # BLD AUTO: 0 K/UL
NRBC BLD-RTO: 0 /100 WBC
PLATELET # BLD AUTO: 245 K/UL (ref 164–446)
PMV BLD AUTO: 9.4 FL (ref 9–12.9)
POTASSIUM SERPL-SCNC: 3.6 MMOL/L (ref 3.6–5.5)
RBC # BLD AUTO: 3.55 M/UL (ref 4.7–6.1)
SIGNIFICANT IND 70042: NORMAL
SITE SITE: NORMAL
SODIUM SERPL-SCNC: 129 MMOL/L (ref 135–145)
SOURCE SOURCE: NORMAL
WBC # BLD AUTO: 7.2 K/UL (ref 4.8–10.8)
WBC # FLD: NORMAL CELLS/UL

## 2018-02-06 PROCEDURE — 87205 SMEAR GRAM STAIN: CPT | Mod: 91

## 2018-02-06 PROCEDURE — 500367 HCHG DRAIN KIT, HEMOVAC: Performed by: ORTHOPAEDIC SURGERY

## 2018-02-06 PROCEDURE — 700101 HCHG RX REV CODE 250

## 2018-02-06 PROCEDURE — 87070 CULTURE OTHR SPECIMN AEROBIC: CPT | Mod: 91

## 2018-02-06 PROCEDURE — 87205 SMEAR GRAM STAIN: CPT

## 2018-02-06 PROCEDURE — 80048 BASIC METABOLIC PNL TOTAL CA: CPT

## 2018-02-06 PROCEDURE — 87077 CULTURE AEROBIC IDENTIFY: CPT

## 2018-02-06 PROCEDURE — 160035 HCHG PACU - 1ST 60 MINS PHASE I: Performed by: ORTHOPAEDIC SURGERY

## 2018-02-06 PROCEDURE — 700111 HCHG RX REV CODE 636 W/ 250 OVERRIDE (IP)

## 2018-02-06 PROCEDURE — 501838 HCHG SUTURE GENERAL: Performed by: ORTHOPAEDIC SURGERY

## 2018-02-06 PROCEDURE — A9270 NON-COVERED ITEM OR SERVICE: HCPCS

## 2018-02-06 PROCEDURE — 160036 HCHG PACU - EA ADDL 30 MINS PHASE I: Performed by: ORTHOPAEDIC SURGERY

## 2018-02-06 PROCEDURE — 0RCK4ZZ EXTIRPATION OF MATTER FROM LEFT SHOULDER JOINT, PERCUTANEOUS ENDOSCOPIC APPROACH: ICD-10-PCS | Performed by: ORTHOPAEDIC SURGERY

## 2018-02-06 PROCEDURE — 87075 CULTR BACTERIA EXCEPT BLOOD: CPT

## 2018-02-06 PROCEDURE — 700105 HCHG RX REV CODE 258

## 2018-02-06 PROCEDURE — A6402 STERILE GAUZE <= 16 SQ IN: HCPCS | Performed by: ORTHOPAEDIC SURGERY

## 2018-02-06 PROCEDURE — 87070 CULTURE OTHR SPECIMN AEROBIC: CPT

## 2018-02-06 PROCEDURE — 500423 HCHG DRESSING, ABD COMBINE: Performed by: ORTHOPAEDIC SURGERY

## 2018-02-06 PROCEDURE — 502581 HCHG PACK, SHOULDER ARTHROSCOPY: Performed by: ORTHOPAEDIC SURGERY

## 2018-02-06 PROCEDURE — 700102 HCHG RX REV CODE 250 W/ 637 OVERRIDE(OP)

## 2018-02-06 PROCEDURE — 87102 FUNGUS ISOLATION CULTURE: CPT

## 2018-02-06 PROCEDURE — 160041 HCHG SURGERY MINUTES - EA ADDL 1 MIN LEVEL 4: Performed by: ORTHOPAEDIC SURGERY

## 2018-02-06 PROCEDURE — 36415 COLL VENOUS BLD VENIPUNCTURE: CPT

## 2018-02-06 PROCEDURE — 700111 HCHG RX REV CODE 636 W/ 250 OVERRIDE (IP): Performed by: ORTHOPAEDIC SURGERY

## 2018-02-06 PROCEDURE — 160009 HCHG ANES TIME/MIN: Performed by: ORTHOPAEDIC SURGERY

## 2018-02-06 PROCEDURE — 160002 HCHG RECOVERY MINUTES (STAT): Performed by: ORTHOPAEDIC SURGERY

## 2018-02-06 PROCEDURE — 500881 HCHG PACK, EXTREMITY: Performed by: ORTHOPAEDIC SURGERY

## 2018-02-06 PROCEDURE — A6222 GAUZE <=16 IN NO W/SAL W/O B: HCPCS | Performed by: ORTHOPAEDIC SURGERY

## 2018-02-06 PROCEDURE — 87015 SPECIMEN INFECT AGNT CONCNTJ: CPT

## 2018-02-06 PROCEDURE — 160029 HCHG SURGERY MINUTES - 1ST 30 MINS LEVEL 4: Performed by: ORTHOPAEDIC SURGERY

## 2018-02-06 PROCEDURE — 770001 HCHG ROOM/CARE - MED/SURG/GYN PRIV*

## 2018-02-06 PROCEDURE — 87116 MYCOBACTERIA CULTURE: CPT

## 2018-02-06 PROCEDURE — 85025 COMPLETE CBC W/AUTO DIFF WBC: CPT

## 2018-02-06 PROCEDURE — 160048 HCHG OR STATISTICAL LEVEL 1-5: Performed by: ORTHOPAEDIC SURGERY

## 2018-02-06 PROCEDURE — 89051 BODY FLUID CELL COUNT: CPT

## 2018-02-06 PROCEDURE — 87206 SMEAR FLUORESCENT/ACID STAI: CPT

## 2018-02-06 PROCEDURE — A4450 NON-WATERPROOF TAPE: HCPCS | Performed by: ORTHOPAEDIC SURGERY

## 2018-02-06 PROCEDURE — 0RBK4ZZ EXCISION OF LEFT SHOULDER JOINT, PERCUTANEOUS ENDOSCOPIC APPROACH: ICD-10-PCS | Performed by: ORTHOPAEDIC SURGERY

## 2018-02-06 PROCEDURE — 86140 C-REACTIVE PROTEIN: CPT

## 2018-02-06 PROCEDURE — 87186 SC STD MICRODIL/AGAR DIL: CPT

## 2018-02-06 PROCEDURE — 500028 HCHG ARTHROWAND TURBOVAC 3.5/90 SUCT.: Performed by: ORTHOPAEDIC SURGERY

## 2018-02-06 PROCEDURE — 85652 RBC SED RATE AUTOMATED: CPT

## 2018-02-06 RX ORDER — MEPERIDINE HYDROCHLORIDE 25 MG/ML
INJECTION INTRAMUSCULAR; INTRAVENOUS; SUBCUTANEOUS
Status: COMPLETED
Start: 2018-02-06 | End: 2018-02-06

## 2018-02-06 RX ORDER — MORPHINE SULFATE 4 MG/ML
4 INJECTION, SOLUTION INTRAMUSCULAR; INTRAVENOUS
Status: DISCONTINUED | OUTPATIENT
Start: 2018-02-06 | End: 2018-02-09 | Stop reason: HOSPADM

## 2018-02-06 RX ORDER — OXYCODONE HCL 5 MG/5 ML
SOLUTION, ORAL ORAL
Status: COMPLETED
Start: 2018-02-06 | End: 2018-02-06

## 2018-02-06 RX ORDER — HYDROMORPHONE HYDROCHLORIDE 2 MG/ML
INJECTION, SOLUTION INTRAMUSCULAR; INTRAVENOUS; SUBCUTANEOUS
Status: COMPLETED
Start: 2018-02-06 | End: 2018-02-06

## 2018-02-06 RX ORDER — OXYCODONE HYDROCHLORIDE 10 MG/1
10 TABLET ORAL
Status: DISCONTINUED | OUTPATIENT
Start: 2018-02-06 | End: 2018-02-09 | Stop reason: HOSPADM

## 2018-02-06 RX ORDER — OXYCODONE HYDROCHLORIDE 5 MG/1
5 TABLET ORAL
Status: DISCONTINUED | OUTPATIENT
Start: 2018-02-06 | End: 2018-02-09 | Stop reason: HOSPADM

## 2018-02-06 RX ORDER — CELECOXIB 200 MG/1
200 CAPSULE ORAL 2 TIMES DAILY WITH MEALS
Status: DISCONTINUED | OUTPATIENT
Start: 2018-02-06 | End: 2018-02-09 | Stop reason: HOSPADM

## 2018-02-06 RX ORDER — SODIUM CHLORIDE AND POTASSIUM CHLORIDE 150; 900 MG/100ML; MG/100ML
INJECTION, SOLUTION INTRAVENOUS CONTINUOUS
Status: DISCONTINUED | OUTPATIENT
Start: 2018-02-06 | End: 2018-02-09 | Stop reason: HOSPADM

## 2018-02-06 RX ORDER — MORPHINE SULFATE 4 MG/ML
2 INJECTION, SOLUTION INTRAMUSCULAR; INTRAVENOUS
Status: DISCONTINUED | OUTPATIENT
Start: 2018-02-06 | End: 2018-02-06

## 2018-02-06 RX ORDER — ONDANSETRON 2 MG/ML
4 INJECTION INTRAMUSCULAR; INTRAVENOUS EVERY 4 HOURS PRN
Status: DISCONTINUED | OUTPATIENT
Start: 2018-02-06 | End: 2018-02-09 | Stop reason: HOSPADM

## 2018-02-06 RX ORDER — OXYCODONE HYDROCHLORIDE AND ACETAMINOPHEN 5; 325 MG/1; MG/1
1-2 TABLET ORAL EVERY 4 HOURS PRN
Status: ON HOLD | COMMUNITY
End: 2018-02-08

## 2018-02-06 RX ORDER — DEXTROSE MONOHYDRATE, SODIUM CHLORIDE, AND POTASSIUM CHLORIDE 50; 1.49; 4.5 G/1000ML; G/1000ML; G/1000ML
INJECTION, SOLUTION INTRAVENOUS CONTINUOUS
Status: DISCONTINUED | OUTPATIENT
Start: 2018-02-06 | End: 2018-02-06

## 2018-02-06 RX ADMIN — HYDROMORPHONE HYDROCHLORIDE 0.2 MG: 2 INJECTION, SOLUTION INTRAMUSCULAR; INTRAVENOUS; SUBCUTANEOUS at 22:38

## 2018-02-06 RX ADMIN — HYDROMORPHONE HYDROCHLORIDE 0.2 MG: 2 INJECTION, SOLUTION INTRAMUSCULAR; INTRAVENOUS; SUBCUTANEOUS at 22:22

## 2018-02-06 RX ADMIN — HYDROMORPHONE HYDROCHLORIDE 0.2 MG: 2 INJECTION, SOLUTION INTRAMUSCULAR; INTRAVENOUS; SUBCUTANEOUS at 22:33

## 2018-02-06 RX ADMIN — OXYCODONE HYDROCHLORIDE 10 MG: 5 SOLUTION ORAL at 22:23

## 2018-02-06 RX ADMIN — MORPHINE SULFATE 2 MG: 4 INJECTION INTRAVENOUS at 18:22

## 2018-02-06 RX ADMIN — MEPERIDINE HYDROCHLORIDE 12.5 MG: 25 INJECTION INTRAMUSCULAR; INTRAVENOUS; SUBCUTANEOUS at 21:50

## 2018-02-06 RX ADMIN — HYDROMORPHONE HYDROCHLORIDE 0.2 MG: 2 INJECTION, SOLUTION INTRAMUSCULAR; INTRAVENOUS; SUBCUTANEOUS at 22:43

## 2018-02-06 RX ADMIN — MEPERIDINE HYDROCHLORIDE 12.5 MG: 25 INJECTION INTRAMUSCULAR; INTRAVENOUS; SUBCUTANEOUS at 22:10

## 2018-02-06 ASSESSMENT — PAIN SCALES - GENERAL
PAINLEVEL_OUTOF10: 6
PAINLEVEL_OUTOF10: 0
PAINLEVEL_OUTOF10: 9
PAINLEVEL_OUTOF10: 0
PAINLEVEL_OUTOF10: 5
PAINLEVEL_OUTOF10: 4
PAINLEVEL_OUTOF10: 0
PAINLEVEL_OUTOF10: 0
PAINLEVEL_OUTOF10: 4

## 2018-02-06 ASSESSMENT — PATIENT HEALTH QUESTIONNAIRE - PHQ9
1. LITTLE INTEREST OR PLEASURE IN DOING THINGS: NOT AT ALL
SUM OF ALL RESPONSES TO PHQ9 QUESTIONS 1 AND 2: 0
1. LITTLE INTEREST OR PLEASURE IN DOING THINGS: NOT AT ALL
SUM OF ALL RESPONSES TO PHQ QUESTIONS 1-9: 0
SUM OF ALL RESPONSES TO PHQ QUESTIONS 1-9: 0
SUM OF ALL RESPONSES TO PHQ9 QUESTIONS 1 AND 2: 0
2. FEELING DOWN, DEPRESSED, IRRITABLE, OR HOPELESS: NOT AT ALL
2. FEELING DOWN, DEPRESSED, IRRITABLE, OR HOPELESS: NOT AT ALL

## 2018-02-06 ASSESSMENT — LIFESTYLE VARIABLES
EVER_SMOKED: NEVER
ALCOHOL_USE: NO

## 2018-02-06 NOTE — PROGRESS NOTES
Direct admit from Home. Dr. Daniel accepting for Septic arthritis. ADT signed and held 02/06/2018 at 1514 and will need to be released upon patient arrival to unit. Patient arriving via POV.

## 2018-02-06 NOTE — PROGRESS NOTES
Pt arrived to unit via wheel chair. Pt states 5/10 pain to the LT shoulder. States he took a 5mg Percocet at 0930. Pt states he ate some food at noon.

## 2018-02-06 NOTE — PROGRESS NOTES
Spoke with Dr. Daniel, pt will have surgery at 2115.     Orders:   NPO   Insert IV  CBC  BMP  Sed-rate  Non- cardiac CRP    Consent for surgery  Morphine 2mg IV Q2H PRN for pain.

## 2018-02-07 LAB
ANION GAP SERPL CALC-SCNC: 7 MMOL/L (ref 0–11.9)
BUN SERPL-MCNC: 8 MG/DL (ref 8–22)
CALCIUM SERPL-MCNC: 8 MG/DL (ref 8.4–10.2)
CHLORIDE SERPL-SCNC: 101 MMOL/L (ref 96–112)
CO2 SERPL-SCNC: 25 MMOL/L (ref 20–33)
CREAT SERPL-MCNC: 0.4 MG/DL (ref 0.5–1.4)
GLUCOSE SERPL-MCNC: 136 MG/DL (ref 65–99)
GRAM STN SPEC: NORMAL
POTASSIUM SERPL-SCNC: 3.7 MMOL/L (ref 3.6–5.5)
RHODAMINE-AURAMINE STN SPEC: NORMAL
SIGNIFICANT IND 70042: NORMAL
SIGNIFICANT IND 70042: NORMAL
SITE SITE: NORMAL
SITE SITE: NORMAL
SODIUM SERPL-SCNC: 133 MMOL/L (ref 135–145)
SOURCE SOURCE: NORMAL
SOURCE SOURCE: NORMAL

## 2018-02-07 PROCEDURE — 700102 HCHG RX REV CODE 250 W/ 637 OVERRIDE(OP): Performed by: ORTHOPAEDIC SURGERY

## 2018-02-07 PROCEDURE — A9270 NON-COVERED ITEM OR SERVICE: HCPCS | Performed by: INTERNAL MEDICINE

## 2018-02-07 PROCEDURE — 700105 HCHG RX REV CODE 258: Performed by: ORTHOPAEDIC SURGERY

## 2018-02-07 PROCEDURE — 80048 BASIC METABOLIC PNL TOTAL CA: CPT

## 2018-02-07 PROCEDURE — 700111 HCHG RX REV CODE 636 W/ 250 OVERRIDE (IP): Performed by: ORTHOPAEDIC SURGERY

## 2018-02-07 PROCEDURE — 700102 HCHG RX REV CODE 250 W/ 637 OVERRIDE(OP): Performed by: INTERNAL MEDICINE

## 2018-02-07 PROCEDURE — 36415 COLL VENOUS BLD VENIPUNCTURE: CPT

## 2018-02-07 PROCEDURE — 770001 HCHG ROOM/CARE - MED/SURG/GYN PRIV*

## 2018-02-07 PROCEDURE — 700101 HCHG RX REV CODE 250: Performed by: ORTHOPAEDIC SURGERY

## 2018-02-07 PROCEDURE — A9270 NON-COVERED ITEM OR SERVICE: HCPCS | Performed by: ORTHOPAEDIC SURGERY

## 2018-02-07 RX ORDER — BACITRACIN ZINC 500 [USP'U]/G
OINTMENT TOPICAL 2 TIMES DAILY
Status: DISCONTINUED | OUTPATIENT
Start: 2018-02-07 | End: 2018-02-09 | Stop reason: HOSPADM

## 2018-02-07 RX ORDER — DIAZEPAM 5 MG/1
5 TABLET ORAL EVERY 8 HOURS PRN
Status: DISCONTINUED | OUTPATIENT
Start: 2018-02-07 | End: 2018-02-09 | Stop reason: HOSPADM

## 2018-02-07 RX ADMIN — POTASSIUM CHLORIDE AND SODIUM CHLORIDE: 900; 150 INJECTION, SOLUTION INTRAVENOUS at 01:03

## 2018-02-07 RX ADMIN — BACITRACIN ZINC: 500 OINTMENT TOPICAL at 20:47

## 2018-02-07 RX ADMIN — DIAZEPAM 5 MG: 5 TABLET ORAL at 17:39

## 2018-02-07 RX ADMIN — VANCOMYCIN HYDROCHLORIDE 900 MG: 10 INJECTION, POWDER, LYOPHILIZED, FOR SOLUTION INTRAVENOUS at 00:47

## 2018-02-07 RX ADMIN — VANCOMYCIN HYDROCHLORIDE 1100 MG: 5 INJECTION, POWDER, LYOPHILIZED, FOR SOLUTION INTRAVENOUS at 11:50

## 2018-02-07 RX ADMIN — BACITRACIN ZINC: 500 OINTMENT TOPICAL at 11:50

## 2018-02-07 RX ADMIN — CELECOXIB 200 MG: 200 CAPSULE ORAL at 00:47

## 2018-02-07 ASSESSMENT — PAIN SCALES - GENERAL
PAINLEVEL_OUTOF10: 0

## 2018-02-07 NOTE — PROGRESS NOTES
"Dr. Daniel at bedside, patient very agitated voicing his complaints.  Pt wants his hemovacs removed because they are not draining a lot right now, Dr. Daniel educated pt on the reasons why he wants pt to leave hemovacs in until tomorrow.  Pt upset that he has to stay in the hospital and demanding Dr. Daniel tell him for how long. Per Dr. Daniel, the very earliest day for discharge will be Friday because still waiting on results from blood cultures and pt needs antibiotics.  Pt also complaining about the TV saying it moves while he tries to change the channel. Pt very unhappy with \"being stuck\" in the hospital and continues to lash out at staff for his discomfort.    "

## 2018-02-07 NOTE — PROGRESS NOTES
Received report from day shift nurse. Assumed pt care at 1915. Pt is A&Ox4, resting comfortably in bed. Pt on r.a.. No signs of SOB/respiratory distress. Labs noted, VSS. Pt denied pain. Fall precautions in place. Bed locked & at lowest position. Call light and personal belongings within reach; encouraged pt to call for any assistance. Continue to monitor

## 2018-02-07 NOTE — CONSULTS
INFECTIOUS DISEASES INPATIENT CONSULT NOTE     Date of Service: 2/7/2018    Consult Requested By: Sergio Daniel M.D.    Reason for Consultation: Left shoulder infection    History of Present Illness:   Sebastien Horn is a 67 y.o. Man with a history of prostate cancer s/p prostatectomy, penile clamp, hyperparathyroidism s/p parathyroidectomy, right shoulder rotator cuff repair surgery and left rotator cuff repair in October admitted 2/6/2018 for left shoulder pain, swelling and erythema. Pt states he was doing well after surgery and progressing with physical therapy until last Thursday when he try to grab a laundry detergent container from a shelf and noted discomfort. His pain progressed over the weekend with increasing swelling and erythema associated with subjective fevers but no chills. He denied any dehiscence from the surgical site. No prior abx in the past month. Earlier on the day of admission, he saw her orthopedic surgeon and he was noted to have signs of infection. Joint aspiration was performed in the clinic. Due to concerns for infection, pt was admitted for I&D. On presentation, he was afebrile without any leukocytosis. He is now s/p I&D on 2/6. Purulence was noted intraoperatively. He is currently on vancomycin. OR cultures are pending. ID consulted for further abx recommendations.     Review Of Systems:  All other ROS were reviewed and are negative except as noted above in the HPI.    PMH:   Past Medical History:   Diagnosis Date   • Arthritis     knees   • Cancer ([facility]) 07/13    Prostate   • Disorder of thyroid    • Heart valve disease     Mild mitral valve problem - per patient cardiologist is not concerned at this time.   • Hiatus hernia syndrome    • Unspecified cataract     Bilateral IOL's   • Urinary incontinence    H/o UTI    PSH:  Past Surgical History:   Procedure Laterality Date   • SHOULDER ARTHROSCOPY W/ ROTATOR CUFF REPAIR Left 10/18/2017    Procedure: SHOULDER ARTHROSCOPY W/  ROTATOR CUFF REPAIR;  Surgeon: Sergio Daniel M.D.;  Location: [facility];  Service: Orthopedics   • SHOULDER DECOMPRESSION ARTHROSCOPIC Left 10/18/2017    Procedure: SHOULDER DECOMPRESSION ARTHROSCOPIC- SUBACROMIAL;  Surgeon: Sergio Daniel M.D.;  Location: [facility];  Service: Orthopedics   • BICEPS TENODESIS Left 10/18/2017    Procedure: BICEPS TENODESIS;  Surgeon: Sergio Daniel M.D.;  Location: [facility];  Service: Orthopedics   • PARATHYROIDECTOMY N/A 7/5/2017    Procedure: PARATHYROIDECTOMY - MINIMALLY INVASIVE W/RECURRENT LARYNGEAL NERVE MONITORING;  Surgeon: Josh Blank M.D.;  Location: SURGERY SAME DAY [facility];  Service:    • SPHINCTER PROSTHESIS REMOVAL  3/28/2017    Procedure: URINARY SPHINCTER PROSTHESIS REMOVAL;  Surgeon: Benigno Grier M.D.;  Location: [facility];  Service:    • CYSTOSCOPY  3/28/2017    Procedure: CYSTOSCOPY;  Surgeon: Benigno Grier M.D.;  Location: [facility];  Service:    • EVACUATION OF HEMATOMA  3/21/2017    Procedure: EVACUATION OF HEMATOMA-CYSTO CLOT EVACUATION AND SMALL CYSTOSCOPE;  Surgeon: Frank Lamas M.D.;  Location: [facility];  Service:    • CYSTOSCOPY  3/21/2017    Procedure: CYSTOSCOPY;  Surgeon: Frank Lamas M.D.;  Location: [facility];  Service:    • CYSTOSCOPY  3/20/2017    Procedure: CYSTOSCOPY -  FLEXIBLE;  Surgeon: Frank Lamas M.D.;  Location: [facility];  Service:    • SPHINCTER PROSTHESIS PLACEMENT  3/20/2017    Procedure: SPHINCTER PROSTHESIS PLACEMENT - ARTIFICIAL URINARY SPHINCTER;  Surgeon: Frank Lamas M.D.;  Location: [facility];  Service:    • PROSTATECTOMY, RADICAL RETRO  8/3/2015    Procedure: PROSTATECTOMY RADICAL RETROPUBIC;  Surgeon: Sage Ngo M.D.;  Location: [facility];  Service:    • NODE DISSECTION Bilateral 8/3/2015    Procedure: NODE DISSECTION LYMPH;  Surgeon: Sage Ngo  M.D.;  Location: SURGERY [facility];  Service:    • KNEE ARTHROPLASTY TOTAL  7/3/2014    Performed by Theodore San M.D. at SURGERY [facility]   • KNEE ARTHROSCOPY  7/3/2014    Performed by Theodore San M.D. at [facility]   • MENISCECTOMY  7/3/2014    Performed by Theodore San M.D. at SURGERY [facility]   • KNEE REPLACEMENT, TOTAL Right 2014   • INGUINAL HERNIA REPAIR  6/1/2009    Performed by RISHI COOK at SURGERY [facility]   • OTHER ORTHOPEDIC SURGERY  2003    L Shoulder Repair   • INGUINAL HERNIA LAPAROSCOPIC BILATERAL Bilateral    • OTHER ORTHOPEDIC SURGERY      R Rotator Cuff repair       FAMILY HX:  Family History   Problem Relation Age of Onset   • Hyperlipidemia Mother    • Hyperlipidemia Father    • Cancer Father      Prostate and Lung   • Lung Disease Father    • Other Sister      Lupus       SOCIAL HX:  Social History     Social History   • Marital status:      Spouse name: N/A   • Number of children: N/A   • Years of education: N/A     Occupational History   • Not on file.     Social History Main Topics   • Smoking status: Never Smoker   • Smokeless tobacco: Never Used   • Alcohol use No   • Drug use: No   • Sexual activity: Not Currently     Other Topics Concern   • Not on file     Social History Narrative   • No narrative on file     History   Smoking Status   • Never Smoker   Smokeless Tobacco   • Never Used     History   Alcohol Use No       Allergies/Intolerances:  No Known Allergies    History reviewed with the patient    Other Current Medications:    Current Facility-Administered Medications:   •  SM VANCOMYCIN 1 G + 100 ML NSS IVPB, , , ,   •  FENTANYL CITRATE (PF) 0.05 MG/ML INJ SOLN, , , ,   •  Pharmacy Consult Request ...Pain Management Review 1 Each, 1 Each, Other, PRN, Sergio Daniel M.D.  •  aspirin EC (ECOTRIN) tablet 81 mg, 81 mg, Oral, BID, Sergio Daniel M.D.  •  celecoxib (CELEBREX) capsule 200 mg, 200 mg, Oral, BID WITH MEALS,  "Sergio Daniel M.D., 200 mg at 18 0047  •  ondansetron (ZOFRAN) syringe/vial injection 4 mg, 4 mg, Intravenous, Q4HRS PRN, Sergio Daniel M.D.  •  MD ALERT... vancomycin per pharmacy protocol, , Other, pharmacy to dose, Sergio Daniel M.D.  •  oxyCODONE immediate release (ROXICODONE) tablet 10 mg, 10 mg, Oral, Q3HRS PRN, Sergio Daniel M.D.  •  morphine (pf) 4 mg/ml injection 4 mg, 4 mg, Intravenous, Q3HRS PRN, Sergio Daniel M.D.  •  oxyCODONE immediate-release (ROXICODONE) tablet 5 mg, 5 mg, Oral, Q3HRS PRN, Sergio Daniel M.D.  •  0.9 % NaCl with KCl 20 mEq infusion, , Intravenous, Continuous, Sergio Daniel M.D., Last Rate: 100 mL/hr at 18 0103  [unfilled]    Most Recent Vital Signs:  /64   Pulse 70   Temp 37.2 °C (98.9 °F)   Resp 16   Ht 1.829 m (6' 0.01\")   Wt 74.8 kg (164 lb 14.5 oz)   SpO2 98%   BMI 22.36 kg/m²   Temp  Av.3 °C (99.1 °F)  Min: 36.7 °C (98.1 °F)  Max: 37.7 °C (99.9 °F)    Physical Exam:  General: well-appearing, no acute distress  HEENT: sclera anicteric, PERRL, EOMI, MMM, no oral lesions  Neck: supple, no lymphadenopathy  Chest: CTAB, no r/r/w, normal work of breathing.  Cardiac: irregular, normal S1 S2, no m/r/g   Abdomen: + bowel sounds, soft, non-tender, non-distended, no HSM  Extremities: R knee surgical scar clean, R shoulder surgical scar clean, Left shoulder dress +Hemovac, no BLE edema  Skin: no rashes or worrisome lesions  Neuro: Alert and oriented times 3, non-focal exam, speech fluent, moves all extremities    Pertinent Lab Results:  Recent Labs      18   1740   WBC  7.2      Recent Labs      18   1740   HEMOGLOBIN  11.5*   HEMATOCRIT  33.7*   MCV  94.9   MCH  32.4   PLATELETCT  245         Recent Labs      18   1740  18   0415   SODIUM  129*  133*   POTASSIUM  3.6  3.7   CHLORIDE  95*  101   CO2  24  25   CREATININE  0.60  0.40*        No results for input(s): ALBUMIN in the last 72 hours.    Invalid input(s): AST, " ALT, ALKPHOS, BILITOT, TOTALBILIRUB, BILIRUBINTOT, BILIRUBINDIR, BILIRUBININD, ALKALINEPHOS     Pertinent Micro:  Results     Procedure Component Value Units Date/Time    CULTURE WOUND W/ GRAM STAIN [[id number]] Collected:  02/06/18 2047    Order Status:  Completed Specimen:  Other Updated:  02/07/18 0613    Narrative:       1)left shoulder joint  AFB (includes smear)  anaerobic  rotine aerobic (includes gram stain)  fungal  gram stain only  wound  arm    FUNGAL CULTURE [[id number]] Collected:  02/06/18 2047    Order Status:  Completed Specimen:  Other Updated:  02/07/18 0613    Narrative:       1)left shoulder joint  AFB (includes smear)  anaerobic  rotine aerobic (includes gram stain)  fungal  gram stain only  wound  arm    AFB CULTURE [[id number]] Collected:  02/06/18 2047    Order Status:  Completed Specimen:  Other Updated:  02/06/18 2344    Narrative:       1)left shoulder joint  AFB (includes smear)  anaerobic  rotine aerobic (includes gram stain)  fungal  gram stain only  wound  arm    ANAEROBIC CULTURE [[id number]] Collected:  02/06/18 2047    Order Status:  Completed Specimen:  Other Updated:  02/06/18 2344    Narrative:       1)left shoulder joint  AFB (includes smear)  anaerobic  rotine aerobic (includes gram stain)  fungal  gram stain only  wound  arm        Blood Culture Hold   Date Value Ref Range Status   03/28/2017 Collected  Final        Studies:  No results found.    IMPRESSION:   1. Left shoulder septic arthritis    2. Recent left shoulder rotator cuff repair    PLAN:   Sebastien Horn is a 67 y.o. man with a history of prostate cancer s/p prostatectomy, bilateral rotator cuff repair with left rotator cuff repair in October admitted for left shoulder pain, swelling and erythema whose clinical presentation is most consistent with left shoulder septic arthritis. He is now s/p I&D on 2/6. Purulence was noted intraoperatively. Continue vancomycin for now pending OR cultures. Monitor renal function  closely on vanco. Anticipate PICC and 4 weeks of IV abx from date of surgery. Further recommendations to follow per clinical course and culture results.       Plan of care discussed with RN. Will continue to follow    Coco Rosario M.D.

## 2018-02-07 NOTE — PROGRESS NOTES
Pain controlled. Fairly agitated at everything.  Dressing CDI  Drains with limited output.  Cult pos Staph A. Sensitivity pending  Cont drains and Abx.   Out patient plan per ID.

## 2018-02-07 NOTE — OP REPORT
DATE OF SERVICE:  02/06/2018    SURGEON:  Sergio Daniel MD    ASSISTANT:  None.    PREOPERATIVE DIAGNOSIS:  Left shoulder septic arthritis, status post rotator   cuff repair 4 months ago.    POSTOPERATIVE DIAGNOSES:  1.  Left shoulder septic arthritis, status post rotator cuff repair 4 months   ago.  2.  Left shoulder retained hardware.    PROCEDURE:  1.  Left shoulder extensive debridement of glenohumeral joint and subacromial   space.  2.  Left shoulder arthroscopy with removal of retained implants.    ANESTHESIOLOGIST:  Mavis Gooden MD    ANESTHETIC:  General endotracheal anesthesia.    INDICATIONS:  The patient had left shoulder pain for the last week or so.  He   initially had an injury, then subsequently had signs of infection.  He was   urgently taken to the operating room for irrigation and debridement.  He was   explained the risks, benefits, alternative procedure, and recovery in detail.    All questions were answered.  Informed consent was obtained.  All questions   were answered.  The patient was specifically explained the serious nature of   this problem and the need for operative debridement and intravenous   antibiotics.    OPERATION:  After successful anesthesia, the patient was carefully placed in   modified beach chair position.  All bony prominences well padded and   supported.  Head and neck were maintained in neutral position.  The arm had   external rotation about 30 degrees, forward elevation about 130 degrees.  Arm   was then prepped and draped in the usual sterile fashion.  A posterior portal   was established with knife and blunt trocar in the glenohumeral space.  A fair   amount of purulent material came out.  The articular cartilage was still in   good condition.  The subscap was intact.  Biceps had had a previous tenotomy.    He had some strain in the joint consistent with infection.  I lavaged that   out with 12 liters of saline both in the glenohumeral joint and the   subacromial  space, which was all contiguous area.  The rotator cuff repaired   failed, and the 3 anchors on the tuberosity were removed along with the   suture.  I debrided that area thoroughly including the subdeltoid recess.    This was all debrided extensively and irrigated out.  After a thorough   debridement of all the soft tissues and the lavage of the fluid, I placed 2   medium Hemovac drains.  These were attached to separate reservoirs, and we   closed the portals with 3-0 Prolene in interrupted fashion.  The drains were   sutured in.    ESTIMATED BLOOD LOSS:  Minimal.    COMPLICATIONS:  None.    SPECIMENS:  Purulent material.       ____________________________________     MD EDIN PHAM / BECCA    DD:  02/06/2018 21:48:59  DT:  02/06/2018 22:11:38    D#:  [id number]  Job#:  [id number]

## 2018-02-07 NOTE — PROGRESS NOTES
Pt A&Ox4, sitting up in bed, complains of 5/10 pain to the LT shoulder. Lt shoulder noted to be red and hot. VSS. Pt notified to call for assistance.

## 2018-02-07 NOTE — H&P
HISTORY OF PRESENT ILLNESS:  The patient is a 67-year-old male, who had a left   shoulder arthroscopy with rotator cuff repair back in October.  He was doing   extremely well until about a week ago when he said he went to grab   accidentally on some laundry detergent with the repaired arm and had some pain in the   shoulder.  We initially rested and treated him with some conservative   management, and then his pain started to increase.  He was brought in the   office today and had significant swelling and warmth around his shoulder.    There was no obvious drainage.  He was still neurovascularly intact.  He   reported some low-grade fevers.  At that point, we elected to aspirate his   shoulder in the office.  We did an ultrasound-guided aspiration, which we got   purulent fluid.  At that time, I was worried about a postoperative infection.    He was sent to the hospital for admission and further definitive treatment.    PAST MEDICAL HISTORY:  Thyroid disease.    PAST SURGICAL HISTORY:  Left shoulder surgery in October with a rotator cuff   repair.    MEDICATIONS:  Percocet and occasional Aleve over-the-counter.    ALLERGIES:  No known drug allergies.    FAMILY HISTORY:  Significant for arthritis and heart disease.    REVIEW OF SYSTEMS:  Left shoulder pain and fevers.    PHYSICAL EXAMINATION:  GENERAL:  Patient appears his stated age.  PSYCHIATRIC:  Mood is appropriate.  He is alert and oriented.  HEENT:  Normal.  RESPIRATIONS:  Unlabored.  VITAL SIGNS:  Temperature is 36.7, blood pressure 136/73, heart rate 70,   respirations 18, 97% oxygenation.  EXTREMITIES:  The left shoulder reveals significant swelling in the left   shoulder.  He has got swelling down the arm.  He is neurovascularly intact.    DIAGNOSTIC STUDIES:  Aspiration and specimens were sent to [facility] from his   shoulder today.    IMPRESSION:  Left shoulder postoperative septic arthritis.    PLAN:  At this point, we have elected to admit him to the  [facility] for a   planned irrigation and debridement, and start him on antibiotics, infectious   disease was consulted.       ____________________________________     MD EDIN PHAM / BECCA    DD:  02/06/2018 17:19:06  DT:  02/06/2018 18:19:45    D#:  [id number]  Job#:  [id number]

## 2018-02-07 NOTE — PROGRESS NOTES
Call from Jennifer in lab, pts left shoulder synovial fluid is growing staph aureus. Dr. Daniel notified.

## 2018-02-07 NOTE — PROGRESS NOTES
"Pharmacy Kinetics 67 y.o. male on vancomycin day # 1  2018    Currently on Vancomycin 1900 mg IV ( total dose) given by 0047 hours on 18  Indication for Treatment: Left shoulder infection    Pertinent history per medical record: Admitted on 2018 for Septic arthritis of Left shoulder, S/P I and D on 18    Other antibiotics: None at this time.    Allergies: Patient has no known allergies.     List concerns for renal function:   Pertinent cultures to date:   18 Left shoulder Synovial fluid NGTD    Recent Labs      18   1740   WBC  7.2   NEUTSPOLYS  75.00*     Recent Labs      18   1740  18   0415   BUN  10  8   CREATININE  0.60  0.40*     No results for input(s): VANCOTROUGH, VANCOPEAK, VANCORANDOM in the last 72 hours.  Intake/Output Summary (Last 24 hours) at 18 1201  Last data filed at 18 0900   Gross per 24 hour   Intake             2257 ml   Output             1220 ml   Net             1037 ml      Blood pressure 119/58, pulse 60, temperature 36.8 °C (98.2 °F), resp. rate 18, height 1.829 m (6' 0.01\"), weight 74.8 kg (164 lb 14.5 oz), SpO2 96 %. Temp (24hrs), Av.2 °C (99 °F), Min:36.7 °C (98.1 °F), Max:37.7 °C (99.9 °F)      A/P   1. Vancomycin dose change: to vancomycin 1100 mg IV every 12 hours.  2. Next vancomycin level: Vtr at 1130 hours on 18  3. Goal trough: 12-16 mcg/ml  4. Comments: Will monitor and adjust regimen per protocol.    Yony Matthew [facility]    "

## 2018-02-07 NOTE — PROGRESS NOTES
Pt verbally abusive to staff (CNA and this RN), screaming profanity because staff did not act quickly enough to help him up to the bathroom to have a BM.  This RN apologized and tried to de-escalate pt but pt continued to scream at this RN and CNA.  This RN notified charge RN Cookei who said she will talk with pt.  Pt is now comfortably back in bed, eating breakfast, no longer yelling at staff at this time.

## 2018-02-07 NOTE — CARE PLAN
Problem: Safety  Goal: Will remain free from injury  Outcome: PROGRESSING AS EXPECTED  Pt is injury-free at this moment   Fall precautions in place including: bed locked & at lowest position, call light & personal belongings within reach; x2 bed upper rails up, treaded socks on  Encouraged pt to call for any assistance. Hourly rounding in place       Problem: Infection  Goal: Will remain free from infection  Outcome: PROGRESSING AS EXPECTED  Pt is afebrile at this moment (98.9*F); recent WBCs is 7.2  Surgical dressing and immobilizer to L shoulder in place & CDI; no purulent drainage noted   Pt finished 1g of Vancomycin post/op.   I&D has been consulted. Will continue to monitor       Problem: Pain Management  Goal: Pain level will decrease to patient's comfort goal  Outcome: PROGRESSING AS EXPECTED  Pt denied pain post/op.   Continue to monitor pt for pain and encouraged pt to report to the nurse if pain remains uncontrolled; pt verbalized understanding

## 2018-02-07 NOTE — OR NURSING
2144 PT RECEIVED IN PACU, REPORT RECEIVED.  VSS, RESP SPONT, EVEN, NON LABORED. PT REPORTS FEELING COLD, PT SHIVERING.  MEDICATE FOR SHIVERS.  SEE MAR.  PT DENIES PAIN, NAUSEA. 2203 VSS. PT DENIES PAIN, RESTING COMFORTABLY ON BED. 2229 VSS.  2249 VSS. PT REPORTS PAIN 4/10 AND TOLERABLE. DENIES NAUSEA. TOLERATING PO FLUIDS. 2300 VSS. PT MEETS CRITERIA FOR TRANSFER TO ROOM.

## 2018-02-08 LAB
BACTERIA FLD AEROBE CULT: ABNORMAL
BACTERIA FLD AEROBE CULT: ABNORMAL
GRAM STN SPEC: ABNORMAL
SIGNIFICANT IND 70042: ABNORMAL
SITE SITE: ABNORMAL
SOURCE SOURCE: ABNORMAL
VANCOMYCIN TROUGH SERPL-MCNC: 7 UG/ML (ref 10–20)

## 2018-02-08 PROCEDURE — 700111 HCHG RX REV CODE 636 W/ 250 OVERRIDE (IP): Performed by: ORTHOPAEDIC SURGERY

## 2018-02-08 PROCEDURE — 700102 HCHG RX REV CODE 250 W/ 637 OVERRIDE(OP): Performed by: ORTHOPAEDIC SURGERY

## 2018-02-08 PROCEDURE — 700105 HCHG RX REV CODE 258: Performed by: ORTHOPAEDIC SURGERY

## 2018-02-08 PROCEDURE — A9270 NON-COVERED ITEM OR SERVICE: HCPCS | Performed by: ORTHOPAEDIC SURGERY

## 2018-02-08 PROCEDURE — 36415 COLL VENOUS BLD VENIPUNCTURE: CPT

## 2018-02-08 PROCEDURE — 80202 ASSAY OF VANCOMYCIN: CPT

## 2018-02-08 PROCEDURE — 770001 HCHG ROOM/CARE - MED/SURG/GYN PRIV*

## 2018-02-08 RX ORDER — OXYCODONE HYDROCHLORIDE 5 MG/1
5 TABLET ORAL EVERY 6 HOURS PRN
Qty: 30 TAB | Refills: 0 | Status: SHIPPED | OUTPATIENT
Start: 2018-02-08 | End: 2018-02-15

## 2018-02-08 RX ADMIN — OXYCODONE HYDROCHLORIDE 5 MG: 5 TABLET ORAL at 20:28

## 2018-02-08 RX ADMIN — VANCOMYCIN HYDROCHLORIDE 1000 MG: 5 INJECTION, POWDER, LYOPHILIZED, FOR SOLUTION INTRAVENOUS at 21:29

## 2018-02-08 RX ADMIN — OXYCODONE HYDROCHLORIDE 10 MG: 10 TABLET ORAL at 01:48

## 2018-02-08 RX ADMIN — DIAZEPAM 5 MG: 5 TABLET ORAL at 01:47

## 2018-02-08 RX ADMIN — ASPIRIN 81 MG: 81 TABLET, COATED ORAL at 21:29

## 2018-02-08 RX ADMIN — BACITRACIN ZINC: 500 OINTMENT TOPICAL at 09:00

## 2018-02-08 RX ADMIN — DIAZEPAM 5 MG: 5 TABLET ORAL at 20:28

## 2018-02-08 RX ADMIN — VANCOMYCIN HYDROCHLORIDE 1100 MG: 5 INJECTION, POWDER, LYOPHILIZED, FOR SOLUTION INTRAVENOUS at 01:25

## 2018-02-08 RX ADMIN — VANCOMYCIN HYDROCHLORIDE 1100 MG: 5 INJECTION, POWDER, LYOPHILIZED, FOR SOLUTION INTRAVENOUS at 12:49

## 2018-02-08 ASSESSMENT — PAIN SCALES - GENERAL
PAINLEVEL_OUTOF10: 2
PAINLEVEL_OUTOF10: 0
PAINLEVEL_OUTOF10: 0
PAINLEVEL_OUTOF10: 7

## 2018-02-08 ASSESSMENT — ENCOUNTER SYMPTOMS
VOMITING: 0
CHILLS: 0
FEVER: 0
ABDOMINAL PAIN: 0
NAUSEA: 0

## 2018-02-08 NOTE — PROGRESS NOTES
Patient is up. Appears to be moaning. Denies any needs and refused any help. Also refused aspirin. Educated to report if needs help, verbalized understanding. Will continue to monitor.

## 2018-02-08 NOTE — PROGRESS NOTES
Pt continues to be verbally abusive, taking out his frustration's on the staff.  Pt now requesting medication for anxiety, this RN paged Dr. Daniel and received order for valium 5mg Q8 PRN.

## 2018-02-08 NOTE — PROGRESS NOTES
Rounded on patient to given aspirin and bacitracin ointment, sleeping at this time. Breathing even and unlabored and no distress noted. Will com back in about an hour.

## 2018-02-08 NOTE — CARE PLAN
Problem: Safety  Goal: Will remain free from falls    Intervention: Implement fall precautions  Room/floor clear. Non skid socks on. Proper signs up. Bed alarm on, bed low and locked. Call light and belonging within reach. Hourly rounding in place to make sure needs are met.        Problem: Infection  Goal: Will remain free from infection    Intervention: Implement standard precautions and perform hand washing before and after patient contact  Hand washing every encounter. IV ports scrubbed with alcohol when hanging medicine. Patient watch for s/s of infection. Patient taught to report s/s of infection, verbalized understanding.        Problem: Pain Management  Goal: Pain level will decrease to patient's comfort goal    Intervention: Follow pain managment plan developed in collaboration with patient and Interdisciplinary Team  Initial pain assessment completed. Denies any intolerable discomfort at this time. Encouraged to report, verbalized understanding.

## 2018-02-08 NOTE — PROGRESS NOTES
"Pharmacy Kinetics 67 y.o. male on vancomycin day # 2 2018    Currently on Vancomycin 1100 mg iv q12hr    Indication for Treatment: Left shoulder infection.    Pertinent history per medical record: Admitted on 2018 for Septic arthritis of Left shoulder, S/P I and D on 18   .    Other antibiotics: None at this time.    Allergies: Patient has no known allergies.     List concerns for renal function  None    Pertinent cultures to date:   18 Left shoulder synovial fluid  MSSA  2- Left shoulder wnd x 4- Staph a.    Recent Labs      18   1740   WBC  7.2   NEUTSPOLYS  75.00*     Recent Labs      18   1740  18   0415   BUN  10  8   CREATININE  0.60  0.40*     Recent Labs      18   1210   VANCOTROUGH  7.0*     Intake/Output Summary (Last 24 hours) at 18 1440  Last data filed at 18 0900   Gross per 24 hour   Intake              360 ml   Output              521 ml   Net             -161 ml      Blood pressure 124/68, pulse 71, temperature 36.7 °C (98.1 °F), resp. rate 18, height 1.829 m (6' 0.01\"), weight 74.8 kg (164 lb 14.5 oz), SpO2 97 %. Temp (24hrs), Av.8 °C (98.2 °F), Min:36.6 °C (97.9 °F), Max:37 °C (98.6 °F)      A/P   1. Vancomycin dose change: 1000 mg IV every 8 hours.  2. Next vancomycin level: couple of days.  3. Goal trough: 12-16 mcg/ml  4. Comments: Will await culture results.    Yony Matthew [facility]    "

## 2018-02-08 NOTE — PROGRESS NOTES
Infectious Disease Progress Note    Author: Coco Rosario M.D. Date & Time of service: 2018  9:46 AM    Chief Complaint:  FU left shoulder septic arthritis    Interval History:   AF, no CBC, OR +GPC, tolerating abx without issues, plan of care re: abx course discussed with pt  Labs Reviewed, Medications Reviewed, Radiology Reviewed and Wound Reviewed.    Review of Systems:  Review of Systems   Constitutional: Negative for chills and fever.   Gastrointestinal: Negative for abdominal pain, nausea and vomiting.   Musculoskeletal: Positive for joint pain.       Hemodynamics:  Temp (24hrs), Av.7 °C (98.1 °F), Min:36.6 °C (97.9 °F), Max:37 °C (98.6 °F)  Temperature: 36.7 °C (98.1 °F)  Pulse  Av.6  Min: 60  Max: 100   Blood Pressure : 124/68       Physical Exam:  Physical Exam   Constitutional: He is oriented to person, place, and time. He appears well-developed.   HENT:   Head: Normocephalic and atraumatic.   Eyes: EOM are normal.   Neck: Neck supple.   Cardiovascular: Normal rate and regular rhythm.    No murmur heard.  Pulmonary/Chest: Effort normal. He has no wheezes. He has no rales.   Abdominal: Soft. There is no tenderness.   Musculoskeletal: He exhibits tenderness.   Left shoulder dressed +Hemovac x 2   Neurological: He is alert and oriented to person, place, and time.       Meds:    Current Facility-Administered Medications:   •  vancomycin (VANCOCIN) IVPB (maintenance dose)  •  bacitracin  •  diazePAM  •  Pharmacy Consult Request  •  aspirin EC  •  celecoxib  •  ondansetron  •  MD ALERT... vancomycin  •  oxyCODONE immediate release  •  morphine injection  •  oxyCODONE immediate-release  •  0.9 % NaCl with KCl 20 mEq 1,000 mL    Labs:  Recent Labs      18   1740   WBC  7.2   RBC  3.55*   HEMOGLOBIN  11.5*   HEMATOCRIT  33.7*   MCV  94.9   MCH  32.4   RDW  43.0   PLATELETCT  245   MPV  9.4   NEUTSPOLYS  75.00*   LYMPHOCYTES  9.60*   MONOCYTES  14.40*   EOSINOPHILS  0.30   BASOPHILS  0.40      Recent Labs      02/06/18   1740  02/07/18 0415   SODIUM  129*  133*   POTASSIUM  3.6  3.7   CHLORIDE  95*  101   CO2  24  25   GLUCOSE  116*  136*   BUN  10  8     Recent Labs      02/06/18   1740  02/07/18 0415   CREATININE  0.60  0.40*       Imaging:  No results found.    Micro:  Results     Procedure Component Value Units Date/Time    GRAM STAIN [[id number]] Collected:  02/06/18 2047    Order Status:  Completed Specimen:  Wound Updated:  02/07/18 1808     Significant Indicator .     Source WND     Site Left Shoulder  joint     Gram Stain Result --     Moderate WBCs.  Rare Gram positive cocci.      Narrative:       1)left shoulder joint  AFB (includes smear)  anaerobic  rotine aerobic (includes gram stain)  fungal  gram stain only  wound  arm    ACID FAST STAIN [[id number]] Collected:  02/06/18 2047    Order Status:  Completed Specimen:  Wound Updated:  02/07/18 1808     Significant Indicator NEG     Source WND     Site Left Shoulder  joint     AFB Smear Results No acid fast bacilli seen.    Narrative:       1)left shoulder joint  AFB (includes smear)  anaerobic  rotine aerobic (includes gram stain)  fungal  gram stain only  wound  arm    AFB CULTURE [[id number]] Collected:  02/06/18 2047    Order Status:  Completed Specimen:  Wound Updated:  02/07/18 1808     Significant Indicator NEG     Source WND     Site Left Shoulder  joint     AFB Culture Culture in progress.     AFB Smear Results No acid fast bacilli seen.    Narrative:       1)left shoulder joint  AFB (includes smear)  anaerobic  rotine aerobic (includes gram stain)  fungal  gram stain only  wound  arm    CULTURE WOUND W/ GRAM STAIN [[id number]] Collected:  02/06/18 2047    Order Status:  Completed Specimen:  Other Updated:  02/07/18 0613    Narrative:       1)left shoulder joint  AFB (includes smear)  anaerobic  rotine aerobic (includes gram stain)  fungal  gram stain only  wound  arm    FUNGAL CULTURE [[id number]] Collected:  02/06/18 2047    Order  Status:  Completed Specimen:  Other Updated:  02/07/18 0613    Narrative:       1)left shoulder joint  AFB (includes smear)  anaerobic  rotine aerobic (includes gram stain)  fungal  gram stain only  wound  arm    ANAEROBIC CULTURE [[id number]] Collected:  02/06/18 2047    Order Status:  Completed Specimen:  Other Updated:  02/06/18 2344    Narrative:       1)left shoulder joint  AFB (includes smear)  anaerobic  rotine aerobic (includes gram stain)  fungal  gram stain only  wound  arm          Assessment:  Left shoulder septic arthritis    Plan:  Left shoulder septic arthritis - ongoing work up  Recent rotator cuff repair in Oct 2017  S/p I&D on 2/6. Purulence was noted intraoperatively  OSH cx reported staph species  OR gram stain +GPC, Cx - pending  Continue vancomycin   Monitor renal function and vanco trough while on vanco  PICC ordered  Anticipate 4 weeks of IV abx from date of surgery  Stop date 03/06/18  Can do daptomycin as outpatient  IV abx orders given to MSW today  Give 1 dose of dapto on day of discharge    Discussed with MSW.

## 2018-02-09 ENCOUNTER — APPOINTMENT (OUTPATIENT)
Dept: RADIOLOGY | Facility: MEDICAL CENTER | Age: 68
DRG: 507 | End: 2018-02-09
Attending: INTERNAL MEDICINE
Payer: MEDICARE

## 2018-02-09 VITALS
OXYGEN SATURATION: 98 % | BODY MASS INDEX: 22.34 KG/M2 | HEART RATE: 69 BPM | TEMPERATURE: 98.1 F | DIASTOLIC BLOOD PRESSURE: 66 MMHG | HEIGHT: 72 IN | RESPIRATION RATE: 18 BRPM | WEIGHT: 164.9 LBS | SYSTOLIC BLOOD PRESSURE: 131 MMHG

## 2018-02-09 PROCEDURE — 36569 INSJ PICC 5 YR+ W/O IMAGING: CPT

## 2018-02-09 PROCEDURE — 02HV33Z INSERTION OF INFUSION DEVICE INTO SUPERIOR VENA CAVA, PERCUTANEOUS APPROACH: ICD-10-PCS | Performed by: ORTHOPAEDIC SURGERY

## 2018-02-09 PROCEDURE — 700111 HCHG RX REV CODE 636 W/ 250 OVERRIDE (IP): Performed by: INTERNAL MEDICINE

## 2018-02-09 PROCEDURE — B548ZZA ULTRASONOGRAPHY OF SUPERIOR VENA CAVA, GUIDANCE: ICD-10-PCS | Performed by: ORTHOPAEDIC SURGERY

## 2018-02-09 PROCEDURE — 700111 HCHG RX REV CODE 636 W/ 250 OVERRIDE (IP): Performed by: ORTHOPAEDIC SURGERY

## 2018-02-09 PROCEDURE — 700101 HCHG RX REV CODE 250: Performed by: INTERNAL MEDICINE

## 2018-02-09 PROCEDURE — 700102 HCHG RX REV CODE 250 W/ 637 OVERRIDE(OP): Performed by: ORTHOPAEDIC SURGERY

## 2018-02-09 PROCEDURE — 700105 HCHG RX REV CODE 258: Performed by: ORTHOPAEDIC SURGERY

## 2018-02-09 PROCEDURE — A9270 NON-COVERED ITEM OR SERVICE: HCPCS | Performed by: ORTHOPAEDIC SURGERY

## 2018-02-09 RX ADMIN — VANCOMYCIN HYDROCHLORIDE 1000 MG: 5 INJECTION, POWDER, LYOPHILIZED, FOR SOLUTION INTRAVENOUS at 05:22

## 2018-02-09 RX ADMIN — OXYCODONE HYDROCHLORIDE 10 MG: 10 TABLET ORAL at 09:35

## 2018-02-09 RX ADMIN — ASPIRIN 81 MG: 81 TABLET, COATED ORAL at 08:52

## 2018-02-09 RX ADMIN — WATER 2 G: 100 INJECTION, SOLUTION INTRAVENOUS at 08:54

## 2018-02-09 RX ADMIN — DAPTOMYCIN 500 MG: 500 INJECTION, POWDER, LYOPHILIZED, FOR SOLUTION INTRAVENOUS at 15:07

## 2018-02-09 ASSESSMENT — ENCOUNTER SYMPTOMS
COUGH: 0
VOMITING: 0
FEVER: 0
NAUSEA: 0
CHILLS: 0
SHORTNESS OF BREATH: 0
ABDOMINAL PAIN: 0

## 2018-02-09 ASSESSMENT — PAIN SCALES - GENERAL: PAINLEVEL_OUTOF10: 7

## 2018-02-09 NOTE — PROGRESS NOTES
"  PICC Line Instructions    How to Care for your PICC  • Do not take a bath, swim, or use hot tubs when you have a PICC. Cover PICC line with clear plastic wrap and tape to keep it dry while showering  • Check the PICC insertion site daily for leakage, redness, swelling, or pain.  • Flush the PICC as directed by your health care provider. Let your health care provider know right away if the PICC is difficult to flush or does not flush. Do not use force to flush the PICC.  • Do not use a syringe that is less than 10 mL to flush the PICC  • Avoid blood pressure checks on the arm with the PICC.  • Do not take the PICC out yourself. Only a trained clinical professional should remove the PICC  • Make sure you or anyone who access your PICC Line washes their hands before using the line  • Make sure the hub of the line is \"scrubbed\" prior to using the line  Dressing Changes  • Change the PICC dressing as instructed by your health care provider.  • Change your PICC dressing if it becomes loose or wet.  When to seek medical attention  • PICC is accidentally pulled all the way out  • There is any type of drainage, redness, or swelling where the PICC enters the skin.  • You cannot flush the PICC, it is difficult to flush, or the PICC leaks around the insertion site when it is flushed.  • You hear a \"flushing\" sound when the PICC is flushed.  • You notice a hole or tear in the PICC.  • You develop chills or a fever      "

## 2018-02-09 NOTE — PROGRESS NOTES
Daptomycin infusion without any problems.Discharge instructions given and verbalized understanding.Will facilitate discharge via wheelchair.

## 2018-02-09 NOTE — PROGRESS NOTES
Received report from Viridiana. Eyes on patient at 1910. Patient awake, alert, oriented with clear speech. Patient states pain level is not as bad tonight and asked for the 5mg oxy with his evening meds vs. 10mg. Patient resting with call light within reach. Patient had been walking hallway during shift report with steady gait.

## 2018-02-09 NOTE — PROGRESS NOTES
Infectious Disease Progress Note    Author: Coco Rosario M.D. Date & Time of service: 2018  8:32 AM    Chief Complaint:  FU left shoulder septic arthritis    Interval History:   AF, no CBC, OR +GPC, tolerating abx without issues, plan of care re: abx course discussed with pt   AF, no CBC, OR cx +MSSA, drains removed, has limited ROM of left shoulder but pain controlled, plan for PICC today  Labs Reviewed, Medications Reviewed, Radiology Reviewed and Wound Reviewed.    Review of Systems:  Review of Systems   Constitutional: Negative for chills and fever.   Respiratory: Negative for cough and shortness of breath.    Gastrointestinal: Negative for abdominal pain, nausea and vomiting.   Musculoskeletal: Positive for joint pain.       Hemodynamics:  Temp (24hrs), Av.9 °C (98.5 °F), Min:36.7 °C (98.1 °F), Max:37.2 °C (98.9 °F)  Temperature: 36.7 °C (98.1 °F)  Pulse  Av.6  Min: 60  Max: 100   Blood Pressure : 131/66       Physical Exam:  Physical Exam   Constitutional: He is oriented to person, place, and time. He appears well-developed.   HENT:   Head: Normocephalic and atraumatic.   Eyes: EOM are normal.   Neck: Neck supple.   Cardiovascular: Normal rate and regular rhythm.    No murmur heard.  Pulmonary/Chest: Effort normal. He has no wheezes. He has no rales.   Abdominal: Soft. There is no tenderness.   Musculoskeletal: He exhibits tenderness.   Left shoulder dressed  Hemovacs removed   Neurological: He is alert and oriented to person, place, and time.       Meds:    Current Facility-Administered Medications:   •  ceFAZolin  •  bacitracin  •  diazePAM  •  Pharmacy Consult Request  •  aspirin EC  •  celecoxib  •  ondansetron  •  oxyCODONE immediate release  •  morphine injection  •  oxyCODONE immediate-release  •  0.9 % NaCl with KCl 20 mEq 1,000 mL    Labs:  Recent Labs      18   1740   WBC  7.2   RBC  3.55*   HEMOGLOBIN  11.5*   HEMATOCRIT  33.7*   MCV  94.9   MCH  32.4   RDW  43.0    PLATELETCT  245   MPV  9.4   NEUTSPOLYS  75.00*   LYMPHOCYTES  9.60*   MONOCYTES  14.40*   EOSINOPHILS  0.30   BASOPHILS  0.40     Recent Labs      02/06/18   1740  02/07/18   0415   SODIUM  129*  133*   POTASSIUM  3.6  3.7   CHLORIDE  95*  101   CO2  24  25   GLUCOSE  116*  136*   BUN  10  8     Recent Labs      02/06/18   1740  02/07/18   0415   CREATININE  0.60  0.40*       Imaging:  No results found.    Micro:  Results     Procedure Component Value Units Date/Time    AFB CULTURE [[id number]] Collected:  02/06/18 2047    Order Status:  Completed Specimen:  Wound Updated:  02/08/18 1137     Significant Indicator NEG     Source WND     Site Left Shoulder  joint     AFB Culture Culture in progress.     AFB Smear Results No acid fast bacilli seen.    Narrative:       1)left shoulder joint  AFB (includes smear)  anaerobic  rotine aerobic (includes gram stain)  fungal  gram stain only  wound  arm    ANAEROBIC CULTURE [[id number]] Collected:  02/06/18 2047    Order Status:  Completed Specimen:  Wound Updated:  02/08/18 1137     Significant Indicator NEG     Source WND     Site Left Shoulder  joint     Anaerobic Culture, Culture Res Culture in progress.    Narrative:       1)left shoulder joint  AFB (includes smear)  anaerobic  rotine aerobic (includes gram stain)  fungal  gram stain only  wound  arm    CULTURE WOUND W/ GRAM STAIN [[id number]]  (Abnormal) Collected:  02/06/18 2047    Order Status:  Completed Specimen:  Wound Updated:  02/08/18 1137     Significant Indicator POS (POS)     Source WND     Site Left Shoulder  joint     Culture Result Wound -- (A)     Gram Stain Result -- (A)     Moderate WBCs.  Rare Gram positive cocci.       Culture Result Wound -- (A)     Staphylococcus aureus  Light growth  See previous culture for sensitivity report.      Narrative:       1)left shoulder joint  AFB (includes smear)  anaerobic  rotine aerobic (includes gram stain)  fungal  gram stain only  wound  arm    GRAM STAIN  [[id number]] Collected:  02/06/18 2047    Order Status:  Completed Specimen:  Wound Updated:  02/07/18 1808     Significant Indicator .     Source WND     Site Left Shoulder  joint     Gram Stain Result --     Moderate WBCs.  Rare Gram positive cocci.      Narrative:       1)left shoulder joint  AFB (includes smear)  anaerobic  rotine aerobic (includes gram stain)  fungal  gram stain only  wound  arm    ACID FAST STAIN [[id number]] Collected:  02/06/18 2047    Order Status:  Completed Specimen:  Wound Updated:  02/07/18 1808     Significant Indicator NEG     Source WND     Site Left Shoulder  joint     AFB Smear Results No acid fast bacilli seen.    Narrative:       1)left shoulder joint  AFB (includes smear)  anaerobic  rotine aerobic (includes gram stain)  fungal  gram stain only  wound  arm    FUNGAL CULTURE [[id number]] Collected:  02/06/18 2047    Order Status:  Completed Specimen:  Other Updated:  02/07/18 0613    Narrative:       1)left shoulder joint  AFB (includes smear)  anaerobic  rotine aerobic (includes gram stain)  fungal  gram stain only  wound  arm          Assessment:  Left shoulder septic arthritis    Plan:  Left shoulder septic arthritis   Recent rotator cuff repair in Oct 2017  S/p I&D on 2/6. Purulence was noted intraoperatively  OSH cx reported staph species  OR gram stain +GPC, Cx - MSSA  DC vancomycin   Transition to cefazolin  PICC ordered - to be placed today  Anticipate 4 weeks of IV abx from date of surgery  Stop date 03/06/18  Can do daptomycin as outpatient  IV abx orders given to MSW 2/8  Give 1 dose of dapto on day of discharge    OK to DC pt once abx arranged    FU ID clinic    Discussed with MSW and RN. ID signing off.

## 2018-02-09 NOTE — PROGRESS NOTES
Nursing Coordinator - contact made with pt on behalf of primary RN for IV start only.  18G placed to RFA x1 attempt.  Well tolerated.  Pt positioning for comfort.  Report to Tabatha BARR

## 2018-02-09 NOTE — DISCHARGE PLANNING
KAT Livingston called [facility] and pt has an appointment for tomorrow at 5 pm.  KAT tried to inform bs RN Tabatha but no answer at RN station.

## 2018-02-09 NOTE — DISCHARGE INSTRUCTIONS
Discharge Instructions    Discharged to home by car with self. Discharged via walking, hospital escort: Refused.  Special equipment needed: Not Applicable    Be sure to schedule a follow-up appointment with your primary care doctor or any specialists as instructed.     Discharge Plan:   Diet Plan: Discussed  Activity Level: Discussed  Confirmed Follow up Appointment: Patient to Call and Schedule Appointment  Confirmed Symptoms Management: Discussed  Medication Reconciliation Updated: Yes  Influenza Vaccine Indication: Not indicated: Previously immunized this influenza season and > 8 years of age    I understand that a diet low in cholesterol, fat, and sodium is recommended for good health. Unless I have been given specific instructions below for another diet, I accept this instruction as my diet prescription.   Other diet: as tolerated    Special Instructions: None    · Is patient discharged on Warfarin / Coumadin?   No     Septic Arthritis  Septic arthritis is inflammation of a joint that results from an infection. The infection is caused by germs (bacteria) that enter the joint. Septic arthritis often starts with a painful joint that suddenly gets hot and red. The knee and hip joints are most often affected, but other joints may become infected. Usually, just one joint is infected.  CAUSES   Often, septic arthritis is caused by Staphylococcus bacteria. Other bacteria that may lead to the condition include those that cause:   · Fungal infections.  · Sexually transmitted infections (STIs).  · Tuberculosis.  Infection can spread to your joint directly if you:   · Have an infection somewhere else in your body that is carried to the joint by your blood. This is the most common cause of septic arthritis.  · Have an open wound near a joint.  · Have a needle put into your joint.  · Have joint surgery.  RISK FACTORS  You may have a higher risk for septic arthritis if you:   · Have an artificial joint.  · Have a blood  infection.  · Had a recent joint surgery or procedure.  · Had a recent joint injury.  · Have diabetes.  · Have arthritis.  · Have a condition that weakens your body's defense system (immune system).  · Use intravenous (IV) drugs.  · Have gonorrhea.    SIGNS AND SYMPTOMS  Signs and symptoms of septic arthritis may include:   · Swelling at the joint.  · Severe pain in the joint.  · Redness and warmth in the joint.  · Being unable to move the joint.  · Fever and chills.    DIAGNOSIS   Your health care provider may do a physical exam. You may also have tests to confirm the diagnosis. These may include:  · Blood tests.  · Fluid removal from your joint to look for signs of infection (synovial fluid analysis).  · X-ray.  TREATMENT   You may need to have fluid drained from the joint every day for several days to relieve pain. You may also start treatment with antibiotic medicine. The antibiotics may be given by IV or mouth.    HOME CARE INSTRUCTIONS   · If you were prescribed an antibiotic medicine, finish it all even if you start to feel better.  · Take medicines only as directed by your health care provider.  · Use a cool compress on your joint to relieve pain.  · Rest your joint until the pain and swelling go away.  · Elevate the affected joint when resting.  · After the pain and swelling are gone, do light exercises as directed by your health care provider.  · Keep all follow-up visits as directed by your health care provider. This is important.  SEEK MEDICAL CARE IF:  · You have pain that is not controlled with medicine.  · You have a fever.  SEEK IMMEDIATE MEDICAL CARE IF:  You have signs of worsening infection in your joint. Watch for:  · Very bad pain.  · Redness.  · Warmth.  · Swelling.  MAKE SURE YOU:  · Understand these instructions.  · Will watch your condition.  · Will get help right away if you are not doing well or get worse.     This information is not intended to replace advice given to you by your health  care provider. Make sure you discuss any questions you have with your health care provider.     Document Released: 03/09/2004 Document Revised: 01/08/2016 Document Reviewed: 02/20/2015  [employer] Interactive Patient Education ©2016 [employer]      Oxycodone tablets or capsules  What is this medicine?  OXYCODONE (ox i KOE done) is a pain reliever. It is used to treat moderate to severe pain.  This medicine may be used for other purposes; ask your health care provider or pharmacist if you have questions.  COMMON BRAND NAME(S): Dazidox , Endocodone , OXECTA, OxyIR, Percolone, Roxicodone  What should I tell my health care provider before I take this medicine?  They need to know if you have any of these conditions:  -Perkins's disease  -brain tumor  -drug abuse or addiction  -head injury  -heart disease  -if you frequently drink alcohol containing drinks  -kidney disease or problems going to the bathroom  -liver disease  -lung disease, asthma, or breathing problems  -mental problems  -an unusual or allergic reaction to oxycodone, codeine, hydrocodone, morphine, other medicines, foods, dyes, or preservatives  -pregnant or trying to get pregnant  -breast-feeding  How should I use this medicine?  Take this medicine by mouth with a glass of water. Follow the directions on the prescription label. You can take it with or without food. If it upsets your stomach, take it with food. Take your medicine at regular intervals. Do not take it more often than directed. Do not stop taking except on your doctor's advice.  Some brands of this medicine, like Oxecta, have special instructions. Ask your doctor or pharmacist if these directions are for you: Do not cut, crush or chew this medicine. Swallow only one tablet at a time. Do not wet, soak, or lick the tablet before you take it.  Talk to your pediatrician regarding the use of this medicine in children. Special care may be needed.  Overdosage: If you think you have taken too much of  this medicine contact a poison control center or emergency room at once.  NOTE: This medicine is only for you. Do not share this medicine with others.  What if I miss a dose?  If you miss a dose, take it as soon as you can. If it is almost time for your next dose, take only that dose. Do not take double or extra doses.  What may interact with this medicine?  -alcohol  -antihistamines  -certain medicines used for nausea like chlorpromazine, droperidol  -erythromycin  -ketoconazole  -medicines for depression, anxiety, or psychotic disturbances  -medicines for sleep  -muscle relaxants  -naloxone  -naltrexone  -narcotic medicines (opiates) for pain  -nilotinib  -phenobarbital  -phenytoin  -rifampin  -ritonavir  -voriconazole  This list may not describe all possible interactions. Give your health care provider a list of all the medicines, herbs, non-prescription drugs, or dietary supplements you use. Also tell them if you smoke, drink alcohol, or use illegal drugs. Some items may interact with your medicine.  What should I watch for while using this medicine?  Tell your doctor or health care professional if your pain does not go away, if it gets worse, or if you have new or a different type of pain. You may develop tolerance to the medicine. Tolerance means that you will need a higher dose of the medicine for pain relief. Tolerance is normal and is expected if you take this medicine for a long time.  Do not suddenly stop taking your medicine because you may develop a severe reaction. Your body becomes used to the medicine. This does NOT mean you are addicted. Addiction is a behavior related to getting and using a drug for a non-medical reason. If you have pain, you have a medical reason to take pain medicine. Your doctor will tell you how much medicine to take. If your doctor wants you to stop the medicine, the dose will be slowly lowered over time to avoid any side effects.  You may get drowsy or dizzy when you first  start taking this medicine or change doses. Do not drive, use machinery, or do anything that may be dangerous until you know how the medicine affects you. Stand or sit up slowly.  There are different types of narcotic medicines (opiates) for pain. If you take more than one type at the same time, you may have more side effects. Give your health care provider a list of all medicines you use. Your doctor will tell you how much medicine to take. Do not take more medicine than directed. Call emergency for help if you have problems breathing.  This medicine will cause constipation. Try to have a bowel movement at least every 2 to 3 days. If you do not have a bowel movement for 3 days, call your doctor or health care professional.  Your mouth may get dry. Drinking water, chewing sugarless gum, or sucking on hard candy may help. See your dentist every 6 months.  What side effects may I notice from receiving this medicine?  Side effects that you should report to your doctor or health care professional as soon as possible:  -allergic reactions like skin rash, itching or hives, swelling of the face, lips, or tongue  -breathing problems  -confusion  -feeling faint or lightheaded, falls  -trouble passing urine or change in the amount of urine  -unusually weak or tired  Side effects that usually do not require medical attention (report to your doctor or health care professional if they continue or are bothersome):  -constipation  -dry mouth  -itching  -nausea, vomiting  -upset stomach  This list may not describe all possible side effects. Call your doctor for medical advice about side effects. You may report side effects to FDA at 0-208-FDA-8535.  Where should I keep my medicine?  Keep out of the reach of children. This medicine can be abused. Keep your medicine in a safe place to protect it from theft. Do not share this medicine with anyone. Selling or giving away this medicine is dangerous and against the law.  Store at room  temperature between 15 and 30 degrees C (59 and 86 degrees F). Protect from light. Keep container tightly closed.   Discard unused medicine and used packaging carefully. Pets and children can be harmed if they find used or lost packages. Flush any unused medicines down the toilet. Do not use the medicine after the expiration date.  NOTE: This sheet is a summary. It may not cover all possible information. If you have questions about this medicine, talk to your doctor, pharmacist, or health care provider.  © 2014, Elsevier/Gold Standard. (11/15/2012 8:33:37 AM)      Depression / Suicide Risk    As you are discharged from this [facility] facility, it is important to learn how to keep safe from harming yourself.    Recognize the warning signs:  · Abrupt changes in personality, positive or negative- including increase in energy   · Giving away possessions  · Change in eating patterns- significant weight changes-  positive or negative  · Change in sleeping patterns- unable to sleep or sleeping all the time   · Unwillingness or inability to communicate  · Depression  · Unusual sadness, discouragement and loneliness  · Talk of wanting to die  · Neglect of personal appearance   · Rebelliousness- reckless behavior  · Withdrawal from people/activities they love  · Confusion- inability to concentrate     If you or a loved one observes any of these behaviors or has concerns about self-harm, here's what you can do:  · Talk about it- your feelings and reasons for harming yourself  · Remove any means that you might use to hurt yourself (examples: pills, rope, extension cords, firearm)  · Get professional help from the community (Mental Health, Substance Abuse, psychological counseling)  · Do not be alone:Call your Safe Contact- someone whom you trust who will be there for you.  · Call your local CRISIS HOTLINE 819-2790 or 749-552-6317  · Call your local Children's Mobile Crisis Response Team [place] (408) 343-2189 or  www.[place].[employer]  · Call the toll free National Suicide Prevention Hotlines   · National Suicide Prevention Lifeline 232-442-ZMRW (3080)  · National Hope Line Network 800-SUICIDE (756-2407)

## 2018-02-09 NOTE — PROGRESS NOTES
Patient in good spirits. Soreness but minimal pain in arm. PICC in place. DC to home today F/U Thursday with Dr. Daniel. Patient has contact info.

## 2018-02-09 NOTE — PROGRESS NOTES
DATE OF SERVICE:  02/08/2018    INTERVAL HISTORY:  Patient was seen today.  His pain is almost 0.  He is   feeling much better.  No systemic symptoms.    PHYSICAL EXAMINATION:  On examination, the drain is removed.  It was not   discharging much.  Incisions are dry.  Swelling is much better.  No   cellulitis.  Hands neurovascularly intact.    DIAGNOSTIC INFORMATION:  Grew out methicillin-sensitive Staphylococcus aureus.    IMPRESSION:  Status post left shoulder incision and drainage for septic   arthritis.    PLAN:  At this point, he will be discharged home on daptomycin per infectious   disease.  I will follow him up next week.       ____________________________________     MD EDIN PHAM / BECCA    DD:  02/08/2018 13:36:45  DT:  02/08/2018 15:12:01    D#:  [id number]  Job#:  [id number]

## 2018-02-09 NOTE — CARE PLAN
Problem: Knowledge Deficit  Goal: Knowledge of the prescribed therapeutic regimen will improve  Patient educated and compliant in use of VTE prophylaxis, ASA.     Problem: Psychosocial Needs:  Goal: Level of anxiety will decrease  Patient progressing as expected. After initial agitation over pain level addressed and medications provided, patient became more pleasant and conversational. Discussed plan of care regarding pain management and patient expressed understanding and agreement with plan of care.

## 2018-02-09 NOTE — PROCEDURES
PICC Insertion Final 3CG    Consents confirmed, vessel patency confirmed with ultrasound. Risks and benefits of procedure explained to patient/family and education regarding central line associated bloodstream infections provided. Questions answered.     Arm circumference 24cm     PICC placed in RUE per MD order with ultrasound guidance. 4 Fr, single lumen PICC placed in radial vein after one attempt(s). 5 cc's of 1% lidocaine injected intradermally, 21 gauge microintroducer needle and modified Seldinger technique used. 45 cm catheter inserted with good blood return. Secured at 3 cm marker. Each lumen flushed without resistance with 10 mL 0.9% normal saline. PICC line secured with Biopatch and Tegaderm.    PICC placement is confirmed by nurse using 3CG technology. PICC line is appropriate for use at this time. Pt tolerated procedure well.  Patient condition relayed to unit RN or ordering physician via this post procedure note in the EMR.     [employer] Power PICC ref # [id number], Lot # [id number]

## 2018-02-09 NOTE — DISCHARGE SUMMARY
DATE OF ADMISSION:  02/06/2018.    DATE OF DISCHARGE:  02/09/2018.    PRIMARY DIAGNOSIS:  Left shoulder septic arthritis.    HOSPITAL COURSE:  The patient developed septic arthritis following a shoulder   surgery 4 months ago.  He was taken for irrigation and debridement.  Drains   were placed.  The cultures grew Methicillin-sensitive Staphylococcus aureus.    He can be discharged home on daptomycin per infectious disease.  I will follow   up with him next week.       ____________________________________     MD EDIN PHAM / NTS    DD:  02/08/2018 13:38:08  DT:  02/09/2018 00:23:36    D#:  [id number]  Job#:  [id number]

## 2018-02-10 ENCOUNTER — OUTPATIENT INFUSION SERVICES (OUTPATIENT)
Dept: ONCOLOGY | Facility: MEDICAL CENTER | Age: 68
End: 2018-02-10
Attending: INTERNAL MEDICINE
Payer: MEDICARE

## 2018-02-10 VITALS
BODY MASS INDEX: 21.02 KG/M2 | HEIGHT: 72 IN | RESPIRATION RATE: 18 BRPM | WEIGHT: 155.2 LBS | OXYGEN SATURATION: 97 % | TEMPERATURE: 98.9 F | DIASTOLIC BLOOD PRESSURE: 79 MMHG | HEART RATE: 88 BPM | SYSTOLIC BLOOD PRESSURE: 145 MMHG

## 2018-02-10 LAB
BACTERIA SPEC ANAEROBE CULT: NORMAL
BACTERIA SPEC ANAEROBE CULT: NORMAL
SIGNIFICANT IND 70042: NORMAL
SITE SITE: NORMAL
SOURCE SOURCE: NORMAL

## 2018-02-10 PROCEDURE — 700105 HCHG RX REV CODE 258: Performed by: INTERNAL MEDICINE

## 2018-02-10 PROCEDURE — 96365 THER/PROPH/DIAG IV INF INIT: CPT

## 2018-02-10 PROCEDURE — 700111 HCHG RX REV CODE 636 W/ 250 OVERRIDE (IP): Mod: JG | Performed by: INTERNAL MEDICINE

## 2018-02-10 RX ADMIN — DAPTOMYCIN 430 MG: 500 INJECTION, POWDER, LYOPHILIZED, FOR SOLUTION INTRAVENOUS at 17:18

## 2018-02-10 ASSESSMENT — PAIN SCALES - GENERAL: PAINLEVEL: 3=SLIGHT PAIN

## 2018-02-11 ENCOUNTER — OUTPATIENT INFUSION SERVICES (OUTPATIENT)
Dept: ONCOLOGY | Facility: MEDICAL CENTER | Age: 68
End: 2018-02-11
Attending: INTERNAL MEDICINE
Payer: MEDICARE

## 2018-02-11 VITALS
OXYGEN SATURATION: 98 % | TEMPERATURE: 98.9 F | RESPIRATION RATE: 16 BRPM | WEIGHT: 155.2 LBS | BODY MASS INDEX: 21.05 KG/M2 | SYSTOLIC BLOOD PRESSURE: 128 MMHG | DIASTOLIC BLOOD PRESSURE: 91 MMHG | HEART RATE: 83 BPM

## 2018-02-11 PROCEDURE — 96365 THER/PROPH/DIAG IV INF INIT: CPT

## 2018-02-11 PROCEDURE — 700111 HCHG RX REV CODE 636 W/ 250 OVERRIDE (IP): Mod: JG | Performed by: INTERNAL MEDICINE

## 2018-02-11 PROCEDURE — 700105 HCHG RX REV CODE 258: Performed by: INTERNAL MEDICINE

## 2018-02-11 RX ADMIN — DAPTOMYCIN 430 MG: 500 INJECTION, POWDER, LYOPHILIZED, FOR SOLUTION INTRAVENOUS at 15:11

## 2018-02-11 ASSESSMENT — PAIN SCALES - GENERAL: PAINLEVEL: NO PAIN

## 2018-02-11 NOTE — PROGRESS NOTES
Pt presents to infusion center for IV antibiotics.  Pt oriented to infusion services and plan of care for the day.  Drug information sheet given, all questions answered.  PICC line in place, brisk blood return noted.  Pt tolerated infusion without incident.  PICC line flushed with saline per policy, wrapped with plastic as pt will take a shower tonight.  Pt lives alone and had shoulder surgery and is unable to wrap PICC line up appropriately.  Pt verbalized understanding to remove wrap once done with shower.  Pt left infusion center ambulatory and in good condition.  Return appointment scheduled; full printout given.

## 2018-02-12 ENCOUNTER — OUTPATIENT INFUSION SERVICES (OUTPATIENT)
Dept: ONCOLOGY | Facility: MEDICAL CENTER | Age: 68
End: 2018-02-12
Attending: INTERNAL MEDICINE
Payer: MEDICARE

## 2018-02-12 ENCOUNTER — PATIENT OUTREACH (OUTPATIENT)
Dept: HEALTH INFORMATION MANAGEMENT | Facility: OTHER | Age: 68
End: 2018-02-12

## 2018-02-12 VITALS
HEIGHT: 72 IN | SYSTOLIC BLOOD PRESSURE: 133 MMHG | TEMPERATURE: 98.6 F | BODY MASS INDEX: 21.5 KG/M2 | HEART RATE: 76 BPM | OXYGEN SATURATION: 98 % | RESPIRATION RATE: 18 BRPM | DIASTOLIC BLOOD PRESSURE: 73 MMHG | WEIGHT: 158.73 LBS

## 2018-02-12 DIAGNOSIS — M85.80 OSTEOPENIA OF THE ELDERLY: ICD-10-CM

## 2018-02-12 LAB
ALBUMIN SERPL BCP-MCNC: 3.3 G/DL (ref 3.2–4.9)
ALP SERPL-CCNC: 59 U/L (ref 30–99)
ALT SERPL-CCNC: 32 U/L (ref 2–50)
AST SERPL-CCNC: 28 U/L (ref 12–45)
BILIRUB CONJ SERPL-MCNC: 0.1 MG/DL (ref 0.1–0.5)
BILIRUB INDIRECT SERPL-MCNC: 0.4 MG/DL (ref 0–1)
BILIRUB SERPL-MCNC: 0.5 MG/DL (ref 0.1–1.5)
BUN SERPL-MCNC: 13 MG/DL (ref 8–22)
CALCIUM SERPL-MCNC: 8.9 MG/DL (ref 8.5–10.5)
CHLORIDE SERPL-SCNC: 100 MMOL/L (ref 96–112)
CK SERPL-CCNC: 39 U/L (ref 0–154)
CO2 SERPL-SCNC: 25 MMOL/L (ref 20–33)
CREAT SERPL-MCNC: 0.42 MG/DL (ref 0.5–1.4)
ERYTHROCYTE [DISTWIDTH] IN BLOOD BY AUTOMATED COUNT: 43.6 FL (ref 35.9–50)
GLUCOSE SERPL-MCNC: 102 MG/DL (ref 65–99)
HCT VFR BLD AUTO: 34.1 % (ref 42–52)
HGB BLD-MCNC: 11.6 G/DL (ref 14–18)
MCH RBC QN AUTO: 32.6 PG (ref 27–33)
MCHC RBC AUTO-ENTMCNC: 34 G/DL (ref 33.7–35.3)
MCV RBC AUTO: 95.8 FL (ref 81.4–97.8)
PHOSPHATE SERPL-MCNC: 3.8 MG/DL (ref 2.5–4.5)
PLATELET # BLD AUTO: 430 K/UL (ref 164–446)
PMV BLD AUTO: 9 FL (ref 9–12.9)
POTASSIUM SERPL-SCNC: 4.3 MMOL/L (ref 3.6–5.5)
PROT SERPL-MCNC: 6.5 G/DL (ref 6–8.2)
RBC # BLD AUTO: 3.56 M/UL (ref 4.7–6.1)
SODIUM SERPL-SCNC: 134 MMOL/L (ref 135–145)
WBC # BLD AUTO: 7 K/UL (ref 4.8–10.8)

## 2018-02-12 PROCEDURE — 96374 THER/PROPH/DIAG INJ IV PUSH: CPT

## 2018-02-12 PROCEDURE — 80076 HEPATIC FUNCTION PANEL: CPT

## 2018-02-12 PROCEDURE — 700105 HCHG RX REV CODE 258: Performed by: INTERNAL MEDICINE

## 2018-02-12 PROCEDURE — 700111 HCHG RX REV CODE 636 W/ 250 OVERRIDE (IP): Mod: JG | Performed by: INTERNAL MEDICINE

## 2018-02-12 PROCEDURE — 85027 COMPLETE CBC AUTOMATED: CPT

## 2018-02-12 PROCEDURE — 36592 COLLECT BLOOD FROM PICC: CPT

## 2018-02-12 PROCEDURE — 96365 THER/PROPH/DIAG IV INF INIT: CPT

## 2018-02-12 PROCEDURE — 82550 ASSAY OF CK (CPK): CPT

## 2018-02-12 PROCEDURE — 80069 RENAL FUNCTION PANEL: CPT

## 2018-02-12 RX ADMIN — DAPTOMYCIN 430 MG: 500 INJECTION, POWDER, LYOPHILIZED, FOR SOLUTION INTRAVENOUS at 17:15

## 2018-02-12 ASSESSMENT — PAIN SCALES - GENERAL: PAINLEVEL: NO PAIN

## 2018-02-12 NOTE — PROGRESS NOTES
Pt presented to infusion center for daily Daptomycin. Pt reports he had bout of diarrhea with severe painful cramping last night, but resolved by morning, pt reported he was taking a stimulant laxative. Suggested pt stop taking that and try Miralax while on the Daptomycin, and only if he was constipated. He verbalized understanding.  Right arm PICC line in place, flushed and brisk blood return observed. Daptomycin infused with no s/s of adverse reaction. Dressing changed in sterile fashion. PICC line flushed, brisk blood return again observed, wrapped for shower per his request, knows to remove after shower, mesh sleeve given to cover afterwards. Has next appt, left on foot in good spirits.

## 2018-02-13 ENCOUNTER — OUTPATIENT INFUSION SERVICES (OUTPATIENT)
Dept: ONCOLOGY | Facility: MEDICAL CENTER | Age: 68
End: 2018-02-13
Attending: INTERNAL MEDICINE
Payer: MEDICARE

## 2018-02-13 VITALS
DIASTOLIC BLOOD PRESSURE: 86 MMHG | SYSTOLIC BLOOD PRESSURE: 128 MMHG | BODY MASS INDEX: 21.52 KG/M2 | RESPIRATION RATE: 16 BRPM | TEMPERATURE: 98.1 F | WEIGHT: 158.73 LBS | OXYGEN SATURATION: 98 % | HEART RATE: 87 BPM

## 2018-02-13 PROCEDURE — 700111 HCHG RX REV CODE 636 W/ 250 OVERRIDE (IP): Mod: JG | Performed by: INTERNAL MEDICINE

## 2018-02-13 PROCEDURE — 96365 THER/PROPH/DIAG IV INF INIT: CPT

## 2018-02-13 PROCEDURE — 700105 HCHG RX REV CODE 258: Performed by: INTERNAL MEDICINE

## 2018-02-13 RX ADMIN — DAPTOMYCIN 430 MG: 500 INJECTION, POWDER, LYOPHILIZED, FOR SOLUTION INTRAVENOUS at 17:05

## 2018-02-13 ASSESSMENT — PAIN SCALES - GENERAL: PAINLEVEL: NO PAIN

## 2018-02-13 NOTE — PROGRESS NOTES
Sebastien returns for IVABX. Labs drawn per order.  Daptomycin infused as ordered. Patient tolerated well and without incident. PICC line in good condition and has positive blood return. PICC line flushed with saline per protocol. Sebastien DC'd home in good condition. Returns daily.

## 2018-02-14 ENCOUNTER — OUTPATIENT INFUSION SERVICES (OUTPATIENT)
Dept: ONCOLOGY | Facility: MEDICAL CENTER | Age: 68
End: 2018-02-14
Attending: INTERNAL MEDICINE
Payer: MEDICARE

## 2018-02-14 VITALS
TEMPERATURE: 98 F | SYSTOLIC BLOOD PRESSURE: 131 MMHG | HEART RATE: 83 BPM | BODY MASS INDEX: 21.52 KG/M2 | WEIGHT: 158.73 LBS | OXYGEN SATURATION: 96 % | DIASTOLIC BLOOD PRESSURE: 81 MMHG | RESPIRATION RATE: 18 BRPM

## 2018-02-14 PROCEDURE — 96365 THER/PROPH/DIAG IV INF INIT: CPT

## 2018-02-14 PROCEDURE — 700111 HCHG RX REV CODE 636 W/ 250 OVERRIDE (IP): Mod: JG | Performed by: INTERNAL MEDICINE

## 2018-02-14 PROCEDURE — 700105 HCHG RX REV CODE 258: Performed by: INTERNAL MEDICINE

## 2018-02-14 RX ADMIN — DAPTOMYCIN 430 MG: 500 INJECTION, POWDER, LYOPHILIZED, FOR SOLUTION INTRAVENOUS at 15:15

## 2018-02-14 ASSESSMENT — PAIN SCALES - GENERAL: PAINLEVEL: NO PAIN

## 2018-02-14 NOTE — PROGRESS NOTES
"Patient presents for daily IV antibiotics. Patient states that he had clear drainage last night and his shoulder feels \"much better\" today. Patient states he sees his surgeon tomorrow. PICC flushes well with brisk blood return. Daptomycin infused as ordered, line flushed clear. PICC flushed per protocol and site secured. Patient returns tomorrow and released in no acute distress.  "

## 2018-02-15 ENCOUNTER — OUTPATIENT INFUSION SERVICES (OUTPATIENT)
Dept: ONCOLOGY | Facility: MEDICAL CENTER | Age: 68
End: 2018-02-15
Attending: INTERNAL MEDICINE
Payer: MEDICARE

## 2018-02-15 VITALS
OXYGEN SATURATION: 96 % | DIASTOLIC BLOOD PRESSURE: 89 MMHG | BODY MASS INDEX: 21.52 KG/M2 | WEIGHT: 158.73 LBS | SYSTOLIC BLOOD PRESSURE: 135 MMHG | TEMPERATURE: 97.5 F | HEART RATE: 84 BPM | RESPIRATION RATE: 18 BRPM

## 2018-02-15 PROCEDURE — 700105 HCHG RX REV CODE 258: Performed by: INTERNAL MEDICINE

## 2018-02-15 PROCEDURE — 700111 HCHG RX REV CODE 636 W/ 250 OVERRIDE (IP): Mod: JG | Performed by: INTERNAL MEDICINE

## 2018-02-15 PROCEDURE — 96365 THER/PROPH/DIAG IV INF INIT: CPT

## 2018-02-15 RX ADMIN — DAPTOMYCIN 430 MG: 500 INJECTION, POWDER, LYOPHILIZED, FOR SOLUTION INTRAVENOUS at 15:54

## 2018-02-15 ASSESSMENT — PAIN SCALES - GENERAL: PAINLEVEL: NO PAIN

## 2018-02-16 ENCOUNTER — OUTPATIENT INFUSION SERVICES (OUTPATIENT)
Dept: ONCOLOGY | Facility: MEDICAL CENTER | Age: 68
End: 2018-02-16
Attending: INTERNAL MEDICINE
Payer: MEDICARE

## 2018-02-16 VITALS
SYSTOLIC BLOOD PRESSURE: 143 MMHG | WEIGHT: 158.73 LBS | OXYGEN SATURATION: 98 % | BODY MASS INDEX: 21.52 KG/M2 | RESPIRATION RATE: 18 BRPM | DIASTOLIC BLOOD PRESSURE: 73 MMHG | HEART RATE: 79 BPM | TEMPERATURE: 98.9 F

## 2018-02-16 PROCEDURE — 96365 THER/PROPH/DIAG IV INF INIT: CPT

## 2018-02-16 PROCEDURE — 700105 HCHG RX REV CODE 258: Performed by: INTERNAL MEDICINE

## 2018-02-16 PROCEDURE — 700111 HCHG RX REV CODE 636 W/ 250 OVERRIDE (IP): Mod: JG | Performed by: INTERNAL MEDICINE

## 2018-02-16 RX ADMIN — DAPTOMYCIN 430 MG: 500 INJECTION, POWDER, LYOPHILIZED, FOR SOLUTION INTRAVENOUS at 17:00

## 2018-02-16 ASSESSMENT — PAIN SCALES - GENERAL: PAINLEVEL: NO PAIN

## 2018-02-16 NOTE — PROGRESS NOTES
"Pt arrived to [facility], ambulatory, for daptomycin infusion. Pt states he saw his surgeon today and he is \"very pleased\" with the way his shoulder is healing. PICC line flushed per policy, positive blood return noted. Dapto infused with no s/sx of adverse reaction. PICC line flushed per policy. Pt left [facility] with no s/sx of distress. Follow up appointment confirmed.  "

## 2018-02-16 NOTE — DOCUMENTATION QUERY
"DOCUMENTATION QUERY    PROVIDERS: Please select “Cosign w/ note”to reply to query.    To better represent the severity of illness of your patient, please review the following information and exercise your independent professional judgment in responding to this query.     \"*Left shoulder extensive debridement of glenohumeral joint and subacromial   Space\"  Is documented in Operative Report.  Based upon the clinical findings, risk factors, and treatment, can this diagnosis be further specified?    • Excisional Debridement (if so please include)  1. deepest level of debridement treated  2. instrument  3. nature of the tissue  4. appearance and size of wound  5. technique  • Non-excisional Debridement  • Other explanation of clinical findings (please specify)  • Unable to determine      The medical record reflects the following:   Clinical Findings  Septic arthritis status post rotator cuff repair   Treatment  Debridement of joint and removal of retained implants, IV antibiotics   Risk Factors  Septic arthritis status post rotator cuff repair   Location within medical record  Operative Report     Thank you,   ROSA Israel         "

## 2018-02-17 ENCOUNTER — OUTPATIENT INFUSION SERVICES (OUTPATIENT)
Dept: ONCOLOGY | Facility: MEDICAL CENTER | Age: 68
End: 2018-02-17
Attending: INTERNAL MEDICINE
Payer: MEDICARE

## 2018-02-17 VITALS
OXYGEN SATURATION: 95 % | TEMPERATURE: 98.6 F | RESPIRATION RATE: 16 BRPM | HEART RATE: 78 BPM | SYSTOLIC BLOOD PRESSURE: 151 MMHG | DIASTOLIC BLOOD PRESSURE: 65 MMHG

## 2018-02-17 PROCEDURE — 700105 HCHG RX REV CODE 258: Performed by: INTERNAL MEDICINE

## 2018-02-17 PROCEDURE — 700111 HCHG RX REV CODE 636 W/ 250 OVERRIDE (IP): Mod: JG | Performed by: INTERNAL MEDICINE

## 2018-02-17 PROCEDURE — 96365 THER/PROPH/DIAG IV INF INIT: CPT

## 2018-02-17 RX ADMIN — DAPTOMYCIN 430 MG: 500 INJECTION, POWDER, LYOPHILIZED, FOR SOLUTION INTRAVENOUS at 16:09

## 2018-02-17 ASSESSMENT — PAIN SCALES - GENERAL: PAINLEVEL: NO PAIN

## 2018-02-17 NOTE — PROGRESS NOTES
Mr Horn returns for IVABX. Daptomycin infused as ordered. Sebastien tolerated well and without incident. PICC line in good condition and has positive blood return. PICC line flushed with saline per protocol. Sebastien DC'd home in good condition. Returns daily.

## 2018-02-18 ENCOUNTER — OUTPATIENT INFUSION SERVICES (OUTPATIENT)
Dept: ONCOLOGY | Facility: MEDICAL CENTER | Age: 68
End: 2018-02-18
Attending: INTERNAL MEDICINE
Payer: MEDICARE

## 2018-02-18 VITALS
TEMPERATURE: 99 F | DIASTOLIC BLOOD PRESSURE: 86 MMHG | OXYGEN SATURATION: 99 % | HEART RATE: 71 BPM | RESPIRATION RATE: 16 BRPM | SYSTOLIC BLOOD PRESSURE: 152 MMHG

## 2018-02-18 PROCEDURE — 700105 HCHG RX REV CODE 258: Performed by: INTERNAL MEDICINE

## 2018-02-18 PROCEDURE — 700111 HCHG RX REV CODE 636 W/ 250 OVERRIDE (IP): Mod: JG | Performed by: INTERNAL MEDICINE

## 2018-02-18 PROCEDURE — 96365 THER/PROPH/DIAG IV INF INIT: CPT

## 2018-02-18 RX ADMIN — DAPTOMYCIN 430 MG: 500 INJECTION, POWDER, LYOPHILIZED, FOR SOLUTION INTRAVENOUS at 16:04

## 2018-02-18 ASSESSMENT — PAIN SCALES - GENERAL: PAINLEVEL: NO PAIN

## 2018-02-18 NOTE — PROGRESS NOTES
Pt presented to infusion center for daily Daptomycin. Pt reports no significant changes since yesterday. Right arm PICC line in place, flushed and brisk blood return observed. Daptomycin infused with no s/s of adverse reaction. PICC line flushed, brisk blood return again observed, wrapped in gauze and protective sleeve. Wrapped with plastic for shower. Has next appt, left on foot in good spirits.

## 2018-02-19 ENCOUNTER — OUTPATIENT INFUSION SERVICES (OUTPATIENT)
Dept: ONCOLOGY | Facility: MEDICAL CENTER | Age: 68
End: 2018-02-19
Attending: INTERNAL MEDICINE
Payer: MEDICARE

## 2018-02-19 VITALS
RESPIRATION RATE: 18 BRPM | SYSTOLIC BLOOD PRESSURE: 140 MMHG | HEART RATE: 69 BPM | BODY MASS INDEX: 21.2 KG/M2 | TEMPERATURE: 97.2 F | OXYGEN SATURATION: 98 % | WEIGHT: 156.53 LBS | DIASTOLIC BLOOD PRESSURE: 85 MMHG | HEIGHT: 72 IN

## 2018-02-19 DIAGNOSIS — M75.122 COMPLETE ROTATOR CUFF TEAR OF LEFT SHOULDER: ICD-10-CM

## 2018-02-19 LAB
ALBUMIN SERPL BCP-MCNC: 3.6 G/DL (ref 3.2–4.9)
ALP SERPL-CCNC: 59 U/L (ref 30–99)
ALT SERPL-CCNC: 14 U/L (ref 2–50)
AST SERPL-CCNC: 18 U/L (ref 12–45)
BASOPHILS # BLD AUTO: 0.9 % (ref 0–1.8)
BASOPHILS # BLD: 0.06 K/UL (ref 0–0.12)
BILIRUB CONJ SERPL-MCNC: 0.2 MG/DL (ref 0.1–0.5)
BILIRUB INDIRECT SERPL-MCNC: 0.1 MG/DL (ref 0–1)
BILIRUB SERPL-MCNC: 0.3 MG/DL (ref 0.1–1.5)
BUN SERPL-MCNC: 11 MG/DL (ref 8–22)
CALCIUM SERPL-MCNC: 8.7 MG/DL (ref 8.5–10.5)
CHLORIDE SERPL-SCNC: 100 MMOL/L (ref 96–112)
CO2 SERPL-SCNC: 24 MMOL/L (ref 20–33)
CREAT SERPL-MCNC: 0.57 MG/DL (ref 0.5–1.4)
EOSINOPHIL # BLD AUTO: 0.11 K/UL (ref 0–0.51)
EOSINOPHIL NFR BLD: 1.7 % (ref 0–6.9)
ERYTHROCYTE [DISTWIDTH] IN BLOOD BY AUTOMATED COUNT: 45.1 FL (ref 35.9–50)
GLUCOSE SERPL-MCNC: 114 MG/DL (ref 65–99)
HCT VFR BLD AUTO: 35.7 % (ref 42–52)
HGB BLD-MCNC: 11.7 G/DL (ref 14–18)
IMM GRANULOCYTES # BLD AUTO: 0.01 K/UL (ref 0–0.11)
IMM GRANULOCYTES NFR BLD AUTO: 0.2 % (ref 0–0.9)
LYMPHOCYTES # BLD AUTO: 1.81 K/UL (ref 1–4.8)
LYMPHOCYTES NFR BLD: 28.1 % (ref 22–41)
MCH RBC QN AUTO: 31.7 PG (ref 27–33)
MCHC RBC AUTO-ENTMCNC: 32.8 G/DL (ref 33.7–35.3)
MCV RBC AUTO: 96.7 FL (ref 81.4–97.8)
MONOCYTES # BLD AUTO: 0.59 K/UL (ref 0–0.85)
MONOCYTES NFR BLD AUTO: 9.1 % (ref 0–13.4)
NEUTROPHILS # BLD AUTO: 3.87 K/UL (ref 1.82–7.42)
NEUTROPHILS NFR BLD: 60 % (ref 44–72)
NRBC # BLD AUTO: 0 K/UL
NRBC BLD-RTO: 0 /100 WBC
PHOSPHATE SERPL-MCNC: 2.9 MG/DL (ref 2.5–4.5)
PLATELET # BLD AUTO: 561 K/UL (ref 164–446)
PMV BLD AUTO: 8.8 FL (ref 9–12.9)
POTASSIUM SERPL-SCNC: 4.2 MMOL/L (ref 3.6–5.5)
PROT SERPL-MCNC: 6.8 G/DL (ref 6–8.2)
RBC # BLD AUTO: 3.69 M/UL (ref 4.7–6.1)
SODIUM SERPL-SCNC: 133 MMOL/L (ref 135–145)
WBC # BLD AUTO: 6.5 K/UL (ref 4.8–10.8)

## 2018-02-19 PROCEDURE — 80076 HEPATIC FUNCTION PANEL: CPT

## 2018-02-19 PROCEDURE — 700111 HCHG RX REV CODE 636 W/ 250 OVERRIDE (IP): Mod: JG | Performed by: INTERNAL MEDICINE

## 2018-02-19 PROCEDURE — 85025 COMPLETE CBC W/AUTO DIFF WBC: CPT

## 2018-02-19 PROCEDURE — 80069 RENAL FUNCTION PANEL: CPT

## 2018-02-19 PROCEDURE — 96365 THER/PROPH/DIAG IV INF INIT: CPT

## 2018-02-19 PROCEDURE — 700105 HCHG RX REV CODE 258: Performed by: INTERNAL MEDICINE

## 2018-02-19 PROCEDURE — 36592 COLLECT BLOOD FROM PICC: CPT

## 2018-02-19 PROCEDURE — 82550 ASSAY OF CK (CPK): CPT

## 2018-02-19 RX ADMIN — DAPTOMYCIN 430 MG: 500 INJECTION, POWDER, LYOPHILIZED, FOR SOLUTION INTRAVENOUS at 17:19

## 2018-02-19 ASSESSMENT — PAIN SCALES - GENERAL: PAINLEVEL: NO PAIN

## 2018-02-19 NOTE — PROGRESS NOTES
Pt presented to infusion center for daily Daptomycin. Pt reports no significant changes since yesterday. Right arm PICC line in place, flushed and brisk blood return observed. Daptomycin infused with no s/s of adverse reaction. Dressing changed in sterile fashion. PICC line flushed, brisk blood return again observed, wrapped in gauze and protective sleeve. Wrapped for shower. Has next appt, left on foot in good spirits.

## 2018-02-20 ENCOUNTER — OUTPATIENT INFUSION SERVICES (OUTPATIENT)
Dept: ONCOLOGY | Facility: MEDICAL CENTER | Age: 68
End: 2018-02-20
Attending: INTERNAL MEDICINE
Payer: MEDICARE

## 2018-02-20 VITALS
BODY MASS INDEX: 21.26 KG/M2 | HEART RATE: 79 BPM | WEIGHT: 156.97 LBS | DIASTOLIC BLOOD PRESSURE: 74 MMHG | TEMPERATURE: 97.9 F | HEIGHT: 72 IN | RESPIRATION RATE: 18 BRPM | SYSTOLIC BLOOD PRESSURE: 150 MMHG | OXYGEN SATURATION: 98 %

## 2018-02-20 LAB — CK SERPL-CCNC: 37 U/L (ref 0–154)

## 2018-02-20 PROCEDURE — 96365 THER/PROPH/DIAG IV INF INIT: CPT

## 2018-02-20 PROCEDURE — 700111 HCHG RX REV CODE 636 W/ 250 OVERRIDE (IP): Mod: JG | Performed by: INTERNAL MEDICINE

## 2018-02-20 PROCEDURE — 700105 HCHG RX REV CODE 258: Performed by: INTERNAL MEDICINE

## 2018-02-20 RX ADMIN — DAPTOMYCIN 430 MG: 500 INJECTION, POWDER, LYOPHILIZED, FOR SOLUTION INTRAVENOUS at 13:54

## 2018-02-20 ASSESSMENT — PAIN SCALES - GENERAL: PAINLEVEL: NO PAIN

## 2018-02-20 NOTE — PROGRESS NOTES
Pt to infusion services ambulatory per self.  Pt here for scheduled IV abx.  Plan of care reviewed.  Pt verbalizes understanding.  Pt with surgical incision to left shoulder with steri strips and band aid in place.  Pt reports he was concerned with one area of surgical incision which was raised and appeared to be filled with fluid so he called and scheduled appointment to see orthopedic MD after this appointment today.  Pt tells after he made appointment area on surgical incision opened and drained.  Pt tells me he placed band aid to site of opening.  Surgical incision to left shoulder observed to be reddened, raised, and observed to be weepy with yellow to serous exudate beneath steri strips.  Pt tells me he will be seeing Dr. Sergio Daniel immediately following this appointment.  Pt with PICC line in place to LEANA.  PICC flushed with normal saline.  PICC flushes easily; + blood return verified.  IV Daptomycin administered per MD orders.  IV Daptomycin infusion completed without incident.  Line flushed clear.  PICC flushed with normal saline per policy.  Line secured and wrapped per patient request for shower later today.  Pt released to self care in no apparent distress after completion of treatment, ambulatory.  Pt to return tomorrow; appointment scheduled.

## 2018-02-20 NOTE — PROGRESS NOTES
Pt returns to infusion center for daily antibiotics.  PICC line in place, brisk blood return noted.  Labs drawn as ordered.  Pt tolerated infusion without incident.  PICC line flushed with saline per policy, wrapped with gauze and sleeve; also wrapped for shower.  Pt left infusion center ambulatory and in good condition.  Returns daily.

## 2018-02-21 ENCOUNTER — OUTPATIENT INFUSION SERVICES (OUTPATIENT)
Dept: ONCOLOGY | Facility: MEDICAL CENTER | Age: 68
End: 2018-02-21
Attending: INTERNAL MEDICINE
Payer: MEDICARE

## 2018-02-21 VITALS
RESPIRATION RATE: 18 BRPM | SYSTOLIC BLOOD PRESSURE: 160 MMHG | TEMPERATURE: 98.6 F | HEART RATE: 84 BPM | OXYGEN SATURATION: 98 % | DIASTOLIC BLOOD PRESSURE: 81 MMHG

## 2018-02-21 PROCEDURE — 700111 HCHG RX REV CODE 636 W/ 250 OVERRIDE (IP): Mod: JG | Performed by: INTERNAL MEDICINE

## 2018-02-21 PROCEDURE — 96365 THER/PROPH/DIAG IV INF INIT: CPT

## 2018-02-21 PROCEDURE — 700105 HCHG RX REV CODE 258: Performed by: INTERNAL MEDICINE

## 2018-02-21 RX ADMIN — DAPTOMYCIN 430 MG: 500 INJECTION, POWDER, LYOPHILIZED, FOR SOLUTION INTRAVENOUS at 17:03

## 2018-02-21 ASSESSMENT — PAIN SCALES - GENERAL: PAINLEVEL: NO PAIN

## 2018-02-22 ENCOUNTER — OUTPATIENT INFUSION SERVICES (OUTPATIENT)
Dept: ONCOLOGY | Facility: MEDICAL CENTER | Age: 68
End: 2018-02-22
Attending: INTERNAL MEDICINE
Payer: MEDICARE

## 2018-02-22 VITALS
DIASTOLIC BLOOD PRESSURE: 72 MMHG | OXYGEN SATURATION: 97 % | HEART RATE: 82 BPM | RESPIRATION RATE: 18 BRPM | SYSTOLIC BLOOD PRESSURE: 129 MMHG | TEMPERATURE: 98.2 F

## 2018-02-22 PROCEDURE — 700105 HCHG RX REV CODE 258: Performed by: INTERNAL MEDICINE

## 2018-02-22 PROCEDURE — 96365 THER/PROPH/DIAG IV INF INIT: CPT

## 2018-02-22 PROCEDURE — 700111 HCHG RX REV CODE 636 W/ 250 OVERRIDE (IP): Mod: JG | Performed by: INTERNAL MEDICINE

## 2018-02-22 RX ADMIN — DAPTOMYCIN 430 MG: 500 INJECTION, POWDER, LYOPHILIZED, FOR SOLUTION INTRAVENOUS at 17:11

## 2018-02-22 ASSESSMENT — PAIN SCALES - GENERAL: PAINLEVEL: NO PAIN

## 2018-02-22 NOTE — PROGRESS NOTES
Patient arrived to clinic for Daptomycin.  Patient reported seeing MD yesterday after surgical incision opened and started draining.   Site currently covered with gauze dressing.  Pt reports that he has appointment tomorrow with surgeon also.  PICC line flushed with good blood return noted.  Daptomycin infused per order, pt tolerated well.  PICC flushed and gauze/mesh cover placed.  Confirmed tomorrow's appointment and pt ambulated out of clinic in no apparent distress.

## 2018-02-22 NOTE — DOCUMENTATION QUERY
"DOCUMENTATION QUERY     DR. PEREZ, please review this Documentation Query and reply with an addendum. You co-signed only, which does not address the needed clarification from you to be able to code and complete this account.       To better represent the severity of illness of your patient, please review the following information and exercise your independent professional judgment in responding to this query.      \"Left shoulder extensive debridement of glenohumeral joint and subacromial Space\" is documented in Operative Report.  Based upon the clinical findings, risk factors, and treatment, can this diagnosis be further specified?     · Excisional Debridement (if so please include)  1. deepest level of debridement treated  2. instrument  3. nature of the tissue  4. appearance and size of wound  5. technique  · Non-excisional Debridement  · Other explanation of clinical findings (please specify)  · Unable to determine        The medical record reflects the following:   Clinical Findings  Septic arthritis status post rotator cuff repair   Treatment  Debridement of joint and removal of retained implants, IV antibiotics   Risk Factors  Septic arthritis status post rotator cuff repair   Location within medical record  Operative Report      Thank you,   ROSA Israel     "

## 2018-02-23 ENCOUNTER — OUTPATIENT INFUSION SERVICES (OUTPATIENT)
Dept: ONCOLOGY | Facility: MEDICAL CENTER | Age: 68
End: 2018-02-23
Attending: INTERNAL MEDICINE
Payer: MEDICARE

## 2018-02-23 VITALS
BODY MASS INDEX: 21.28 KG/M2 | TEMPERATURE: 97.3 F | HEART RATE: 80 BPM | SYSTOLIC BLOOD PRESSURE: 118 MMHG | OXYGEN SATURATION: 98 % | DIASTOLIC BLOOD PRESSURE: 46 MMHG | RESPIRATION RATE: 18 BRPM | WEIGHT: 156.97 LBS

## 2018-02-23 PROCEDURE — 96365 THER/PROPH/DIAG IV INF INIT: CPT

## 2018-02-23 PROCEDURE — 700105 HCHG RX REV CODE 258: Performed by: INTERNAL MEDICINE

## 2018-02-23 PROCEDURE — 700111 HCHG RX REV CODE 636 W/ 250 OVERRIDE (IP): Mod: JG | Performed by: INTERNAL MEDICINE

## 2018-02-23 RX ADMIN — DAPTOMYCIN 430 MG: 500 INJECTION, POWDER, LYOPHILIZED, FOR SOLUTION INTRAVENOUS at 17:00

## 2018-02-23 NOTE — PROGRESS NOTES
Patient arrived to clinic for Daptomycin.  Denies any changes.  States he saw surgeon today who states wound is draining appropriately.  PICC line flushed with good blood return noted.  Daptomycin infused per order, pt tolerated well.  PICC flushed and gauze/mesh cover placed.  Wrapped for shower per patient request.  Confirmed tomorrow's appointment and pt ambulated out of clinic in no apparent distress.

## 2018-02-24 ENCOUNTER — OUTPATIENT INFUSION SERVICES (OUTPATIENT)
Dept: ONCOLOGY | Facility: MEDICAL CENTER | Age: 68
End: 2018-02-24
Attending: INTERNAL MEDICINE
Payer: MEDICARE

## 2018-02-24 VITALS
SYSTOLIC BLOOD PRESSURE: 132 MMHG | WEIGHT: 156.97 LBS | DIASTOLIC BLOOD PRESSURE: 92 MMHG | RESPIRATION RATE: 16 BRPM | BODY MASS INDEX: 21.28 KG/M2 | TEMPERATURE: 98.4 F | HEART RATE: 80 BPM | OXYGEN SATURATION: 98 %

## 2018-02-24 PROCEDURE — 700111 HCHG RX REV CODE 636 W/ 250 OVERRIDE (IP): Mod: JG | Performed by: INTERNAL MEDICINE

## 2018-02-24 PROCEDURE — 700105 HCHG RX REV CODE 258: Performed by: INTERNAL MEDICINE

## 2018-02-24 PROCEDURE — 96365 THER/PROPH/DIAG IV INF INIT: CPT

## 2018-02-24 RX ADMIN — DAPTOMYCIN 430 MG: 500 INJECTION, POWDER, LYOPHILIZED, FOR SOLUTION INTRAVENOUS at 17:05

## 2018-02-24 ASSESSMENT — PAIN SCALES - GENERAL: PAINLEVEL: NO PAIN

## 2018-02-24 NOTE — PROGRESS NOTES
Patient arrived to clinic for Daptomycin.  Denies any changes from previous visit.  PICC line flushed with good blood return noted.  Daptomycin infused per order, pt tolerated well.  PICC line flushed, clave changed and gauze/mesh cover placed.  Wrapped for shower per patient request.  Confirmed tomorrow's appointment and pt ambulated out of clinic in no apparent distress.

## 2018-02-25 ENCOUNTER — OUTPATIENT INFUSION SERVICES (OUTPATIENT)
Dept: ONCOLOGY | Facility: MEDICAL CENTER | Age: 68
End: 2018-02-25
Attending: INTERNAL MEDICINE
Payer: MEDICARE

## 2018-02-25 VITALS
WEIGHT: 156.97 LBS | BODY MASS INDEX: 21.28 KG/M2 | DIASTOLIC BLOOD PRESSURE: 61 MMHG | OXYGEN SATURATION: 97 % | HEART RATE: 86 BPM | SYSTOLIC BLOOD PRESSURE: 114 MMHG | TEMPERATURE: 98.8 F | RESPIRATION RATE: 16 BRPM

## 2018-02-25 PROCEDURE — 700105 HCHG RX REV CODE 258: Performed by: INTERNAL MEDICINE

## 2018-02-25 PROCEDURE — 96365 THER/PROPH/DIAG IV INF INIT: CPT

## 2018-02-25 PROCEDURE — 700111 HCHG RX REV CODE 636 W/ 250 OVERRIDE (IP): Mod: JG | Performed by: INTERNAL MEDICINE

## 2018-02-25 PROCEDURE — 99211 OFF/OP EST MAY X REQ PHY/QHP: CPT

## 2018-02-25 RX ADMIN — DAPTOMYCIN 430 MG: 500 INJECTION, POWDER, LYOPHILIZED, FOR SOLUTION INTRAVENOUS at 15:05

## 2018-02-25 ASSESSMENT — PAIN SCALES - GENERAL: PAINLEVEL: NO PAIN

## 2018-02-25 NOTE — PROGRESS NOTES
Patient returns for IVABX. Daptomycin infused as ordered. Patient tolerated well and without incident. PICC line in good condition and has positive blood return. PICC line flushed with saline per protocol, dressing changed using sterile technique. Patient DC'd home in good condition. Returns daily.

## 2018-02-25 NOTE — PROGRESS NOTES
Haider returns for IVABX. Daptomycin infused as ordered. Patient tolerated well and without incident. PICC line in good condition and has positive blood return. PICC line flushed with saline per protocol. Patient DC'd home in good condition. Returns daily.

## 2018-02-26 ENCOUNTER — OFFICE VISIT (OUTPATIENT)
Dept: INFECTIOUS DISEASES | Facility: MEDICAL CENTER | Age: 68
End: 2018-02-26
Payer: MEDICARE

## 2018-02-26 ENCOUNTER — OUTPATIENT INFUSION SERVICES (OUTPATIENT)
Dept: ONCOLOGY | Facility: MEDICAL CENTER | Age: 68
End: 2018-02-26
Attending: INTERNAL MEDICINE
Payer: MEDICARE

## 2018-02-26 VITALS
DIASTOLIC BLOOD PRESSURE: 81 MMHG | OXYGEN SATURATION: 93 % | RESPIRATION RATE: 18 BRPM | WEIGHT: 153.66 LBS | SYSTOLIC BLOOD PRESSURE: 130 MMHG | BODY MASS INDEX: 20.81 KG/M2 | HEIGHT: 72 IN | TEMPERATURE: 98.8 F | HEART RATE: 90 BPM

## 2018-02-26 VITALS
DIASTOLIC BLOOD PRESSURE: 80 MMHG | TEMPERATURE: 100.2 F | SYSTOLIC BLOOD PRESSURE: 126 MMHG | OXYGEN SATURATION: 91 % | WEIGHT: 136.8 LBS | BODY MASS INDEX: 18.53 KG/M2 | HEIGHT: 72 IN | HEART RATE: 86 BPM

## 2018-02-26 DIAGNOSIS — A49.01 MSSA (METHICILLIN SUSCEPTIBLE STAPHYLOCOCCUS AUREUS) INFECTION: ICD-10-CM

## 2018-02-26 DIAGNOSIS — M00.012 STAPHYLOCOCCAL ARTHRITIS OF LEFT SHOULDER (HCC): Primary | ICD-10-CM

## 2018-02-26 DIAGNOSIS — M00.812: ICD-10-CM

## 2018-02-26 DIAGNOSIS — R53.83 FATIGUE DUE TO TREATMENT: ICD-10-CM

## 2018-02-26 LAB
ALBUMIN SERPL BCP-MCNC: 3.7 G/DL (ref 3.2–4.9)
ALP SERPL-CCNC: 60 U/L (ref 30–99)
ALT SERPL-CCNC: 9 U/L (ref 2–50)
AST SERPL-CCNC: 11 U/L (ref 12–45)
BASOPHILS # BLD AUTO: 0.3 % (ref 0–1.8)
BASOPHILS # BLD: 0.03 K/UL (ref 0–0.12)
BILIRUB CONJ SERPL-MCNC: 0.3 MG/DL (ref 0.1–0.5)
BILIRUB INDIRECT SERPL-MCNC: 1 MG/DL (ref 0–1)
BILIRUB SERPL-MCNC: 1.3 MG/DL (ref 0.1–1.5)
BUN SERPL-MCNC: 13 MG/DL (ref 8–22)
CALCIUM SERPL-MCNC: 8.9 MG/DL (ref 8.5–10.5)
CHLORIDE SERPL-SCNC: 98 MMOL/L (ref 96–112)
CO2 SERPL-SCNC: 21 MMOL/L (ref 20–33)
CREAT SERPL-MCNC: 0.53 MG/DL (ref 0.5–1.4)
EOSINOPHIL # BLD AUTO: 0.6 K/UL (ref 0–0.51)
EOSINOPHIL NFR BLD: 5.4 % (ref 0–6.9)
ERYTHROCYTE [DISTWIDTH] IN BLOOD BY AUTOMATED COUNT: 44.1 FL (ref 35.9–50)
GLUCOSE SERPL-MCNC: 125 MG/DL (ref 65–99)
HCT VFR BLD AUTO: 39.1 % (ref 42–52)
HGB BLD-MCNC: 13.4 G/DL (ref 14–18)
IMM GRANULOCYTES # BLD AUTO: 0.05 K/UL (ref 0–0.11)
IMM GRANULOCYTES NFR BLD AUTO: 0.5 % (ref 0–0.9)
LYMPHOCYTES # BLD AUTO: 0.69 K/UL (ref 1–4.8)
LYMPHOCYTES NFR BLD: 6.2 % (ref 22–41)
MCH RBC QN AUTO: 32.3 PG (ref 27–33)
MCHC RBC AUTO-ENTMCNC: 34.3 G/DL (ref 33.7–35.3)
MCV RBC AUTO: 94.2 FL (ref 81.4–97.8)
MONOCYTES # BLD AUTO: 0.83 K/UL (ref 0–0.85)
MONOCYTES NFR BLD AUTO: 7.5 % (ref 0–13.4)
NEUTROPHILS # BLD AUTO: 8.91 K/UL (ref 1.82–7.42)
NEUTROPHILS NFR BLD: 80.1 % (ref 44–72)
NRBC # BLD AUTO: 0 K/UL
NRBC BLD-RTO: 0 /100 WBC
PHOSPHATE SERPL-MCNC: 3 MG/DL (ref 2.5–4.5)
PLATELET # BLD AUTO: 297 K/UL (ref 164–446)
PMV BLD AUTO: 9.1 FL (ref 9–12.9)
POTASSIUM SERPL-SCNC: 3.9 MMOL/L (ref 3.6–5.5)
PROT SERPL-MCNC: 6.5 G/DL (ref 6–8.2)
RBC # BLD AUTO: 4.15 M/UL (ref 4.7–6.1)
SODIUM SERPL-SCNC: 130 MMOL/L (ref 135–145)
WBC # BLD AUTO: 11.1 K/UL (ref 4.8–10.8)

## 2018-02-26 PROCEDURE — 700105 HCHG RX REV CODE 258: Performed by: INTERNAL MEDICINE

## 2018-02-26 PROCEDURE — 80076 HEPATIC FUNCTION PANEL: CPT

## 2018-02-26 PROCEDURE — 80069 RENAL FUNCTION PANEL: CPT

## 2018-02-26 PROCEDURE — 85025 COMPLETE CBC W/AUTO DIFF WBC: CPT

## 2018-02-26 PROCEDURE — 700111 HCHG RX REV CODE 636 W/ 250 OVERRIDE (IP): Performed by: INTERNAL MEDICINE

## 2018-02-26 PROCEDURE — 36415 COLL VENOUS BLD VENIPUNCTURE: CPT

## 2018-02-26 PROCEDURE — 99214 OFFICE O/P EST MOD 30 MIN: CPT | Performed by: INTERNAL MEDICINE

## 2018-02-26 PROCEDURE — 96365 THER/PROPH/DIAG IV INF INIT: CPT

## 2018-02-26 RX ORDER — CEFAZOLIN SODIUM 1 G/3ML
1 INJECTION, POWDER, FOR SOLUTION INTRAMUSCULAR; INTRAVENOUS EVERY 8 HOURS
COMMUNITY
End: 2018-03-14

## 2018-02-26 RX ORDER — OXYCODONE HYDROCHLORIDE 5 MG/1
5 TABLET ORAL EVERY 4 HOURS PRN
COMMUNITY
End: 2021-03-15

## 2018-02-26 RX ADMIN — SODIUM CHLORIDE 1000 MG: 900 INJECTION INTRAVENOUS at 10:37

## 2018-02-26 ASSESSMENT — PAIN SCALES - GENERAL: PAINLEVEL: NO PAIN

## 2018-02-26 NOTE — PROGRESS NOTES
Infectious Disease Follow up Note      Subjective:     Chief Complaint   Patient presents with   • Hospital Follow-up     left shoulder septic arthritis         Interval History:   67 y.o. Man with a history of prostate cancer s/p prostatectomy, penile clamp, hyperparathyroidism s/p parathyroidectomy, right shoulder rotator cuff repair surgery and left rotator cuff repair in October admitted 2/6/2018 for left shoulder pain, swelling and erythema.  Found to have left shoulder septic arthritis   Recent rotator cuff repair in Oct 2017  S/p I&D on 2/6. Purulence was noted intraoperatively  OSH cx reported staph species  OR gram stain +GPC, Cx - MSSA  Ancef inpatient transitioned to dapto via [facility]  Anticipate 4 weeks of IV abx from date of surgery  Stop date 03/06/18    Hospital records reviewed    Pt here for follow up. Pt having increasing fatigue and malaise on daptomycin but denies any cramping. He has a poor appetite. He is better in the mornings however he states he has to take an oxycontin prior to going for his infusions in the afternoons. He does not have any pain but he thinks the pain meds make him feel better. He is concerned however about the need to take them as he does not want to be dependent on these meds. Over a week ago, he developed drainage from the surgical site. He saw ortho and underwent a washout in the clinic. He is no longer having any drainage. He has also be noting low grade temps and his has one in the clinic today but no chest pain, SOB, cough, abdominal pain, diarrhea or dysuria. He thinks the daptomycin is causing him increasing fatigue from his baseline. He does admit to spending most of his days in bed. He will follow up with ortho later this week.    Past Medical History:   Diagnosis Date   • Arthritis     knees   • Cancer (CMS-[facility]) 07/13    Prostate   • Disorder of thyroid    • Heart valve disease     Mild mitral valve problem - per patient cardiologist is not concerned at this time.    • Hiatus hernia syndrome    • Unspecified cataract     Bilateral IOL's   • Urinary incontinence        Past Surgical History:   Procedure Laterality Date   • SHOULDER ARTHROSCOPY Left 2/6/2018    Procedure: SHOULDER ARTHROSCOPY;  Surgeon: Sergio Daniel M.D.;  Location: [facility];  Service: Orthopedics   • IRRIGATION & DEBRIDEMENT ORTHO Left 2/6/2018    Procedure: IRRIGATION & DEBRIDEMENT ORTHO- SHOULDER  ;  Surgeon: Sergio Daniel M.D.;  Location: [facility];  Service: Orthopedics   • FOREIGN BODY REMOVAL Left 2/6/2018    Procedure: FOREIGN BODY REMOVAL;  Surgeon: Sergio Daniel M.D.;  Location: [facility];  Service: Orthopedics   • SHOULDER ARTHROSCOPY W/ ROTATOR CUFF REPAIR Left 10/18/2017    Procedure: SHOULDER ARTHROSCOPY W/ ROTATOR CUFF REPAIR;  Surgeon: Sergio Daniel M.D.;  Location: [facility];  Service: Orthopedics   • SHOULDER DECOMPRESSION ARTHROSCOPIC Left 10/18/2017    Procedure: SHOULDER DECOMPRESSION ARTHROSCOPIC- SUBACROMIAL;  Surgeon: Sergio Daniel M.D.;  Location: [facility];  Service: Orthopedics   • BICEPS TENODESIS Left 10/18/2017    Procedure: BICEPS TENODESIS;  Surgeon: Sergio Daniel M.D.;  Location: [facility];  Service: Orthopedics   • PARATHYROIDECTOMY N/A 7/5/2017    Procedure: PARATHYROIDECTOMY - MINIMALLY INVASIVE W/RECURRENT LARYNGEAL NERVE MONITORING;  Surgeon: Josh Blank M.D.;  Location: SURGERY SAME DAY [facility];  Service:    • SPHINCTER PROSTHESIS REMOVAL  3/28/2017    Procedure: URINARY SPHINCTER PROSTHESIS REMOVAL;  Surgeon: Benigno Grier M.D.;  Location: [facility];  Service:    • CYSTOSCOPY  3/28/2017    Procedure: CYSTOSCOPY;  Surgeon: Benigno Grier M.D.;  Location: [facility];  Service:    • EVACUATION OF HEMATOMA  3/21/2017    Procedure: EVACUATION OF HEMATOMA-CYSTO CLOT EVACUATION AND SMALL CYSTOSCOPE;  Surgeon: Frank Lamas M.D.;  Location:  [facility];  Service:    • CYSTOSCOPY  3/21/2017    Procedure: CYSTOSCOPY;  Surgeon: Frank Lamas M.D.;  Location: [facility];  Service:    • CYSTOSCOPY  3/20/2017    Procedure: CYSTOSCOPY -  FLEXIBLE;  Surgeon: Frank Lamas M.D.;  Location: [facility];  Service:    • SPHINCTER PROSTHESIS PLACEMENT  3/20/2017    Procedure: SPHINCTER PROSTHESIS PLACEMENT - ARTIFICIAL URINARY SPHINCTER;  Surgeon: Frank Lamas M.D.;  Location: [facility];  Service:    • PROSTATECTOMY, RADICAL RETRO  8/3/2015    Procedure: PROSTATECTOMY RADICAL RETROPUBIC;  Surgeon: Sage Ngo M.D.;  Location: [facility];  Service:    • NODE DISSECTION Bilateral 8/3/2015    Procedure: NODE DISSECTION LYMPH;  Surgeon: Sage Ngo M.D.;  Location: [facility];  Service:    • KNEE ARTHROPLASTY TOTAL  7/3/2014    Performed by Theodore San M.D. at [facility]   • KNEE ARTHROSCOPY  7/3/2014    Performed by Theodore San M.D. at [facility]   • MENISCECTOMY  7/3/2014    Performed by Theodore San M.D. at [facility]   • KNEE REPLACEMENT, TOTAL Right 2014   • INGUINAL HERNIA REPAIR  6/1/2009    Performed by RISHI COOK at [facility]   • OTHER ORTHOPEDIC SURGERY  2003    L Shoulder Repair   • INGUINAL HERNIA LAPAROSCOPIC BILATERAL Bilateral    • OTHER ORTHOPEDIC SURGERY      R Rotator Cuff repair       Allergies: No Known Allergies      Medications:  Current Outpatient Prescriptions on File Prior to Visit   Medication Sig Dispense Refill   • Probiotic Product (SOLUBLE FIBER/PROBIOTICS PO) Take  by mouth.     • Cyanocobalamin (VITAMIN B 12 PO) Take 2,500 Units by mouth as needed.       Current Facility-Administered Medications on File Prior to Visit   Medication Dose Route Frequency Provider Last Rate Last Dose   • DAPTOmycin (CUBICIN) 430 mg in NS 50 mL IVPB  430 mg Intravenous Q24HRS Coco PIERRE  "REJI Rosario  As documented above in my HPI       Objective:     PE:  /80   Pulse 86   Temp 37.9 °C (100.2 °F)   Ht 1.829 m (6' 0.01\")   Wt 62.1 kg (136 lb 12.8 oz)   SpO2 91%   BMI 18.55 kg/m²      Vital signs reviewed  Constitutional: patient is oriented to person, place, and time. Appears well-developed and well-nourished. No distress, thin  Eyes: Conjunctivae normal and EOM are normal. Pupils are equal, round, and reactive to light.   Mouth/Throat: Lips without lesions, good dentition, oropharynx is clear and moist.  Neck: Trachea midline. Normal range of motion. Neck supple. No masses  Cardiovascular: Normal rate, regular rhythm, normal heart sounds and intact distal pulses. No murmur, gallop, or friction rub. No edema.  Pulmonary/Chest: No respiratory distress. Unlabored respiratory effort, lungs clear to auscultation. No wheezes or rales.   Abdominal: Soft, non tender. BS + x 4. No masses or hepatosplenomegaly.   Musculoskeletal: RUE PICC nontender, left shoulder with wound but not drainage. Limited ROM   Neurological: alert and oriented to person, place, and time. No cranial nerve deficit. Coordination normal. Moves all extremities  Skin: Skin is warm and dry. Good turgor. No rashes visable.  Psychiatric: Normal mood and affect. Behavior is normal.     LABS:  WBC   Date/Time Value Ref Range Status   02/19/2018 05:20 PM 6.5 4.8 - 10.8 K/uL Final     RBC   Date/Time Value Ref Range Status   02/19/2018 05:20 PM 3.69 (L) 4.70 - 6.10 M/uL Final     Hemoglobin   Date/Time Value Ref Range Status   02/19/2018 05:20 PM 11.7 (L) 14.0 - 18.0 g/dL Final     Hematocrit   Date/Time Value Ref Range Status   02/19/2018 05:20 PM 35.7 (L) 42.0 - 52.0 % Final     MCV   Date/Time Value Ref Range Status   02/19/2018 05:20 PM 96.7 81.4 - 97.8 fL Final     MCH   Date/Time Value Ref Range Status   02/19/2018 05:20 PM 31.7 27.0 - 33.0 pg Final     MCHC   Date/Time Value Ref Range Status   02/19/2018 05:20 " PM 32.8 (L) 33.7 - 35.3 g/dL Final     MPV   Date/Time Value Ref Range Status   02/19/2018 05:20 PM 8.8 (L) 9.0 - 12.9 fL Final        Sodium   Date/Time Value Ref Range Status   02/19/2018 05:20  (L) 135 - 145 mmol/L Final     Potassium   Date/Time Value Ref Range Status   02/19/2018 05:20 PM 4.2 3.6 - 5.5 mmol/L Final     Chloride   Date/Time Value Ref Range Status   02/19/2018 05:20  96 - 112 mmol/L Final     Co2   Date/Time Value Ref Range Status   02/19/2018 05:20 PM 24 20 - 33 mmol/L Final     Glucose   Date/Time Value Ref Range Status   02/19/2018 05:20  (H) 65 - 99 mg/dL Final     Bun   Date/Time Value Ref Range Status   02/19/2018 05:20 PM 11 8 - 22 mg/dL Final     Creatinine   Date/Time Value Ref Range Status   02/19/2018 05:20 PM 0.57 0.50 - 1.40 mg/dL Final     Alkaline Phosphatase   Date/Time Value Ref Range Status   02/19/2018 05:20 PM 59 30 - 99 U/L Final     AST(SGOT)   Date/Time Value Ref Range Status   02/19/2018 05:20 PM 18 12 - 45 U/L Final     ALT(SGPT)   Date/Time Value Ref Range Status   02/19/2018 05:20 PM 14 2 - 50 U/L Final     Total Bilirubin   Date/Time Value Ref Range Status   02/19/2018 05:20 PM 0.3 0.1 - 1.5 mg/dL Final        CPK Total   Date/Time Value Ref Range Status   02/19/2018 05:20 PM 37 0 - 154 U/L Final        MICRO:  Blood Culture Hold   Date Value Ref Range Status   03/28/2017 Collected  Final          IMAGING STUDIES:  None new    Assessment/Plan:     Problem List Items Addressed This Visit     None      Visit Diagnoses     Staphylococcal arthritis of left shoulder (CMS-HCC)    -  Primary    Relevant Medications    oxyCODONE immediate-release (ROXICODONE) 5 MG Tab    MSSA (methicillin susceptible Staphylococcus aureus) infection        Fatigue due to treatment            Increasing fatigue. ? Due to dapto. Certainly a possibility. Will DC dapto and transition to ertapenem   Continue IV abx through 03/06/18. No po abx to follow  Low grade temp today but no  symptoms or signs of new infection. Monitor  FU with Ortho as scheduled  Advised pt to refrain from taking oxy daily if not using med for pain.    Follow up: 1 week, RTC sooner if needed. FU with PCP for ongoing chronic medical conditions.     Coco Rosario M.D.

## 2018-02-26 NOTE — PROGRESS NOTES
"Patient arrived to Infusion after his morning appt with Dr. Rosario; pt states he is switching antibiotics as he reports he has been having low grade fevers and significant malaise.  New orders received and verified.  PICC line flushed with brisk blood return.  Labs drawn per protocol/order.  Invanz infused as ordered; medication information sheet provided and reviewed with patient.  Pt reports lessened anxiety, but \"would like to just feel better.\"  PICC line flushed, pt declined a gauze wrap.  Confirmed tomorrow's appt time. Educated pt on worsening s/s and to f/u with MD or ER if any occur, pt agreed.  Discharged home in great spirits under no apparent distress.  "

## 2018-02-27 ENCOUNTER — OUTPATIENT INFUSION SERVICES (OUTPATIENT)
Dept: ONCOLOGY | Facility: MEDICAL CENTER | Age: 68
End: 2018-02-27
Attending: INTERNAL MEDICINE
Payer: MEDICARE

## 2018-02-27 VITALS
SYSTOLIC BLOOD PRESSURE: 135 MMHG | WEIGHT: 153.66 LBS | RESPIRATION RATE: 18 BRPM | DIASTOLIC BLOOD PRESSURE: 82 MMHG | BODY MASS INDEX: 20.84 KG/M2 | OXYGEN SATURATION: 91 % | TEMPERATURE: 99 F | HEART RATE: 108 BPM

## 2018-02-27 DIAGNOSIS — M00.812: ICD-10-CM

## 2018-02-27 LAB
CRP SERPL HS-MCNC: 14.14 MG/DL (ref 0–0.75)
ERYTHROCYTE [SEDIMENTATION RATE] IN BLOOD BY WESTERGREN METHOD: 60 MM/HOUR (ref 0–20)

## 2018-02-27 PROCEDURE — 700105 HCHG RX REV CODE 258: Performed by: INTERNAL MEDICINE

## 2018-02-27 PROCEDURE — 700111 HCHG RX REV CODE 636 W/ 250 OVERRIDE (IP): Performed by: INTERNAL MEDICINE

## 2018-02-27 PROCEDURE — 36592 COLLECT BLOOD FROM PICC: CPT

## 2018-02-27 PROCEDURE — 96365 THER/PROPH/DIAG IV INF INIT: CPT

## 2018-02-27 PROCEDURE — 86140 C-REACTIVE PROTEIN: CPT

## 2018-02-27 PROCEDURE — 85652 RBC SED RATE AUTOMATED: CPT

## 2018-02-27 RX ADMIN — SODIUM CHLORIDE 1000 MG: 900 INJECTION INTRAVENOUS at 16:18

## 2018-02-27 ASSESSMENT — PAIN SCALES - GENERAL: PAINLEVEL: NO PAIN

## 2018-02-28 ENCOUNTER — OUTPATIENT INFUSION SERVICES (OUTPATIENT)
Dept: ONCOLOGY | Facility: MEDICAL CENTER | Age: 68
End: 2018-02-28
Attending: INTERNAL MEDICINE
Payer: MEDICARE

## 2018-02-28 VITALS
BODY MASS INDEX: 20.84 KG/M2 | HEART RATE: 85 BPM | DIASTOLIC BLOOD PRESSURE: 59 MMHG | SYSTOLIC BLOOD PRESSURE: 123 MMHG | WEIGHT: 153.66 LBS | TEMPERATURE: 98.8 F | OXYGEN SATURATION: 96 % | RESPIRATION RATE: 18 BRPM

## 2018-02-28 PROCEDURE — 96365 THER/PROPH/DIAG IV INF INIT: CPT

## 2018-02-28 PROCEDURE — 700111 HCHG RX REV CODE 636 W/ 250 OVERRIDE (IP): Performed by: INTERNAL MEDICINE

## 2018-02-28 PROCEDURE — 700105 HCHG RX REV CODE 258: Performed by: INTERNAL MEDICINE

## 2018-02-28 RX ADMIN — SODIUM CHLORIDE 1000 MG: 900 INJECTION INTRAVENOUS at 15:10

## 2018-02-28 NOTE — PROGRESS NOTES
Pt returned to Infusion for Invanz infusion; reports f/u with ortho MD earlier today, brought in additional labs to be drawn.  PICC line flushed with brisk blood return.  Labs drawn as ordered.  Invanz infused as ordered.  PICC line flushed, new clave placed.  Gauze dressing applied and assisted patient with dressing for shower when he discharged home.  Pt discharged home in good spirits under no apparent distress.

## 2018-03-01 ENCOUNTER — OUTPATIENT INFUSION SERVICES (OUTPATIENT)
Dept: ONCOLOGY | Facility: MEDICAL CENTER | Age: 68
End: 2018-03-01
Attending: INTERNAL MEDICINE
Payer: MEDICARE

## 2018-03-01 VITALS
SYSTOLIC BLOOD PRESSURE: 116 MMHG | HEART RATE: 77 BPM | DIASTOLIC BLOOD PRESSURE: 56 MMHG | RESPIRATION RATE: 18 BRPM | TEMPERATURE: 98.3 F | WEIGHT: 153.66 LBS | OXYGEN SATURATION: 94 % | BODY MASS INDEX: 20.84 KG/M2

## 2018-03-01 PROCEDURE — 700111 HCHG RX REV CODE 636 W/ 250 OVERRIDE (IP): Performed by: INTERNAL MEDICINE

## 2018-03-01 PROCEDURE — 96365 THER/PROPH/DIAG IV INF INIT: CPT

## 2018-03-01 PROCEDURE — 700105 HCHG RX REV CODE 258: Performed by: INTERNAL MEDICINE

## 2018-03-01 RX ADMIN — SODIUM CHLORIDE 1000 MG: 900 INJECTION INTRAVENOUS at 16:16

## 2018-03-01 ASSESSMENT — PAIN SCALES - GENERAL: PAINLEVEL: NO PAIN

## 2018-03-02 ENCOUNTER — OUTPATIENT INFUSION SERVICES (OUTPATIENT)
Dept: ONCOLOGY | Facility: MEDICAL CENTER | Age: 68
End: 2018-03-02
Attending: INTERNAL MEDICINE
Payer: MEDICARE

## 2018-03-02 VITALS
RESPIRATION RATE: 18 BRPM | BODY MASS INDEX: 20.84 KG/M2 | HEART RATE: 65 BPM | SYSTOLIC BLOOD PRESSURE: 155 MMHG | WEIGHT: 153.66 LBS | TEMPERATURE: 98.2 F | OXYGEN SATURATION: 98 % | DIASTOLIC BLOOD PRESSURE: 71 MMHG

## 2018-03-02 PROCEDURE — 96365 THER/PROPH/DIAG IV INF INIT: CPT

## 2018-03-02 PROCEDURE — 700111 HCHG RX REV CODE 636 W/ 250 OVERRIDE (IP): Performed by: INTERNAL MEDICINE

## 2018-03-02 PROCEDURE — 700105 HCHG RX REV CODE 258: Performed by: INTERNAL MEDICINE

## 2018-03-02 RX ADMIN — SODIUM CHLORIDE 1000 MG: 900 INJECTION INTRAVENOUS at 15:28

## 2018-03-02 ASSESSMENT — PAIN SCALES - GENERAL: PAINLEVEL: NO PAIN

## 2018-03-02 NOTE — PROGRESS NOTES
Pt returned to Infusion for Invanz infusion; reports significant improvement in appetite and overall energy level over last two days.  PICC line flushed with brisk blood return.   Invanz infused as ordered.  PICC line flushed, new clave placed.  Gauze dressing applied and assisted patient with dressing for shower when he discharged home.  Pt discharged home in good spirits under no apparent distress.

## 2018-03-03 ENCOUNTER — OUTPATIENT INFUSION SERVICES (OUTPATIENT)
Dept: ONCOLOGY | Facility: MEDICAL CENTER | Age: 68
End: 2018-03-03
Attending: INTERNAL MEDICINE
Payer: MEDICARE

## 2018-03-03 VITALS
DIASTOLIC BLOOD PRESSURE: 79 MMHG | TEMPERATURE: 97.3 F | SYSTOLIC BLOOD PRESSURE: 142 MMHG | OXYGEN SATURATION: 98 % | HEART RATE: 68 BPM | RESPIRATION RATE: 16 BRPM

## 2018-03-03 PROCEDURE — 700105 HCHG RX REV CODE 258: Performed by: INTERNAL MEDICINE

## 2018-03-03 PROCEDURE — 96365 THER/PROPH/DIAG IV INF INIT: CPT

## 2018-03-03 PROCEDURE — 700111 HCHG RX REV CODE 636 W/ 250 OVERRIDE (IP): Performed by: INTERNAL MEDICINE

## 2018-03-03 RX ADMIN — SODIUM CHLORIDE 1000 MG: 900 INJECTION INTRAVENOUS at 16:00

## 2018-03-03 ASSESSMENT — PAIN SCALES - GENERAL: PAINLEVEL: NO PAIN

## 2018-03-03 NOTE — PROGRESS NOTES
Pt is here for his scheduled IVABX. States tolerating Invanz better than his previous IV antibiotic. No concerns. No pain. Infuion completed without an incident. PICC line wrapped for a shower as before. Discharged home to self care. Returns daily.

## 2018-03-04 ENCOUNTER — OUTPATIENT INFUSION SERVICES (OUTPATIENT)
Dept: ONCOLOGY | Facility: MEDICAL CENTER | Age: 68
End: 2018-03-04
Attending: INTERNAL MEDICINE
Payer: MEDICARE

## 2018-03-04 VITALS
TEMPERATURE: 97.8 F | SYSTOLIC BLOOD PRESSURE: 147 MMHG | HEART RATE: 74 BPM | RESPIRATION RATE: 18 BRPM | DIASTOLIC BLOOD PRESSURE: 80 MMHG | OXYGEN SATURATION: 96 %

## 2018-03-04 PROCEDURE — 700105 HCHG RX REV CODE 258: Performed by: INTERNAL MEDICINE

## 2018-03-04 PROCEDURE — 96365 THER/PROPH/DIAG IV INF INIT: CPT

## 2018-03-04 PROCEDURE — 700111 HCHG RX REV CODE 636 W/ 250 OVERRIDE (IP): Performed by: INTERNAL MEDICINE

## 2018-03-04 RX ADMIN — SODIUM CHLORIDE 1000 MG: 900 INJECTION INTRAVENOUS at 16:54

## 2018-03-04 ASSESSMENT — PAIN SCALES - GENERAL: PAINLEVEL: NO PAIN

## 2018-03-04 NOTE — PROGRESS NOTES
Haider returns for IVABX. Invanz infused as ordered. Haider tolerated well and without incident. PICC line in good condition and has positive blood return. PICC line flushed with saline per protocol. Next appointment scheduled. Discharged to self care; no apparent distress noted.

## 2018-03-05 ENCOUNTER — OFFICE VISIT (OUTPATIENT)
Dept: INFECTIOUS DISEASES | Facility: MEDICAL CENTER | Age: 68
End: 2018-03-05
Payer: MEDICARE

## 2018-03-05 ENCOUNTER — APPOINTMENT (OUTPATIENT)
Dept: ONCOLOGY | Facility: MEDICAL CENTER | Age: 68
End: 2018-03-05
Attending: INTERNAL MEDICINE
Payer: MEDICARE

## 2018-03-05 VITALS
TEMPERATURE: 98.1 F | HEART RATE: 75 BPM | WEIGHT: 152.6 LBS | HEIGHT: 72 IN | BODY MASS INDEX: 20.67 KG/M2 | SYSTOLIC BLOOD PRESSURE: 110 MMHG | DIASTOLIC BLOOD PRESSURE: 60 MMHG | OXYGEN SATURATION: 95 %

## 2018-03-05 VITALS
HEART RATE: 61 BPM | RESPIRATION RATE: 18 BRPM | HEIGHT: 72 IN | DIASTOLIC BLOOD PRESSURE: 77 MMHG | SYSTOLIC BLOOD PRESSURE: 152 MMHG | BODY MASS INDEX: 20.25 KG/M2 | WEIGHT: 149.47 LBS | OXYGEN SATURATION: 99 % | TEMPERATURE: 98.2 F

## 2018-03-05 DIAGNOSIS — M19.90 ARTHRITIS: ICD-10-CM

## 2018-03-05 DIAGNOSIS — M00.012 STAPHYLOCOCCAL ARTHRITIS OF LEFT SHOULDER (HCC): ICD-10-CM

## 2018-03-05 DIAGNOSIS — D72.829 LEUKOCYTOSIS, UNSPECIFIED TYPE: ICD-10-CM

## 2018-03-05 DIAGNOSIS — A49.01 MSSA (METHICILLIN SUSCEPTIBLE STAPHYLOCOCCUS AUREUS) INFECTION: ICD-10-CM

## 2018-03-05 LAB
ALBUMIN SERPL BCP-MCNC: 3.7 G/DL (ref 3.2–4.9)
ALP SERPL-CCNC: 56 U/L (ref 30–99)
ALT SERPL-CCNC: 16 U/L (ref 2–50)
AST SERPL-CCNC: 19 U/L (ref 12–45)
BASOPHILS # BLD AUTO: 2.1 % (ref 0–1.8)
BASOPHILS # BLD: 0.12 K/UL (ref 0–0.12)
BILIRUB CONJ SERPL-MCNC: <0.1 MG/DL (ref 0.1–0.5)
BILIRUB INDIRECT SERPL-MCNC: NORMAL MG/DL (ref 0–1)
BILIRUB SERPL-MCNC: 0.3 MG/DL (ref 0.1–1.5)
BUN SERPL-MCNC: 16 MG/DL (ref 8–22)
CALCIUM SERPL-MCNC: 8.5 MG/DL (ref 8.5–10.5)
CHLORIDE SERPL-SCNC: 104 MMOL/L (ref 96–112)
CO2 SERPL-SCNC: 23 MMOL/L (ref 20–33)
CREAT SERPL-MCNC: 0.42 MG/DL (ref 0.5–1.4)
CRP SERPL HS-MCNC: 1.43 MG/DL (ref 0–0.75)
EOSINOPHIL # BLD AUTO: 0.55 K/UL (ref 0–0.51)
EOSINOPHIL NFR BLD: 9.5 % (ref 0–6.9)
ERYTHROCYTE [DISTWIDTH] IN BLOOD BY AUTOMATED COUNT: 44 FL (ref 35.9–50)
ERYTHROCYTE [SEDIMENTATION RATE] IN BLOOD BY WESTERGREN METHOD: 60 MM/HOUR (ref 0–20)
GLUCOSE SERPL-MCNC: 91 MG/DL (ref 65–99)
HCT VFR BLD AUTO: 36.1 % (ref 42–52)
HGB BLD-MCNC: 12.4 G/DL (ref 14–18)
IMM GRANULOCYTES # BLD AUTO: 0.01 K/UL (ref 0–0.11)
IMM GRANULOCYTES NFR BLD AUTO: 0.2 % (ref 0–0.9)
LYMPHOCYTES # BLD AUTO: 1.62 K/UL (ref 1–4.8)
LYMPHOCYTES NFR BLD: 27.9 % (ref 22–41)
MCH RBC QN AUTO: 32.3 PG (ref 27–33)
MCHC RBC AUTO-ENTMCNC: 34.3 G/DL (ref 33.7–35.3)
MCV RBC AUTO: 94 FL (ref 81.4–97.8)
MONOCYTES # BLD AUTO: 0.47 K/UL (ref 0–0.85)
MONOCYTES NFR BLD AUTO: 8.1 % (ref 0–13.4)
NEUTROPHILS # BLD AUTO: 3.03 K/UL (ref 1.82–7.42)
NEUTROPHILS NFR BLD: 52.2 % (ref 44–72)
NRBC # BLD AUTO: 0 K/UL
NRBC BLD-RTO: 0 /100 WBC
PHOSPHATE SERPL-MCNC: 2.7 MG/DL (ref 2.5–4.5)
PLATELET # BLD AUTO: 291 K/UL (ref 164–446)
PMV BLD AUTO: 9.5 FL (ref 9–12.9)
POTASSIUM SERPL-SCNC: 4.2 MMOL/L (ref 3.6–5.5)
PROT SERPL-MCNC: 6.1 G/DL (ref 6–8.2)
RBC # BLD AUTO: 3.84 M/UL (ref 4.7–6.1)
SODIUM SERPL-SCNC: 136 MMOL/L (ref 135–145)
WBC # BLD AUTO: 5.8 K/UL (ref 4.8–10.8)

## 2018-03-05 PROCEDURE — 36592 COLLECT BLOOD FROM PICC: CPT

## 2018-03-05 PROCEDURE — 99214 OFFICE O/P EST MOD 30 MIN: CPT | Performed by: NURSE PRACTITIONER

## 2018-03-05 PROCEDURE — 700111 HCHG RX REV CODE 636 W/ 250 OVERRIDE (IP): Performed by: INTERNAL MEDICINE

## 2018-03-05 PROCEDURE — 85652 RBC SED RATE AUTOMATED: CPT

## 2018-03-05 PROCEDURE — 85025 COMPLETE CBC W/AUTO DIFF WBC: CPT

## 2018-03-05 PROCEDURE — 86140 C-REACTIVE PROTEIN: CPT

## 2018-03-05 PROCEDURE — 80069 RENAL FUNCTION PANEL: CPT

## 2018-03-05 PROCEDURE — 80076 HEPATIC FUNCTION PANEL: CPT

## 2018-03-05 PROCEDURE — 700105 HCHG RX REV CODE 258: Performed by: INTERNAL MEDICINE

## 2018-03-05 PROCEDURE — 96365 THER/PROPH/DIAG IV INF INIT: CPT

## 2018-03-05 RX ORDER — SULFAMETHOXAZOLE AND TRIMETHOPRIM 800; 160 MG/1; MG/1
1 TABLET ORAL 2 TIMES DAILY
Qty: 20 TAB | Refills: 0 | Status: SHIPPED | OUTPATIENT
Start: 2018-03-05 | End: 2018-03-14

## 2018-03-05 RX ORDER — ERTAPENEM 1 G/1
INJECTION, POWDER, LYOPHILIZED, FOR SOLUTION INTRAMUSCULAR; INTRAVENOUS
COMMUNITY
End: 2018-03-14

## 2018-03-05 RX ADMIN — SODIUM CHLORIDE 1000 MG: 900 INJECTION INTRAVENOUS at 14:43

## 2018-03-05 ASSESSMENT — PAIN SCALES - GENERAL: PAINLEVEL: NO PAIN

## 2018-03-05 NOTE — PROGRESS NOTES
"[facility]    Subjective:     Chief Complaint   Patient presents with   • Follow-Up     Staphylococcal arthritis of left shoulder      This is my first time meeting Mr. Horn.      Interval History: 67 y.o. Man with a history of prostate cancer s/p prostatectomy, penile clamp, hyperparathyroidism s/p parathyroidectomy, right shoulder rotator cuff repair surgery and left rotator cuff repair in October 2017.  Hospitalized from 2/6- 2/9/18, admitted for left shoulder pain, swelling and erythema.  Found to have left shoulder septic arthritis.  Underwent Left shoulder I&D and arthroscopy with removal of retained implants on 2/6 with Dr. Daniel- purulence noted intraoperatively.  OR cx +MSSA.  Pt discharged home on IV Daptomycin, end date 3/6/18.    2/26/18:  Seen by Dr. Rosario.  Pt having increasing fatigue and malaise on daptomycin but denies any cramping. He has a poor appetite. Over a week ago, he developed drainage from the surgical site. He saw ortho and underwent a washout in the clinic. He is no longer having any drainage.  Will DC Dapto and transition to Ertapenem.  Continue IV abx through 03/06/18.    Past records reviewed    Today, 3/5/2018: Patient reports feeling well, stating he feels \"quantifiabley better\" since changing over to the IV Ertapenem.  Denies feeling generally ill, fevers/chills, general malaise, headache, n/v/d, abdominal pain, chest pain or shortness of breath.  He still has a bit of fatigue, but nothing compared to how he felt on the Dapto.  He was seen by Dr. Daniel on 3/1 who was pleased with the pt's progress.  He is scheduled to see him again on 3/8, believes he will be starting PT soon.  He has some soreness around the shoulder, mostly with ROM.  Denies pain at rest.  Swelling around the shoulder has improved.  Improves with rest, has not taken any pain meds since the Daptomycin was stopped.  There is a pinhole along the surgical incision, trace yellowish drainage, no " "odor, trace redness- covering with a band aid.     ROS  As documented above in my HPI.    Past Medical History:   Diagnosis Date   • Arthritis     knees   • Cancer (CMS-HCC) 07/13    Prostate   • Disorder of thyroid    • Heart valve disease     Mild mitral valve problem - per patient cardiologist is not concerned at this time.   • Hiatus hernia syndrome    • Unspecified cataract     Bilateral IOL's   • Urinary incontinence        Social History   Substance Use Topics   • Smoking status: Never Smoker   • Smokeless tobacco: Never Used   • Alcohol use No       Allergies: Patient has no known allergies.    Pt's medication and problem list reviewed.     Objective:     PE:  /60   Pulse 75   Temp 36.7 °C (98.1 °F)   Ht 1.829 m (6' 0.01\")   Wt 69.2 kg (152 lb 9.6 oz)   SpO2 95%   BMI 20.69 kg/m²     Vital signs reviewed    Constitutional: Appears well-developed and well-nourished. No acute distress.  Speech fluent.  Thin.    Eyes: Conjunctivae normal and EOM are normal. Pupils are equal, round, and reactive to light.   Neck: Trachea midline. Normal range of motion. No JVD.  Cardiovascular: Normal rate, regular rhythm, normal heart sounds. No murmur, gallop, or friction rub. No edema.  Respiratory: No respiratory distress, unlabored respiratory effort.  Lungs clear to auscultation bilaterally. No wheezes or rales.   Abdomen: Soft, non tender, non-distended. BS + x 4. No masses.   Musculoskeletal: Steady gait.  Limited L shoulder range of motion.    L shoulder -well healed and approximated, one small pinhole opening with trace serous drainage, no odor, trace erythema around pinhole site, minimal swelling, non tender to palpation.  RUE PICC- CDI, non tender, no erythema.  Skin: Warm and dry.  No visible rashes or lesions.  Neurological: No cranial nerve deficit. Coordination normal.    Psychiatric: Alert and oriented to person, place, and time. Normal mood, calm affect.  Normal behavior and judgment.     Labs:  WBC "   Date/Time Value Ref Range Status   02/26/2018 10:37 AM 11.1 (H) 4.8 - 10.8 K/uL Final     RBC   Date/Time Value Ref Range Status   02/26/2018 10:37 AM 4.15 (L) 4.70 - 6.10 M/uL Final     Hemoglobin   Date/Time Value Ref Range Status   02/26/2018 10:37 AM 13.4 (L) 14.0 - 18.0 g/dL Final     Hematocrit   Date/Time Value Ref Range Status   02/26/2018 10:37 AM 39.1 (L) 42.0 - 52.0 % Final     MCV   Date/Time Value Ref Range Status   02/26/2018 10:37 AM 94.2 81.4 - 97.8 fL Final     MCH   Date/Time Value Ref Range Status   02/26/2018 10:37 AM 32.3 27.0 - 33.0 pg Final     MCHC   Date/Time Value Ref Range Status   02/26/2018 10:37 AM 34.3 33.7 - 35.3 g/dL Final     MPV   Date/Time Value Ref Range Status   02/26/2018 10:37 AM 9.1 9.0 - 12.9 fL Final        Sodium   Date/Time Value Ref Range Status   02/26/2018 10:37  (L) 135 - 145 mmol/L Final     Potassium   Date/Time Value Ref Range Status   02/26/2018 10:37 AM 3.9 3.6 - 5.5 mmol/L Final     Chloride   Date/Time Value Ref Range Status   02/26/2018 10:37 AM 98 96 - 112 mmol/L Final     Co2   Date/Time Value Ref Range Status   02/26/2018 10:37 AM 21 20 - 33 mmol/L Final     Glucose   Date/Time Value Ref Range Status   02/26/2018 10:37  (H) 65 - 99 mg/dL Final     Bun   Date/Time Value Ref Range Status   02/26/2018 10:37 AM 13 8 - 22 mg/dL Final     Creatinine   Date/Time Value Ref Range Status   02/26/2018 10:37 AM 0.53 0.50 - 1.40 mg/dL Final       Alkaline Phosphatase   Date/Time Value Ref Range Status   02/26/2018 10:37 AM 60 30 - 99 U/L Final     AST(SGOT)   Date/Time Value Ref Range Status   02/26/2018 10:37 AM 11 (L) 12 - 45 U/L Final     ALT(SGPT)   Date/Time Value Ref Range Status   02/26/2018 10:37 AM 9 2 - 50 U/L Final     Total Bilirubin   Date/Time Value Ref Range Status   02/26/2018 10:37 AM 1.3 0.1 - 1.5 mg/dL Final        CPK Total   Date/Time Value Ref Range Status   02/19/2018 05:20 PM 37 0 - 154 U/L Final        Assessment and Plan:   The  following treatment plan was discussed with patient at length:    1. Staphylococcal arthritis of left shoulder (CMS-HCC)  sulfamethoxazole-trimethoprim (BACTRIM DS) 800-160 MG tablet    -Finish IV Ertapenem on 3/6/18.  D/C PICC after last infusion dose.  -PO Bactrim x 10 days.  Discussed dosing and side effects of medications being prescribed. Pt instructed to call clinic with any adverse effects or to discontinue the medication and go to the ER should difficulty breathing, SOB, CP, facial swelling and/or gross rash/itching occurs.   -Monitor for s/sx of worsening transitioning from IV to PO abx: increased redness, pain, swelling, drainage, breakdown of surgical site, fevers, chills, general malaise, etc.  Notify ID or go to ER should these s/sx occur.   2. MSSA (methicillin susceptible Staphylococcus aureus) infection      As above.   3. Leukocytosis, unspecified type      CBC to be drawn today at [facility].     Follow up: 10 days, RTC sooner if needed. FU with PCP for ongoing chronic medical conditions.     LEELA Dhaliwal.

## 2018-03-05 NOTE — PROGRESS NOTES
Haider returns for IVABX. Invanz infused as ordered. Haider tolerated well and without incident. PICC line in good condition and has positive blood return. PICC line dressing changed using sterile technique. PICC line flushed with saline per protocol. Next appointment scheduled. Discharged to self care; no apparent distress noted.

## 2018-03-06 ENCOUNTER — APPOINTMENT (OUTPATIENT)
Dept: ONCOLOGY | Facility: MEDICAL CENTER | Age: 68
End: 2018-03-06
Attending: INTERNAL MEDICINE
Payer: MEDICARE

## 2018-03-06 ENCOUNTER — TELEPHONE (OUTPATIENT)
Dept: INFECTIOUS DISEASES | Facility: MEDICAL CENTER | Age: 68
End: 2018-03-06

## 2018-03-06 VITALS
TEMPERATURE: 97.6 F | DIASTOLIC BLOOD PRESSURE: 57 MMHG | HEART RATE: 82 BPM | RESPIRATION RATE: 18 BRPM | SYSTOLIC BLOOD PRESSURE: 108 MMHG | OXYGEN SATURATION: 100 %

## 2018-03-06 PROCEDURE — 700111 HCHG RX REV CODE 636 W/ 250 OVERRIDE (IP): Performed by: INTERNAL MEDICINE

## 2018-03-06 PROCEDURE — 700105 HCHG RX REV CODE 258: Performed by: INTERNAL MEDICINE

## 2018-03-06 PROCEDURE — 96365 THER/PROPH/DIAG IV INF INIT: CPT

## 2018-03-06 PROCEDURE — 306780 HCHG STAT FOR TRANSFUSION PER CASE

## 2018-03-06 RX ADMIN — SODIUM CHLORIDE 1000 MG: 900 INJECTION INTRAVENOUS at 15:34

## 2018-03-06 ASSESSMENT — PAIN SCALES - GENERAL: PAINLEVEL: NO PAIN

## 2018-03-06 NOTE — TELEPHONE ENCOUNTER
Pt is calling and asking for the results of his blood work. He is also asking if he is able to get the PICC line out today. Please advise. Thank you. DAWN

## 2018-03-06 NOTE — PROGRESS NOTES
Pt returned to Infusion for Invanz infusion; reports no changes or concerns. PICC line flushed with brisk blood return.   Invanz infused as ordered.  Labs drawn as ordered. PICC line flushed.  Gauze dressing applied and assisted patient with dressing for shower when he discharged home.  Pt discharged home in good spirits under no apparent distress.

## 2018-03-06 NOTE — TELEPHONE ENCOUNTER
Called and spoke with pt. All information/instructions were given. Pt was very excited and will comply with all given. DAWN

## 2018-03-07 LAB
FUNGUS SPEC CULT: NORMAL
SIGNIFICANT IND 70042: NORMAL
SITE SITE: NORMAL
SOURCE SOURCE: NORMAL

## 2018-03-07 NOTE — PROGRESS NOTES
Pt returned to Infusion for final Invanz infusion; reports no changes or concerns. PICC line flushed with brisk blood return.   Invanz infused as ordered.  PICC line flushed.  PICC line removed, measured 45cm out, same as insertion length. Gauze pressure dressing applied.  Monitored for 30mins without any s/s intolerance.  Dressing dry and intact.  Reviewed patients PICC removal care instructions. No further appts at this time. Pt discharged home in good spirits under no apparent distress.

## 2018-03-09 ENCOUNTER — HOSPITAL ENCOUNTER (OUTPATIENT)
Facility: MEDICAL CENTER | Age: 68
End: 2018-03-09
Payer: MEDICARE

## 2018-03-09 ENCOUNTER — HOSPITAL ENCOUNTER (INPATIENT)
Facility: MEDICAL CENTER | Age: 68
LOS: 3 days | DRG: 493 | End: 2018-03-12
Attending: ORTHOPAEDIC SURGERY | Admitting: ORTHOPAEDIC SURGERY
Payer: MEDICARE

## 2018-03-09 ENCOUNTER — APPOINTMENT (OUTPATIENT)
Dept: RADIOLOGY | Facility: MEDICAL CENTER | Age: 68
DRG: 493 | End: 2018-03-09
Attending: ORTHOPAEDIC SURGERY
Payer: MEDICARE

## 2018-03-09 ENCOUNTER — HOSPITAL ENCOUNTER (OUTPATIENT)
Facility: MEDICAL CENTER | Age: 68
End: 2018-03-09
Attending: ORTHOPAEDIC SURGERY
Payer: MEDICARE

## 2018-03-09 DIAGNOSIS — M75.122 COMPLETE ROTATOR CUFF TEAR OF LEFT SHOULDER: ICD-10-CM

## 2018-03-09 LAB
APPEARANCE FLD: NORMAL
BODY FLD TYPE: NORMAL
COLOR FLD: NORMAL
CRYSTALS FLD MICRO: NORMAL
CSF COMMENTS 1658: NORMAL
GRAM STN SPEC: NORMAL
LYMPHOCYTES NFR FLD: 3 %
MONONUC CELLS NFR FLD: 3 %
NEUTROPHILS NFR FLD: 94 %
RBC # FLD: NORMAL CELLS/UL
SIGNIFICANT IND 70042: NORMAL
SITE SITE: NORMAL
SOURCE SOURCE: NORMAL
WBC # FLD: NORMAL CELLS/UL

## 2018-03-09 PROCEDURE — 700101 HCHG RX REV CODE 250

## 2018-03-09 PROCEDURE — 700111 HCHG RX REV CODE 636 W/ 250 OVERRIDE (IP): Performed by: ORTHOPAEDIC SURGERY

## 2018-03-09 PROCEDURE — 89051 BODY FLUID CELL COUNT: CPT

## 2018-03-09 PROCEDURE — 700101 HCHG RX REV CODE 250: Performed by: ORTHOPAEDIC SURGERY

## 2018-03-09 PROCEDURE — 770006 HCHG ROOM/CARE - MED/SURG/GYN SEMI*

## 2018-03-09 PROCEDURE — 89060 EXAM SYNOVIAL FLUID CRYSTALS: CPT

## 2018-03-09 PROCEDURE — 700105 HCHG RX REV CODE 258: Performed by: ORTHOPAEDIC SURGERY

## 2018-03-09 PROCEDURE — 87205 SMEAR GRAM STAIN: CPT

## 2018-03-09 PROCEDURE — 700102 HCHG RX REV CODE 250 W/ 637 OVERRIDE(OP): Performed by: ORTHOPAEDIC SURGERY

## 2018-03-09 PROCEDURE — A9270 NON-COVERED ITEM OR SERVICE: HCPCS | Performed by: ORTHOPAEDIC SURGERY

## 2018-03-09 PROCEDURE — 73221 MRI JOINT UPR EXTREM W/O DYE: CPT | Mod: LT

## 2018-03-09 PROCEDURE — 87070 CULTURE OTHR SPECIMN AEROBIC: CPT

## 2018-03-09 PROCEDURE — 700111 HCHG RX REV CODE 636 W/ 250 OVERRIDE (IP)

## 2018-03-09 RX ORDER — DIAZEPAM 5 MG/1
5 TABLET ORAL
Status: COMPLETED | OUTPATIENT
Start: 2018-03-09 | End: 2018-03-09

## 2018-03-09 RX ORDER — OXYCODONE HYDROCHLORIDE 5 MG/1
5 TABLET ORAL EVERY 4 HOURS PRN
Status: DISCONTINUED | OUTPATIENT
Start: 2018-03-09 | End: 2018-03-12 | Stop reason: HOSPADM

## 2018-03-09 RX ORDER — DEXTROSE MONOHYDRATE, SODIUM CHLORIDE, AND POTASSIUM CHLORIDE 50; 1.49; 4.5 G/1000ML; G/1000ML; G/1000ML
INJECTION, SOLUTION INTRAVENOUS CONTINUOUS
Status: DISCONTINUED | OUTPATIENT
Start: 2018-03-09 | End: 2018-03-12 | Stop reason: HOSPADM

## 2018-03-09 RX ORDER — PROMETHAZINE HYDROCHLORIDE 25 MG/1
12.5-25 TABLET ORAL EVERY 6 HOURS PRN
Status: DISCONTINUED | OUTPATIENT
Start: 2018-03-09 | End: 2018-03-12 | Stop reason: HOSPADM

## 2018-03-09 RX ORDER — MORPHINE SULFATE 4 MG/ML
2-5 INJECTION, SOLUTION INTRAMUSCULAR; INTRAVENOUS
Status: DISCONTINUED | OUTPATIENT
Start: 2018-03-09 | End: 2018-03-12 | Stop reason: HOSPADM

## 2018-03-09 RX ORDER — ONDANSETRON 2 MG/ML
4 INJECTION INTRAMUSCULAR; INTRAVENOUS EVERY 8 HOURS PRN
Status: DISCONTINUED | OUTPATIENT
Start: 2018-03-09 | End: 2018-03-12 | Stop reason: HOSPADM

## 2018-03-09 RX ORDER — OXYCODONE HYDROCHLORIDE 10 MG/1
10 TABLET ORAL EVERY 4 HOURS PRN
Status: DISCONTINUED | OUTPATIENT
Start: 2018-03-09 | End: 2018-03-12 | Stop reason: HOSPADM

## 2018-03-09 RX ADMIN — POTASSIUM CHLORIDE, DEXTROSE MONOHYDRATE AND SODIUM CHLORIDE: 150; 5; 450 INJECTION, SOLUTION INTRAVENOUS at 19:00

## 2018-03-09 RX ADMIN — VANCOMYCIN HYDROCHLORIDE 1700 MG: 1 INJECTION, POWDER, LYOPHILIZED, FOR SOLUTION INTRAVENOUS at 19:53

## 2018-03-09 RX ADMIN — DIAZEPAM 5 MG: 5 TABLET ORAL at 19:53

## 2018-03-09 RX ADMIN — THERA TABS 1 TABLET: TAB at 19:53

## 2018-03-09 ASSESSMENT — PAIN SCALES - GENERAL: PAINLEVEL_OUTOF10: 0

## 2018-03-09 NOTE — H&P
CHIEF COMPLAINT:  Left shoulder infection.    HISTORY OF PRESENT ILLNESS:  Patient had a rotator cuff repair in October, was   doing relatively well until later in January early February when he developed   an unfortunate postoperative septic arthritis.  He had an urgent irrigation   and debridement, was in the hospital, grew out methicillin-sensitive Staph   aureus.  A PICC line was started and he was placed on IV antibiotics per Dr. Rosario.  He was switched to oral antibiotics about a week ago and he developed   some increasing symptoms and swelling.  We aspirated it today in the office   that looked like purulence.  At that point, we sent that to [facility] for   evaluation.  The patient otherwise feeling relatively well, not having a lot   of shoulder pain.    PAST MEDICAL HISTORY:  1.  Left shoulder infection.  2.  Multiple urologic surgeries and issues.  3.  Left pec major rupture.  4.  Left AC joint separation.  ,   PAST SURGICAL HISTORY:  Left shoulder rotator cuff repair in October 2017.    Left shoulder I and D on 02/06/2018.    MEDICATIONS:  Bactrim double strength.    ALLERGIES:  No known drug allergies.    REVIEW OF SYSTEMS:  Patient had slightly high fever for him, but otherwise has   felt relatively well.  His overall fatigue that early had waxed and waned   over the last week.    PHYSICAL EXAMINATION:  GENERAL:  Patient appears stated age.  PSYCHIATRIC:  Mood is appropriate.  He is alert and oriented.  HEENT:  Normal.  LUNGS:  Respirations unlabored.  MUSCULOSKELETAL:  Reveals some swelling in that left shoulder.  It was   aspirated today in the office and he got better.  There was a little bit of   purulent aspiration.  His arms neurovascularly intact.  Incisions are all dry.    IMPRESSION:  Left shoulder septic arthritis with recurrence.    PLAN:  At this point, I will get an MRI to make sure there is no evidence of   osteomyelitis or other abscess.  He is on the schedule tomorrow for a left    kitty Car.  The patient will be admitted to the hospital today around 1   o'clock.       ____________________________________     MD EDIN PHAM / BECCA    DD:  03/09/2018 12:24:05  DT:  03/09/2018 13:20:27    D#:  [id number]  Job#:  [id number]

## 2018-03-09 NOTE — PROGRESS NOTES
Direct admit by Dr. Daniel for Left shoulder septic arthritis.  ADT signed & held on 3/9/2018 @ 6680, needs to be released upon pt arrival.  Written orders with patient to be transcribed into EPIC and fax to Pharmacy.  Pt coming by private vehicle.

## 2018-03-10 ENCOUNTER — APPOINTMENT (OUTPATIENT)
Dept: RADIOLOGY | Facility: MEDICAL CENTER | Age: 68
DRG: 493 | End: 2018-03-10
Attending: ORTHOPAEDIC SURGERY
Payer: MEDICARE

## 2018-03-10 LAB
ANION GAP SERPL CALC-SCNC: 6 MMOL/L (ref 0–11.9)
BUN SERPL-MCNC: 12 MG/DL (ref 8–22)
CALCIUM SERPL-MCNC: 8.5 MG/DL (ref 8.4–10.2)
CHLORIDE SERPL-SCNC: 108 MMOL/L (ref 96–112)
CO2 SERPL-SCNC: 24 MMOL/L (ref 20–33)
CREAT SERPL-MCNC: 0.61 MG/DL (ref 0.5–1.4)
CRP SERPL HS-MCNC: 2.48 MG/DL (ref 0–0.75)
ERYTHROCYTE [DISTWIDTH] IN BLOOD BY AUTOMATED COUNT: 44.7 FL (ref 35.9–50)
ERYTHROCYTE [SEDIMENTATION RATE] IN BLOOD BY WESTERGREN METHOD: 52 MM/HOUR (ref 0–20)
GLUCOSE SERPL-MCNC: 95 MG/DL (ref 65–99)
GRAM STN SPEC: NORMAL
HCT VFR BLD AUTO: 34.4 % (ref 42–52)
HGB BLD-MCNC: 11.7 G/DL (ref 14–18)
MCH RBC QN AUTO: 31.5 PG (ref 27–33)
MCHC RBC AUTO-ENTMCNC: 34 G/DL (ref 33.7–35.3)
MCV RBC AUTO: 92.7 FL (ref 81.4–97.8)
PLATELET # BLD AUTO: 318 K/UL (ref 164–446)
PMV BLD AUTO: 9.6 FL (ref 9–12.9)
POTASSIUM SERPL-SCNC: 4.1 MMOL/L (ref 3.6–5.5)
RBC # BLD AUTO: 3.71 M/UL (ref 4.7–6.1)
SIGNIFICANT IND 70042: NORMAL
SITE SITE: NORMAL
SODIUM SERPL-SCNC: 138 MMOL/L (ref 135–145)
SOURCE SOURCE: NORMAL
WBC # BLD AUTO: 4.7 K/UL (ref 4.8–10.8)

## 2018-03-10 PROCEDURE — 160036 HCHG PACU - EA ADDL 30 MINS PHASE I: Performed by: ORTHOPAEDIC SURGERY

## 2018-03-10 PROCEDURE — 700105 HCHG RX REV CODE 258: Performed by: INTERNAL MEDICINE

## 2018-03-10 PROCEDURE — 87077 CULTURE AEROBIC IDENTIFY: CPT

## 2018-03-10 PROCEDURE — 501838 HCHG SUTURE GENERAL: Performed by: ORTHOPAEDIC SURGERY

## 2018-03-10 PROCEDURE — 500367 HCHG DRAIN KIT, HEMOVAC: Performed by: ORTHOPAEDIC SURGERY

## 2018-03-10 PROCEDURE — A6402 STERILE GAUZE <= 16 SQ IN: HCPCS | Performed by: ORTHOPAEDIC SURGERY

## 2018-03-10 PROCEDURE — 500028 HCHG ARTHROWAND TURBOVAC 3.5/90 SUCT.: Performed by: ORTHOPAEDIC SURGERY

## 2018-03-10 PROCEDURE — 36415 COLL VENOUS BLD VENIPUNCTURE: CPT

## 2018-03-10 PROCEDURE — 85652 RBC SED RATE AUTOMATED: CPT

## 2018-03-10 PROCEDURE — 87205 SMEAR GRAM STAIN: CPT

## 2018-03-10 PROCEDURE — 85027 COMPLETE CBC AUTOMATED: CPT

## 2018-03-10 PROCEDURE — 86140 C-REACTIVE PROTEIN: CPT

## 2018-03-10 PROCEDURE — 700102 HCHG RX REV CODE 250 W/ 637 OVERRIDE(OP)

## 2018-03-10 PROCEDURE — 0PBD0ZZ EXCISION OF LEFT HUMERAL HEAD, OPEN APPROACH: ICD-10-PCS | Performed by: ORTHOPAEDIC SURGERY

## 2018-03-10 PROCEDURE — 700101 HCHG RX REV CODE 250

## 2018-03-10 PROCEDURE — A9270 NON-COVERED ITEM OR SERVICE: HCPCS | Performed by: ORTHOPAEDIC SURGERY

## 2018-03-10 PROCEDURE — A4565 SLINGS: HCPCS | Performed by: ORTHOPAEDIC SURGERY

## 2018-03-10 PROCEDURE — 160009 HCHG ANES TIME/MIN: Performed by: ORTHOPAEDIC SURGERY

## 2018-03-10 PROCEDURE — 700111 HCHG RX REV CODE 636 W/ 250 OVERRIDE (IP): Performed by: INTERNAL MEDICINE

## 2018-03-10 PROCEDURE — 160048 HCHG OR STATISTICAL LEVEL 1-5: Performed by: ORTHOPAEDIC SURGERY

## 2018-03-10 PROCEDURE — 0RPK04Z REMOVAL OF INTERNAL FIXATION DEVICE FROM LEFT SHOULDER JOINT, OPEN APPROACH: ICD-10-PCS | Performed by: ORTHOPAEDIC SURGERY

## 2018-03-10 PROCEDURE — 700111 HCHG RX REV CODE 636 W/ 250 OVERRIDE (IP)

## 2018-03-10 PROCEDURE — 0RBK0ZZ EXCISION OF LEFT SHOULDER JOINT, OPEN APPROACH: ICD-10-PCS | Performed by: ORTHOPAEDIC SURGERY

## 2018-03-10 PROCEDURE — 160035 HCHG PACU - 1ST 60 MINS PHASE I: Performed by: ORTHOPAEDIC SURGERY

## 2018-03-10 PROCEDURE — 160027 HCHG SURGERY MINUTES - 1ST 30 MINS LEVEL 2: Performed by: ORTHOPAEDIC SURGERY

## 2018-03-10 PROCEDURE — 160038 HCHG SURGERY MINUTES - EA ADDL 1 MIN LEVEL 2: Performed by: ORTHOPAEDIC SURGERY

## 2018-03-10 PROCEDURE — 87102 FUNGUS ISOLATION CULTURE: CPT

## 2018-03-10 PROCEDURE — 770006 HCHG ROOM/CARE - MED/SURG/GYN SEMI*

## 2018-03-10 PROCEDURE — 502581 HCHG PACK, SHOULDER ARTHROSCOPY: Performed by: ORTHOPAEDIC SURGERY

## 2018-03-10 PROCEDURE — 80048 BASIC METABOLIC PNL TOTAL CA: CPT

## 2018-03-10 PROCEDURE — 87075 CULTR BACTERIA EXCEPT BLOOD: CPT

## 2018-03-10 PROCEDURE — 700101 HCHG RX REV CODE 250: Performed by: INTERNAL MEDICINE

## 2018-03-10 PROCEDURE — 700111 HCHG RX REV CODE 636 W/ 250 OVERRIDE (IP): Performed by: ORTHOPAEDIC SURGERY

## 2018-03-10 PROCEDURE — 500881 HCHG PACK, EXTREMITY: Performed by: ORTHOPAEDIC SURGERY

## 2018-03-10 PROCEDURE — A4450 NON-WATERPROOF TAPE: HCPCS | Performed by: ORTHOPAEDIC SURGERY

## 2018-03-10 PROCEDURE — 700102 HCHG RX REV CODE 250 W/ 637 OVERRIDE(OP): Performed by: ORTHOPAEDIC SURGERY

## 2018-03-10 PROCEDURE — 160002 HCHG RECOVERY MINUTES (STAT): Performed by: ORTHOPAEDIC SURGERY

## 2018-03-10 PROCEDURE — 700101 HCHG RX REV CODE 250: Performed by: ORTHOPAEDIC SURGERY

## 2018-03-10 PROCEDURE — A6222 GAUZE <=16 IN NO W/SAL W/O B: HCPCS | Performed by: ORTHOPAEDIC SURGERY

## 2018-03-10 PROCEDURE — A9270 NON-COVERED ITEM OR SERVICE: HCPCS

## 2018-03-10 PROCEDURE — 700105 HCHG RX REV CODE 258: Performed by: ORTHOPAEDIC SURGERY

## 2018-03-10 PROCEDURE — 87070 CULTURE OTHR SPECIMN AEROBIC: CPT

## 2018-03-10 RX ORDER — HYDROMORPHONE HYDROCHLORIDE 2 MG/ML
INJECTION, SOLUTION INTRAMUSCULAR; INTRAVENOUS; SUBCUTANEOUS
Status: COMPLETED
Start: 2018-03-10 | End: 2018-03-10

## 2018-03-10 RX ORDER — DIAZEPAM 5 MG/1
5 TABLET ORAL
Status: DISCONTINUED | OUTPATIENT
Start: 2018-03-10 | End: 2018-03-12 | Stop reason: HOSPADM

## 2018-03-10 RX ORDER — SODIUM CHLORIDE, SODIUM LACTATE, POTASSIUM CHLORIDE, CALCIUM CHLORIDE 600; 310; 30; 20 MG/100ML; MG/100ML; MG/100ML; MG/100ML
INJECTION, SOLUTION INTRAVENOUS
Status: DISCONTINUED | OUTPATIENT
Start: 2018-03-10 | End: 2018-03-11

## 2018-03-10 RX ORDER — OXYCODONE HCL 5 MG/5 ML
SOLUTION, ORAL ORAL
Status: COMPLETED
Start: 2018-03-10 | End: 2018-03-10

## 2018-03-10 RX ORDER — OXYCODONE HYDROCHLORIDE AND ACETAMINOPHEN 5; 325 MG/1; MG/1
TABLET ORAL
Status: COMPLETED
Start: 2018-03-10 | End: 2018-03-10

## 2018-03-10 RX ORDER — OXYCODONE HYDROCHLORIDE 5 MG/1
5 TABLET ORAL EVERY 6 HOURS PRN
Qty: 40 TAB | Refills: 0 | Status: SHIPPED | OUTPATIENT
Start: 2018-03-10 | End: 2018-03-14

## 2018-03-10 RX ADMIN — POTASSIUM CHLORIDE, DEXTROSE MONOHYDRATE AND SODIUM CHLORIDE: 150; 5; 450 INJECTION, SOLUTION INTRAVENOUS at 16:54

## 2018-03-10 RX ADMIN — WATER 2 G: 1 INJECTION INTRAMUSCULAR; INTRAVENOUS; SUBCUTANEOUS at 09:43

## 2018-03-10 RX ADMIN — FENTANYL CITRATE 50 MCG: 50 INJECTION, SOLUTION INTRAMUSCULAR; INTRAVENOUS at 11:14

## 2018-03-10 RX ADMIN — OXYCODONE HYDROCHLORIDE AND ACETAMINOPHEN 2 TABLET: 5; 325 TABLET ORAL at 11:18

## 2018-03-10 RX ADMIN — FENTANYL CITRATE 50 MCG: 50 INJECTION, SOLUTION INTRAMUSCULAR; INTRAVENOUS at 11:47

## 2018-03-10 RX ADMIN — OXYCODONE HYDROCHLORIDE 10 MG: 10 TABLET ORAL at 15:07

## 2018-03-10 RX ADMIN — FENTANYL CITRATE 50 MCG: 50 INJECTION, SOLUTION INTRAMUSCULAR; INTRAVENOUS at 11:23

## 2018-03-10 RX ADMIN — MORPHINE SULFATE 4 MG: 4 INJECTION INTRAVENOUS at 13:39

## 2018-03-10 RX ADMIN — PIPERACILLIN AND TAZOBACTAM 3.38 G: 3; .375 INJECTION, POWDER, LYOPHILIZED, FOR SOLUTION INTRAVENOUS; PARENTERAL at 21:00

## 2018-03-10 RX ADMIN — FENTANYL CITRATE 50 MCG: 50 INJECTION, SOLUTION INTRAMUSCULAR; INTRAVENOUS at 11:37

## 2018-03-10 RX ADMIN — PIPERACILLIN AND TAZOBACTAM 3.38 G: 3; .375 INJECTION, POWDER, LYOPHILIZED, FOR SOLUTION INTRAVENOUS; PARENTERAL at 13:53

## 2018-03-10 RX ADMIN — VANCOMYCIN HYDROCHLORIDE 1000 MG: 1 INJECTION, POWDER, LYOPHILIZED, FOR SOLUTION INTRAVENOUS at 06:51

## 2018-03-10 RX ADMIN — PIPERACILLIN AND TAZOBACTAM 3.38 G: 3; .375 INJECTION, POWDER, LYOPHILIZED, FOR SOLUTION INTRAVENOUS; PARENTERAL at 16:54

## 2018-03-10 RX ADMIN — SODIUM CHLORIDE, SODIUM LACTATE, POTASSIUM CHLORIDE, CALCIUM CHLORIDE: 600; 310; 30; 20 INJECTION, SOLUTION INTRAVENOUS at 09:20

## 2018-03-10 ASSESSMENT — PAIN SCALES - GENERAL
PAINLEVEL_OUTOF10: 5
PAINLEVEL_OUTOF10: 7
PAINLEVEL_OUTOF10: 0
PAINLEVEL_OUTOF10: ASSUMED PAIN PRESENT
PAINLEVEL_OUTOF10: 8
PAINLEVEL_OUTOF10: 6
PAINLEVEL_OUTOF10: 5
PAINLEVEL_OUTOF10: 10
PAINLEVEL_OUTOF10: 5
PAINLEVEL_OUTOF10: 10

## 2018-03-10 ASSESSMENT — LIFESTYLE VARIABLES
EVER_SMOKED: NEVER
ALCOHOL_USE: NO

## 2018-03-10 NOTE — PROGRESS NOTES
Pharmacy Kinetics 67 y.o. male on vancomycin day # 2 3/10/2018    Currently on Vancomycin 1000 mg IV every 12 hours    Indication for Treatment: Septic arthritis of Left shoulder    Pertinent history per medical record: Admitted on 3/9/2018 for septic arthritis of left shoulder.  I&D 18 was positive for MSSA per ID note.  Was on Ancef as inpatient then dapto as outpatient.  Also Invanz.    Other antibiotics: none    Allergies: Patient has no known allergies.     List concerns for renal function none    Pertinent cultures to date:   Left shoulder synovial fluid: Moderate WBC's; some rare gram neg rods    Recent Labs      03/10/18   0425   WBC  4.7*     Recent Labs      03/10/18   0425   BUN  12   CREATININE  0.61       Intake/Output Summary (Last 24 hours) at 03/10/18 0649  Last data filed at 03/10/18 0600   Gross per 24 hour   Intake              825 ml   Output             1525 ml   Net             -700 ml      Blood pressure 114/57, pulse (!) 52, temperature 36.6 °C (97.9 °F), resp. rate 18, weight 68.3 kg (150 lb 9.2 oz), SpO2 98 %. Temp (24hrs), Av.7 °C (98.1 °F), Min:36.6 °C (97.9 °F), Max:36.8 °C (98.3 °F)      A/P   1. Vancomycin dose change: Not indicated at this time  2. Next vancomycin level: 3/11/18 @ 0630  3. Goal trough: 12-16 mcg/ml  4. Comments: Will continue to follow and adjust dosage per Pharmacy Protocol.  Plan to discuss antibiotic selection with physicians on rounds.  Await culture results.    Janel Gross, Pharmacy Intern  Gary Evans, PharmD BCPS

## 2018-03-10 NOTE — PROGRESS NOTES
Received report from night RN.  Assumed pt care.  Pt is alert and oriented.  Updated pt w/ POC.  No other needs at this time.  Will continue with care.

## 2018-03-10 NOTE — OP REPORT
DATE OF SERVICE:  03/10/2018    SURGEON:  Sergio Daniel MD    ASSISTANT:  None.    PREOPERATIVE DIAGNOSES:  1.  Left shoulder septic arthritis.  2.  Left shoulder retained hardware.    POSTOPERATIVE DIAGNOSES:  1.  Left shoulder septic arthritis.  2.  Left proximal humerus possible osteomyelitis.  3.  Left shoulder retained hardware.    PROCEDURE:  1.  Left shoulder open irrigation and debridement of infected joint.  2.  Left humerus opening of bone cortex and curettage.  2.  Left shoulder hardware removal.    ANESTHESIOLOGIST:  Carlos Acuna MD    ANESTHETIC:  General endotracheal anesthesia.    INDICATIONS:  Patient unfortunately had a rotator cuff repair back in October   that got subsequently infected in February and I and D'd with an IV   antibiotics.  When he went off IV antibiotics, he had recurrence.  We would   like to take him back for surgical intervention.  He was explained the risks,   benefits, alternative of the procedure and recovery in detail.  All questions   were answered and informed consent was obtained.  Site verification per   protocol was done in the preop holding area and in the operating room.    Patient received appropriate preoperative antibiotics.    OPERATION:  After successful anesthesia, he was carefully placed in modified   beach chair position.  All bony prominences were well padded and supported.    His head and neck were maintained in neutral position.  His arm was then   prepped and draped in the usual sterile fashion.  I made a deltopectoral   incision anteriorly.  I identified the cephalic vein and retracted that   laterally.  He did have a sealed area underneath the deltoid that was not   infected.  Then once I cut into the rotator interval, there was gross   purulence.  The subscap was intact.  I debrided the joint anteriorly,   posteriorly, and laterally. The debridement was done with a scalpel, rongeur, and curettes. I also removed some devitalized tissue with the  luz. I was able to identify three of the Q-Fix   anchors, then I had removed them in their entirety and pulled those out and   curetted the bone.  I also removed the lateral Multifix anchor and suture.    All sutures were removed along with the anchors.  I curettaged and debrided   the bone with a curette and rongeur then irrigated out multiple times with dilute Betadine solution and   let it sit there for 3 minutes multiple times.  I then irrigated that all out.    I then placed 2 g of vancomycin powder into the bone and around the shoulder   and then placed 2 Hemovac drains.  I then closed the skin with 3-0 nylon in   interrupted fashion and placed sterile dressings.  Patient was placed in sling   and transferred to recovery room in stable condition.  The drains are working   well at the time of transfer to the recovery room.    ESTIMATED BLOOD LOSS:  About 100 mL    COMPLICATIONS:  None.    FINDINGS:  Gross purulence.    SPECIMENS:  None.    HARDWARE REMOVED:  Three [employer] Q-Fix 2.8 and 1 Multifix.       ____________________________________     MD EDIN PHAM / BECAC    DD:  03/10/2018 11:01:32  DT:  03/10/2018 12:08:52    D#:  [id number]  Job#:  [id number]

## 2018-03-10 NOTE — OR NURSING
1100: To PACU from OR via bed, respirations spontaneous and non-laboredIcepack applied over c/d/i L shoulder surgical dressings.  1115: Pain not tolerable, pain medication given per MAR, no nausea.  1120: Oral pain medication given per MAR, still no nausea.   1135: Pain still not tolerable, medication given per MAR, no nausea.   1145: Pain is more tolerable, no nausea, tolerating decrease in supplemental oxygen, sleepy, but arouses to voice, A&O x 4, tolerating sips of water.  1150: Pain is not quite tolerable, pain medication given per MAR, no nausea.  1205: Pain is tolerable, no nausea, still tolerating low supplemental O2. No change in surgical site assessment. Meets criteria to transfer to floor, report called to Lorenzo, RN and pt readied for transfer.

## 2018-03-10 NOTE — PROGRESS NOTES
JUSTIN Crook in with pt for CHG bath. Pt refusing to have CHG bath done on his legs and refusing to take off his sweat pants even after education regarding importance of the CHG baths. CHG bath done to torso and upper extremities only, pt continues to refuse CHG baths to lower extremities.

## 2018-03-10 NOTE — CONSULTS
"INFECTIOUS DISEASES INPATIENT CONSULT NOTE     Date of Service: 3/10/2018    Consult Requested By: Sergio Daniel M.D.    Reason for Consultation: septic shoulder    History of Present Illness:   Sebastien Horn is a 67 y.o. male admitted 3/9/2018. Known to ID service from admission last month.  History of prostate cancer s/p prostatectomy, penile clamp, hyperparathyroidism s/p parathyroidectomy, right shoulder rotator cuff repair surgery and left rotator cuff repair in October 2017.  Hospitalized from 2/6- 2/9/18, admitted for left shoulder pain, swelling and erythema.  Found to have left shoulder septic arthritis.  Underwent Left shoulder I&D and arthroscopy with removal of retained implants on 2/6 with Dr. Daniel- purulence noted intraoperatively.  OR cx +MSSA.  Pt discharged home on IV Daptomycin, end date 3/6/18.  Due to increasing fatigue, anorexia, and malaise on daptomycin, he was changed to ertapenem.  At that time it was noted that he had developed drainage from the surgical site. He saw ortho and underwent a washout in the clinic with resolution of the drainage.     Seen again in clinic on 3/5/2018: Felt \"quantifiabley better\" since changing over to the IV Ertapenem. Seen by Dr. Daniel on 3/1 who was pleased with the pt's progress.  + soreness around the shoulder, mostly with ROM.   Swelling around the shoulder has improved.  Pain improved with rest, has not taken any pain meds since the Daptomycin was stopped.  There is a pinhole along the surgical incision, trace yellowish drainage, no odor, trace redness- covering with a band aid. Given Bactrim    Admitted now due to increased drainage after stopping IV antibiotics. States no pain-went for a walk and felt fine, but when he took off his karolyn shirt he noticed a golf ball size swelling. No fever or other illnesses.  No trauma. Now s/p left open irrigation and debridement of infected joint with hardware removal.  On vancomycin and Infectious Diseases " consulted for antibiotic recommendation and management      Review Of Systems:  All other systems are reviewed and are negative     PMH:   Past Medical History:   Diagnosis Date   • Arthritis     knees   • Cancer ([facility]) 07/13    Prostate   • Disorder of thyroid    • Heart valve disease     Mild mitral valve problem - per patient cardiologist is not concerned at this time.   • Hiatus hernia syndrome    • Unspecified cataract     Bilateral IOL's   • Urinary incontinence          PSH:  Past Surgical History:   Procedure Laterality Date   • SHOULDER ARTHROSCOPY Left 2/6/2018    Procedure: SHOULDER ARTHROSCOPY;  Surgeon: Sergio Daniel M.D.;  Location: [facility];  Service: Orthopedics   • IRRIGATION & DEBRIDEMENT ORTHO Left 2/6/2018    Procedure: IRRIGATION & DEBRIDEMENT ORTHO- SHOULDER  ;  Surgeon: Sergio Daniel M.D.;  Location: [facility];  Service: Orthopedics   • FOREIGN BODY REMOVAL Left 2/6/2018    Procedure: FOREIGN BODY REMOVAL;  Surgeon: Sergio Daniel M.D.;  Location: [facility];  Service: Orthopedics   • SHOULDER ARTHROSCOPY W/ ROTATOR CUFF REPAIR Left 10/18/2017    Procedure: SHOULDER ARTHROSCOPY W/ ROTATOR CUFF REPAIR;  Surgeon: Sergio Daniel M.D.;  Location: [facility];  Service: Orthopedics   • SHOULDER DECOMPRESSION ARTHROSCOPIC Left 10/18/2017    Procedure: SHOULDER DECOMPRESSION ARTHROSCOPIC- SUBACROMIAL;  Surgeon: Sergio Daniel M.D.;  Location: [facility];  Service: Orthopedics   • BICEPS TENODESIS Left 10/18/2017    Procedure: BICEPS TENODESIS;  Surgeon: Sergio Daniel M.D.;  Location: [facility];  Service: Orthopedics   • PARATHYROIDECTOMY N/A 7/5/2017    Procedure: PARATHYROIDECTOMY - MINIMALLY INVASIVE W/RECURRENT LARYNGEAL NERVE MONITORING;  Surgeon: Josh Blank M.D.;  Location: SURGERY SAME DAY [facility];  Service:    • SPHINCTER PROSTHESIS REMOVAL  3/28/2017    Procedure: URINARY SPHINCTER  PROSTHESIS REMOVAL;  Surgeon: Benigno Grier M.D.;  Location: [facility];  Service:    • CYSTOSCOPY  3/28/2017    Procedure: CYSTOSCOPY;  Surgeon: Benigno Grier M.D.;  Location: [facility];  Service:    • EVACUATION OF HEMATOMA  3/21/2017    Procedure: EVACUATION OF HEMATOMA-CYSTO CLOT EVACUATION AND SMALL CYSTOSCOPE;  Surgeon: Frank Lamas M.D.;  Location: [facility];  Service:    • CYSTOSCOPY  3/21/2017    Procedure: CYSTOSCOPY;  Surgeon: Frank Lamas M.D.;  Location: [facility];  Service:    • CYSTOSCOPY  3/20/2017    Procedure: CYSTOSCOPY -  FLEXIBLE;  Surgeon: Frank Lamas M.D.;  Location: [facility];  Service:    • SPHINCTER PROSTHESIS PLACEMENT  3/20/2017    Procedure: SPHINCTER PROSTHESIS PLACEMENT - ARTIFICIAL URINARY SPHINCTER;  Surgeon: Frank Lamas M.D.;  Location: [facility];  Service:    • PROSTATECTOMY, RADICAL RETRO  8/3/2015    Procedure: PROSTATECTOMY RADICAL RETROPUBIC;  Surgeon: Sage gNo M.D.;  Location: [facility];  Service:    • NODE DISSECTION Bilateral 8/3/2015    Procedure: NODE DISSECTION LYMPH;  Surgeon: Sage Ngo M.D.;  Location: [facility];  Service:    • KNEE ARTHROPLASTY TOTAL  7/3/2014    Performed by Theodore San M.D. at [facility]   • KNEE ARTHROSCOPY  7/3/2014    Performed by Theodore San M.D. at [facility]   • MENISCECTOMY  7/3/2014    Performed by Theodore San M.D. at [facility]   • KNEE REPLACEMENT, TOTAL Right 2014   • INGUINAL HERNIA REPAIR  6/1/2009    Performed by RISHI COOK at [facility]   • OTHER ORTHOPEDIC SURGERY  2003    L Shoulder Repair   • INGUINAL HERNIA LAPAROSCOPIC BILATERAL Bilateral    • OTHER ORTHOPEDIC SURGERY      R Rotator Cuff repair       FAMILY HX:  Family History   Problem Relation Age of Onset   • Hyperlipidemia Mother    • Hyperlipidemia Father    • Cancer  Father      Prostate and Lung   • Lung Disease Father    • Other Sister      Lupus       SOCIAL HX:  Social History     Social History   • Marital status:      Spouse name: N/A   • Number of children: N/A   • Years of education: N/A     Occupational History   • Not on file.     Social History Main Topics   • Smoking status: Never Smoker   • Smokeless tobacco: Never Used   • Alcohol use No   • Drug use: No   • Sexual activity: Not Currently     Other Topics Concern   • Not on file     Social History Narrative   • No narrative on file     History   Smoking Status   • Never Smoker   Smokeless Tobacco   • Never Used     History   Alcohol Use No       Allergies/Intolerances:  No Known Allergies    History reviewed with the patient    Other Current Medications:    Current Facility-Administered Medications:   •  lactated ringers infusion, , Intravenous, Intra-Op Continuous, Sergio Daniel M.D., Stopped at 03/10/18 1206  •  PICC Line Insertion has been implemented, , , Once **AND** May use Lidocaine 1% not to exceed 3 mls for local at insertion site, , , CONTINUOUS **AND** NOTIFY MD, , , Once **AND** Tip to dwell in the superior vena cava, , , CONTINUOUS **AND** Do not use PICC Line until placement verified by Chest X Ray, , , CONTINUOUS **AND** [CANCELED] DX-CHEST-FOR PICC LINE Perform procedure in: Patient's Room, , , Once **AND** If radiologist reading of chest X-ray states any of the following the PICC should be used, , , CONTINUOUS **AND** Further evaluation of the PICC placement can be retrieved from X-Ray and Imaging, , , CONTINUOUS **AND** Blood draws through PICC line; draws by RN only, , , CONTINUOUS **AND** FLUSHING GUIDELINES WHEN IN USE, , , CONTINUOUS **AND** normal saline PF 10-20 mL, 10-20 mL, Intravenous, PRN **AND** FLUSHING GUIDELINES WHEN NOT IN USE, , , CONTINUOUS **AND** DRESSING MAINTENANCE, , , Once **AND** Change needleless pressure ports and IV tubing every 72 hours per hospital policy, , ,  "CONTINUOUS **AND** TUBING, , , CONTINUOUS **AND** If there is an MD order to remove the PICC line, any RN may remove the PICC line, , , CONTINUOUS **AND** [] PATIENT EDUCATION MATERIALS, , , Prior to discharge **AND** NURSING COMMUNICATION, , , CONTINUOUS, Sergio Daniel M.D.  •  piperacillin-tazobactam (ZOSYN) 3.375 g in  mL IVPB, 3.375 g, Intravenous, Once **AND** piperacillin-tazobactam (ZOSYN) 3.375 g in  mL IVPB, 3.375 g, Intravenous, Q8HRS, Meghan Bateman M.D.  •  Pharmacy Consult Request ...Pain Management Review 1 Each, 1 Each, Other, PRN, Sergio Daniel M.D.  •  dextrose 5 % and 0.45 % NaCl with KCl 20 mEq, , Intravenous, Continuous, Sergio Daniel M.D., Last Rate: 75 mL/hr at 03/10/18 1206  •  oxyCODONE immediate-release (ROXICODONE) tablet 5 mg, 5 mg, Oral, Q4HRS PRN, Sergio Daniel M.D.  •  oxyCODONE immediate release (ROXICODONE) tablet 10 mg, 10 mg, Oral, Q4HRS PRN, Sergio Daniel M.D.  •  morphine (pf) 4 mg/ml injection 2-5 mg, 2-5 mg, Intravenous, Q2HRS PRN, Sergio Daniel M.D.  •  multivitamin (THERAGRAN) tablet 1 Tab, 1 Tab, Oral, DAILY, Sergio Daniel M.D., Stopped at 03/10/18 1000  •  ondansetron (ZOFRAN) syringe/vial injection 4 mg, 4 mg, Intravenous, Q8HRS PRN, Sergio Daniel M.D.  •  promethazine (PHENERGAN) tablet 12.5-25 mg, 12.5-25 mg, Oral, Q6HRS PRN, Sergio Daniel M.D.      Most Recent Vital Signs:  /66   Pulse 61   Temp 37.1 °C (98.8 °F)   Resp 16   Ht 1.803 m (5' 11\")   Wt 65.8 kg (145 lb)   SpO2 98%   BMI 20.22 kg/m²   Temp  Av.1 °C (98.7 °F)  Min: 36.6 °C (97.9 °F)  Max: 37.9 °C (100.2 °F)    Physical Exam:  General: well-appearing, well nourished no acute distress  HEENT: NCAT, PERRLA, sclera anicteric, PERRL, EOMI,  no oral lesions   Neck: supple, no lymphadenopathy  Chest: CTAB, unlabored.  Cardiac: RRR, normal S1 S2, no m/r/g   Abdomen: + bowel sounds, soft, non-tender, non-distended, no HSM  Extremities: No cyanosis, clubbing. no edema, " left shoulder dressed  Skin: no rashes   Neuro: Alert and oriented times 3, Speech fluent CN intact OWENS    Pertinent Lab Results:  Recent Labs      03/10/18   0425   WBC  4.7*      Recent Labs      03/10/18   0425   HEMOGLOBIN  11.7*   HEMATOCRIT  34.4*   MCV  92.7   MCH  31.5   PLATELETCT  318         Recent Labs      03/10/18   0425   SODIUM  138   POTASSIUM  4.1   CHLORIDE  108   CO2  24   CREATININE  0.61        No results for input(s): ALBUMIN in the last 72 hours.    Invalid input(s): AST, ALT, ALKPHOS, BILITOT, TOTALBILIRUB, BILIRUBINTOT, BILIRUBINDIR, BILIRUBININD, ALKALINEPHOS     Pertinent Micro:  Results     Procedure Component Value Units Date/Time    ANAEROBIC CULTURE [[id number]] Collected:  03/10/18 0952    Order Status:  Completed Specimen:  Other Updated:  03/10/18 1127    FUNGAL CULTURE [[id number]] Collected:  03/10/18 0952    Order Status:  Completed Specimen:  Other Updated:  03/10/18 1127    CULTURE WOUND W/ GRAM STAIN [[id number]] Collected:  03/10/18 0952    Order Status:  Completed Specimen:  Other Updated:  03/10/18 1127        Blood Culture Hold   Date Value Ref Range Status   03/28/2017 Collected  Final        Studies:    IMPRESSION:   Left shoulder septic arthritis       PLAN:   Left shoulder septic arthritis   Rotator cuff repair in Oct 2017  S/p I&D on 2/6. Purulence was noted intraoperatively. +MSSA  Completed 6 weeks IV abx (dapto then ertapenem)-developed sinus and swelling  S/p repeat I and D with hardware removal 3/10  Gram stain with GNR  Change to Zosyn  PICC ordered  Final abx per pathogen ID  Anticipate 6 weeks IV abx post-op    Leukopenia  Not neutropenic  Monitor    Plan of care discussed with CHATO Daniel M.D. Will continue to follow    Meghan Bateman M.D.

## 2018-03-10 NOTE — PROGRESS NOTES
Dr. Daniel contacted for questions about abx. Orders for vanco per pharm given as well as valium for sleep tonight.

## 2018-03-10 NOTE — OR NURSING
0859: Brought patient to pre-op and assumed care. MOMO Gr rcvd report from MOMO [place] on floor and [place]na gave me bedside report.  0922: Patient allergies and NPO status verified, home medication reconciliation completed and belongings secured. Patient verbalizes understanding of pain scale, expected course of stay and plan of care. Surgical site verified with patient. IV access established. Sequentials placed on legs.

## 2018-03-10 NOTE — PROGRESS NOTES
Pt to floor.  Pt drowsy but able to follow commands.  2 hemovac drains in place, dressing to left shoulder clean, dry and intact.  Sling in place.  Initial VSS.  Fluids infusing.  No c/o nausea.  Pain tolerable at this time.  Updated pt w/ POC.

## 2018-03-10 NOTE — PROGRESS NOTES
Admit profile completed & allergies verified. Surgical consent signed and placed on the chart. Pre-op check list done. Pt denies any needs at this time. Hourly rounds continue.

## 2018-03-10 NOTE — PROGRESS NOTES
Paged on call MD for orders.  Pain uncontrolled with current medications.      1526: Ary Lam returned phone call.  Orders for valium 5mg for sleep.  Instructed this RN to call anesthesiologist for block.      1528: Paged Dr. Acuna.  MD will contact this RN w/ updates if someone can block shoulder.

## 2018-03-10 NOTE — PROGRESS NOTES
Pharmacy Kinetics 67 y.o. male on vancomycin day # 1 3/9/2018    Currently on Vancomycin 1000 mg iv q12hr    Indication for Treatment: Septic arthritis of Left shoulder    Pertinent history per medical record: Admitted on 3/9/2018 for Septic arthritis of left shoulder.I&D on 2018 positive for GPC MSSA per ID note. Was on Ancef as inpatient, then Daptomycin as out patient. Also Invanz.     Other antibiotics: none    Allergies: Patient has no known allergies.     List concerns for renal function (possible concerns include abnormal LFTs, BUN/SCr ratio > 20:1, CHF, obesity, malnutrition/low albumin, hypermetabolic state (SIRS), pressors/hypotension, nephrotoxic drugs, etc.):     Pertinent cultures to date:   None available    No results for input(s): WBC, NEUTSPOLYS, BANDSSTABS in the last 72 hours.  No results for input(s): BUN, CREATININE, ALBUMIN in the last 72 hours.  No results for input(s): VANCOTROUGH, VANCOPEAK, VANCORANDOM in the last 72 hours.No intake or output data in the 24 hours ending 18 1842   Blood pressure 125/64, pulse 72, temperature 36.8 °C (98.3 °F), resp. rate 18, weight 68.3 kg (150 lb 9.2 oz), SpO2 97 %. Temp (24hrs), Av.8 °C (98.3 °F), Min:36.8 °C (98.3 °F), Max:36.8 °C (98.3 °F)      A/P   1. Vancomycin 1700 mg loading dose, followed by Vancomycin 1000 mg every 12 HR  2. Next vancomycin level: Vancomycin trough level due on 2018 at 0630.  3. Goal trough: 12-16 mcg/ml  4. Comments: Will continue to follow and adjust dosage per Pharmacy Protocol    Deep WHYTE

## 2018-03-10 NOTE — PROGRESS NOTES
Direct admit brought in to floor walking and bed assigned. Written orders added to epic from admitting Dr. Daniel. IV started and fluids infusing. Will cont to monitor.

## 2018-03-11 ENCOUNTER — APPOINTMENT (OUTPATIENT)
Dept: RADIOLOGY | Facility: MEDICAL CENTER | Age: 68
DRG: 493 | End: 2018-03-11
Attending: ORTHOPAEDIC SURGERY
Payer: MEDICARE

## 2018-03-11 LAB
ANION GAP SERPL CALC-SCNC: 5 MMOL/L (ref 0–11.9)
BUN SERPL-MCNC: 16 MG/DL (ref 8–22)
CALCIUM SERPL-MCNC: 8.4 MG/DL (ref 8.4–10.2)
CHLORIDE SERPL-SCNC: 107 MMOL/L (ref 96–112)
CO2 SERPL-SCNC: 24 MMOL/L (ref 20–33)
CREAT SERPL-MCNC: 0.64 MG/DL (ref 0.5–1.4)
ERYTHROCYTE [DISTWIDTH] IN BLOOD BY AUTOMATED COUNT: 45.5 FL (ref 35.9–50)
GLUCOSE SERPL-MCNC: 164 MG/DL (ref 65–99)
HCT VFR BLD AUTO: 32.5 % (ref 42–52)
HGB BLD-MCNC: 10.8 G/DL (ref 14–18)
MCH RBC QN AUTO: 31.4 PG (ref 27–33)
MCHC RBC AUTO-ENTMCNC: 33.2 G/DL (ref 33.7–35.3)
MCV RBC AUTO: 94.5 FL (ref 81.4–97.8)
PLATELET # BLD AUTO: 329 K/UL (ref 164–446)
PMV BLD AUTO: 9.6 FL (ref 9–12.9)
POTASSIUM SERPL-SCNC: 4.3 MMOL/L (ref 3.6–5.5)
RBC # BLD AUTO: 3.44 M/UL (ref 4.7–6.1)
SODIUM SERPL-SCNC: 136 MMOL/L (ref 135–145)
WBC # BLD AUTO: 7.3 K/UL (ref 4.8–10.8)

## 2018-03-11 PROCEDURE — 700105 HCHG RX REV CODE 258: Performed by: INTERNAL MEDICINE

## 2018-03-11 PROCEDURE — 36415 COLL VENOUS BLD VENIPUNCTURE: CPT

## 2018-03-11 PROCEDURE — B54MZZA ULTRASONOGRAPHY OF RIGHT UPPER EXTREMITY VEINS, GUIDANCE: ICD-10-PCS | Performed by: ORTHOPAEDIC SURGERY

## 2018-03-11 PROCEDURE — 770006 HCHG ROOM/CARE - MED/SURG/GYN SEMI*

## 2018-03-11 PROCEDURE — 85027 COMPLETE CBC AUTOMATED: CPT

## 2018-03-11 PROCEDURE — 700101 HCHG RX REV CODE 250: Performed by: ORTHOPAEDIC SURGERY

## 2018-03-11 PROCEDURE — 700102 HCHG RX REV CODE 250 W/ 637 OVERRIDE(OP): Performed by: ORTHOPAEDIC SURGERY

## 2018-03-11 PROCEDURE — 80048 BASIC METABOLIC PNL TOTAL CA: CPT

## 2018-03-11 PROCEDURE — 02HV33Z INSERTION OF INFUSION DEVICE INTO SUPERIOR VENA CAVA, PERCUTANEOUS APPROACH: ICD-10-PCS | Performed by: ORTHOPAEDIC SURGERY

## 2018-03-11 PROCEDURE — A9270 NON-COVERED ITEM OR SERVICE: HCPCS | Performed by: ORTHOPAEDIC SURGERY

## 2018-03-11 PROCEDURE — 36569 INSJ PICC 5 YR+ W/O IMAGING: CPT

## 2018-03-11 PROCEDURE — 700111 HCHG RX REV CODE 636 W/ 250 OVERRIDE (IP): Performed by: INTERNAL MEDICINE

## 2018-03-11 RX ADMIN — OXYCODONE HYDROCHLORIDE 10 MG: 10 TABLET ORAL at 13:07

## 2018-03-11 RX ADMIN — OXYCODONE HYDROCHLORIDE 10 MG: 10 TABLET ORAL at 09:05

## 2018-03-11 RX ADMIN — OXYCODONE HYDROCHLORIDE 10 MG: 10 TABLET ORAL at 17:41

## 2018-03-11 RX ADMIN — OXYCODONE HYDROCHLORIDE 10 MG: 10 TABLET ORAL at 21:38

## 2018-03-11 RX ADMIN — POTASSIUM CHLORIDE, DEXTROSE MONOHYDRATE AND SODIUM CHLORIDE: 150; 5; 450 INJECTION, SOLUTION INTRAVENOUS at 13:07

## 2018-03-11 RX ADMIN — PIPERACILLIN AND TAZOBACTAM 3.38 G: 3; .375 INJECTION, POWDER, LYOPHILIZED, FOR SOLUTION INTRAVENOUS; PARENTERAL at 05:48

## 2018-03-11 RX ADMIN — OXYCODONE HYDROCHLORIDE 5 MG: 5 TABLET ORAL at 06:38

## 2018-03-11 RX ADMIN — PIPERACILLIN AND TAZOBACTAM 3.38 G: 3; .375 INJECTION, POWDER, LYOPHILIZED, FOR SOLUTION INTRAVENOUS; PARENTERAL at 21:38

## 2018-03-11 RX ADMIN — PIPERACILLIN AND TAZOBACTAM 3.38 G: 3; .375 INJECTION, POWDER, LYOPHILIZED, FOR SOLUTION INTRAVENOUS; PARENTERAL at 13:07

## 2018-03-11 RX ADMIN — THERA TABS 1 TABLET: TAB at 09:05

## 2018-03-11 ASSESSMENT — ENCOUNTER SYMPTOMS
FEVER: 0
VOMITING: 0
NAUSEA: 0
CHILLS: 0
ABDOMINAL PAIN: 0

## 2018-03-11 ASSESSMENT — PAIN SCALES - GENERAL
PAINLEVEL_OUTOF10: 3
PAINLEVEL_OUTOF10: 7
PAINLEVEL_OUTOF10: 5
PAINLEVEL_OUTOF10: 5

## 2018-03-11 NOTE — PROGRESS NOTES
Received report from night RN.  Assumed pt care.  Pt is alert and oriented.  Pain tolerable at this time.  Updated pt w/ POC. Will continue with care.

## 2018-03-11 NOTE — PROCEDURES
PICC Insertion Procedure Note    Consents confirmed, vessel patency confirmed with ultrasound, real time ultrasound image printed and placed in patient chart. Risks and benefits of procedure explained to patient/family and education regarding central line associated bloodstream infections provided. Questions answered.     PICC placed in RUE per MD order with ultrasound guidance. 4 Fr, single lumen PICC placed in basilic vein after 2 attempt(s). 3.0 cc's of 1% lidocaine injected intradermally, 21 gauge microintroducer needle and modified Seldinger technique used. 41 cm total catheter length, 2 cm external measurement inserted with good blood return. Each lumen flushed without resistance with 10 mL 0.9% normal saline. PICC line secured with Biopatch and Tegaderm.Pre procedure arm circumference 9 cm above antecubital is 28 cm.    PICC tip location in the SVC confirmed by ECG technology. Pt tolerated procedure well.  Patient condition relayed to unit RN or ordering physician via this post procedure note in the EMR.     [employer] PICC ref # [id number], Lot # [id number]

## 2018-03-11 NOTE — PROGRESS NOTES
"Patient seen and examined  Complains of minimal pain - had block yesterday after surgery.    Blood pressure 109/66, pulse 71, temperature 36.4 °C (97.5 °F), resp. rate 16, height 1.803 m (5' 11\"), weight 65.8 kg (145 lb), SpO2 96 %.    Recent Labs      03/10/18   0425  03/11/18   0442   WBC  4.7*  7.3   RBC  3.71*  3.44*   HEMOGLOBIN  11.7*  10.8*   HEMATOCRIT  34.4*  32.5*   MCV  92.7  94.5   MCH  31.5  31.4   MCHC  34.0  33.2*   RDW  44.7  45.5   PLATELETCT  318  329   MPV  9.6  9.6       No acute distress  Dressing clean dry and intact  Neurovascularly intact  Hemovacs in place total 123cc    POD#1 s/p ID left shoulder with HWR    Plan:  DVT Prophylaxis- TEDS/SCDs,   Weight Bearing Status-NWB LUE sling for comfort  Continue Hemovac drains  Antibiotics: Zosyn, PICC line today Continue plan per ID  Will need IV infusion arrangements before Discharge  Cultures pending          "

## 2018-03-11 NOTE — PROGRESS NOTES
Infectious Disease Progress Note    Author: Meghan Bateman M.D. Date & Time of service: 3/11/2018  11:15 AM    Chief Complaint:  septic shoulder      Interval History:  68 y/o WM admitted for I and/hardware removal for left shoulder septic arthritis   3/11 AF WBC 7.3 nerve block wearing off-otherwise feels OK  Labs Reviewed, Medications Reviewed, Radiology Reviewed and Wound Reviewed.    Review of Systems:  Review of Systems   Constitutional: Negative for chills and fever.   Gastrointestinal: Negative for abdominal pain, nausea and vomiting.   Musculoskeletal: Positive for joint pain.   All other systems reviewed and are negative.      Hemodynamics:  Temp (24hrs), Av.6 °C (97.8 °F), Min:36.3 °C (97.3 °F), Max:37.1 °C (98.8 °F)  Temperature: 36.4 °C (97.5 °F)  Pulse  Av.8  Min: 52  Max: 91Heart Rate (Monitored): 74  Blood Pressure : 109/66       Physical Exam:  Physical Exam   Constitutional: He is oriented to person, place, and time. He appears well-developed. No distress.   HENT:   Head: Normocephalic and atraumatic.   Eyes: EOM are normal. Pupils are equal, round, and reactive to light.   Cardiovascular: Normal rate.    Pulmonary/Chest: Effort normal. No respiratory distress.   Abdominal: Soft. He exhibits no distension.   Musculoskeletal:   Left shoulder dressed  +HV   Neurological: He is alert and oriented to person, place, and time.   Skin: No rash noted.   Nursing note and vitals reviewed.      Meds:    Current Facility-Administered Medications:   •  PICC Line Insertion has been implemented **AND** May use Lidocaine 1% not to exceed 3 mls for local at insertion site **AND** NOTIFY MD **AND** Tip to dwell in the superior vena cava **AND** If radiologist reading of chest X-ray states any of the following the PICC should be used **AND** Further evaluation of the PICC placement can be retrieved from X-Ray and Imaging **AND** Blood draws through PICC line; draws by RN only **AND** FLUSHING GUIDELINES  WHEN IN USE **AND** normal saline PF **AND** FLUSHING GUIDELINES WHEN NOT IN USE **AND** DRESSING MAINTENANCE **AND** Change needleless pressure ports and IV tubing every 72 hours per hospital policy **AND** TUBING **AND** If there is an MD order to remove the PICC line, any RN may remove the PICC line **AND** [] PATIENT EDUCATION MATERIALS **AND** NURSING COMMUNICATION  •  LR  •  PICC Line Insertion has been implemented **AND** May use Lidocaine 1% not to exceed 3 mls for local at insertion site **AND** NOTIFY MD **AND** Tip to dwell in the superior vena cava **AND** Do not use PICC Line until placement verified by Chest X Ray **AND** [CANCELED] DX-CHEST-FOR PICC LINE Perform procedure in: Patient's Room **AND** If radiologist reading of chest X-ray states any of the following the PICC should be used **AND** Further evaluation of the PICC placement can be retrieved from X-Ray and Imaging **AND** Blood draws through PICC line; draws by RN only **AND** FLUSHING GUIDELINES WHEN IN USE **AND** normal saline PF **AND** FLUSHING GUIDELINES WHEN NOT IN USE **AND** DRESSING MAINTENANCE **AND** Change needleless pressure ports and IV tubing every 72 hours per hospital policy **AND** TUBING **AND** If there is an MD order to remove the PICC line, any RN may remove the PICC line **AND** [] PATIENT EDUCATION MATERIALS **AND** NURSING COMMUNICATION  •  [COMPLETED] piperacillin-tazobactam **AND** piperacillin-tazobactam  •  diazePAM  •  Pharmacy Consult Request  •  dextrose 5 % and 0.45 % NaCl with KCl 20 mEq  •  oxyCODONE immediate-release  •  oxyCODONE immediate release  •  morphine injection  •  multivitamin  •  ondansetron  •  promethazine    Labs:  Recent Labs      03/10/18   0425  18   0442   WBC  4.7*  7.3   RBC  3.71*  3.44*   HEMOGLOBIN  11.7*  10.8*   HEMATOCRIT  34.4*  32.5*   MCV  92.7  94.5   MCH  31.5  31.4   RDW  44.7  45.5   PLATELETCT  318  329   MPV  9.6  9.6     Recent Labs      03/10/18    0425  03/11/18   0442   SODIUM  138  136   POTASSIUM  4.1  4.3   CHLORIDE  108  107   CO2  24  24   GLUCOSE  95  164*   BUN  12  16     Recent Labs      03/10/18   0425  03/11/18   0442   CREATININE  0.61  0.64       Imaging:  Mr-shoulder-w/o Left    Result Date: 3/9/2018  3/9/2018 3:25 PM HISTORY/REASON FOR EXAM:  Left shoulder pain and swelling, recent surgery. Infection with recent shaving of the lateral humeral head TECHNIQUE/EXAM DESCRIPTION: MRI of the LEFT shoulder without contrast. The study was performed on a [employer] 1.5 Kahte MRI scanner. T1 sagittal, fast spin-echo T2 fat-suppressed oblique coronal, sagittal, and axial and intermediate fast spin-echo oblique coronal images were obtained. COMPARISON:  None of the left shoulder FINDINGS: BONES AND BONE MARROW: There is bone marrow edema throughout the humeral head. There are suture anchor within the anterior humeral head. GLENOID AND GLENOID LABRUM: There is a large glenohumeral joint effusion extending into the subacromial/subdeltoid bursa. There is debris within the shoulder joint/effusion. BICEPS TENDON: The biceps tendon is normal in position and morphology. 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  Degree is mild. ROTATOR CUFF: There is a large full-thickness rotator cuff tear involving the comparison is been a distended and entire in cystitis tendon retracted approximately 6 cm to a level superior to the glenoid. There is associated superior migration of the humeral head. The subscapularis tendon is intact. Edema extends along the supraspinatus and infraspinatus muscles..     1.  Large glenohumeral joint effusion extending into the subacromial/subdeltoid bursa. Effusion contains debris. 2.  Large full-thickness rotator cuff tear involving the entire supraspinatus and infraspinatus tendons retracted approximately 6 cm 3.  Suture anchor within the lateral humeral head 4.  Bone marrow edema within the upper outer  humeral head extending into the neck. This appears to underlie the site of shaving and therefore is most likely reactive edema. Osteomyelitis less likely although not entirely excluded. 5.  No soft tissue abscess Findings were discussed with SEE PEREZ on 3/9/2018 4:05 PM.    Ir-picc Line Placement > Age 5    Result Date: 3/11/2018  HISTORY/REASON FOR EXAM:   PICC placement. TECHNIQUE/EXAM DESCRIPTION AND NUMBER OF VIEWS:   PICC line insertion with ultrasound guidance.  The procedure was performed using maximal sterile barrier technique including sterile gown, mask, cap, and donning of sterile gloves following appropriate hand hygiene and/or sterile scrub. Patient skin site was prepped with 2% Chlorhexidine solution. FINDINGS:  PICC line insertion with Ultrasound Guidance was performed by qualified nursing staff without the assistance of a Radiologist.  A follow up chest x-ray will be performed to determine appropriateness of PICC positioning.              Ultrasound-guided PICC placement performed by qualified nursing staff as above.     Ir-picc Line Placement > Age 5    Result Date: 2/9/2018  HISTORY/REASON FOR EXAM:   PICC placement. TECHNIQUE/EXAM DESCRIPTION AND NUMBER OF VIEWS:   PICC line insertion with ultrasound guidance.  The procedure was performed using maximal sterile barrier technique including sterile gown, mask, cap, and donning of sterile gloves following appropriate hand hygiene and/or sterile scrub. Patient skin site was prepped with 2% Chlorhexidine solution. FINDINGS:  PICC line insertion with Ultrasound Guidance was performed by qualified nursing staff without the assistance of a Radiologist.  A follow up chest x-ray will be performed to determine appropriateness of PICC positioning.              Ultrasound-guided PICC placement performed by qualified nursing staff as above.       Micro:  Results     Procedure Component Value Units Date/Time    GRAM STAIN [[id number]] Collected:  03/10/18  0952    Order Status:  Completed Specimen:  Wound Updated:  03/10/18 2204     Significant Indicator .     Source WND     Site Left Shoulder     Gram Stain Result Moderate WBCs.  Rare Gram positive cocci.      ANAEROBIC CULTURE [[id number]] Collected:  03/10/18 0952    Order Status:  Completed Specimen:  Other Updated:  03/10/18 1127    FUNGAL CULTURE [[id number]] Collected:  03/10/18 0952    Order Status:  Completed Specimen:  Other Updated:  03/10/18 1127    CULTURE WOUND W/ GRAM STAIN [[id number]] Collected:  03/10/18 0952    Order Status:  Completed Specimen:  Other Updated:  03/10/18 1127          Assessment:  Left shoulder septic arthritis     Plan:  Left shoulder septic arthritis   Rotator cuff repair in Oct 2017  S/p I&D on 2/6. Purulence was noted intraoperatively. +MSSA  Completed 6 weeks IV abx (dapto then ertapenem)-developed sinus and swelling  S/p repeat I and D with hardware removal 3/10  Gram stain with GNR initially-now changed to GPC  Continue Zosyn for now  PICC ordered  Final abx per pathogen ID  Anticipate 6 weeks IV abx post-op     Leukopenia, improved  Not neutropenic  Monitor    Discussed with RN

## 2018-03-12 VITALS
HEIGHT: 71 IN | RESPIRATION RATE: 16 BRPM | BODY MASS INDEX: 20.3 KG/M2 | TEMPERATURE: 98.5 F | SYSTOLIC BLOOD PRESSURE: 119 MMHG | WEIGHT: 145 LBS | DIASTOLIC BLOOD PRESSURE: 30 MMHG | HEART RATE: 68 BPM | OXYGEN SATURATION: 95 %

## 2018-03-12 LAB
BACTERIA FLD AEROBE CULT: NORMAL
ERYTHROCYTE [DISTWIDTH] IN BLOOD BY AUTOMATED COUNT: 45.3 FL (ref 35.9–50)
GRAM STN SPEC: NORMAL
HCT VFR BLD AUTO: 31 % (ref 42–52)
HGB BLD-MCNC: 10.5 G/DL (ref 14–18)
MCH RBC QN AUTO: 31.7 PG (ref 27–33)
MCHC RBC AUTO-ENTMCNC: 33.9 G/DL (ref 33.7–35.3)
MCV RBC AUTO: 93.7 FL (ref 81.4–97.8)
PLATELET # BLD AUTO: 299 K/UL (ref 164–446)
PMV BLD AUTO: 9.1 FL (ref 9–12.9)
RBC # BLD AUTO: 3.31 M/UL (ref 4.7–6.1)
SIGNIFICANT IND 70042: NORMAL
SITE SITE: NORMAL
SOURCE SOURCE: NORMAL
WBC # BLD AUTO: 6 K/UL (ref 4.8–10.8)

## 2018-03-12 PROCEDURE — 700112 HCHG RX REV CODE 229: Performed by: ORTHOPAEDIC SURGERY

## 2018-03-12 PROCEDURE — A9270 NON-COVERED ITEM OR SERVICE: HCPCS | Performed by: ORTHOPAEDIC SURGERY

## 2018-03-12 PROCEDURE — 700105 HCHG RX REV CODE 258: Performed by: INTERNAL MEDICINE

## 2018-03-12 PROCEDURE — 700111 HCHG RX REV CODE 636 W/ 250 OVERRIDE (IP): Performed by: INTERNAL MEDICINE

## 2018-03-12 PROCEDURE — 700102 HCHG RX REV CODE 250 W/ 637 OVERRIDE(OP): Performed by: ORTHOPAEDIC SURGERY

## 2018-03-12 PROCEDURE — 85027 COMPLETE CBC AUTOMATED: CPT

## 2018-03-12 RX ORDER — DOCUSATE SODIUM 100 MG/1
100 CAPSULE, LIQUID FILLED ORAL 2 TIMES DAILY
Status: DISCONTINUED | OUTPATIENT
Start: 2018-03-12 | End: 2018-03-12 | Stop reason: HOSPADM

## 2018-03-12 RX ADMIN — DOCUSATE SODIUM 100 MG: 100 CAPSULE, LIQUID FILLED ORAL at 12:26

## 2018-03-12 RX ADMIN — OXYCODONE HYDROCHLORIDE 10 MG: 10 TABLET ORAL at 03:27

## 2018-03-12 RX ADMIN — SODIUM CHLORIDE 1000 MG: 900 INJECTION INTRAVENOUS at 16:53

## 2018-03-12 RX ADMIN — PIPERACILLIN AND TAZOBACTAM 3.38 G: 3; .375 INJECTION, POWDER, LYOPHILIZED, FOR SOLUTION INTRAVENOUS; PARENTERAL at 12:26

## 2018-03-12 RX ADMIN — THERA TABS 1 TABLET: TAB at 08:41

## 2018-03-12 RX ADMIN — PIPERACILLIN AND TAZOBACTAM 3.38 G: 3; .375 INJECTION, POWDER, LYOPHILIZED, FOR SOLUTION INTRAVENOUS; PARENTERAL at 05:07

## 2018-03-12 ASSESSMENT — ENCOUNTER SYMPTOMS
VOMITING: 0
FEVER: 0
ABDOMINAL PAIN: 0
CHILLS: 0
NAUSEA: 0

## 2018-03-12 ASSESSMENT — PAIN SCALES - GENERAL
PAINLEVEL_OUTOF10: 1
PAINLEVEL_OUTOF10: 2

## 2018-03-12 NOTE — PROGRESS NOTES
Dr. Daniel returned call, updated, ok with giving dose ertapenem 1 gram x 1 dose now and discharging home after, follow-up with appointment on Thursday.

## 2018-03-12 NOTE — PROGRESS NOTES
0725: Assessment completed. Lines and gtts verified. POC discussed. C/o left shoulder tenderness, declines pain medication at this time. Call light in reach, encouraged pt to call for needs or assist.

## 2018-03-12 NOTE — PROGRESS NOTES
Infectious Disease Progress Note    Author: Meghan Bateman M.D. Date & Time of service: 3/12/2018  12:45 PM    Chief Complaint:  septic shoulder      Interval History:  66 y/o WM admitted for I and/hardware removal for left shoulder septic arthritis   3/11 AF WBC 7.3 nerve block wearing off-otherwise feels OK  3/12 AF WBC 6 anxious for discharge-no growth on cxs Gram stain changed to GPC  Labs Reviewed, Medications Reviewed, Radiology Reviewed and Wound Reviewed.    Review of Systems:  Review of Systems   Constitutional: Negative for chills and fever.   Gastrointestinal: Negative for abdominal pain, nausea and vomiting.   Musculoskeletal: Positive for joint pain.   All other systems reviewed and are negative.      Hemodynamics:  Temp (24hrs), Av.9 °C (98.4 °F), Min:36.5 °C (97.7 °F), Max:37.3 °C (99.1 °F)  Temperature: 36.9 °C (98.5 °F)  Pulse  Av.3  Min: 52  Max: 91   Blood Pressure : 146/53       Physical Exam:  Physical Exam   Constitutional: He is oriented to person, place, and time. He appears well-developed. No distress.   HENT:   Head: Normocephalic and atraumatic.   Eyes: EOM are normal. Pupils are equal, round, and reactive to light.   Cardiovascular: Normal rate.    Pulmonary/Chest: Effort normal. No respiratory distress.   Abdominal: Soft. He exhibits no distension.   Musculoskeletal:   Left shoulder dressed  RUE PICC   Neurological: He is alert and oriented to person, place, and time.   Skin: No rash noted.   Nursing note and vitals reviewed.      Meds:    Current Facility-Administered Medications:   •  docusate sodium  •  PICC Line Insertion has been implemented **AND** May use Lidocaine 1% not to exceed 3 mls for local at insertion site **AND** NOTIFY MD **AND** Tip to dwell in the superior vena cava **AND** If radiologist reading of chest X-ray states any of the following the PICC should be used **AND** Further evaluation of the PICC placement can be retrieved from X-Ray and Imaging  **AND** Blood draws through PICC line; draws by RN only **AND** FLUSHING GUIDELINES WHEN IN USE **AND** normal saline PF **AND** FLUSHING GUIDELINES WHEN NOT IN USE **AND** DRESSING MAINTENANCE **AND** Change needleless pressure ports and IV tubing every 72 hours per hospital policy **AND** TUBING **AND** If there is an MD order to remove the PICC line, any RN may remove the PICC line **AND** [] PATIENT EDUCATION MATERIALS **AND** NURSING COMMUNICATION  •  [COMPLETED] piperacillin-tazobactam **AND** piperacillin-tazobactam  •  diazePAM  •  Pharmacy Consult Request  •  dextrose 5 % and 0.45 % NaCl with KCl 20 mEq  •  oxyCODONE immediate-release  •  oxyCODONE immediate release  •  morphine injection  •  multivitamin  •  ondansetron  •  promethazine    Labs:  Recent Labs      03/10/18   0425  03/11/18   0442  18   0503   WBC  4.7*  7.3  6.0   RBC  3.71*  3.44*  3.31*   HEMOGLOBIN  11.7*  10.8*  10.5*   HEMATOCRIT  34.4*  32.5*  31.0*   MCV  92.7  94.5  93.7   MCH  31.5  31.4  31.7   RDW  44.7  45.5  45.3   PLATELETCT  318  329  299   MPV  9.6  9.6  9.1     Recent Labs      03/10/18   0425  18   0442   SODIUM  138  136   POTASSIUM  4.1  4.3   CHLORIDE  108  107   CO2  24  24   GLUCOSE  95  164*   BUN  12  16     Recent Labs      03/10/18   0425  18   0442   CREATININE  0.61  0.64       Imaging:  Mr-shoulder-w/o Left    Result Date: 3/9/2018  3/9/2018 3:25 PM HISTORY/REASON FOR EXAM:  Left shoulder pain and swelling, recent surgery. Infection with recent shaving of the lateral humeral head TECHNIQUE/EXAM DESCRIPTION: MRI of the LEFT shoulder without contrast. The study was performed on a [employer] 1.5 Kathe MRI scanner. T1 sagittal, fast spin-echo T2 fat-suppressed oblique coronal, sagittal, and axial and intermediate fast spin-echo oblique coronal images were obtained. COMPARISON:  None of the left shoulder FINDINGS: BONES AND BONE MARROW: There is bone marrow edema throughout the humeral head.  There are suture anchor within the anterior humeral head. GLENOID AND GLENOID LABRUM: There is a large glenohumeral joint effusion extending into the subacromial/subdeltoid bursa. There is debris within the shoulder joint/effusion. BICEPS TENDON: The biceps tendon is normal in position and morphology. 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  Degree is mild. ROTATOR CUFF: There is a large full-thickness rotator cuff tear involving the comparison is been a distended and entire in cystitis tendon retracted approximately 6 cm to a level superior to the glenoid. There is associated superior migration of the humeral head. The subscapularis tendon is intact. Edema extends along the supraspinatus and infraspinatus muscles..     1.  Large glenohumeral joint effusion extending into the subacromial/subdeltoid bursa. Effusion contains debris. 2.  Large full-thickness rotator cuff tear involving the entire supraspinatus and infraspinatus tendons retracted approximately 6 cm 3.  Suture anchor within the lateral humeral head 4.  Bone marrow edema within the upper outer humeral head extending into the neck. This appears to underlie the site of shaving and therefore is most likely reactive edema. Osteomyelitis less likely although not entirely excluded. 5.  No soft tissue abscess Findings were discussed with SEE PREEZ on 3/9/2018 4:05 PM.    Ir-picc Line Placement > Age 5    Result Date: 3/11/2018  HISTORY/REASON FOR EXAM:   PICC placement. TECHNIQUE/EXAM DESCRIPTION AND NUMBER OF VIEWS:   PICC line insertion with ultrasound guidance.  The procedure was performed using maximal sterile barrier technique including sterile gown, mask, cap, and donning of sterile gloves following appropriate hand hygiene and/or sterile scrub. Patient skin site was prepped with 2% Chlorhexidine solution. FINDINGS:  PICC line insertion with Ultrasound Guidance was performed by qualified nursing staff without the  assistance of a Radiologist.  A follow up chest x-ray will be performed to determine appropriateness of PICC positioning.              Ultrasound-guided PICC placement performed by qualified nursing staff as above.     Ir-picc Line Placement > Age 5    Result Date: 2/9/2018  HISTORY/REASON FOR EXAM:   PICC placement. TECHNIQUE/EXAM DESCRIPTION AND NUMBER OF VIEWS:   PICC line insertion with ultrasound guidance.  The procedure was performed using maximal sterile barrier technique including sterile gown, mask, cap, and donning of sterile gloves following appropriate hand hygiene and/or sterile scrub. Patient skin site was prepped with 2% Chlorhexidine solution. FINDINGS:  PICC line insertion with Ultrasound Guidance was performed by qualified nursing staff without the assistance of a Radiologist.  A follow up chest x-ray will be performed to determine appropriateness of PICC positioning.              Ultrasound-guided PICC placement performed by qualified nursing staff as above.       Micro:  Results     Procedure Component Value Units Date/Time    ANAEROBIC CULTURE [[id number]] Collected:  03/10/18 0952    Order Status:  Completed Specimen:  Wound Updated:  03/12/18 1212     Significant Indicator NEG     Source WND     Site Left Shoulder     Anaerobic Culture, Culture Res Culture in progress.    FUNGAL CULTURE [[id number]] Collected:  03/10/18 0952    Order Status:  Completed Specimen:  Wound Updated:  03/12/18 1212     Significant Indicator NEG     Source WND     Site Left Shoulder     Fungal Culture Culture in progress.    CULTURE WOUND W/ GRAM STAIN [[id number]] Collected:  03/10/18 0952    Order Status:  Completed Specimen:  Wound Updated:  03/12/18 1212     Significant Indicator NEG     Source WND     Site Left Shoulder     Culture Result Wound No growth at 48 hours.     Gram Stain Result Moderate WBCs.  Rare Gram positive cocci.      GRAM STAIN [[id number]] Collected:  03/10/18 0952    Order Status:  Completed  Specimen:  Wound Updated:  03/10/18 220     Significant Indicator .     Source WND     Site Left Shoulder     Gram Stain Result Moderate WBCs.  Rare Gram positive cocci.            Assessment:  Left shoulder septic arthritis     Plan:  Left shoulder septic arthritis   Rotator cuff repair in Oct 2017  S/p I&D on 2/6. Purulence was noted intraoperatively. +MSSA  Completed 6 weeks IV abx (dapto then ertapenem)-developed sinus and swelling  S/p repeat I and D with hardware removal 3/10  Gram stain with GPC-slow to grow due to prior abx  Final abx per pathogen ID-likely ertapenem 1 gram IV daily  Stop date 4/21/2018 to complete 6 weeks IV abx post-op     Leukopenia, improved  Not neutropenic  Monitor    Discussed with RN

## 2018-03-12 NOTE — PROGRESS NOTES
Pt transferred to [facility] with all personal belongings per request to change rooms. Informed by [facility] that pt has infusion center appointment scheduled for tomorrow 3/13/18 @ 1400. Page out to Dr. Bateman to clarify if pt can get dose today and discharge.

## 2018-03-12 NOTE — DISCHARGE PLANNING
Care Transition Team Assessment    SW met with this patient to complete assessment.  Patient stated that he has had several surgeries and infections in this shoulder.  Going to the infusion center was discussed once order is received.  SW will monitor and assist this patient as needed.     Information Source  Orientation : Oriented x 4  Information Given By: Patient  Informant's Name: Haider  Who is responsible for making decisions for patient? : Patient    Elopement Risk  Legal Hold: No  Ambulatory or Self Mobile in Wheelchair: Yes  Disoriented: No  Psychiatric Symptoms: None  History of Wandering: No  Elopement this Admit: No  Vocalizing Wanting to Leave: No  Displays Behaviors, Body Language Wanting to Leave: No-Not at Risk for Elopement  Elopement Risk: Not at Risk for Elopement    Interdisciplinary Discharge Planning  Does Admitting Nurse Feel This Could be a Complex Discharge?: No  Primary Care Physician: Dr. Rodarte   Lives with - Patient's Self Care Capacity: Alone and Able to Care For Self  Support Systems: Family Member(s), Friends / Neighbors, [employer]  Housing / Facility: [address]  Do You Take your Prescribed Medications Regularly: Yes  Able to Return to Previous ADL's: Future Time w/Therapy  Mobility Issues: No  Prior Services: Home-Independent, None  Assistance Needed: No  Durable Medical Equipment: Not Applicable    Discharge Preparedness  What is your plan after discharge?: Home with help  What are your discharge supports?: Sibling, Other (comment)  Prior Functional Level: Independent with Activities of Daily Living    Functional Assesment  Prior Functional Level: Independent with Activities of Daily Living    Finances  Financial Barriers to Discharge: No  Prescription Coverage: Yes    Vision / Hearing Impairment  Vision Impairment : Yes  Right Eye Vision: Wears Glasses  Left Eye Vision: Wears Glasses  Hearing Impairment : Yes  Hearing Impairment: Both Ears, Hearing Device Not  Available  Does Pt Need Special Equipment for the Hearing Impaired?: No    Values / Beliefs / Concerns  Values / Beliefs Concerns : No    Advance Directive  Advance Directive?: None    Domestic Abuse  Have you ever been the victim of abuse or violence?: No  Physical Abuse or Sexual Abuse: No  Verbal Abuse or Emotional Abuse: No         Discharge Risks or Barriers  Discharge risks or barriers?: Complex medical needs    Anticipated Discharge Information  Anticipated discharge disposition: Home, Infusion Center (outpatient)

## 2018-03-12 NOTE — PROGRESS NOTES
No complaint of pain and is felling well.  AFVSS  WBC decreasing.  Incisions look good.  Drains removed.  Cultures pending. Gram stain with both gram neg jakob and gram pos cocci  Await final outpatient abx based on cult.

## 2018-03-12 NOTE — PROGRESS NOTES
Dr. Daniel at bedside, removed hemovac #1 and #2, pt tolerated well. Island dressing applied to front left shoulder and 2x2 with paper tape to hemovac site. Pt asked MD for stool softener, orders placed.

## 2018-03-12 NOTE — CARE PLAN
Problem: Safety  Goal: Will remain free from falls    Intervention: Implement fall precautions  Treaded slipper socks in place, bed in low position, pt near nurses station, and call light in reach. Pt will remain free from falls. Pt demonstrates correct use of call light.       Problem: Knowledge Deficit  Goal: Knowledge of disease process/condition, treatment plan, diagnostic tests, and medications will improve    Intervention: Explain information regarding disease process/condition, treatment plan, diagnostic tests, and medications and document in education  POC discussed with pt, IVF and IV antibiotics. Pt verbalized understanding.       Problem: Pain Management  Goal: Pain level will decrease to patient's comfort goal    Intervention: Follow pain managment plan developed in collaboration with patient and Interdisciplinary Team  PRN oxycodone for left shoulder with relief.

## 2018-03-12 NOTE — PROGRESS NOTES
Dr. Bateman returned call, updated on infusion appointment tomorrow, telephone order received to give x 1 dose Ertapenem 1 gm IV today and discharge after dose completed, follow-up with ID  In 1-2 weeks. Pt states that he lives alone but can drive him self home for discharge. Call placed out to Dr. Daniel to update.

## 2018-03-13 ENCOUNTER — PATIENT OUTREACH (OUTPATIENT)
Dept: HEALTH INFORMATION MANAGEMENT | Facility: OTHER | Age: 68
End: 2018-03-13

## 2018-03-13 ENCOUNTER — OUTPATIENT INFUSION SERVICES (OUTPATIENT)
Dept: ONCOLOGY | Facility: MEDICAL CENTER | Age: 68
End: 2018-03-13
Attending: INTERNAL MEDICINE
Payer: MEDICARE

## 2018-03-13 VITALS
RESPIRATION RATE: 18 BRPM | OXYGEN SATURATION: 96 % | SYSTOLIC BLOOD PRESSURE: 125 MMHG | TEMPERATURE: 98.9 F | HEART RATE: 85 BPM | BODY MASS INDEX: 20.23 KG/M2 | DIASTOLIC BLOOD PRESSURE: 77 MMHG | WEIGHT: 145.06 LBS

## 2018-03-13 DIAGNOSIS — M75.122 COMPLETE ROTATOR CUFF TEAR OF LEFT SHOULDER: ICD-10-CM

## 2018-03-13 LAB
ALBUMIN SERPL BCP-MCNC: 3.4 G/DL (ref 3.2–4.9)
ALP SERPL-CCNC: 57 U/L (ref 30–99)
ALT SERPL-CCNC: 17 U/L (ref 2–50)
AST SERPL-CCNC: 20 U/L (ref 12–45)
BASOPHILS # BLD AUTO: 1.2 % (ref 0–1.8)
BASOPHILS # BLD: 0.08 K/UL (ref 0–0.12)
BILIRUB CONJ SERPL-MCNC: 0.1 MG/DL (ref 0.1–0.5)
BILIRUB INDIRECT SERPL-MCNC: 0.6 MG/DL (ref 0–1)
BILIRUB SERPL-MCNC: 0.7 MG/DL (ref 0.1–1.5)
BUN SERPL-MCNC: 12 MG/DL (ref 8–22)
CALCIUM SERPL-MCNC: 9.2 MG/DL (ref 8.5–10.5)
CHLORIDE SERPL-SCNC: 100 MMOL/L (ref 96–112)
CO2 SERPL-SCNC: 22 MMOL/L (ref 20–33)
CREAT SERPL-MCNC: 0.52 MG/DL (ref 0.5–1.4)
EOSINOPHIL # BLD AUTO: 0.96 K/UL (ref 0–0.51)
EOSINOPHIL NFR BLD: 14.5 % (ref 0–6.9)
ERYTHROCYTE [DISTWIDTH] IN BLOOD BY AUTOMATED COUNT: 45.7 FL (ref 35.9–50)
GLUCOSE SERPL-MCNC: 139 MG/DL (ref 65–99)
HCT VFR BLD AUTO: 35.2 % (ref 42–52)
HGB BLD-MCNC: 11.8 G/DL (ref 14–18)
IMM GRANULOCYTES # BLD AUTO: 0.02 K/UL (ref 0–0.11)
IMM GRANULOCYTES NFR BLD AUTO: 0.3 % (ref 0–0.9)
LYMPHOCYTES # BLD AUTO: 0.68 K/UL (ref 1–4.8)
LYMPHOCYTES NFR BLD: 10.3 % (ref 22–41)
MCH RBC QN AUTO: 31.7 PG (ref 27–33)
MCHC RBC AUTO-ENTMCNC: 33.5 G/DL (ref 33.7–35.3)
MCV RBC AUTO: 94.6 FL (ref 81.4–97.8)
MONOCYTES # BLD AUTO: 0.41 K/UL (ref 0–0.85)
MONOCYTES NFR BLD AUTO: 6.2 % (ref 0–13.4)
NEUTROPHILS # BLD AUTO: 4.48 K/UL (ref 1.82–7.42)
NEUTROPHILS NFR BLD: 67.5 % (ref 44–72)
NRBC # BLD AUTO: 0 K/UL
NRBC BLD-RTO: 0 /100 WBC
PHOSPHATE SERPL-MCNC: 3.5 MG/DL (ref 2.5–4.5)
PLATELET # BLD AUTO: 342 K/UL (ref 164–446)
PMV BLD AUTO: 9.5 FL (ref 9–12.9)
POTASSIUM SERPL-SCNC: 3.8 MMOL/L (ref 3.6–5.5)
PROT SERPL-MCNC: 6.6 G/DL (ref 6–8.2)
RBC # BLD AUTO: 3.72 M/UL (ref 4.7–6.1)
SODIUM SERPL-SCNC: 132 MMOL/L (ref 135–145)
WBC # BLD AUTO: 6.6 K/UL (ref 4.8–10.8)

## 2018-03-13 PROCEDURE — 700111 HCHG RX REV CODE 636 W/ 250 OVERRIDE (IP): Performed by: INTERNAL MEDICINE

## 2018-03-13 PROCEDURE — 80076 HEPATIC FUNCTION PANEL: CPT

## 2018-03-13 PROCEDURE — 85025 COMPLETE CBC W/AUTO DIFF WBC: CPT

## 2018-03-13 PROCEDURE — 80069 RENAL FUNCTION PANEL: CPT

## 2018-03-13 PROCEDURE — 700105 HCHG RX REV CODE 258: Performed by: INTERNAL MEDICINE

## 2018-03-13 PROCEDURE — 96365 THER/PROPH/DIAG IV INF INIT: CPT

## 2018-03-13 RX ADMIN — SODIUM CHLORIDE 1000 MG: 900 INJECTION INTRAVENOUS at 14:07

## 2018-03-13 ASSESSMENT — PAIN SCALES - GENERAL: PAINLEVEL: 3=SLIGHT PAIN

## 2018-03-13 NOTE — DISCHARGE INSTRUCTIONS
Discharge Instructions    Discharged to home by car with self. Discharged via wheelchair, hospital escort: Yes.  Special equipment needed: Right Upper arm single lumen PICC line (placed 3/11/18 at 1105).     Be sure to schedule a follow-up appointment with your primary care doctor or any specialists as instructed.     Discharge Plan:   Diet Plan: Discussed  Activity Level: Discussed  Confirmed Follow up Appointment: Appointment Scheduled  Confirmed Symptoms Management: Discussed  Medication Reconciliation Updated: Yes  Influenza Vaccine Indication: Not indicated: Previously immunized this influenza season and > 8 years of age    I understand that a diet low in cholesterol, fat, and sodium is recommended for good health. Unless I have been given specific instructions below for another diet, I accept this instruction as my diet prescription.   Other diet: heart healthy    Special Instructions: None    · Is patient discharged on Warfarin / Coumadin?   No     Depression / Suicide Risk    As you are discharged from this [facility] facility, it is important to learn how to keep safe from harming yourself.    Recognize the warning signs:  · Abrupt changes in personality, positive or negative- including increase in energy   · Giving away possessions  · Change in eating patterns- significant weight changes-  positive or negative  · Change in sleeping patterns- unable to sleep or sleeping all the time   · Unwillingness or inability to communicate  · Depression  · Unusual sadness, discouragement and loneliness  · Talk of wanting to die  · Neglect of personal appearance   · Rebelliousness- reckless behavior  · Withdrawal from people/activities they love  · Confusion- inability to concentrate     If you or a loved one observes any of these behaviors or has concerns about self-harm, here's what you can do:  · Talk about it- your feelings and reasons for harming yourself  · Remove any means that you might use to hurt yourself  (examples: pills, rope, extension cords, firearm)  · Get professional help from the community (Mental Health, Substance Abuse, psychological counseling)  · Do not be alone:Call your Safe Contact- someone whom you trust who will be there for you.  · Call your local CRISIS HOTLINE 946-3395 or 566-820-1640  · Call your local Children's Mobile Crisis Response Team [place] (172) 255-7069 or www.Craigslist  · Call the toll free National Suicide Prevention Hotlines   · National Suicide Prevention Lifeline 281-954-AOLV (4986)  · [employer] Hope Line Network 800-SUICIDE (355-3020)      Ertapenem injection  What is this medicine?  ERTAPENEM (er ta PEN em) is a carbapenem antibiotic. It is used to treat certain kinds of bacterial infections. It will not work for colds, flu, or other viral infections.  This medicine may be used for other purposes; ask your health care provider or pharmacist if you have questions.  COMMON BRAND NAME(S): Invanz  What should I tell my health care provider before I take this medicine?  They need to know if you have any of these conditions:  -brain tumor, lesion  -kidney disease  -seizure disorder  -an unusual or allergic reaction to ertapenem, other antibiotics, amide local anesthetics like lidocaine, or other medicines, foods, dyes, or preservatives  -pregnant or trying to get pregnant  -breast-feeding  How should I use this medicine?  This medicine is infused into a vein or injected deep into a muscle. It is usually given by a health care professional in a hospital or clinic setting.  If you get this medicine at home, you will be taught how to prepare and give this medicine. Use exactly as directed. Take your medicine at regular intervals. Do not take your medicine more often than directed.  It is important that you put your used needles and syringes in a special sharps container. Do not put them in a trash can. If you do not have a sharps container, call your pharmacist or healthcare  provider to get one.  Talk to your pediatrician regarding the use of this medicine in children. While this drug may be prescribed for children as young as 3 months old for selected conditions, precautions do apply.  Overdosage: If you think you have taken too much of this medicine contact a poison control center or emergency room at once.  NOTE: This medicine is only for you. Do not share this medicine with others.  What if I miss a dose?  If you miss a dose, take it as soon as you can. If it is almost time for your next dose, take only that dose. Do not take double or extra doses.  What may interact with this medicine?  -birth control pills  -probenecid  This list may not describe all possible interactions. Give your health care provider a list of all the medicines, herbs, non-prescription drugs, or dietary supplements you use. Also tell them if you smoke, drink alcohol, or use illegal drugs. Some items may interact with your medicine.  What should I watch for while using this medicine?  Tell your doctor or health care professional if your symptoms do not improve or if you get new symptoms. Your doctor will monitor your condition and blood work as needed.  Do not treat diarrhea with over the counter products. Contact your doctor if you have diarrhea that lasts more than 2 days or if it is severe and watery.  What side effects may I notice from receiving this medicine?  Side effects that you should report to your doctor or health care professional as soon as possible:  -allergic reactions like skin rash, itching or hives, swelling of the face, lips, or tongue  -anxiety, confusion, dizzy  -chest pain  -difficulty breathing, wheezing  -edema, swelling  -fever  -irregular heart rate, blood pressure  -pain or difficulty passing urine  -seizures  -unusually weak or tired  -white or red patches in mouth  Side effects that usually do not require medical attention (report to your doctor or health care professional if they  continue or are bothersome):  -constipation or diarrhea  -difficulty sleeping  -headache  -nausea, vomiting  -pain, swelling or irritation where injected  -stomach upset  -vaginal itch, irritation  This list may not describe all possible side effects. Call your doctor for medical advice about side effects. You may report side effects to FDA at 2-046-FDA-6907.  Where should I keep my medicine?  Keep out of the reach of children.  You will be instructed on how to store this medicine. Throw away any unused medicine after the expiration date on the label.  NOTE: This sheet is a summary. It may not cover all possible information. If you have questions about this medicine, talk to your doctor, pharmacist, or health care provider.  © 2018 [employer]vier/Gold Standard (2014-07-25 07:36:44)

## 2018-03-13 NOTE — PROGRESS NOTES
Pt returns to IS for daily IV Ertapenum infusion.  Pt was recently discharged from the hospital for continued infection in L shoulder. Pt expresses frustration with needing more treatment.  Emotional support provided.  RUE PICC line in place, blood return confirmed.  Labs drawn per orders and sent for processing.  Antibiotic infused without complication.  Line flushed and clave changed.  Line protected with gauze and arm wrap.  Pt returns tomorrow, appointment confirmed.  Discharged from IS in NAD under self care.

## 2018-03-13 NOTE — PROGRESS NOTES
Ertapenem x 1 dose given. Discharge instructions discussed with pt, signed copy in chart. Pt discharged home with self walking, pt has all personal belongings. Pt verbalizes understanding for infusion center appointment tomorrow.

## 2018-03-14 ENCOUNTER — OUTPATIENT INFUSION SERVICES (OUTPATIENT)
Dept: ONCOLOGY | Facility: MEDICAL CENTER | Age: 68
End: 2018-03-14
Attending: INTERNAL MEDICINE
Payer: MEDICARE

## 2018-03-14 VITALS
SYSTOLIC BLOOD PRESSURE: 148 MMHG | TEMPERATURE: 98.8 F | BODY MASS INDEX: 20.23 KG/M2 | WEIGHT: 145.06 LBS | OXYGEN SATURATION: 98 % | HEART RATE: 72 BPM | DIASTOLIC BLOOD PRESSURE: 92 MMHG | RESPIRATION RATE: 18 BRPM

## 2018-03-14 DIAGNOSIS — M75.122 COMPLETE ROTATOR CUFF TEAR OF LEFT SHOULDER: ICD-10-CM

## 2018-03-14 PROCEDURE — 700111 HCHG RX REV CODE 636 W/ 250 OVERRIDE (IP): Performed by: INTERNAL MEDICINE

## 2018-03-14 PROCEDURE — 96365 THER/PROPH/DIAG IV INF INIT: CPT

## 2018-03-14 PROCEDURE — 700105 HCHG RX REV CODE 258: Performed by: INTERNAL MEDICINE

## 2018-03-14 RX ADMIN — SODIUM CHLORIDE 1000 MG: 900 INJECTION INTRAVENOUS at 11:41

## 2018-03-14 ASSESSMENT — PAIN SCALES - GENERAL: PAINLEVEL: NO PAIN

## 2018-03-14 NOTE — PROGRESS NOTES
Pt ambulated into department for his daily Invanz infusion. Pt declined having any new or acute symptoms; denied fatigue or GI distress. PICC had + blood return prior, flushed briskly. Invanz infused as prescribed, Pt tolerated well. PICC had + blood return after, flushed per [facility] policy, line secured to arm with 4x4's and tube gauze. Tomorrow's appointment at 10:00 am confirmed with Pt prior to leaving, left department by self appearing in good spirits and NAD.

## 2018-03-15 ENCOUNTER — OUTPATIENT INFUSION SERVICES (OUTPATIENT)
Dept: ONCOLOGY | Facility: MEDICAL CENTER | Age: 68
End: 2018-03-15
Attending: INTERNAL MEDICINE
Payer: MEDICARE

## 2018-03-15 VITALS
RESPIRATION RATE: 16 BRPM | BODY MASS INDEX: 20.23 KG/M2 | SYSTOLIC BLOOD PRESSURE: 122 MMHG | TEMPERATURE: 97.8 F | HEART RATE: 60 BPM | DIASTOLIC BLOOD PRESSURE: 76 MMHG | OXYGEN SATURATION: 98 % | WEIGHT: 145.06 LBS

## 2018-03-15 DIAGNOSIS — M75.122 COMPLETE ROTATOR CUFF TEAR OF LEFT SHOULDER: ICD-10-CM

## 2018-03-15 PROCEDURE — 700111 HCHG RX REV CODE 636 W/ 250 OVERRIDE (IP): Performed by: INTERNAL MEDICINE

## 2018-03-15 PROCEDURE — 700105 HCHG RX REV CODE 258: Performed by: INTERNAL MEDICINE

## 2018-03-15 PROCEDURE — 96365 THER/PROPH/DIAG IV INF INIT: CPT

## 2018-03-15 RX ADMIN — SODIUM CHLORIDE 1000 MG: 900 INJECTION INTRAVENOUS at 10:05

## 2018-03-15 ASSESSMENT — PAIN SCALES - GENERAL: PAINLEVEL: NO PAIN

## 2018-03-15 NOTE — PROGRESS NOTES
"Patient arrived ambulatory to the [facility] [place] for Ertapenem. Reviewed vital signs, labs, and physician order. Patient reports surgical site present to left shoulder, has f/u with Surgeon today after infusion apt. Pt arrives with SL PICC access established in RUE,  visualized brisk blood return. Ertapenem administered, no adverse reaction observed. PICC flushed per protocol, 4X4 gauze and mesh sleeve placed for protection. Assist provided to wrap PICC with plastic bag and shower safe tape as pt reports \"will take a shower when I get home\". Confirmed upcoming appointment date and time with patient. [facility] no sign of distress.   "

## 2018-03-16 ENCOUNTER — OUTPATIENT INFUSION SERVICES (OUTPATIENT)
Dept: ONCOLOGY | Facility: MEDICAL CENTER | Age: 68
End: 2018-03-16
Attending: INTERNAL MEDICINE
Payer: MEDICARE

## 2018-03-16 VITALS
TEMPERATURE: 99 F | HEART RATE: 64 BPM | DIASTOLIC BLOOD PRESSURE: 79 MMHG | RESPIRATION RATE: 18 BRPM | SYSTOLIC BLOOD PRESSURE: 132 MMHG | OXYGEN SATURATION: 97 %

## 2018-03-16 DIAGNOSIS — M75.122 COMPLETE ROTATOR CUFF TEAR OF LEFT SHOULDER: ICD-10-CM

## 2018-03-16 PROCEDURE — 96365 THER/PROPH/DIAG IV INF INIT: CPT

## 2018-03-16 PROCEDURE — 700111 HCHG RX REV CODE 636 W/ 250 OVERRIDE (IP): Performed by: INTERNAL MEDICINE

## 2018-03-16 PROCEDURE — 700105 HCHG RX REV CODE 258: Performed by: INTERNAL MEDICINE

## 2018-03-16 RX ADMIN — SODIUM CHLORIDE 1000 MG: 900 INJECTION INTRAVENOUS at 10:10

## 2018-03-16 ASSESSMENT — PAIN SCALES - GENERAL: PAINLEVEL: NO PAIN

## 2018-03-16 NOTE — PROGRESS NOTES
Returns for IVAB.  Feels well.  Reports still has a ways to go for tx, but energy is better today.  Reports stitches remain, but no drainage.  Tx infused without issue.  Saline flush post.  DC to self care.

## 2018-03-17 ENCOUNTER — OUTPATIENT INFUSION SERVICES (OUTPATIENT)
Dept: ONCOLOGY | Facility: MEDICAL CENTER | Age: 68
End: 2018-03-17
Attending: INTERNAL MEDICINE
Payer: MEDICARE

## 2018-03-17 VITALS
SYSTOLIC BLOOD PRESSURE: 141 MMHG | RESPIRATION RATE: 16 BRPM | DIASTOLIC BLOOD PRESSURE: 77 MMHG | OXYGEN SATURATION: 98 % | HEART RATE: 63 BPM | TEMPERATURE: 97.1 F

## 2018-03-17 DIAGNOSIS — M75.122 COMPLETE ROTATOR CUFF TEAR OF LEFT SHOULDER: ICD-10-CM

## 2018-03-17 PROCEDURE — 700111 HCHG RX REV CODE 636 W/ 250 OVERRIDE (IP): Performed by: INTERNAL MEDICINE

## 2018-03-17 PROCEDURE — 96365 THER/PROPH/DIAG IV INF INIT: CPT

## 2018-03-17 PROCEDURE — 700105 HCHG RX REV CODE 258: Performed by: INTERNAL MEDICINE

## 2018-03-17 RX ADMIN — SODIUM CHLORIDE 1000 MG: 900 INJECTION INTRAVENOUS at 10:07

## 2018-03-17 ASSESSMENT — PAIN SCALES - GENERAL: PAINLEVEL: NO PAIN

## 2018-03-17 NOTE — PROGRESS NOTES
Returns for IVAB.  States continues to improve.  Started playing drums again as help to move shoulder.  Some twinges and stiffness, but had received ok from orthopod.  Tx infused via PICC without issue.  Saline flush post.  DC to self care.

## 2018-03-18 ENCOUNTER — OUTPATIENT INFUSION SERVICES (OUTPATIENT)
Dept: ONCOLOGY | Facility: MEDICAL CENTER | Age: 68
End: 2018-03-18
Attending: INTERNAL MEDICINE
Payer: MEDICARE

## 2018-03-18 VITALS
TEMPERATURE: 98.2 F | OXYGEN SATURATION: 100 % | HEART RATE: 65 BPM | RESPIRATION RATE: 18 BRPM | SYSTOLIC BLOOD PRESSURE: 128 MMHG | DIASTOLIC BLOOD PRESSURE: 76 MMHG

## 2018-03-18 DIAGNOSIS — M75.122 COMPLETE ROTATOR CUFF TEAR OF LEFT SHOULDER: ICD-10-CM

## 2018-03-18 PROCEDURE — 96365 THER/PROPH/DIAG IV INF INIT: CPT

## 2018-03-18 PROCEDURE — 700105 HCHG RX REV CODE 258: Performed by: INTERNAL MEDICINE

## 2018-03-18 PROCEDURE — 700111 HCHG RX REV CODE 636 W/ 250 OVERRIDE (IP): Performed by: INTERNAL MEDICINE

## 2018-03-18 RX ADMIN — SODIUM CHLORIDE 1000 MG: 900 INJECTION INTRAVENOUS at 11:14

## 2018-03-18 ASSESSMENT — PAIN SCALES - GENERAL: PAINLEVEL: NO PAIN

## 2018-03-18 NOTE — PROGRESS NOTES
Patient seen today for IVAB therapy and PICC line dressing change.  Plan of care discussed.  Assessment completed.  Good blood return from PICC line and dressing changed using sterile technique.  Ertapenem infused as ordered.  Tolerated well and without complaint.  Patient discharged after completion of treatment in good condition, ambulatory.  Returns daily.

## 2018-03-19 ENCOUNTER — OUTPATIENT INFUSION SERVICES (OUTPATIENT)
Dept: ONCOLOGY | Facility: MEDICAL CENTER | Age: 68
End: 2018-03-19
Attending: INTERNAL MEDICINE
Payer: MEDICARE

## 2018-03-19 VITALS
TEMPERATURE: 98.2 F | BODY MASS INDEX: 20.57 KG/M2 | OXYGEN SATURATION: 99 % | DIASTOLIC BLOOD PRESSURE: 78 MMHG | RESPIRATION RATE: 18 BRPM | SYSTOLIC BLOOD PRESSURE: 137 MMHG | WEIGHT: 151.9 LBS | HEART RATE: 62 BPM | HEIGHT: 72 IN

## 2018-03-19 DIAGNOSIS — M75.122 COMPLETE ROTATOR CUFF TEAR OF LEFT SHOULDER: ICD-10-CM

## 2018-03-19 LAB
ALBUMIN SERPL BCP-MCNC: 3.6 G/DL (ref 3.2–4.9)
ALP SERPL-CCNC: 70 U/L (ref 30–99)
ALT SERPL-CCNC: 19 U/L (ref 2–50)
AST SERPL-CCNC: 22 U/L (ref 12–45)
BASOPHILS # BLD AUTO: 1.7 % (ref 0–1.8)
BASOPHILS # BLD: 0.08 K/UL (ref 0–0.12)
BILIRUB CONJ SERPL-MCNC: <0.1 MG/DL (ref 0.1–0.5)
BILIRUB INDIRECT SERPL-MCNC: NORMAL MG/DL (ref 0–1)
BILIRUB SERPL-MCNC: 0.5 MG/DL (ref 0.1–1.5)
BUN SERPL-MCNC: 14 MG/DL (ref 8–22)
CALCIUM SERPL-MCNC: 8.9 MG/DL (ref 8.5–10.5)
CHLORIDE SERPL-SCNC: 102 MMOL/L (ref 96–112)
CO2 SERPL-SCNC: 23 MMOL/L (ref 20–33)
CREAT SERPL-MCNC: 0.51 MG/DL (ref 0.5–1.4)
EOSINOPHIL # BLD AUTO: 0.76 K/UL (ref 0–0.51)
EOSINOPHIL NFR BLD: 15.9 % (ref 0–6.9)
ERYTHROCYTE [DISTWIDTH] IN BLOOD BY AUTOMATED COUNT: 45.8 FL (ref 35.9–50)
GLUCOSE SERPL-MCNC: 93 MG/DL (ref 65–99)
HCT VFR BLD AUTO: 36.6 % (ref 42–52)
HGB BLD-MCNC: 12.1 G/DL (ref 14–18)
IMM GRANULOCYTES # BLD AUTO: 0.01 K/UL (ref 0–0.11)
IMM GRANULOCYTES NFR BLD AUTO: 0.2 % (ref 0–0.9)
LYMPHOCYTES # BLD AUTO: 1.46 K/UL (ref 1–4.8)
LYMPHOCYTES NFR BLD: 30.6 % (ref 22–41)
MCH RBC QN AUTO: 31.3 PG (ref 27–33)
MCHC RBC AUTO-ENTMCNC: 33.1 G/DL (ref 33.7–35.3)
MCV RBC AUTO: 94.6 FL (ref 81.4–97.8)
MONOCYTES # BLD AUTO: 0.34 K/UL (ref 0–0.85)
MONOCYTES NFR BLD AUTO: 7.1 % (ref 0–13.4)
NEUTROPHILS # BLD AUTO: 2.12 K/UL (ref 1.82–7.42)
NEUTROPHILS NFR BLD: 44.5 % (ref 44–72)
NRBC # BLD AUTO: 0 K/UL
NRBC BLD-RTO: 0 /100 WBC
PHOSPHATE SERPL-MCNC: 2.9 MG/DL (ref 2.5–4.5)
PLATELET # BLD AUTO: 300 K/UL (ref 164–446)
PMV BLD AUTO: 9.6 FL (ref 9–12.9)
POTASSIUM SERPL-SCNC: 3.6 MMOL/L (ref 3.6–5.5)
PROT SERPL-MCNC: 6.3 G/DL (ref 6–8.2)
RBC # BLD AUTO: 3.87 M/UL (ref 4.7–6.1)
SODIUM SERPL-SCNC: 132 MMOL/L (ref 135–145)
WBC # BLD AUTO: 4.8 K/UL (ref 4.8–10.8)

## 2018-03-19 PROCEDURE — 700111 HCHG RX REV CODE 636 W/ 250 OVERRIDE (IP): Performed by: INTERNAL MEDICINE

## 2018-03-19 PROCEDURE — 36592 COLLECT BLOOD FROM PICC: CPT

## 2018-03-19 PROCEDURE — 96365 THER/PROPH/DIAG IV INF INIT: CPT

## 2018-03-19 PROCEDURE — 85025 COMPLETE CBC W/AUTO DIFF WBC: CPT

## 2018-03-19 PROCEDURE — 80069 RENAL FUNCTION PANEL: CPT

## 2018-03-19 PROCEDURE — 700105 HCHG RX REV CODE 258: Performed by: INTERNAL MEDICINE

## 2018-03-19 PROCEDURE — 80076 HEPATIC FUNCTION PANEL: CPT

## 2018-03-19 RX ADMIN — SODIUM CHLORIDE 1000 MG: 900 INJECTION INTRAVENOUS at 10:47

## 2018-03-19 ASSESSMENT — PAIN SCALES - GENERAL: PAINLEVEL: NO PAIN

## 2018-03-20 ENCOUNTER — OUTPATIENT INFUSION SERVICES (OUTPATIENT)
Dept: ONCOLOGY | Facility: MEDICAL CENTER | Age: 68
End: 2018-03-20
Attending: INTERNAL MEDICINE
Payer: MEDICARE

## 2018-03-20 VITALS
SYSTOLIC BLOOD PRESSURE: 137 MMHG | DIASTOLIC BLOOD PRESSURE: 80 MMHG | TEMPERATURE: 97.9 F | OXYGEN SATURATION: 99 % | RESPIRATION RATE: 18 BRPM | HEART RATE: 69 BPM

## 2018-03-20 DIAGNOSIS — M75.122 COMPLETE ROTATOR CUFF TEAR OF LEFT SHOULDER: ICD-10-CM

## 2018-03-20 PROCEDURE — 700105 HCHG RX REV CODE 258: Performed by: INTERNAL MEDICINE

## 2018-03-20 PROCEDURE — 700111 HCHG RX REV CODE 636 W/ 250 OVERRIDE (IP): Performed by: INTERNAL MEDICINE

## 2018-03-20 PROCEDURE — 96365 THER/PROPH/DIAG IV INF INIT: CPT

## 2018-03-20 RX ADMIN — SODIUM CHLORIDE 1000 MG: 900 INJECTION INTRAVENOUS at 09:02

## 2018-03-20 ASSESSMENT — PAIN SCALES - GENERAL: PAINLEVEL: NO PAIN

## 2018-03-21 ENCOUNTER — OUTPATIENT INFUSION SERVICES (OUTPATIENT)
Dept: ONCOLOGY | Facility: MEDICAL CENTER | Age: 68
End: 2018-03-21
Attending: INTERNAL MEDICINE
Payer: MEDICARE

## 2018-03-21 VITALS
DIASTOLIC BLOOD PRESSURE: 78 MMHG | OXYGEN SATURATION: 100 % | TEMPERATURE: 97.9 F | HEART RATE: 67 BPM | SYSTOLIC BLOOD PRESSURE: 122 MMHG | RESPIRATION RATE: 18 BRPM

## 2018-03-21 DIAGNOSIS — M75.122 COMPLETE ROTATOR CUFF TEAR OF LEFT SHOULDER: ICD-10-CM

## 2018-03-21 PROCEDURE — 96365 THER/PROPH/DIAG IV INF INIT: CPT

## 2018-03-21 PROCEDURE — 700111 HCHG RX REV CODE 636 W/ 250 OVERRIDE (IP): Performed by: INTERNAL MEDICINE

## 2018-03-21 PROCEDURE — 700105 HCHG RX REV CODE 258: Performed by: INTERNAL MEDICINE

## 2018-03-21 RX ADMIN — SODIUM CHLORIDE 1000 MG: 900 INJECTION INTRAVENOUS at 10:01

## 2018-03-21 ASSESSMENT — PAIN SCALES - GENERAL: PAINLEVEL: NO PAIN

## 2018-03-21 NOTE — DISCHARGE SUMMARY
DATE OF ADMISSION:  03/09/2018    DATE OF DISCHARGE:  03/12/2018     PRIMARY DIAGNOSIS:  Septic arthritis, left shoulder.    CONSULTATIONS:  Infectious disease.    HOSPITAL COURSE:  The patient was admitted to the hospital for a recurrence of   septic arthritis, left shoulder.  He went through a thorough debridement and   was switched to Invanz for IV antibiotics.  The patient had a PICC line and   was discharged with the infusion center.    Medications at the time of discharge that were different from Invanz.       ____________________________________     MD EDIN PHAM / NTS    DD:  03/21/2018 12:41:16  DT:  03/21/2018 13:27:18    D#:  [id number]  Job#:  [id number]

## 2018-03-21 NOTE — H&P
HISTORY OF PRESENT ILLNESS:  The patient had left shoulder rotator cuff repair   in October.  Unfortunately, about 4 months later he developed a septic   arthritis with methicillin sensitive Staph aureus.  He was treated with   debridements and IV antibiotics and unfortunately I switched him to orals.  He   developed a recurrence.  He was aspirated in the clinic and he had what   looked like purulent and he was taken emergently to the hospital for a repeat   I and D.    PAST MEDICAL HISTORY:  Significant for prostate cancer, thyroid disease, heart   valve disease, hiatal hernia, cataracts, and urinary incontinence.    PAST SURGICAL HISTORY:  Left shoulder rotator cuff repair in October of 2017   and a left shoulder I and D in February of 2018 along with a variety of other   surgeries per the chart.    ALLERGIES:  No known drug allergies.    FAMILY HISTORY:  Noncontributory.    SOCIAL HISTORY:  Patient lives in town.  He is an outdoor adventure   .    REVIEW OF SYSTEMS:  He has no systemic symptoms, but did have significant left   shoulder pain and swelling.    PHYSICAL EXAMINATION:  GENERAL:  The patient appears of stated age.  PSYCHIATRIC:  Mood is appropriate.  He is alert and oriented.  HEENT:  Normal.  RESPIRATIONS:  Unlabored.  MUSCULOSKELETAL:  Examination shows a swollen shoulder.  No significant   redness or drainage.    IMPRESSION:  Left shoulder septic arthritis, status post previous rotator cuff   repair and debridement.    PLAN:  At this point, we will take him to the operating room.  We are going to   put him back on IV antibiotics, consult infectious disease and do a thorough   debridement and remove the hardware.  He was explained the risks, benefits,   alternative procedure and recovery in detail.  All questions were answered,   informed consent will be obtained.       ____________________________________     MD EDIN PHAM / BECCA    DD:  03/21/2018 12:40:03  DT:  03/21/2018  13:10:41    D#:  [id number]  Job#:  [id number]

## 2018-03-21 NOTE — PROGRESS NOTES
Pt to [facility] for daily ertapenem infusion for left shoulder osteomyelitis.  Pt PICC line flushed per protocol w/ brisk blood return noted.  Ertapenem infused through PICC with no adverse reactions.  Pt PICC again flushed per protocol w/ brisk blood return noted, wrapped in gauze and protective sleeve placed.  Pt left on foot in NAD, next appt in place

## 2018-03-22 ENCOUNTER — HOSPITAL ENCOUNTER (OUTPATIENT)
Dept: LAB | Facility: MEDICAL CENTER | Age: 68
End: 2018-03-22
Attending: UROLOGY
Payer: MEDICARE

## 2018-03-22 ENCOUNTER — OUTPATIENT INFUSION SERVICES (OUTPATIENT)
Dept: ONCOLOGY | Facility: MEDICAL CENTER | Age: 68
End: 2018-03-22
Attending: INTERNAL MEDICINE
Payer: MEDICARE

## 2018-03-22 VITALS
OXYGEN SATURATION: 95 % | TEMPERATURE: 98.2 F | SYSTOLIC BLOOD PRESSURE: 120 MMHG | DIASTOLIC BLOOD PRESSURE: 69 MMHG | RESPIRATION RATE: 18 BRPM | HEART RATE: 63 BPM

## 2018-03-22 DIAGNOSIS — M75.122 COMPLETE ROTATOR CUFF TEAR OF LEFT SHOULDER: ICD-10-CM

## 2018-03-22 LAB — PSA SERPL-MCNC: <0.01 NG/ML (ref 0–4)

## 2018-03-22 PROCEDURE — 700105 HCHG RX REV CODE 258: Performed by: INTERNAL MEDICINE

## 2018-03-22 PROCEDURE — 96365 THER/PROPH/DIAG IV INF INIT: CPT

## 2018-03-22 PROCEDURE — 84153 ASSAY OF PSA TOTAL: CPT

## 2018-03-22 PROCEDURE — 36415 COLL VENOUS BLD VENIPUNCTURE: CPT

## 2018-03-22 PROCEDURE — 700111 HCHG RX REV CODE 636 W/ 250 OVERRIDE (IP): Performed by: INTERNAL MEDICINE

## 2018-03-22 RX ADMIN — SODIUM CHLORIDE 1000 MG: 900 INJECTION INTRAVENOUS at 10:28

## 2018-03-22 ASSESSMENT — PAIN SCALES - GENERAL: PAINLEVEL: NO PAIN

## 2018-03-22 NOTE — PROGRESS NOTES
Pt arrived to IS, ambulatory, for Invanz infusion. Pt voices no complaints. PICC line flushed per policy, positive blood return noted. Invanz infused with no s/sx of adverse reaction. PICC line flushed per policy. Pt left IS with no s/sx of distress. Follow up appointment confirmed.

## 2018-03-23 ENCOUNTER — OUTPATIENT INFUSION SERVICES (OUTPATIENT)
Dept: ONCOLOGY | Facility: MEDICAL CENTER | Age: 68
End: 2018-03-23
Attending: INTERNAL MEDICINE
Payer: MEDICARE

## 2018-03-23 VITALS
WEIGHT: 151.9 LBS | RESPIRATION RATE: 18 BRPM | BODY MASS INDEX: 20.62 KG/M2 | SYSTOLIC BLOOD PRESSURE: 115 MMHG | TEMPERATURE: 98.4 F | DIASTOLIC BLOOD PRESSURE: 68 MMHG | OXYGEN SATURATION: 98 % | HEART RATE: 64 BPM

## 2018-03-23 DIAGNOSIS — M75.122 COMPLETE ROTATOR CUFF TEAR OF LEFT SHOULDER: ICD-10-CM

## 2018-03-23 PROCEDURE — 700105 HCHG RX REV CODE 258: Performed by: INTERNAL MEDICINE

## 2018-03-23 PROCEDURE — 700111 HCHG RX REV CODE 636 W/ 250 OVERRIDE (IP): Performed by: INTERNAL MEDICINE

## 2018-03-23 PROCEDURE — 96365 THER/PROPH/DIAG IV INF INIT: CPT

## 2018-03-23 RX ADMIN — SODIUM CHLORIDE 1000 MG: 900 INJECTION INTRAVENOUS at 13:38

## 2018-03-23 ASSESSMENT — PAIN SCALES - GENERAL: PAINLEVEL: 8=MODERATE-SEVERE PAIN

## 2018-03-23 NOTE — PROGRESS NOTES
Patient arrived to Infusion for daily Invanz; reports no significant changes or concerns.  Pain level the same; states he will start therapy next week to rehab L shoulder.  PICC line flushed with brisk blood return. Invanz infused per MAR.  PICC line flushed per protocol. Confirmed tomorrow's appt time. Pt discharged home in good spirits under no apparent distress.

## 2018-03-24 ENCOUNTER — OUTPATIENT INFUSION SERVICES (OUTPATIENT)
Dept: ONCOLOGY | Facility: MEDICAL CENTER | Age: 68
End: 2018-03-24
Attending: INTERNAL MEDICINE
Payer: MEDICARE

## 2018-03-24 VITALS
HEART RATE: 51 BPM | WEIGHT: 151.9 LBS | SYSTOLIC BLOOD PRESSURE: 130 MMHG | BODY MASS INDEX: 20.62 KG/M2 | RESPIRATION RATE: 18 BRPM | TEMPERATURE: 98 F | DIASTOLIC BLOOD PRESSURE: 67 MMHG | OXYGEN SATURATION: 99 %

## 2018-03-24 DIAGNOSIS — M75.122 COMPLETE ROTATOR CUFF TEAR OF LEFT SHOULDER: ICD-10-CM

## 2018-03-24 PROCEDURE — 700105 HCHG RX REV CODE 258: Performed by: INTERNAL MEDICINE

## 2018-03-24 PROCEDURE — 700111 HCHG RX REV CODE 636 W/ 250 OVERRIDE (IP): Performed by: INTERNAL MEDICINE

## 2018-03-24 PROCEDURE — 96365 THER/PROPH/DIAG IV INF INIT: CPT

## 2018-03-24 RX ADMIN — SODIUM CHLORIDE 1000 MG: 900 INJECTION INTRAVENOUS at 10:07

## 2018-03-24 ASSESSMENT — PAIN SCALES - GENERAL: PAINLEVEL: 9=SEVERE PAIN

## 2018-03-24 NOTE — PROGRESS NOTES
Patient in for continued IV Invanz therapy; reports irritability today related to duration of antibiotic therapy, given time to express concerns and reassured with positive results. Denies any physical distress overnight. Treatment infused as ordered and well tolerated. PICC dressing to be changed on 3/25/18; appointment confirmed with patient for 0930 tomorrow. Discharged from clinic with PICC dressing wrapped for shower and in no apparent distress.

## 2018-03-25 ENCOUNTER — OUTPATIENT INFUSION SERVICES (OUTPATIENT)
Dept: ONCOLOGY | Facility: MEDICAL CENTER | Age: 68
End: 2018-03-25
Attending: INTERNAL MEDICINE
Payer: MEDICARE

## 2018-03-25 VITALS
RESPIRATION RATE: 18 BRPM | BODY MASS INDEX: 20.62 KG/M2 | DIASTOLIC BLOOD PRESSURE: 73 MMHG | SYSTOLIC BLOOD PRESSURE: 127 MMHG | TEMPERATURE: 98.6 F | OXYGEN SATURATION: 99 % | HEART RATE: 55 BPM | WEIGHT: 151.9 LBS

## 2018-03-25 DIAGNOSIS — M75.122 COMPLETE ROTATOR CUFF TEAR OF LEFT SHOULDER: ICD-10-CM

## 2018-03-25 PROCEDURE — 700111 HCHG RX REV CODE 636 W/ 250 OVERRIDE (IP): Performed by: INTERNAL MEDICINE

## 2018-03-25 PROCEDURE — 96365 THER/PROPH/DIAG IV INF INIT: CPT

## 2018-03-25 PROCEDURE — 700105 HCHG RX REV CODE 258: Performed by: INTERNAL MEDICINE

## 2018-03-25 RX ADMIN — SODIUM CHLORIDE 1000 MG: 900 INJECTION INTRAVENOUS at 09:37

## 2018-03-25 ASSESSMENT — PAIN SCALES - GENERAL: PAINLEVEL: 8=MODERATE-SEVERE PAIN

## 2018-03-25 NOTE — PROGRESS NOTES
Pt presented to infusion center for daily Invanz. Pt reports no significant changes since yesterday. Right arm PICC line in place, flushed and brisk blood return observed. Invanz infused with no s/s of adverse reaction. Dressing changed in sterile fashion. PICC line flushed, brisk blood return again observed, wrapped in gauze and protective sleeve, wrapped for shower with plastic bag and tape per pt request. Has next appt, left on foot in good spirits.

## 2018-03-26 ENCOUNTER — OFFICE VISIT (OUTPATIENT)
Dept: INFECTIOUS DISEASES | Facility: MEDICAL CENTER | Age: 68
End: 2018-03-26
Payer: MEDICARE

## 2018-03-26 ENCOUNTER — OUTPATIENT INFUSION SERVICES (OUTPATIENT)
Dept: ONCOLOGY | Facility: MEDICAL CENTER | Age: 68
End: 2018-03-26
Attending: INTERNAL MEDICINE
Payer: MEDICARE

## 2018-03-26 VITALS
HEART RATE: 54 BPM | DIASTOLIC BLOOD PRESSURE: 68 MMHG | WEIGHT: 154.32 LBS | TEMPERATURE: 97.8 F | HEIGHT: 72 IN | RESPIRATION RATE: 18 BRPM | SYSTOLIC BLOOD PRESSURE: 118 MMHG | BODY MASS INDEX: 20.9 KG/M2 | OXYGEN SATURATION: 100 %

## 2018-03-26 VITALS
DIASTOLIC BLOOD PRESSURE: 68 MMHG | HEIGHT: 72 IN | WEIGHT: 155.6 LBS | OXYGEN SATURATION: 96 % | TEMPERATURE: 98 F | SYSTOLIC BLOOD PRESSURE: 118 MMHG | HEART RATE: 50 BPM | BODY MASS INDEX: 21.08 KG/M2

## 2018-03-26 DIAGNOSIS — D72.819 LEUKOPENIA, UNSPECIFIED TYPE: ICD-10-CM

## 2018-03-26 DIAGNOSIS — M75.122 COMPLETE ROTATOR CUFF TEAR OF LEFT SHOULDER: ICD-10-CM

## 2018-03-26 DIAGNOSIS — A49.01 MSSA (METHICILLIN SUSCEPTIBLE STAPHYLOCOCCUS AUREUS) INFECTION: ICD-10-CM

## 2018-03-26 DIAGNOSIS — M00.012 STAPHYLOCOCCAL ARTHRITIS OF LEFT SHOULDER (HCC): ICD-10-CM

## 2018-03-26 LAB
ALBUMIN SERPL BCP-MCNC: 3.5 G/DL (ref 3.2–4.9)
ALP SERPL-CCNC: 55 U/L (ref 30–99)
ALT SERPL-CCNC: 16 U/L (ref 2–50)
AST SERPL-CCNC: 17 U/L (ref 12–45)
BASOPHILS # BLD AUTO: 2.3 % (ref 0–1.8)
BASOPHILS # BLD: 0.09 K/UL (ref 0–0.12)
BILIRUB CONJ SERPL-MCNC: <0.1 MG/DL (ref 0.1–0.5)
BILIRUB INDIRECT SERPL-MCNC: NORMAL MG/DL (ref 0–1)
BILIRUB SERPL-MCNC: 0.5 MG/DL (ref 0.1–1.5)
BUN SERPL-MCNC: 17 MG/DL (ref 8–22)
CALCIUM SERPL-MCNC: 9.1 MG/DL (ref 8.5–10.5)
CHLORIDE SERPL-SCNC: 105 MMOL/L (ref 96–112)
CO2 SERPL-SCNC: 25 MMOL/L (ref 20–33)
CREAT SERPL-MCNC: 0.56 MG/DL (ref 0.5–1.4)
EOSINOPHIL # BLD AUTO: 0.24 K/UL (ref 0–0.51)
EOSINOPHIL NFR BLD: 6.2 % (ref 0–6.9)
ERYTHROCYTE [DISTWIDTH] IN BLOOD BY AUTOMATED COUNT: 48.3 FL (ref 35.9–50)
GLUCOSE SERPL-MCNC: 105 MG/DL (ref 65–99)
HCT VFR BLD AUTO: 37.2 % (ref 42–52)
HGB BLD-MCNC: 12 G/DL (ref 14–18)
IMM GRANULOCYTES # BLD AUTO: 0.01 K/UL (ref 0–0.11)
IMM GRANULOCYTES NFR BLD AUTO: 0.3 % (ref 0–0.9)
LYMPHOCYTES # BLD AUTO: 1.58 K/UL (ref 1–4.8)
LYMPHOCYTES NFR BLD: 40.6 % (ref 22–41)
MCH RBC QN AUTO: 30.7 PG (ref 27–33)
MCHC RBC AUTO-ENTMCNC: 32.3 G/DL (ref 33.7–35.3)
MCV RBC AUTO: 95.1 FL (ref 81.4–97.8)
MONOCYTES # BLD AUTO: 0.34 K/UL (ref 0–0.85)
MONOCYTES NFR BLD AUTO: 8.7 % (ref 0–13.4)
NEUTROPHILS # BLD AUTO: 1.63 K/UL (ref 1.82–7.42)
NEUTROPHILS NFR BLD: 41.9 % (ref 44–72)
NRBC # BLD AUTO: 0 K/UL
NRBC BLD-RTO: 0 /100 WBC
PHOSPHATE SERPL-MCNC: 3.2 MG/DL (ref 2.5–4.5)
PLATELET # BLD AUTO: 269 K/UL (ref 164–446)
PMV BLD AUTO: 9.8 FL (ref 9–12.9)
POTASSIUM SERPL-SCNC: 3.6 MMOL/L (ref 3.6–5.5)
PROT SERPL-MCNC: 6 G/DL (ref 6–8.2)
RBC # BLD AUTO: 3.91 M/UL (ref 4.7–6.1)
SODIUM SERPL-SCNC: 137 MMOL/L (ref 135–145)
WBC # BLD AUTO: 3.9 K/UL (ref 4.8–10.8)

## 2018-03-26 PROCEDURE — 96365 THER/PROPH/DIAG IV INF INIT: CPT

## 2018-03-26 PROCEDURE — 99214 OFFICE O/P EST MOD 30 MIN: CPT | Performed by: NURSE PRACTITIONER

## 2018-03-26 PROCEDURE — 36592 COLLECT BLOOD FROM PICC: CPT

## 2018-03-26 PROCEDURE — 85025 COMPLETE CBC W/AUTO DIFF WBC: CPT

## 2018-03-26 PROCEDURE — 80076 HEPATIC FUNCTION PANEL: CPT

## 2018-03-26 PROCEDURE — 80069 RENAL FUNCTION PANEL: CPT

## 2018-03-26 PROCEDURE — 700105 HCHG RX REV CODE 258: Performed by: INTERNAL MEDICINE

## 2018-03-26 PROCEDURE — 700111 HCHG RX REV CODE 636 W/ 250 OVERRIDE (IP): Performed by: INTERNAL MEDICINE

## 2018-03-26 RX ADMIN — SODIUM CHLORIDE 1000 MG: 900 INJECTION INTRAVENOUS at 14:10

## 2018-03-26 ASSESSMENT — PAIN SCALES - GENERAL: PAINLEVEL: 4=SLIGHT-MODERATE PAIN

## 2018-03-26 NOTE — PROGRESS NOTES
"Infectious Disease Clinic    Subjective:     Chief Complaint   Patient presents with   • Follow-Up     Staphylococcal arthritis of left shoulder      Interval History: 67 y.o. Man with a history of prostate cancer s/p prostatectomy, penile clamp, hyperparathyroidism s/p parathyroidectomy, right shoulder rotator cuff repair surgery and left rotator cuff repair in October 2017.  Hospitalized from 2/6- 2/9/18, admitted for left shoulder pain, swelling and erythema.  Found to have left shoulder septic arthritis.  Underwent Left shoulder I&D and arthroscopy with removal of retained implants on 2/6 with Dr. Daniel- purulence noted intraoperatively.  OR cx +MSSA.  Pt discharged home on IV Daptomycin, end date 3/6/18.    2/26/18:  Seen by Dr. Rosario.  Pt having increasing fatigue and malaise on daptomycin but denies any cramping. He has a poor appetite. Over a week ago, he developed drainage from the surgical site. He saw ortho and underwent a washout in the clinic. He is no longer having any drainage.  Will DC Dapto and transition to Ertapenem.  Continue IV abx through 03/06/18.    3/5/2018: Seen by JENIFFER Strauss.  Stating he feels \"quantifiabley better\" since changing over to the IV Ertapenem.  There is a pinhole along the surgical incision, trace yellowish drainage, no odor, trace redness- covering with a band aid.  Finish IV Ertapenem on 3/6/18.  D/C PICC after last infusion dose.  PO Bactrim x 10 days.     Hospitalized from 3/9/2018 - 3/12/2018, admitted d/t increased drainage after stopping IV antibiotics.  Denied pain.  Pt reports that he went on a walk, felt fine and when he returned he took off his t-shirt and noticed golf ball size swelling around the shoulder.  Underwent I&D of infected joint with hardware removal on 3/10 with Dr. Daniel.  OR cx +GPC; however, pt on abx prior to cx.  Pt discharged home on IV Ertapenem through 4/21/18.    Today, 3/26/2018:  Tolerating the IV Ertapenem without issue, denies " "feeling generally ill, fevers/chills, general malaise, headache, n/v/d, abdominal pain, chest pain or shortness of breath.  Went for a 3 mile hike today, felt great.  Was seen by Dr. Daniel, sutures removed and pt started on PT.  Has minimal pain to L shoulder that is more tender and improves with rest.  Preparing to start PT tomorrow.  Discussed low WBC and ANC- likely d/t the Ertapenem.  Pt stating that he is willing to try the Daptomycin again and if he becomes too fatigued, then will do PO abx.     ROS  As documented above in my HPI.    Past Medical History:   Diagnosis Date   • Arthritis     knees   • Cancer (CMS-[facility]) 07/13    Prostate   • Disorder of thyroid    • Heart valve disease     Mild mitral valve problem - per patient cardiologist is not concerned at this time.   • Hiatus hernia syndrome    • Unspecified cataract     Bilateral IOL's   • Urinary incontinence        Social History   Substance Use Topics   • Smoking status: Never Smoker   • Smokeless tobacco: Never Used   • Alcohol use No       Allergies: Patient has no known allergies.    Pt's medication and problem list reviewed.     Objective:     PE:  /68   Pulse (!) 50   Temp 36.7 °C (98 °F)   Ht 1.822 m (5' 11.73\")   Wt 70.6 kg (155 lb 9.6 oz)   SpO2 96%   BMI 21.26 kg/m²     Vital signs reviewed    Constitutional: Appears well-developed and well-nourished. No acute distress.  Speech fluent.  Thin.    Eyes: Conjunctivae normal and EOM are normal. Pupils are equal, round, and reactive to light.   Neck: Trachea midline. Normal range of motion. No JVD.  Cardiovascular: Bradycardic, regular rhythm, normal heart sounds. No murmur, gallop, or friction rub. No edema.  Respiratory: No respiratory distress, unlabored respiratory effort.  Lungs clear to auscultation bilaterally. No wheezes or rales.   Abdomen: Soft, non tender, non-distended. BS + x 4. No masses.   Musculoskeletal: Steady gait.  Limited L shoulder range of motion.    L shoulder " -steri strips in place, well approximated, no openings or drainage, no odor, no erythema, minimal swelling.  RUE PICC- CDI, non tender, no erythema.  Skin: Warm and dry.  No visible rashes or lesions.  Neurological: No cranial nerve deficit. Coordination normal.    Psychiatric: Alert and oriented to person, place, and time. Normal mood, calm affect.  Normal behavior and judgment.     Labs:  WBC   Date/Time Value Ref Range Status   03/26/2018 02:17 PM 3.9 (L) 4.8 - 10.8 K/uL Final     RBC   Date/Time Value Ref Range Status   03/26/2018 02:17 PM 3.91 (L) 4.70 - 6.10 M/uL Final     Hemoglobin   Date/Time Value Ref Range Status   03/26/2018 02:17 PM 12.0 (L) 14.0 - 18.0 g/dL Final     Hematocrit   Date/Time Value Ref Range Status   03/26/2018 02:17 PM 37.2 (L) 42.0 - 52.0 % Final     MCV   Date/Time Value Ref Range Status   03/26/2018 02:17 PM 95.1 81.4 - 97.8 fL Final     MCH   Date/Time Value Ref Range Status   03/26/2018 02:17 PM 30.7 27.0 - 33.0 pg Final     MCHC   Date/Time Value Ref Range Status   03/26/2018 02:17 PM 32.3 (L) 33.7 - 35.3 g/dL Final     MPV   Date/Time Value Ref Range Status   03/26/2018 02:17 PM 9.8 9.0 - 12.9 fL Final        Sodium   Date/Time Value Ref Range Status   03/26/2018 02:17  135 - 145 mmol/L Final     Potassium   Date/Time Value Ref Range Status   03/26/2018 02:17 PM 3.6 3.6 - 5.5 mmol/L Final     Chloride   Date/Time Value Ref Range Status   03/26/2018 02:17  96 - 112 mmol/L Final     Co2   Date/Time Value Ref Range Status   03/26/2018 02:17 PM 25 20 - 33 mmol/L Final     Glucose   Date/Time Value Ref Range Status   03/26/2018 02:17  (H) 65 - 99 mg/dL Final     Bun   Date/Time Value Ref Range Status   03/26/2018 02:17 PM 17 8 - 22 mg/dL Final     Creatinine   Date/Time Value Ref Range Status   03/26/2018 02:17 PM 0.56 0.50 - 1.40 mg/dL Final       Alkaline Phosphatase   Date/Time Value Ref Range Status   03/26/2018 02:17 PM 55 30 - 99 U/L Final     AST(SGOT)    Date/Time Value Ref Range Status   03/26/2018 02:17 PM 17 12 - 45 U/L Final     ALT(SGPT)   Date/Time Value Ref Range Status   03/26/2018 02:17 PM 16 2 - 50 U/L Final     Total Bilirubin   Date/Time Value Ref Range Status   03/26/2018 02:17 PM 0.5 0.1 - 1.5 mg/dL Final      Assessment and Plan:   The following treatment plan was discussed with patient at length:    1. Staphylococcal arthritis of left shoulder (CMS-HCC)  CBC WITH DIFFERENTIAL    WESTERGREN SED RATE    CRP QUANTITIVE (NON-CARDIAC)    -Continue IV Ertapenem for the time being.  Will closely monitor for worsening leukopenia.  Repeat CBC on 3/29.  After lengthy discussion, pt stated he would be would be willing to go back to IV Daptomycin rather than doing PO Avelox/ Levaquin.  No changes to abx at this time, will base time of change on lab results.  If pt found to be intolerable to Dapto, if change made, then will progress to PO Avelox/Levaquin.  -ESR and CRP added to labs to trend inflammatory markers.     2. MSSA (methicillin susceptible Staphylococcus aureus) infection  CBC WITH DIFFERENTIAL    WESTERGREN SED RATE    CRP QUANTITIVE (NON-CARDIAC)    As above.   3. Leukopenia, unspecified type  HIV AG/AB COMBO ASSAY SCREENING    CBC WITH DIFFERENTIAL    -CBC as above.  -Check HIV- negative back in 2014.     Follow up: 2 weeks, RTC sooner if needed. FU with PCP for ongoing chronic medical conditions.     LEELA Dhaliwal.

## 2018-03-27 ENCOUNTER — OUTPATIENT INFUSION SERVICES (OUTPATIENT)
Dept: ONCOLOGY | Facility: MEDICAL CENTER | Age: 68
End: 2018-03-27
Attending: INTERNAL MEDICINE
Payer: MEDICARE

## 2018-03-27 VITALS
HEART RATE: 51 BPM | DIASTOLIC BLOOD PRESSURE: 45 MMHG | RESPIRATION RATE: 18 BRPM | SYSTOLIC BLOOD PRESSURE: 109 MMHG | OXYGEN SATURATION: 100 % | BODY MASS INDEX: 21.27 KG/M2 | WEIGHT: 155.65 LBS | TEMPERATURE: 98.2 F

## 2018-03-27 DIAGNOSIS — M75.122 COMPLETE ROTATOR CUFF TEAR OF LEFT SHOULDER: ICD-10-CM

## 2018-03-27 PROCEDURE — 96365 THER/PROPH/DIAG IV INF INIT: CPT

## 2018-03-27 PROCEDURE — 700105 HCHG RX REV CODE 258: Performed by: INTERNAL MEDICINE

## 2018-03-27 PROCEDURE — 700111 HCHG RX REV CODE 636 W/ 250 OVERRIDE (IP): Performed by: INTERNAL MEDICINE

## 2018-03-27 RX ADMIN — SODIUM CHLORIDE 1000 MG: 900 INJECTION INTRAVENOUS at 15:34

## 2018-03-27 ASSESSMENT — PAIN SCALES - GENERAL: PAINLEVEL: NO PAIN

## 2018-03-28 ENCOUNTER — OUTPATIENT INFUSION SERVICES (OUTPATIENT)
Dept: ONCOLOGY | Facility: MEDICAL CENTER | Age: 68
End: 2018-03-28
Attending: INTERNAL MEDICINE
Payer: MEDICARE

## 2018-03-28 VITALS
OXYGEN SATURATION: 100 % | SYSTOLIC BLOOD PRESSURE: 117 MMHG | WEIGHT: 155.65 LBS | HEART RATE: 61 BPM | DIASTOLIC BLOOD PRESSURE: 69 MMHG | TEMPERATURE: 97.5 F | BODY MASS INDEX: 21.27 KG/M2 | RESPIRATION RATE: 18 BRPM

## 2018-03-28 DIAGNOSIS — M75.122 COMPLETE ROTATOR CUFF TEAR OF LEFT SHOULDER: ICD-10-CM

## 2018-03-28 PROCEDURE — 700105 HCHG RX REV CODE 258: Performed by: INTERNAL MEDICINE

## 2018-03-28 PROCEDURE — 700111 HCHG RX REV CODE 636 W/ 250 OVERRIDE (IP): Performed by: INTERNAL MEDICINE

## 2018-03-28 PROCEDURE — 96365 THER/PROPH/DIAG IV INF INIT: CPT

## 2018-03-28 RX ADMIN — SODIUM CHLORIDE 1000 MG: 900 INJECTION INTRAVENOUS at 15:09

## 2018-03-28 ASSESSMENT — PAIN SCALES - GENERAL: PAINLEVEL: 2=MINIMAL-SLIGHT

## 2018-03-28 NOTE — PROGRESS NOTES
Pt arrives ambulatory to IS accompanied by self.  He is here for IV ABX.  PICC is positional and with pt in proper position PICC flushes easily and brisk blood return noted.  IV ABX infused as ordered, pt is tolerating this course of treatment without complaint, no evidence of adverse effects noted or expressed.  Pt is to have CBC drawn on Thursday 3/29, order in Open Orders.  PICC flushed and wrapped with 4x4 and stretch net.  dc'd to self care in no apparent distress.  Returns daily.

## 2018-03-29 ENCOUNTER — OUTPATIENT INFUSION SERVICES (OUTPATIENT)
Dept: ONCOLOGY | Facility: MEDICAL CENTER | Age: 68
End: 2018-03-29
Attending: INTERNAL MEDICINE
Payer: MEDICARE

## 2018-03-29 VITALS
TEMPERATURE: 98.5 F | SYSTOLIC BLOOD PRESSURE: 108 MMHG | RESPIRATION RATE: 18 BRPM | OXYGEN SATURATION: 100 % | BODY MASS INDEX: 21.27 KG/M2 | HEART RATE: 50 BPM | DIASTOLIC BLOOD PRESSURE: 51 MMHG | WEIGHT: 155.65 LBS

## 2018-03-29 DIAGNOSIS — M75.122 COMPLETE ROTATOR CUFF TEAR OF LEFT SHOULDER: ICD-10-CM

## 2018-03-29 DIAGNOSIS — A49.01 MSSA (METHICILLIN SUSCEPTIBLE STAPHYLOCOCCUS AUREUS) INFECTION: ICD-10-CM

## 2018-03-29 DIAGNOSIS — D72.819 LEUKOPENIA, UNSPECIFIED TYPE: ICD-10-CM

## 2018-03-29 DIAGNOSIS — M00.012 STAPHYLOCOCCAL ARTHRITIS OF LEFT SHOULDER (HCC): ICD-10-CM

## 2018-03-29 LAB
BASOPHILS # BLD AUTO: 1.9 % (ref 0–1.8)
BASOPHILS # BLD: 0.07 K/UL (ref 0–0.12)
CRP SERPL HS-MCNC: 0.05 MG/DL (ref 0–0.75)
EOSINOPHIL # BLD AUTO: 0.18 K/UL (ref 0–0.51)
EOSINOPHIL NFR BLD: 4.8 % (ref 0–6.9)
ERYTHROCYTE [DISTWIDTH] IN BLOOD BY AUTOMATED COUNT: 47.7 FL (ref 35.9–50)
ERYTHROCYTE [SEDIMENTATION RATE] IN BLOOD BY WESTERGREN METHOD: 18 MM/HOUR (ref 0–20)
HCT VFR BLD AUTO: 38.1 % (ref 42–52)
HGB BLD-MCNC: 12.7 G/DL (ref 14–18)
HIV 1+2 AB+HIV1 P24 AG SERPL QL IA: NON REACTIVE
IMM GRANULOCYTES # BLD AUTO: 0.01 K/UL (ref 0–0.11)
IMM GRANULOCYTES NFR BLD AUTO: 0.3 % (ref 0–0.9)
LYMPHOCYTES # BLD AUTO: 1.72 K/UL (ref 1–4.8)
LYMPHOCYTES NFR BLD: 46.1 % (ref 22–41)
MCH RBC QN AUTO: 31.5 PG (ref 27–33)
MCHC RBC AUTO-ENTMCNC: 33.3 G/DL (ref 33.7–35.3)
MCV RBC AUTO: 94.5 FL (ref 81.4–97.8)
MONOCYTES # BLD AUTO: 0.31 K/UL (ref 0–0.85)
MONOCYTES NFR BLD AUTO: 8.3 % (ref 0–13.4)
NEUTROPHILS # BLD AUTO: 1.44 K/UL (ref 1.82–7.42)
NEUTROPHILS NFR BLD: 38.6 % (ref 44–72)
NRBC # BLD AUTO: 0 K/UL
NRBC BLD-RTO: 0 /100 WBC
PLATELET # BLD AUTO: 255 K/UL (ref 164–446)
PMV BLD AUTO: 10.1 FL (ref 9–12.9)
RBC # BLD AUTO: 4.03 M/UL (ref 4.7–6.1)
WBC # BLD AUTO: 3.7 K/UL (ref 4.8–10.8)

## 2018-03-29 PROCEDURE — 87389 HIV-1 AG W/HIV-1&-2 AB AG IA: CPT

## 2018-03-29 PROCEDURE — 96365 THER/PROPH/DIAG IV INF INIT: CPT

## 2018-03-29 PROCEDURE — 86140 C-REACTIVE PROTEIN: CPT

## 2018-03-29 PROCEDURE — 700105 HCHG RX REV CODE 258: Performed by: INTERNAL MEDICINE

## 2018-03-29 PROCEDURE — 85025 COMPLETE CBC W/AUTO DIFF WBC: CPT

## 2018-03-29 PROCEDURE — 85652 RBC SED RATE AUTOMATED: CPT

## 2018-03-29 PROCEDURE — 700111 HCHG RX REV CODE 636 W/ 250 OVERRIDE (IP): Performed by: INTERNAL MEDICINE

## 2018-03-29 RX ADMIN — SODIUM CHLORIDE 1000 MG: 900 INJECTION INTRAVENOUS at 17:05

## 2018-03-29 ASSESSMENT — PAIN SCALES - GENERAL: PAINLEVEL: 2=MINIMAL-SLIGHT

## 2018-03-29 NOTE — PROGRESS NOTES
Haider returns for IVABX.  Discussed plan to redraw CBC on Thursday 3/29. Invanz infused as ordered. Haider tolerated well and without incident. PICC line in good condition and has positive blood return. PICC line flushed with saline per protocol. Next appointment scheduled. Discharged to self care; no apparent distress noted.

## 2018-03-30 ENCOUNTER — OUTPATIENT INFUSION SERVICES (OUTPATIENT)
Dept: ONCOLOGY | Facility: MEDICAL CENTER | Age: 68
End: 2018-03-30
Attending: INTERNAL MEDICINE
Payer: MEDICARE

## 2018-03-30 VITALS
OXYGEN SATURATION: 100 % | TEMPERATURE: 98.5 F | WEIGHT: 155.65 LBS | DIASTOLIC BLOOD PRESSURE: 89 MMHG | HEART RATE: 60 BPM | RESPIRATION RATE: 18 BRPM | SYSTOLIC BLOOD PRESSURE: 140 MMHG | BODY MASS INDEX: 21.27 KG/M2

## 2018-03-30 DIAGNOSIS — M75.122 COMPLETE ROTATOR CUFF TEAR OF LEFT SHOULDER: ICD-10-CM

## 2018-03-30 PROCEDURE — 96365 THER/PROPH/DIAG IV INF INIT: CPT

## 2018-03-30 PROCEDURE — 700111 HCHG RX REV CODE 636 W/ 250 OVERRIDE (IP): Performed by: INTERNAL MEDICINE

## 2018-03-30 PROCEDURE — 700105 HCHG RX REV CODE 258: Performed by: INTERNAL MEDICINE

## 2018-03-30 RX ADMIN — SODIUM CHLORIDE 1000 MG: 900 INJECTION INTRAVENOUS at 15:32

## 2018-03-30 ASSESSMENT — PAIN SCALES - GENERAL: PAINLEVEL: NO PAIN

## 2018-03-30 NOTE — PROGRESS NOTES
Patient in for continuation of IV Invanz therapy, reports no difficulties overnight. Per previous RN note, patient's PICC line was difficult to flush and sluggish with blood return on 3/29/18. PICC line assessed with second RN in clinic today; able to obtain brisk blood return and flush easily when patient laid back in reclining position. Patient denies pain, no swelling noted; reports he had been doing upper body physical therapy on both shoulders, but will hold on this until PICC line is removed. Treatment infused as ordered and well tolerated. PICC with positive blood return at beginning and end of infusion; clave changed per policy. Appointment for 3/31/18 at 1500 confirmed; discharged ambulatory in no apparent distress.

## 2018-03-30 NOTE — PROGRESS NOTES
Pt presents ambulatory to IS for daily Ertapenem infusion.  POC discussed with patient, he verbalized understanding.  RUE PICC line in place, difficulty with flushing and receiving blood return.  With positional changes, able to visualize blood return and infuse antibiotic.  Labs drawn from LAC due to sluggish blood return from PICC.  Pt reports doing PT to his injured shoulder daily, stretching and using both arms extensively.  Educated patient on avoiding strenuous activity to R arm, where PICC is located, as this can cause positional changes to catheter.  Antibiotic completed without incident, and line flushed per policy.  Pt returns tomorrow, aware of possible TPA and/or CXR.  MOMO Vora informed of plan for tomorrow.  Pt discharged from IS in NAD under self care.

## 2018-03-31 ENCOUNTER — OUTPATIENT INFUSION SERVICES (OUTPATIENT)
Dept: ONCOLOGY | Facility: MEDICAL CENTER | Age: 68
End: 2018-03-31
Attending: INTERNAL MEDICINE
Payer: MEDICARE

## 2018-03-31 VITALS
RESPIRATION RATE: 16 BRPM | OXYGEN SATURATION: 98 % | HEART RATE: 54 BPM | TEMPERATURE: 98.2 F | SYSTOLIC BLOOD PRESSURE: 132 MMHG | DIASTOLIC BLOOD PRESSURE: 82 MMHG

## 2018-03-31 DIAGNOSIS — M75.122 COMPLETE ROTATOR CUFF TEAR OF LEFT SHOULDER: ICD-10-CM

## 2018-03-31 PROCEDURE — 700105 HCHG RX REV CODE 258: Performed by: INTERNAL MEDICINE

## 2018-03-31 PROCEDURE — 96365 THER/PROPH/DIAG IV INF INIT: CPT

## 2018-03-31 PROCEDURE — 700111 HCHG RX REV CODE 636 W/ 250 OVERRIDE (IP): Performed by: INTERNAL MEDICINE

## 2018-03-31 RX ADMIN — SODIUM CHLORIDE 1000 MG: 900 INJECTION INTRAVENOUS at 15:07

## 2018-03-31 ASSESSMENT — PAIN SCALES - GENERAL: PAINLEVEL: NO PAIN

## 2018-03-31 NOTE — PROGRESS NOTES
Patient seen today for IVAB therapy.  Plan of care discussed.  Assessment completed.  Good blood return from PICC line.  Ertapenem infused as ordered.  Tolerated well and without complaint.  Patient discharged after completion of treatment in good condition, ambulatory.  Returns daily.

## 2018-04-01 ENCOUNTER — OUTPATIENT INFUSION SERVICES (OUTPATIENT)
Dept: ONCOLOGY | Facility: MEDICAL CENTER | Age: 68
End: 2018-04-01
Attending: INTERNAL MEDICINE
Payer: MEDICARE

## 2018-04-01 VITALS
RESPIRATION RATE: 16 BRPM | SYSTOLIC BLOOD PRESSURE: 120 MMHG | DIASTOLIC BLOOD PRESSURE: 62 MMHG | TEMPERATURE: 97.6 F | OXYGEN SATURATION: 100 % | HEART RATE: 48 BPM

## 2018-04-01 DIAGNOSIS — M75.122 COMPLETE ROTATOR CUFF TEAR OF LEFT SHOULDER: ICD-10-CM

## 2018-04-01 PROCEDURE — 700111 HCHG RX REV CODE 636 W/ 250 OVERRIDE (IP): Performed by: INTERNAL MEDICINE

## 2018-04-01 PROCEDURE — 96365 THER/PROPH/DIAG IV INF INIT: CPT

## 2018-04-01 PROCEDURE — 700105 HCHG RX REV CODE 258: Performed by: INTERNAL MEDICINE

## 2018-04-01 RX ADMIN — SODIUM CHLORIDE 1000 MG: 900 INJECTION INTRAVENOUS at 11:09

## 2018-04-01 ASSESSMENT — PAIN SCALES - GENERAL: PAINLEVEL: NO PAIN

## 2018-04-01 NOTE — PROGRESS NOTES
Patient arrived to clinic for daily Invanz.  Denies any changes from previous day.  PICC line positional.  Unable to flush with arm held to side.  Arm repositioned and line flushed with sluggish blood return noted.  Invanz infused per order, pt tolerated well.  PICC line flushed and unable to get blood return when arm placed in different positions.  Patient stated the PICC line issues started after he began PT .  Talked to patient about tpa and possible chest xray if line remains hard to use.  Since unable to get blood return post infusion patient requested to do tpa at tomorrow's appointment and not today.  Dressing changed using sterile technique.  Confirmed tomorrow's appointment and pt ambulated out of clinic in no apparent distress.

## 2018-04-02 ENCOUNTER — OUTPATIENT INFUSION SERVICES (OUTPATIENT)
Dept: ONCOLOGY | Facility: MEDICAL CENTER | Age: 68
End: 2018-04-02
Attending: INTERNAL MEDICINE
Payer: MEDICARE

## 2018-04-02 ENCOUNTER — TELEPHONE (OUTPATIENT)
Dept: ONCOLOGY | Facility: MEDICAL CENTER | Age: 68
End: 2018-04-02

## 2018-04-02 VITALS
SYSTOLIC BLOOD PRESSURE: 144 MMHG | OXYGEN SATURATION: 100 % | HEART RATE: 56 BPM | RESPIRATION RATE: 18 BRPM | TEMPERATURE: 97.5 F | DIASTOLIC BLOOD PRESSURE: 70 MMHG | WEIGHT: 154.54 LBS | HEIGHT: 72 IN | BODY MASS INDEX: 20.93 KG/M2

## 2018-04-02 DIAGNOSIS — M00.012 STAPHYLOCOCCAL ARTHRITIS OF LEFT SHOULDER (HCC): ICD-10-CM

## 2018-04-02 DIAGNOSIS — M75.122 COMPLETE ROTATOR CUFF TEAR OF LEFT SHOULDER: ICD-10-CM

## 2018-04-02 DIAGNOSIS — Z79.2 RECEIVING INTRAVENOUS ANTIBIOTIC TREATMENT AS OUTPATIENT: ICD-10-CM

## 2018-04-02 LAB
ALBUMIN SERPL BCP-MCNC: 3.8 G/DL (ref 3.2–4.9)
ALP SERPL-CCNC: 69 U/L (ref 30–99)
ALT SERPL-CCNC: 16 U/L (ref 2–50)
AST SERPL-CCNC: 21 U/L (ref 12–45)
BASOPHILS # BLD AUTO: 1.8 % (ref 0–1.8)
BASOPHILS # BLD: 0.08 K/UL (ref 0–0.12)
BILIRUB CONJ SERPL-MCNC: 0.2 MG/DL (ref 0.1–0.5)
BILIRUB INDIRECT SERPL-MCNC: 0.6 MG/DL (ref 0–1)
BILIRUB SERPL-MCNC: 0.8 MG/DL (ref 0.1–1.5)
BUN SERPL-MCNC: 15 MG/DL (ref 8–22)
CALCIUM SERPL-MCNC: 9 MG/DL (ref 8.5–10.5)
CHLORIDE SERPL-SCNC: 104 MMOL/L (ref 96–112)
CO2 SERPL-SCNC: 23 MMOL/L (ref 20–33)
CREAT SERPL-MCNC: 0.56 MG/DL (ref 0.5–1.4)
EOSINOPHIL # BLD AUTO: 0.29 K/UL (ref 0–0.51)
EOSINOPHIL NFR BLD: 6.6 % (ref 0–6.9)
ERYTHROCYTE [DISTWIDTH] IN BLOOD BY AUTOMATED COUNT: 47.9 FL (ref 35.9–50)
GLUCOSE SERPL-MCNC: 92 MG/DL (ref 65–99)
HCT VFR BLD AUTO: 38.6 % (ref 42–52)
HGB BLD-MCNC: 13 G/DL (ref 14–18)
IMM GRANULOCYTES # BLD AUTO: 0.01 K/UL (ref 0–0.11)
IMM GRANULOCYTES NFR BLD AUTO: 0.2 % (ref 0–0.9)
LYMPHOCYTES # BLD AUTO: 1.37 K/UL (ref 1–4.8)
LYMPHOCYTES NFR BLD: 31.1 % (ref 22–41)
MCH RBC QN AUTO: 31.9 PG (ref 27–33)
MCHC RBC AUTO-ENTMCNC: 33.7 G/DL (ref 33.7–35.3)
MCV RBC AUTO: 94.6 FL (ref 81.4–97.8)
MONOCYTES # BLD AUTO: 0.38 K/UL (ref 0–0.85)
MONOCYTES NFR BLD AUTO: 8.6 % (ref 0–13.4)
NEUTROPHILS # BLD AUTO: 2.28 K/UL (ref 1.82–7.42)
NEUTROPHILS NFR BLD: 51.7 % (ref 44–72)
NRBC # BLD AUTO: 0 K/UL
NRBC BLD-RTO: 0 /100 WBC
PHOSPHATE SERPL-MCNC: 3.2 MG/DL (ref 2.5–4.5)
PLATELET # BLD AUTO: 214 K/UL (ref 164–446)
PMV BLD AUTO: 10.1 FL (ref 9–12.9)
POTASSIUM SERPL-SCNC: 4.6 MMOL/L (ref 3.6–5.5)
PROT SERPL-MCNC: 6.4 G/DL (ref 6–8.2)
RBC # BLD AUTO: 4.08 M/UL (ref 4.7–6.1)
SODIUM SERPL-SCNC: 135 MMOL/L (ref 135–145)
WBC # BLD AUTO: 4.4 K/UL (ref 4.8–10.8)

## 2018-04-02 PROCEDURE — 700111 HCHG RX REV CODE 636 W/ 250 OVERRIDE (IP): Performed by: INTERNAL MEDICINE

## 2018-04-02 PROCEDURE — 80069 RENAL FUNCTION PANEL: CPT

## 2018-04-02 PROCEDURE — 84075 ASSAY ALKALINE PHOSPHATASE: CPT

## 2018-04-02 PROCEDURE — 36415 COLL VENOUS BLD VENIPUNCTURE: CPT

## 2018-04-02 PROCEDURE — 82247 BILIRUBIN TOTAL: CPT

## 2018-04-02 PROCEDURE — 96365 THER/PROPH/DIAG IV INF INIT: CPT

## 2018-04-02 PROCEDURE — 82248 BILIRUBIN DIRECT: CPT

## 2018-04-02 PROCEDURE — 84460 ALANINE AMINO (ALT) (SGPT): CPT

## 2018-04-02 PROCEDURE — 700105 HCHG RX REV CODE 258: Performed by: INTERNAL MEDICINE

## 2018-04-02 PROCEDURE — 84155 ASSAY OF PROTEIN SERUM: CPT

## 2018-04-02 PROCEDURE — 85025 COMPLETE CBC W/AUTO DIFF WBC: CPT

## 2018-04-02 PROCEDURE — 84450 TRANSFERASE (AST) (SGOT): CPT

## 2018-04-02 PROCEDURE — 80076 HEPATIC FUNCTION PANEL: CPT

## 2018-04-02 RX ADMIN — SODIUM CHLORIDE 1000 MG: 900 INJECTION INTRAVENOUS at 11:56

## 2018-04-02 ASSESSMENT — PAIN SCALES - GENERAL: PAINLEVEL: NO PAIN

## 2018-04-02 NOTE — PROGRESS NOTES
Late entry for 1230:  Pt to infusion services ambulatory per self.  Pt here for scheduled IV abx.  Plan of care reviewed.  Pt verbalizes understanding.  Pt voices no issues or complaints.  Pt with PICC line intact to LEANA.  PICC flushed with normal saline.  PICC line positional and flushes easily with R arm/R hand raised above head; + brisk blood return verified, however unable to draw enough waste sample due arm elevated.  Labs drawn from LAC using #25G BD needle.  Pt tolerated well.  Pressure dressing applied.  IV abx administered per MD orders.  Telephone call to MD office and message left for JENIFFER Dexter.  IV abx infusion completed without incident.  Line flushed clear.  PICC flushed with normal saline; continued + blood return verified.  Line secured and wrapped for shower per patient request.  Pt released to self care in no apparent distress after completion of treatment, ambulatory.  Pt to return tomorrow; appointment scheduled.     1500:  Return call from JENIFFER Dexter.  APRN notified of positional PICC line.  Per JENIFFER, okay to continue to use PICC line, however orders placed to for chest xray to confirm placement.  Telephone call to patient, successful.  Patient notified of APRN placing orders for chest xray to confirm placement of PICC line and if possible to have done at outpatient imaging prior to IV abx infusion appointment tomorrow at 1130.  Pt tells me he will attempt to have done tomorrow prior to IV abx appointment.

## 2018-04-02 NOTE — PROGRESS NOTES
Spoke with MOMO Fox at [facility].  PICC positionally touchy- will get CXR.  Labs reviewed, no changes to Ertapenem at this time.

## 2018-04-03 ENCOUNTER — HOSPITAL ENCOUNTER (OUTPATIENT)
Dept: RADIOLOGY | Facility: MEDICAL CENTER | Age: 68
End: 2018-04-03
Attending: NURSE PRACTITIONER
Payer: MEDICARE

## 2018-04-03 ENCOUNTER — OUTPATIENT INFUSION SERVICES (OUTPATIENT)
Dept: ONCOLOGY | Facility: MEDICAL CENTER | Age: 68
End: 2018-04-03
Attending: INTERNAL MEDICINE
Payer: MEDICARE

## 2018-04-03 VITALS
TEMPERATURE: 97.7 F | DIASTOLIC BLOOD PRESSURE: 68 MMHG | RESPIRATION RATE: 18 BRPM | OXYGEN SATURATION: 99 % | HEART RATE: 66 BPM | SYSTOLIC BLOOD PRESSURE: 132 MMHG

## 2018-04-03 DIAGNOSIS — M75.122 COMPLETE ROTATOR CUFF TEAR OF LEFT SHOULDER: ICD-10-CM

## 2018-04-03 DIAGNOSIS — Z79.2 RECEIVING INTRAVENOUS ANTIBIOTIC TREATMENT AS OUTPATIENT: ICD-10-CM

## 2018-04-03 DIAGNOSIS — M00.012 STAPHYLOCOCCAL ARTHRITIS OF LEFT SHOULDER (HCC): ICD-10-CM

## 2018-04-03 PROCEDURE — 700105 HCHG RX REV CODE 258: Performed by: INTERNAL MEDICINE

## 2018-04-03 PROCEDURE — 71046 X-RAY EXAM CHEST 2 VIEWS: CPT

## 2018-04-03 PROCEDURE — 700111 HCHG RX REV CODE 636 W/ 250 OVERRIDE (IP): Performed by: INTERNAL MEDICINE

## 2018-04-03 PROCEDURE — 96365 THER/PROPH/DIAG IV INF INIT: CPT

## 2018-04-03 RX ADMIN — SODIUM CHLORIDE 1000 MG: 900 INJECTION INTRAVENOUS at 11:20

## 2018-04-03 ASSESSMENT — PAIN SCALES - GENERAL: PAINLEVEL: NO PAIN

## 2018-04-03 NOTE — PROGRESS NOTES
Patient arrived to Infusion for daily Invanz; pt reports coming from Xray just now for PICC line assessment.  PICC line assessed; flushed with brisk blood return only when inverted.  Very positional.  Per RN yesterday, ok to use. Awaiting xray results. Invanz infused per MAR.  PICC line flushed per protocol. Shower dressing applied per pt preference.  Confirmed tomorrow's appt time. Pt discharged home in good spirits under no apparent distress.

## 2018-04-04 ENCOUNTER — OUTPATIENT INFUSION SERVICES (OUTPATIENT)
Dept: ONCOLOGY | Facility: MEDICAL CENTER | Age: 68
End: 2018-04-04
Attending: INTERNAL MEDICINE
Payer: MEDICARE

## 2018-04-04 VITALS
RESPIRATION RATE: 18 BRPM | TEMPERATURE: 98.1 F | OXYGEN SATURATION: 100 % | SYSTOLIC BLOOD PRESSURE: 124 MMHG | DIASTOLIC BLOOD PRESSURE: 70 MMHG | HEART RATE: 50 BPM

## 2018-04-04 DIAGNOSIS — M75.122 COMPLETE ROTATOR CUFF TEAR OF LEFT SHOULDER: ICD-10-CM

## 2018-04-04 LAB
FUNGUS SPEC CULT: NORMAL
MYCOBACTERIUM SPEC CULT: NORMAL
RHODAMINE-AURAMINE STN SPEC: NORMAL
RHODAMINE-AURAMINE STN SPEC: NORMAL
SIGNIFICANT IND 70042: NORMAL
SIGNIFICANT IND 70042: NORMAL
SITE SITE: NORMAL
SITE SITE: NORMAL
SOURCE SOURCE: NORMAL
SOURCE SOURCE: NORMAL

## 2018-04-04 PROCEDURE — 700105 HCHG RX REV CODE 258: Performed by: INTERNAL MEDICINE

## 2018-04-04 PROCEDURE — 700111 HCHG RX REV CODE 636 W/ 250 OVERRIDE (IP): Performed by: INTERNAL MEDICINE

## 2018-04-04 PROCEDURE — 96365 THER/PROPH/DIAG IV INF INIT: CPT

## 2018-04-04 RX ADMIN — SODIUM CHLORIDE 1000 MG: 900 INJECTION INTRAVENOUS at 11:33

## 2018-04-04 ASSESSMENT — PAIN SCALES - GENERAL: PAINLEVEL: NO PAIN

## 2018-04-04 NOTE — PROGRESS NOTES
Patient arrived to Infusion for daily Invanz; reports no issues or concerns.  PICC line assessed; flushed with brisk blood return when inverted.  Very positional.   Invanz infused per MAR.  PICC line flushed per protocol. Shower dressing applied per pt preference.  Confirmed tomorrow's appt time. Pt discharged home in good spirits under no apparent distress.

## 2018-04-05 ENCOUNTER — OUTPATIENT INFUSION SERVICES (OUTPATIENT)
Dept: ONCOLOGY | Facility: MEDICAL CENTER | Age: 68
End: 2018-04-05
Attending: INTERNAL MEDICINE
Payer: MEDICARE

## 2018-04-05 VITALS
TEMPERATURE: 97.5 F | DIASTOLIC BLOOD PRESSURE: 74 MMHG | HEART RATE: 48 BPM | RESPIRATION RATE: 18 BRPM | SYSTOLIC BLOOD PRESSURE: 133 MMHG | OXYGEN SATURATION: 99 %

## 2018-04-05 DIAGNOSIS — M75.122 COMPLETE ROTATOR CUFF TEAR OF LEFT SHOULDER: ICD-10-CM

## 2018-04-05 PROCEDURE — 96365 THER/PROPH/DIAG IV INF INIT: CPT

## 2018-04-05 PROCEDURE — 700111 HCHG RX REV CODE 636 W/ 250 OVERRIDE (IP): Performed by: INTERNAL MEDICINE

## 2018-04-05 PROCEDURE — 700105 HCHG RX REV CODE 258: Performed by: INTERNAL MEDICINE

## 2018-04-05 RX ADMIN — SODIUM CHLORIDE 1000 MG: 900 INJECTION INTRAVENOUS at 11:35

## 2018-04-05 ASSESSMENT — PAIN SCALES - GENERAL: PAINLEVEL: NO PAIN

## 2018-04-05 NOTE — PROGRESS NOTES
Pt ambulated into department for his daily Invanz infusion. Pt declined having any new or acute symptoms since yesterday's infusion; denied fatigue or GI distress. PICC had + blood return prior with repositioning, flushed briskly. Invanz infused as prescribed, Pt tolerated well. PICC had + blood return after, flushed per [facility] policy, line secured to arm with 4x4's and tube gauze. Tomorrow's appointment at 11:30 am confirmed with Pt prior to leaving, left department by self appearing in good spirits and NAD.

## 2018-04-06 ENCOUNTER — OUTPATIENT INFUSION SERVICES (OUTPATIENT)
Dept: ONCOLOGY | Facility: MEDICAL CENTER | Age: 68
End: 2018-04-06
Attending: INTERNAL MEDICINE
Payer: MEDICARE

## 2018-04-06 VITALS
RESPIRATION RATE: 18 BRPM | DIASTOLIC BLOOD PRESSURE: 39 MMHG | SYSTOLIC BLOOD PRESSURE: 94 MMHG | OXYGEN SATURATION: 99 % | BODY MASS INDEX: 21.12 KG/M2 | TEMPERATURE: 98.4 F | HEART RATE: 49 BPM | WEIGHT: 154.54 LBS

## 2018-04-06 DIAGNOSIS — M00.012 STAPHYLOCOCCAL ARTHRITIS OF LEFT SHOULDER (HCC): ICD-10-CM

## 2018-04-06 DIAGNOSIS — M75.122 COMPLETE ROTATOR CUFF TEAR OF LEFT SHOULDER: ICD-10-CM

## 2018-04-06 DIAGNOSIS — I34.1 MVP (MITRAL VALVE PROLAPSE): ICD-10-CM

## 2018-04-06 LAB
BASOPHILS # BLD AUTO: 1.2 % (ref 0–1.8)
BASOPHILS # BLD: 0.05 K/UL (ref 0–0.12)
EOSINOPHIL # BLD AUTO: 0.19 K/UL (ref 0–0.51)
EOSINOPHIL NFR BLD: 4.5 % (ref 0–6.9)
ERYTHROCYTE [DISTWIDTH] IN BLOOD BY AUTOMATED COUNT: 47.1 FL (ref 35.9–50)
HCT VFR BLD AUTO: 37.7 % (ref 42–52)
HGB BLD-MCNC: 12.9 G/DL (ref 14–18)
IMM GRANULOCYTES # BLD AUTO: 0.01 K/UL (ref 0–0.11)
IMM GRANULOCYTES NFR BLD AUTO: 0.2 % (ref 0–0.9)
LYMPHOCYTES # BLD AUTO: 1.73 K/UL (ref 1–4.8)
LYMPHOCYTES NFR BLD: 40.8 % (ref 22–41)
MCH RBC QN AUTO: 31.6 PG (ref 27–33)
MCHC RBC AUTO-ENTMCNC: 34.2 G/DL (ref 33.7–35.3)
MCV RBC AUTO: 92.4 FL (ref 81.4–97.8)
MONOCYTES # BLD AUTO: 0.34 K/UL (ref 0–0.85)
MONOCYTES NFR BLD AUTO: 8 % (ref 0–13.4)
NEUTROPHILS # BLD AUTO: 1.92 K/UL (ref 1.82–7.42)
NEUTROPHILS NFR BLD: 45.3 % (ref 44–72)
NRBC # BLD AUTO: 0 K/UL
NRBC BLD-RTO: 0 /100 WBC
PLATELET # BLD AUTO: 189 K/UL (ref 164–446)
PMV BLD AUTO: 10.3 FL (ref 9–12.9)
RBC # BLD AUTO: 4.08 M/UL (ref 4.7–6.1)
WBC # BLD AUTO: 4.2 K/UL (ref 4.8–10.8)

## 2018-04-06 PROCEDURE — 700105 HCHG RX REV CODE 258: Performed by: INTERNAL MEDICINE

## 2018-04-06 PROCEDURE — 96365 THER/PROPH/DIAG IV INF INIT: CPT

## 2018-04-06 PROCEDURE — 85025 COMPLETE CBC W/AUTO DIFF WBC: CPT

## 2018-04-06 PROCEDURE — 36592 COLLECT BLOOD FROM PICC: CPT

## 2018-04-06 PROCEDURE — 700111 HCHG RX REV CODE 636 W/ 250 OVERRIDE (IP): Performed by: INTERNAL MEDICINE

## 2018-04-06 RX ADMIN — SODIUM CHLORIDE 1000 MG: 900 INJECTION INTRAVENOUS at 11:47

## 2018-04-06 ASSESSMENT — PAIN SCALES - GENERAL: PAINLEVEL: NO PAIN

## 2018-04-06 NOTE — PROGRESS NOTES
Patient seen today for IVAB therapy.  Plan of care discussed.  Assessment completed.  Good blood return from PICC line with arm elevated.  Blood drawn for CBC per orders today. Ertapenem infused as ordered.  Tolerated well and without complaint.  Patient discharged after completion of treatment in good condition, ambulatory.  Returns tomorrow, appointment confirmed.

## 2018-04-07 ENCOUNTER — OUTPATIENT INFUSION SERVICES (OUTPATIENT)
Dept: ONCOLOGY | Facility: MEDICAL CENTER | Age: 68
End: 2018-04-07
Attending: INTERNAL MEDICINE
Payer: MEDICARE

## 2018-04-07 VITALS
SYSTOLIC BLOOD PRESSURE: 119 MMHG | HEART RATE: 54 BPM | DIASTOLIC BLOOD PRESSURE: 63 MMHG | TEMPERATURE: 98.4 F | RESPIRATION RATE: 18 BRPM | OXYGEN SATURATION: 99 % | BODY MASS INDEX: 21.12 KG/M2 | WEIGHT: 154.54 LBS

## 2018-04-07 DIAGNOSIS — M75.122 COMPLETE ROTATOR CUFF TEAR OF LEFT SHOULDER: ICD-10-CM

## 2018-04-07 PROCEDURE — 96365 THER/PROPH/DIAG IV INF INIT: CPT

## 2018-04-07 PROCEDURE — 700105 HCHG RX REV CODE 258: Performed by: INTERNAL MEDICINE

## 2018-04-07 PROCEDURE — 700111 HCHG RX REV CODE 636 W/ 250 OVERRIDE (IP): Performed by: INTERNAL MEDICINE

## 2018-04-07 RX ADMIN — SODIUM CHLORIDE 1000 MG: 900 INJECTION INTRAVENOUS at 11:08

## 2018-04-07 ASSESSMENT — PAIN SCALES - GENERAL: PAINLEVEL: NO PAIN

## 2018-04-07 NOTE — PROGRESS NOTES
Patient arrived ambulatory to the \[facility]\"" for Ertapenem. Reviewed vital signs, labs, and physician order. Patient denies new issues or concerns from previous infusion. Pt arrives with positional PICC to GERDA, visualized brisk blood return.  Ertapenem administered, no adverse reaction observed. PICC flushed per protocol, 4X4 gauze, and mesh sleeve placed for protection, assisted pt cover with bag and tape for shower. Confirmed upcoming appointment date and time with patient. Patient left the [facility] ambulatory in no sign of distress.

## 2018-04-08 ENCOUNTER — OUTPATIENT INFUSION SERVICES (OUTPATIENT)
Dept: ONCOLOGY | Facility: MEDICAL CENTER | Age: 68
End: 2018-04-08
Attending: INTERNAL MEDICINE
Payer: MEDICARE

## 2018-04-08 VITALS
RESPIRATION RATE: 18 BRPM | HEART RATE: 92 BPM | SYSTOLIC BLOOD PRESSURE: 117 MMHG | DIASTOLIC BLOOD PRESSURE: 67 MMHG | WEIGHT: 154.54 LBS | TEMPERATURE: 99 F | OXYGEN SATURATION: 100 % | BODY MASS INDEX: 21.12 KG/M2

## 2018-04-08 DIAGNOSIS — M75.122 COMPLETE ROTATOR CUFF TEAR OF LEFT SHOULDER: ICD-10-CM

## 2018-04-08 PROCEDURE — 700111 HCHG RX REV CODE 636 W/ 250 OVERRIDE (IP): Performed by: INTERNAL MEDICINE

## 2018-04-08 PROCEDURE — 700105 HCHG RX REV CODE 258: Performed by: INTERNAL MEDICINE

## 2018-04-08 PROCEDURE — 96365 THER/PROPH/DIAG IV INF INIT: CPT

## 2018-04-08 RX ADMIN — SODIUM CHLORIDE 1000 MG: 900 INJECTION INTRAVENOUS at 11:41

## 2018-04-08 ASSESSMENT — PAIN SCALES - GENERAL: PAINLEVEL: NO PAIN

## 2018-04-08 NOTE — PROGRESS NOTES
Pt presents ambulatory for Invanz infusion. Pt reports no adverse side effects from previous treatments. PICC accessed and blood return noted. Medication infused per orders without complications or adverse reactions. PICC flushed with saline per protocol, drsg changed with sterile technique and arm wrapped for pt to shower and gauze with arm wrap given to pt for use after shower. Appt for tomorrow confirmed with pt and pt left ambulatory in no distress at 1233

## 2018-04-09 ENCOUNTER — OFFICE VISIT (OUTPATIENT)
Dept: INFECTIOUS DISEASES | Facility: MEDICAL CENTER | Age: 68
End: 2018-04-09
Payer: MEDICARE

## 2018-04-09 ENCOUNTER — OUTPATIENT INFUSION SERVICES (OUTPATIENT)
Dept: ONCOLOGY | Facility: MEDICAL CENTER | Age: 68
End: 2018-04-09
Attending: INTERNAL MEDICINE
Payer: MEDICARE

## 2018-04-09 VITALS
WEIGHT: 156.53 LBS | OXYGEN SATURATION: 100 % | BODY MASS INDEX: 21.2 KG/M2 | TEMPERATURE: 97.9 F | RESPIRATION RATE: 18 BRPM | HEART RATE: 50 BPM | SYSTOLIC BLOOD PRESSURE: 120 MMHG | DIASTOLIC BLOOD PRESSURE: 70 MMHG | HEIGHT: 72 IN

## 2018-04-09 VITALS
BODY MASS INDEX: 21.24 KG/M2 | DIASTOLIC BLOOD PRESSURE: 82 MMHG | HEIGHT: 72 IN | OXYGEN SATURATION: 98 % | WEIGHT: 156.8 LBS | HEART RATE: 54 BPM | SYSTOLIC BLOOD PRESSURE: 120 MMHG | TEMPERATURE: 97.8 F

## 2018-04-09 DIAGNOSIS — M75.122 COMPLETE ROTATOR CUFF TEAR OF LEFT SHOULDER: ICD-10-CM

## 2018-04-09 DIAGNOSIS — A49.01 MSSA (METHICILLIN SUSCEPTIBLE STAPHYLOCOCCUS AUREUS) INFECTION: ICD-10-CM

## 2018-04-09 DIAGNOSIS — M00.012 STAPHYLOCOCCAL ARTHRITIS OF LEFT SHOULDER (HCC): ICD-10-CM

## 2018-04-09 DIAGNOSIS — D72.819 LEUKOPENIA, UNSPECIFIED TYPE: ICD-10-CM

## 2018-04-09 LAB
ALBUMIN SERPL BCP-MCNC: 3.6 G/DL (ref 3.2–4.9)
ALP SERPL-CCNC: 64 U/L (ref 30–99)
ALT SERPL-CCNC: 15 U/L (ref 2–50)
AST SERPL-CCNC: 25 U/L (ref 12–45)
BASOPHILS # BLD AUTO: 1.5 % (ref 0–1.8)
BASOPHILS # BLD: 0.06 K/UL (ref 0–0.12)
BILIRUB CONJ SERPL-MCNC: <0.1 MG/DL (ref 0.1–0.5)
BILIRUB INDIRECT SERPL-MCNC: NORMAL MG/DL (ref 0–1)
BILIRUB SERPL-MCNC: 0.8 MG/DL (ref 0.1–1.5)
BUN SERPL-MCNC: 13 MG/DL (ref 8–22)
CALCIUM SERPL-MCNC: 8.7 MG/DL (ref 8.5–10.5)
CHLORIDE SERPL-SCNC: 106 MMOL/L (ref 96–112)
CO2 SERPL-SCNC: 17 MMOL/L (ref 20–33)
CREAT SERPL-MCNC: 0.56 MG/DL (ref 0.5–1.4)
EOSINOPHIL # BLD AUTO: 0.23 K/UL (ref 0–0.51)
EOSINOPHIL NFR BLD: 5.6 % (ref 0–6.9)
ERYTHROCYTE [DISTWIDTH] IN BLOOD BY AUTOMATED COUNT: 47.5 FL (ref 35.9–50)
GLUCOSE SERPL-MCNC: 90 MG/DL (ref 65–99)
HCT VFR BLD AUTO: 39 % (ref 42–52)
HGB BLD-MCNC: 13 G/DL (ref 14–18)
IMM GRANULOCYTES # BLD AUTO: 0.01 K/UL (ref 0–0.11)
IMM GRANULOCYTES NFR BLD AUTO: 0.2 % (ref 0–0.9)
LYMPHOCYTES # BLD AUTO: 1.56 K/UL (ref 1–4.8)
LYMPHOCYTES NFR BLD: 37.8 % (ref 22–41)
MCH RBC QN AUTO: 31.3 PG (ref 27–33)
MCHC RBC AUTO-ENTMCNC: 33.3 G/DL (ref 33.7–35.3)
MCV RBC AUTO: 94 FL (ref 81.4–97.8)
MONOCYTES # BLD AUTO: 0.32 K/UL (ref 0–0.85)
MONOCYTES NFR BLD AUTO: 7.7 % (ref 0–13.4)
NEUTROPHILS # BLD AUTO: 1.95 K/UL (ref 1.82–7.42)
NEUTROPHILS NFR BLD: 47.2 % (ref 44–72)
NRBC # BLD AUTO: 0 K/UL
NRBC BLD-RTO: 0 /100 WBC
PHOSPHATE SERPL-MCNC: 3.1 MG/DL (ref 2.5–4.5)
PLATELET # BLD AUTO: 195 K/UL (ref 164–446)
PMV BLD AUTO: 10.5 FL (ref 9–12.9)
POTASSIUM SERPL-SCNC: 4.1 MMOL/L (ref 3.6–5.5)
PROT SERPL-MCNC: 6.3 G/DL (ref 6–8.2)
RBC # BLD AUTO: 4.15 M/UL (ref 4.7–6.1)
SODIUM SERPL-SCNC: 133 MMOL/L (ref 135–145)
WBC # BLD AUTO: 4.1 K/UL (ref 4.8–10.8)

## 2018-04-09 PROCEDURE — 80069 RENAL FUNCTION PANEL: CPT

## 2018-04-09 PROCEDURE — 96365 THER/PROPH/DIAG IV INF INIT: CPT

## 2018-04-09 PROCEDURE — 82247 BILIRUBIN TOTAL: CPT

## 2018-04-09 PROCEDURE — 80076 HEPATIC FUNCTION PANEL: CPT

## 2018-04-09 PROCEDURE — 700111 HCHG RX REV CODE 636 W/ 250 OVERRIDE (IP): Performed by: INTERNAL MEDICINE

## 2018-04-09 PROCEDURE — 84460 ALANINE AMINO (ALT) (SGPT): CPT

## 2018-04-09 PROCEDURE — 700105 HCHG RX REV CODE 258: Performed by: INTERNAL MEDICINE

## 2018-04-09 PROCEDURE — 84075 ASSAY ALKALINE PHOSPHATASE: CPT

## 2018-04-09 PROCEDURE — 84450 TRANSFERASE (AST) (SGOT): CPT

## 2018-04-09 PROCEDURE — 84155 ASSAY OF PROTEIN SERUM: CPT

## 2018-04-09 PROCEDURE — 85025 COMPLETE CBC W/AUTO DIFF WBC: CPT

## 2018-04-09 PROCEDURE — 82248 BILIRUBIN DIRECT: CPT

## 2018-04-09 PROCEDURE — 99214 OFFICE O/P EST MOD 30 MIN: CPT | Performed by: NURSE PRACTITIONER

## 2018-04-09 RX ADMIN — SODIUM CHLORIDE 1000 MG: 900 INJECTION INTRAVENOUS at 11:36

## 2018-04-09 ASSESSMENT — PAIN SCALES - GENERAL: PAINLEVEL: NO PAIN

## 2018-04-09 NOTE — PROGRESS NOTES
"[facility]    Subjective:     Chief Complaint   Patient presents with   • Follow-Up     Staphylococcal arthritis of left shoulder      Interval History: 67 y.o. Man with a history of prostate cancer s/p prostatectomy, penile clamp, hyperparathyroidism s/p parathyroidectomy, right shoulder rotator cuff repair surgery and left rotator cuff repair in October 2017.  Hospitalized from 2/6- 2/9/18, admitted for left shoulder pain, swelling and erythema.  Found to have left shoulder septic arthritis.  Underwent Left shoulder I&D and arthroscopy with removal of retained implants on 2/6 with Dr. Daniel- purulence noted intraoperatively.  OR cx +MSSA.  Pt discharged home on IV Daptomycin, end date 3/6/18.    2/26/18:  Seen by Dr. Rosario.  Pt having increasing fatigue and malaise on daptomycin but denies any cramping. He has a poor appetite. Over a week ago, he developed drainage from the surgical site. He saw ortho and underwent a washout in the clinic. He is no longer having any drainage.  Will DC Dapto and transition to Ertapenem.  Continue IV abx through 03/06/18.    3/5/2018: Seen by JENIFFER Strauss.  Stating he feels \"quantifiabley better\" since changing over to the IV Ertapenem.  There is a pinhole along the surgical incision, trace yellowish drainage, no odor, trace redness- covering with a band aid.  Finish IV Ertapenem on 3/6/18.  D/C PICC after last infusion dose.  PO Bactrim x 10 days.     Hospitalized from 3/9/2018 - 3/12/2018, admitted d/t increased drainage after stopping IV antibiotics.  Denied pain.  Pt reports that he went on a walk, felt fine and when he returned he took off his t-shirt and noticed golf ball size swelling around the shoulder.  Underwent I&D of infected joint with hardware removal on 3/10 with Dr. Daniel.  OR cx +GPC; however, pt on abx prior to cx.  Pt discharged home on IV Ertapenem through 4/21/18.    3/26/2018:  Seen by JENIFFER Strauss.  Tolerating the IV " "Ertapenem.  Was seen by Dr. Daniel, sutures removed and pt started on PT.  Continue IV Ertapenem for the time being.  Will closely monitor for worsening leukopenia.     Today, 4/9/2018:  Continues to tolerate the IV Ertapenem without issue, blood counts remain stable.  Pt states that he \"feels great\" and that he \"feels like I did before all of this started.\"  Denies feeling generally ill, fevers/chills, general malaise, headache, n/v/d, abdominal pain, chest pain or shortness of breath.  Working with PT, getting better ROM of shoulder, but notes that it is still stiff, will take Aleve from time to time to help with the tenderness.  Denies any gross pain, no throbbing.  Shoulder remains well healed, denies any drainage or redness, still has a bit of swelling to the shoulder.    ROS  As documented above in my HPI.    Past Medical History:   Diagnosis Date   • Arthritis     knees   • Cancer (CMS-[facility]) 07/13    Prostate   • Disorder of thyroid    • Heart valve disease     Mild mitral valve problem - per patient cardiologist is not concerned at this time.   • Hiatus hernia syndrome    • Unspecified cataract     Bilateral IOL's   • Urinary incontinence        Social History   Substance Use Topics   • Smoking status: Never Smoker   • Smokeless tobacco: Never Used   • Alcohol use No       Allergies: Patient has no known allergies.    Pt's medication and problem list reviewed.     Objective:     PE:  /82   Pulse (!) 54   Temp 36.6 °C (97.8 °F)   Ht 1.822 m (5' 11.73\")   Wt 71.1 kg (156 lb 12.8 oz)   SpO2 98%   BMI 21.43 kg/m²     Vital signs reviewed    Constitutional: Appears well-developed and well-nourished. No acute distress.  Speech fluent.  Thin.    Eyes: Conjunctivae normal and EOM are normal. Pupils are equal, round, and reactive to light.   Neck: Trachea midline. Normal range of motion. No JVD.  Cardiovascular: Bradycardic, regular rhythm, normal heart sounds. No murmur, gallop, or friction rub. No " edema.  Respiratory: No respiratory distress, unlabored respiratory effort.  Lungs clear to auscultation bilaterally. No wheezes or rales.   Abdomen: Soft, non tender, non-distended. BS + x 4. No masses.   Musculoskeletal: Steady gait.  Limited L shoulder range of motion- improved.    L shoulder- well healed and approximated, no openings or drainage, no odor, no erythema, minimal swelling- improved.  RUE PICC- CDI, non tender, no erythema.  Skin: Warm and dry.  No visible rashes or lesions.  Neurological: No cranial nerve deficit. Coordination normal.    Psychiatric: Alert and oriented to person, place, and time. Normal mood, calm affect.  Normal behavior and judgment.     Labs:  WBC   Date/Time Value Ref Range Status   04/06/2018 11:48 AM 4.2 (L) 4.8 - 10.8 K/uL Final     RBC   Date/Time Value Ref Range Status   04/06/2018 11:48 AM 4.08 (L) 4.70 - 6.10 M/uL Final     Hemoglobin   Date/Time Value Ref Range Status   04/06/2018 11:48 AM 12.9 (L) 14.0 - 18.0 g/dL Final     Hematocrit   Date/Time Value Ref Range Status   04/06/2018 11:48 AM 37.7 (L) 42.0 - 52.0 % Final     MCV   Date/Time Value Ref Range Status   04/06/2018 11:48 AM 92.4 81.4 - 97.8 fL Final     MCH   Date/Time Value Ref Range Status   04/06/2018 11:48 AM 31.6 27.0 - 33.0 pg Final     MCHC   Date/Time Value Ref Range Status   04/06/2018 11:48 AM 34.2 33.7 - 35.3 g/dL Final     MPV   Date/Time Value Ref Range Status   04/06/2018 11:48 AM 10.3 9.0 - 12.9 fL Final        Sodium   Date/Time Value Ref Range Status   04/09/2018 11:45  (L) 135 - 145 mmol/L Final     Potassium   Date/Time Value Ref Range Status   04/09/2018 11:45 AM 4.1 3.6 - 5.5 mmol/L Final     Chloride   Date/Time Value Ref Range Status   04/09/2018 11:45  96 - 112 mmol/L Final     Co2   Date/Time Value Ref Range Status   04/09/2018 11:45 AM 17 (L) 20 - 33 mmol/L Final     Glucose   Date/Time Value Ref Range Status   04/09/2018 11:45 AM 90 65 - 99 mg/dL Final     Bun   Date/Time  Value Ref Range Status   04/09/2018 11:45 AM 13 8 - 22 mg/dL Final     Creatinine   Date/Time Value Ref Range Status   04/09/2018 11:45 AM 0.56 0.50 - 1.40 mg/dL Final       Alkaline Phosphatase   Date/Time Value Ref Range Status   04/09/2018 11:45 AM 64 30 - 99 U/L Final     AST(SGOT)   Date/Time Value Ref Range Status   04/09/2018 11:45 AM 25 12 - 45 U/L Final     ALT(SGPT)   Date/Time Value Ref Range Status   04/09/2018 11:45 AM 15 2 - 50 U/L Final     Total Bilirubin   Date/Time Value Ref Range Status   04/09/2018 11:45 AM 0.8 0.1 - 1.5 mg/dL Final      ESR= 18 on 3/29/18    Assessment and Plan:   The following treatment plan was discussed with patient at length:    1. Staphylococcal arthritis of left shoulder (CMS-HCC)      -Continue IV Ertapenem, end date 4/21/18.  -Continue to check CBC every Monday and Thursday- no changes to abx as long as blood counts remain stable.  -D/C PICC after last infusion dose on 4/21/18.  No PO abx to follow.  -Monitor for s/sx of worsening off abx: increased redness, pain, swelling, drainage, breakdown of surgical site, fevers, chills, general malaise, etc.  Notify ID or go to ER should these s/sx occur.   2. MSSA (methicillin susceptible Staphylococcus aureus) infection      As above.   3. Leukopenia, unspecified type      CBC every Monday and Thursday as above.     Follow up: PRN, RTC sooner if needed. FU with PCP for ongoing chronic medical conditions.     LEELA Dhaliwal.

## 2018-04-10 ENCOUNTER — OUTPATIENT INFUSION SERVICES (OUTPATIENT)
Dept: ONCOLOGY | Facility: MEDICAL CENTER | Age: 68
End: 2018-04-10
Attending: INTERNAL MEDICINE
Payer: MEDICARE

## 2018-04-10 VITALS
BODY MASS INDEX: 21.42 KG/M2 | WEIGHT: 156.75 LBS | HEART RATE: 49 BPM | RESPIRATION RATE: 18 BRPM | SYSTOLIC BLOOD PRESSURE: 115 MMHG | TEMPERATURE: 98.4 F | OXYGEN SATURATION: 100 % | DIASTOLIC BLOOD PRESSURE: 69 MMHG

## 2018-04-10 DIAGNOSIS — M75.122 COMPLETE ROTATOR CUFF TEAR OF LEFT SHOULDER: ICD-10-CM

## 2018-04-10 PROCEDURE — 96365 THER/PROPH/DIAG IV INF INIT: CPT

## 2018-04-10 PROCEDURE — 700111 HCHG RX REV CODE 636 W/ 250 OVERRIDE (IP): Performed by: INTERNAL MEDICINE

## 2018-04-10 PROCEDURE — 700105 HCHG RX REV CODE 258: Performed by: INTERNAL MEDICINE

## 2018-04-10 RX ADMIN — SODIUM CHLORIDE 1000 MG: 900 INJECTION INTRAVENOUS at 11:41

## 2018-04-10 ASSESSMENT — PAIN SCALES - GENERAL: PAINLEVEL: NO PAIN

## 2018-04-10 NOTE — PROGRESS NOTES
Returns for IVAB.  Reports doing well.  PICC remains positional and pt states MD aware.  Blood return, but also with difficulty.  Has had line check.  Tx infused without other issues.  Saline flush post.  DC to self care.

## 2018-04-11 ENCOUNTER — OUTPATIENT INFUSION SERVICES (OUTPATIENT)
Dept: ONCOLOGY | Facility: MEDICAL CENTER | Age: 68
End: 2018-04-11
Attending: INTERNAL MEDICINE
Payer: MEDICARE

## 2018-04-11 VITALS
HEART RATE: 52 BPM | DIASTOLIC BLOOD PRESSURE: 67 MMHG | WEIGHT: 156.75 LBS | OXYGEN SATURATION: 98 % | RESPIRATION RATE: 18 BRPM | BODY MASS INDEX: 21.42 KG/M2 | SYSTOLIC BLOOD PRESSURE: 118 MMHG | TEMPERATURE: 97.1 F

## 2018-04-11 DIAGNOSIS — M75.122 COMPLETE ROTATOR CUFF TEAR OF LEFT SHOULDER: ICD-10-CM

## 2018-04-11 PROCEDURE — 700111 HCHG RX REV CODE 636 W/ 250 OVERRIDE (IP): Performed by: INTERNAL MEDICINE

## 2018-04-11 PROCEDURE — 700105 HCHG RX REV CODE 258: Performed by: INTERNAL MEDICINE

## 2018-04-11 PROCEDURE — 96365 THER/PROPH/DIAG IV INF INIT: CPT

## 2018-04-11 RX ADMIN — SODIUM CHLORIDE 1000 MG: 900 INJECTION INTRAVENOUS at 11:33

## 2018-04-11 ASSESSMENT — PAIN SCALES - GENERAL: PAINLEVEL: NO PAIN

## 2018-04-11 NOTE — PROGRESS NOTES
Returns for IVAB.  Just saw APN yesterday and doing well.  PICC cont to be positional and pt refuses any meds to place.  APN aware.  Tx infused without rx.  PICC saline flush post.  DC to self care.

## 2018-04-11 NOTE — PROGRESS NOTES
Patient seen today for IVAB therapy.  Plan of care discussed.  Assessment completed.  Good blood return from PICC line when arm is elevated as line is positional.  Ertapenem infused as ordered.  Tolerated well and without complaint.  Patient discharged after completion of treatment in good condition, ambulatory.  Return appointment confirmed.

## 2018-04-12 ENCOUNTER — PATIENT OUTREACH (OUTPATIENT)
Dept: HEALTH INFORMATION MANAGEMENT | Facility: OTHER | Age: 68
End: 2018-04-12

## 2018-04-12 ENCOUNTER — OUTPATIENT INFUSION SERVICES (OUTPATIENT)
Dept: ONCOLOGY | Facility: MEDICAL CENTER | Age: 68
End: 2018-04-12
Attending: INTERNAL MEDICINE
Payer: MEDICARE

## 2018-04-12 VITALS
TEMPERATURE: 97.5 F | RESPIRATION RATE: 16 BRPM | OXYGEN SATURATION: 97 % | SYSTOLIC BLOOD PRESSURE: 114 MMHG | HEART RATE: 45 BPM | WEIGHT: 156.75 LBS | BODY MASS INDEX: 21.42 KG/M2 | DIASTOLIC BLOOD PRESSURE: 65 MMHG

## 2018-04-12 DIAGNOSIS — M75.122 COMPLETE ROTATOR CUFF TEAR OF LEFT SHOULDER: ICD-10-CM

## 2018-04-12 PROCEDURE — 96365 THER/PROPH/DIAG IV INF INIT: CPT

## 2018-04-12 PROCEDURE — 700105 HCHG RX REV CODE 258: Performed by: INTERNAL MEDICINE

## 2018-04-12 PROCEDURE — 700111 HCHG RX REV CODE 636 W/ 250 OVERRIDE (IP): Performed by: INTERNAL MEDICINE

## 2018-04-12 RX ADMIN — SODIUM CHLORIDE 1000 MG: 900 INJECTION INTRAVENOUS at 11:27

## 2018-04-12 ASSESSMENT — PAIN SCALES - GENERAL: PAINLEVEL: 2=MINIMAL-SLIGHT

## 2018-04-12 NOTE — PROGRESS NOTES
Patient presents for daily IV antibiotics.  PICC flushes well with brisk. Invanz infused as ordered, line flushed clear. PICC flushed per protocol and site secured. Patient returns tomorrow and released in no acute distress.

## 2018-04-13 ENCOUNTER — OUTPATIENT INFUSION SERVICES (OUTPATIENT)
Dept: ONCOLOGY | Facility: MEDICAL CENTER | Age: 68
End: 2018-04-13
Attending: INTERNAL MEDICINE
Payer: MEDICARE

## 2018-04-13 VITALS
BODY MASS INDEX: 21.42 KG/M2 | RESPIRATION RATE: 18 BRPM | SYSTOLIC BLOOD PRESSURE: 117 MMHG | OXYGEN SATURATION: 92 % | DIASTOLIC BLOOD PRESSURE: 66 MMHG | WEIGHT: 156.75 LBS | TEMPERATURE: 98.3 F | HEART RATE: 60 BPM

## 2018-04-13 DIAGNOSIS — M75.122 COMPLETE ROTATOR CUFF TEAR OF LEFT SHOULDER: ICD-10-CM

## 2018-04-13 LAB
BASOPHILS # BLD AUTO: 1.9 % (ref 0–1.8)
BASOPHILS # BLD: 0.08 K/UL (ref 0–0.12)
EOSINOPHIL # BLD AUTO: 0.19 K/UL (ref 0–0.51)
EOSINOPHIL NFR BLD: 4.5 % (ref 0–6.9)
ERYTHROCYTE [DISTWIDTH] IN BLOOD BY AUTOMATED COUNT: 47.8 FL (ref 35.9–50)
HCT VFR BLD AUTO: 39.2 % (ref 42–52)
HGB BLD-MCNC: 13 G/DL (ref 14–18)
IMM GRANULOCYTES # BLD AUTO: 0 K/UL (ref 0–0.11)
IMM GRANULOCYTES NFR BLD AUTO: 0 % (ref 0–0.9)
LYMPHOCYTES # BLD AUTO: 1.54 K/UL (ref 1–4.8)
LYMPHOCYTES NFR BLD: 36.5 % (ref 22–41)
MCH RBC QN AUTO: 31.2 PG (ref 27–33)
MCHC RBC AUTO-ENTMCNC: 33.2 G/DL (ref 33.7–35.3)
MCV RBC AUTO: 94 FL (ref 81.4–97.8)
MONOCYTES # BLD AUTO: 0.33 K/UL (ref 0–0.85)
MONOCYTES NFR BLD AUTO: 7.8 % (ref 0–13.4)
NEUTROPHILS # BLD AUTO: 2.08 K/UL (ref 1.82–7.42)
NEUTROPHILS NFR BLD: 49.3 % (ref 44–72)
NRBC # BLD AUTO: 0 K/UL
NRBC BLD-RTO: 0 /100 WBC
PLATELET # BLD AUTO: 193 K/UL (ref 164–446)
PMV BLD AUTO: 10.4 FL (ref 9–12.9)
RBC # BLD AUTO: 4.17 M/UL (ref 4.7–6.1)
WBC # BLD AUTO: 4.2 K/UL (ref 4.8–10.8)

## 2018-04-13 PROCEDURE — 700105 HCHG RX REV CODE 258: Performed by: INTERNAL MEDICINE

## 2018-04-13 PROCEDURE — 700111 HCHG RX REV CODE 636 W/ 250 OVERRIDE (IP): Performed by: INTERNAL MEDICINE

## 2018-04-13 PROCEDURE — 96365 THER/PROPH/DIAG IV INF INIT: CPT

## 2018-04-13 PROCEDURE — 85025 COMPLETE CBC W/AUTO DIFF WBC: CPT

## 2018-04-13 RX ADMIN — SODIUM CHLORIDE 1000 MG: 900 INJECTION INTRAVENOUS at 11:43

## 2018-04-13 ASSESSMENT — PAIN SCALES - GENERAL: PAINLEVEL: NO PAIN

## 2018-04-13 NOTE — PROGRESS NOTES
Patient arrived ambulatory to the [facility] for Ertapenem. Reviewed vital signs, labs, and physician order. CBC collected per MD order for each Thursday. Pt arrives with SL PICC to [facility], visualized brisk blood return. Ertapenem administered, no adverse reaction observed. PICC line flushed per protocol, 4X4 guaze and mesh sleeve placed for protection, clave changed per policy.  Confirmed upcoming appointment date and time with patient. Patient left the [facility] ambulatory in no sign of distress.

## 2018-04-14 ENCOUNTER — OUTPATIENT INFUSION SERVICES (OUTPATIENT)
Dept: ONCOLOGY | Facility: MEDICAL CENTER | Age: 68
End: 2018-04-14
Attending: INTERNAL MEDICINE
Payer: MEDICARE

## 2018-04-14 VITALS
SYSTOLIC BLOOD PRESSURE: 102 MMHG | HEART RATE: 51 BPM | DIASTOLIC BLOOD PRESSURE: 70 MMHG | TEMPERATURE: 98.1 F | OXYGEN SATURATION: 100 % | RESPIRATION RATE: 18 BRPM

## 2018-04-14 DIAGNOSIS — M75.122 COMPLETE ROTATOR CUFF TEAR OF LEFT SHOULDER: ICD-10-CM

## 2018-04-14 PROCEDURE — 96365 THER/PROPH/DIAG IV INF INIT: CPT

## 2018-04-14 PROCEDURE — 700105 HCHG RX REV CODE 258: Performed by: INTERNAL MEDICINE

## 2018-04-14 PROCEDURE — 700111 HCHG RX REV CODE 636 W/ 250 OVERRIDE (IP): Performed by: INTERNAL MEDICINE

## 2018-04-14 RX ADMIN — SODIUM CHLORIDE 1000 MG: 900 INJECTION INTRAVENOUS at 11:32

## 2018-04-14 ASSESSMENT — PAIN SCALES - GENERAL: PAINLEVEL: 1=MINIMAL PAIN

## 2018-04-14 NOTE — PROGRESS NOTES
Patient seen today for IVAB therapy.  Plan of care discussed.  Assessment completed.  Good blood return from PICC line (with arm elevated as line is positional).  Ertapenem infused as ordered.  Tolerated well and without complaint.  Patient discharged after completion of treatment in good condition, ambulatory.  Returns daily.

## 2018-04-15 ENCOUNTER — OUTPATIENT INFUSION SERVICES (OUTPATIENT)
Dept: ONCOLOGY | Facility: MEDICAL CENTER | Age: 68
End: 2018-04-15
Attending: INTERNAL MEDICINE
Payer: MEDICARE

## 2018-04-15 VITALS
DIASTOLIC BLOOD PRESSURE: 44 MMHG | SYSTOLIC BLOOD PRESSURE: 123 MMHG | OXYGEN SATURATION: 100 % | TEMPERATURE: 98.4 F | RESPIRATION RATE: 18 BRPM | HEART RATE: 55 BPM

## 2018-04-15 DIAGNOSIS — M75.122 COMPLETE ROTATOR CUFF TEAR OF LEFT SHOULDER: ICD-10-CM

## 2018-04-15 PROCEDURE — 700111 HCHG RX REV CODE 636 W/ 250 OVERRIDE (IP): Performed by: INTERNAL MEDICINE

## 2018-04-15 PROCEDURE — 96365 THER/PROPH/DIAG IV INF INIT: CPT

## 2018-04-15 PROCEDURE — 700105 HCHG RX REV CODE 258: Performed by: INTERNAL MEDICINE

## 2018-04-15 RX ADMIN — SODIUM CHLORIDE 1000 MG: 900 INJECTION INTRAVENOUS at 11:31

## 2018-04-15 ASSESSMENT — PAIN SCALES - GENERAL: PAINLEVEL: NO PAIN

## 2018-04-15 NOTE — PROGRESS NOTES
Patient arrived to clinic for daily Invanz.  Denies any changes from previous visit.  PICC line flushed with good blood return noted with arm overhead.  Invanz infused per order, pt tolerated well.  PICC line flushed and dressing changed using sterile technique.  Gauze/mesh cover placed.  Confirmed tomorrow's appointment and pt ambulated out of clinic in no apparent distress.

## 2018-04-16 ENCOUNTER — OUTPATIENT INFUSION SERVICES (OUTPATIENT)
Dept: ONCOLOGY | Facility: MEDICAL CENTER | Age: 68
End: 2018-04-16
Attending: INTERNAL MEDICINE
Payer: MEDICARE

## 2018-04-16 VITALS
HEIGHT: 72 IN | BODY MASS INDEX: 20.84 KG/M2 | RESPIRATION RATE: 18 BRPM | SYSTOLIC BLOOD PRESSURE: 114 MMHG | HEART RATE: 54 BPM | TEMPERATURE: 98.2 F | OXYGEN SATURATION: 100 % | DIASTOLIC BLOOD PRESSURE: 71 MMHG | WEIGHT: 153.88 LBS

## 2018-04-16 DIAGNOSIS — M75.122 COMPLETE ROTATOR CUFF TEAR OF LEFT SHOULDER: ICD-10-CM

## 2018-04-16 LAB
ALBUMIN SERPL BCP-MCNC: 3.9 G/DL (ref 3.2–4.9)
ALP SERPL-CCNC: 66 U/L (ref 30–99)
ALT SERPL-CCNC: 16 U/L (ref 2–50)
AST SERPL-CCNC: 19 U/L (ref 12–45)
BASOPHILS # BLD AUTO: 1.4 % (ref 0–1.8)
BASOPHILS # BLD: 0.06 K/UL (ref 0–0.12)
BILIRUB CONJ SERPL-MCNC: 0.2 MG/DL (ref 0.1–0.5)
BILIRUB INDIRECT SERPL-MCNC: 0.7 MG/DL (ref 0–1)
BILIRUB SERPL-MCNC: 0.9 MG/DL (ref 0.1–1.5)
BUN SERPL-MCNC: 12 MG/DL (ref 8–22)
CALCIUM SERPL-MCNC: 9 MG/DL (ref 8.5–10.5)
CHLORIDE SERPL-SCNC: 104 MMOL/L (ref 96–112)
CO2 SERPL-SCNC: 25 MMOL/L (ref 20–33)
CREAT SERPL-MCNC: 0.62 MG/DL (ref 0.5–1.4)
EOSINOPHIL # BLD AUTO: 0.19 K/UL (ref 0–0.51)
EOSINOPHIL NFR BLD: 4.5 % (ref 0–6.9)
ERYTHROCYTE [DISTWIDTH] IN BLOOD BY AUTOMATED COUNT: 47.8 FL (ref 35.9–50)
GLUCOSE SERPL-MCNC: 99 MG/DL (ref 65–99)
HCT VFR BLD AUTO: 38.2 % (ref 42–52)
HGB BLD-MCNC: 12.8 G/DL (ref 14–18)
IMM GRANULOCYTES # BLD AUTO: 0 K/UL (ref 0–0.11)
IMM GRANULOCYTES NFR BLD AUTO: 0 % (ref 0–0.9)
LYMPHOCYTES # BLD AUTO: 1.75 K/UL (ref 1–4.8)
LYMPHOCYTES NFR BLD: 41.2 % (ref 22–41)
MCH RBC QN AUTO: 31.2 PG (ref 27–33)
MCHC RBC AUTO-ENTMCNC: 33.5 G/DL (ref 33.7–35.3)
MCV RBC AUTO: 93.2 FL (ref 81.4–97.8)
MONOCYTES # BLD AUTO: 0.34 K/UL (ref 0–0.85)
MONOCYTES NFR BLD AUTO: 8 % (ref 0–13.4)
NEUTROPHILS # BLD AUTO: 1.91 K/UL (ref 1.82–7.42)
NEUTROPHILS NFR BLD: 44.9 % (ref 44–72)
NRBC # BLD AUTO: 0 K/UL
NRBC BLD-RTO: 0 /100 WBC
PHOSPHATE SERPL-MCNC: 3.2 MG/DL (ref 2.5–4.5)
PLATELET # BLD AUTO: 188 K/UL (ref 164–446)
PMV BLD AUTO: 10.5 FL (ref 9–12.9)
POTASSIUM SERPL-SCNC: 3.8 MMOL/L (ref 3.6–5.5)
PROT SERPL-MCNC: 6.2 G/DL (ref 6–8.2)
RBC # BLD AUTO: 4.1 M/UL (ref 4.7–6.1)
SODIUM SERPL-SCNC: 137 MMOL/L (ref 135–145)
WBC # BLD AUTO: 4.3 K/UL (ref 4.8–10.8)

## 2018-04-16 PROCEDURE — 82247 BILIRUBIN TOTAL: CPT

## 2018-04-16 PROCEDURE — 700111 HCHG RX REV CODE 636 W/ 250 OVERRIDE (IP): Performed by: INTERNAL MEDICINE

## 2018-04-16 PROCEDURE — 84155 ASSAY OF PROTEIN SERUM: CPT

## 2018-04-16 PROCEDURE — 84460 ALANINE AMINO (ALT) (SGPT): CPT

## 2018-04-16 PROCEDURE — 80076 HEPATIC FUNCTION PANEL: CPT

## 2018-04-16 PROCEDURE — 84075 ASSAY ALKALINE PHOSPHATASE: CPT

## 2018-04-16 PROCEDURE — 82248 BILIRUBIN DIRECT: CPT

## 2018-04-16 PROCEDURE — 36592 COLLECT BLOOD FROM PICC: CPT

## 2018-04-16 PROCEDURE — 96365 THER/PROPH/DIAG IV INF INIT: CPT

## 2018-04-16 PROCEDURE — 84450 TRANSFERASE (AST) (SGOT): CPT

## 2018-04-16 PROCEDURE — 700105 HCHG RX REV CODE 258: Performed by: INTERNAL MEDICINE

## 2018-04-16 PROCEDURE — 80069 RENAL FUNCTION PANEL: CPT

## 2018-04-16 PROCEDURE — 85025 COMPLETE CBC W/AUTO DIFF WBC: CPT

## 2018-04-16 RX ADMIN — SODIUM CHLORIDE 1000 MG: 900 INJECTION INTRAVENOUS at 11:44

## 2018-04-16 ASSESSMENT — PAIN SCALES - GENERAL: PAINLEVEL: NO PAIN

## 2018-04-16 NOTE — PROGRESS NOTES
Pt arrived to IS ambulatory, here for daily IV abx for infected hardware for rotator cuff repair. Pt with R PICC, has been positional for quite some time. Pt and MD aware and pt knows how to position arm to allow PICC to work. PICC flushed and blood return observed. Labs drawn as ordered. Invanz infused over 30 min. Pt gianni well. Line flushed clear. PICC flushed and wrapped. PICC also wrapped for pt so he can shower tonight. Pt knows to cont to return for daily treatment. Discharged home under self care in no apparent distress.

## 2018-04-17 ENCOUNTER — OUTPATIENT INFUSION SERVICES (OUTPATIENT)
Dept: ONCOLOGY | Facility: MEDICAL CENTER | Age: 68
End: 2018-04-17
Attending: INTERNAL MEDICINE
Payer: MEDICARE

## 2018-04-17 VITALS
RESPIRATION RATE: 18 BRPM | HEART RATE: 47 BPM | TEMPERATURE: 98.2 F | DIASTOLIC BLOOD PRESSURE: 82 MMHG | OXYGEN SATURATION: 100 % | SYSTOLIC BLOOD PRESSURE: 122 MMHG

## 2018-04-17 DIAGNOSIS — M75.122 COMPLETE ROTATOR CUFF TEAR OF LEFT SHOULDER: ICD-10-CM

## 2018-04-17 PROCEDURE — 96365 THER/PROPH/DIAG IV INF INIT: CPT

## 2018-04-17 PROCEDURE — 700111 HCHG RX REV CODE 636 W/ 250 OVERRIDE (IP): Performed by: INTERNAL MEDICINE

## 2018-04-17 PROCEDURE — 700105 HCHG RX REV CODE 258: Performed by: INTERNAL MEDICINE

## 2018-04-17 RX ADMIN — SODIUM CHLORIDE 1000 MG: 900 INJECTION INTRAVENOUS at 11:33

## 2018-04-17 ASSESSMENT — PAIN SCALES - GENERAL: PAINLEVEL: NO PAIN

## 2018-04-17 NOTE — PROGRESS NOTES
Pt arrives ambulatory to  for daily IVAB.  Pt denies complaints today.  LUE PICC in place, line flushes easily however unable to visualize blood return with several positional changes.  IVAB infused without difficulty.  PICC line flushed at completion of infusion and blood return confirmed.  Clave changed, line wrapped with gauze and plastic wrap for shower.  Pt returns tomorrow, he was discharged from IS in NAD under self care.

## 2018-04-17 NOTE — PROGRESS NOTES
Patient Sebastien Horn was admitted to [facility] on 2/6/18 for left shoulder septic arthritis.  The patient was discharged on 2/9/18 and instructed to follow up with his PCP and orthopedics, outpatient infusion was ordered as well.  The patient began IV infusion on 3/10/18 and followed up with orthopedics on 2/15/18, 2/22/18 and 3/1/18, however the patient refused assistance scheduling a follow up with his PCP and an appointment was not scheduled.  The patient also followed up with infectious disease and he successfully filled all discharged medications.   Unfortunately the patient was directly admitted back to [facility] on 3/9/18 by his orthopedic provider for left shoulder septic arthritis.  The patient was discharged on 3/12/18 and was instructed to follow up with his PCP, orthopedics and infectious disease.  The patient was also to continue with outpatient infusion.   The patient resumed infusion  on 3/13/18.  He followed up with his orthopedist on 3/15/18 and infectious disease on  3/26/18 and 4/9/18.  The patient again declined assistance scheduling a follow up with his PCP and an appointment has not been scheduled.  The patient successfully filled his discharge medications.  No future appointment with providers are scheduled, however the patient is scheduled for IV infusion everyday through the month of April.  PPS Screening 80.

## 2018-04-18 ENCOUNTER — OUTPATIENT INFUSION SERVICES (OUTPATIENT)
Dept: ONCOLOGY | Facility: MEDICAL CENTER | Age: 68
End: 2018-04-18
Attending: INTERNAL MEDICINE
Payer: MEDICARE

## 2018-04-18 VITALS
RESPIRATION RATE: 18 BRPM | OXYGEN SATURATION: 100 % | HEART RATE: 53 BPM | TEMPERATURE: 98.2 F | DIASTOLIC BLOOD PRESSURE: 50 MMHG | SYSTOLIC BLOOD PRESSURE: 117 MMHG

## 2018-04-18 DIAGNOSIS — M75.122 COMPLETE ROTATOR CUFF TEAR OF LEFT SHOULDER: ICD-10-CM

## 2018-04-18 PROCEDURE — 96365 THER/PROPH/DIAG IV INF INIT: CPT

## 2018-04-18 PROCEDURE — 700111 HCHG RX REV CODE 636 W/ 250 OVERRIDE (IP): Performed by: INTERNAL MEDICINE

## 2018-04-18 PROCEDURE — 700105 HCHG RX REV CODE 258: Performed by: INTERNAL MEDICINE

## 2018-04-18 RX ADMIN — SODIUM CHLORIDE 1000 MG: 900 INJECTION INTRAVENOUS at 11:40

## 2018-04-18 ASSESSMENT — PAIN SCALES - GENERAL: PAINLEVEL: NO PAIN

## 2018-04-18 NOTE — PROGRESS NOTES
Pt to [facility] for daily ertapenem infusion for OM.  Pt PICC line flushed per protocol w/ brisk blood return noted.  Ertapenem infused through PICC with no adverse reactions.  Pt PICC again flushed per protocol w/ brisk blood return noted, wrapped in gauze and protective sleeve placed.  Pt left on foot in NAD, next appt in place

## 2018-04-18 NOTE — PROGRESS NOTES
Outcome: Left Message to schedule awv    Please transfer to Patient Outreach Team at [phone] when patient returns call.        Attempt # final

## 2018-04-19 ENCOUNTER — OUTPATIENT INFUSION SERVICES (OUTPATIENT)
Dept: ONCOLOGY | Facility: MEDICAL CENTER | Age: 68
End: 2018-04-19
Attending: INTERNAL MEDICINE
Payer: MEDICARE

## 2018-04-19 VITALS
HEART RATE: 49 BPM | SYSTOLIC BLOOD PRESSURE: 117 MMHG | DIASTOLIC BLOOD PRESSURE: 65 MMHG | TEMPERATURE: 97.3 F | RESPIRATION RATE: 18 BRPM | OXYGEN SATURATION: 99 %

## 2018-04-19 DIAGNOSIS — M75.122 COMPLETE ROTATOR CUFF TEAR OF LEFT SHOULDER: ICD-10-CM

## 2018-04-19 LAB
BASOPHILS # BLD AUTO: 1.4 % (ref 0–1.8)
BASOPHILS # BLD: 0.06 K/UL (ref 0–0.12)
EOSINOPHIL # BLD AUTO: 0.19 K/UL (ref 0–0.51)
EOSINOPHIL NFR BLD: 4.3 % (ref 0–6.9)
ERYTHROCYTE [DISTWIDTH] IN BLOOD BY AUTOMATED COUNT: 47.2 FL (ref 35.9–50)
HCT VFR BLD AUTO: 36.4 % (ref 42–52)
HGB BLD-MCNC: 12.3 G/DL (ref 14–18)
IMM GRANULOCYTES # BLD AUTO: 0.01 K/UL (ref 0–0.11)
IMM GRANULOCYTES NFR BLD AUTO: 0.2 % (ref 0–0.9)
LYMPHOCYTES # BLD AUTO: 1.89 K/UL (ref 1–4.8)
LYMPHOCYTES NFR BLD: 43.2 % (ref 22–41)
MCH RBC QN AUTO: 31.4 PG (ref 27–33)
MCHC RBC AUTO-ENTMCNC: 33.8 G/DL (ref 33.7–35.3)
MCV RBC AUTO: 92.9 FL (ref 81.4–97.8)
MONOCYTES # BLD AUTO: 0.32 K/UL (ref 0–0.85)
MONOCYTES NFR BLD AUTO: 7.3 % (ref 0–13.4)
NEUTROPHILS # BLD AUTO: 1.91 K/UL (ref 1.82–7.42)
NEUTROPHILS NFR BLD: 43.6 % (ref 44–72)
NRBC # BLD AUTO: 0 K/UL
NRBC BLD-RTO: 0 /100 WBC
PLATELET # BLD AUTO: 207 K/UL (ref 164–446)
PMV BLD AUTO: 10.8 FL (ref 9–12.9)
RBC # BLD AUTO: 3.92 M/UL (ref 4.7–6.1)
WBC # BLD AUTO: 4.4 K/UL (ref 4.8–10.8)

## 2018-04-19 PROCEDURE — 700111 HCHG RX REV CODE 636 W/ 250 OVERRIDE (IP): Performed by: INTERNAL MEDICINE

## 2018-04-19 PROCEDURE — 700105 HCHG RX REV CODE 258: Performed by: INTERNAL MEDICINE

## 2018-04-19 PROCEDURE — 85025 COMPLETE CBC W/AUTO DIFF WBC: CPT

## 2018-04-19 PROCEDURE — 96365 THER/PROPH/DIAG IV INF INIT: CPT

## 2018-04-19 RX ADMIN — SODIUM CHLORIDE 1000 MG: 900 INJECTION INTRAVENOUS at 11:47

## 2018-04-19 ASSESSMENT — PAIN SCALES - GENERAL: PAINLEVEL: NO PAIN

## 2018-04-19 NOTE — PROGRESS NOTES
Patient arrived to clinic for daily Invanz.  Denies any changes from previous appointment.  PICC line flushes with blood return noted, CBC drawn per standing order.  Invanz infused per order, pt tolerated well.  PICC flushed and gauze/mesh cover placed.  Confirmed tomorrow's appointment and pt ambulated out of clinic in no apparent distress.

## 2018-04-20 ENCOUNTER — OUTPATIENT INFUSION SERVICES (OUTPATIENT)
Dept: ONCOLOGY | Facility: MEDICAL CENTER | Age: 68
End: 2018-04-20
Attending: INTERNAL MEDICINE
Payer: MEDICARE

## 2018-04-20 VITALS
DIASTOLIC BLOOD PRESSURE: 78 MMHG | OXYGEN SATURATION: 100 % | TEMPERATURE: 98 F | RESPIRATION RATE: 18 BRPM | HEART RATE: 50 BPM | SYSTOLIC BLOOD PRESSURE: 136 MMHG

## 2018-04-20 DIAGNOSIS — M75.122 COMPLETE ROTATOR CUFF TEAR OF LEFT SHOULDER: ICD-10-CM

## 2018-04-20 PROCEDURE — 700111 HCHG RX REV CODE 636 W/ 250 OVERRIDE (IP): Performed by: INTERNAL MEDICINE

## 2018-04-20 PROCEDURE — 96365 THER/PROPH/DIAG IV INF INIT: CPT

## 2018-04-20 PROCEDURE — 700105 HCHG RX REV CODE 258: Performed by: INTERNAL MEDICINE

## 2018-04-20 RX ADMIN — SODIUM CHLORIDE 1000 MG: 900 INJECTION INTRAVENOUS at 11:33

## 2018-04-20 ASSESSMENT — PAIN SCALES - GENERAL: PAINLEVEL: NO PAIN

## 2018-04-20 NOTE — PROGRESS NOTES
Patient arrived to clinic for daily Invanz.  Denies any changes from previous visit.  PICC line positional.  Flushed well with blood return noted.  Invanz infused per order, pt tolerated well.  Confirmed tomorrow's appointment and pt ambulated out of clinic in no apparent distress.    Called and spoke with GIANNA Strauss and confirmed tomorrow 4/21/18 is patient's last antibiotic day and OK to pull PICC line.

## 2018-04-21 ENCOUNTER — OUTPATIENT INFUSION SERVICES (OUTPATIENT)
Dept: ONCOLOGY | Facility: MEDICAL CENTER | Age: 68
End: 2018-04-21
Attending: INTERNAL MEDICINE
Payer: MEDICARE

## 2018-04-21 VITALS
DIASTOLIC BLOOD PRESSURE: 43 MMHG | RESPIRATION RATE: 16 BRPM | OXYGEN SATURATION: 100 % | TEMPERATURE: 98.6 F | BODY MASS INDEX: 21.03 KG/M2 | WEIGHT: 153.88 LBS | SYSTOLIC BLOOD PRESSURE: 100 MMHG | HEART RATE: 57 BPM

## 2018-04-21 DIAGNOSIS — M75.122 COMPLETE ROTATOR CUFF TEAR OF LEFT SHOULDER: ICD-10-CM

## 2018-04-21 PROCEDURE — 306780 HCHG STAT FOR TRANSFUSION PER CASE

## 2018-04-21 PROCEDURE — 700105 HCHG RX REV CODE 258: Performed by: INTERNAL MEDICINE

## 2018-04-21 PROCEDURE — 700111 HCHG RX REV CODE 636 W/ 250 OVERRIDE (IP): Performed by: INTERNAL MEDICINE

## 2018-04-21 PROCEDURE — 96365 THER/PROPH/DIAG IV INF INIT: CPT

## 2018-04-21 RX ADMIN — SODIUM CHLORIDE 1000 MG: 900 INJECTION INTRAVENOUS at 14:17

## 2018-04-21 ASSESSMENT — PAIN SCALES - GENERAL: PAINLEVEL: NO PAIN

## 2018-04-21 NOTE — PROGRESS NOTES
Patient arrived ambulatory to the [facility] for final dose of Ertapenem. Reviewed vital signs, labs, and physician order. Patient denies new issues or concerns. Pt arrives with SL PICC to [facility], visualized brisk blood return. Ertapenem administered, no adverse reaction observed. PICC flushed per protocol, removed 41cm with tip intact, gauze, tegaderm and coban dressing placed. Pt remained in [facility] for 30 min observation, dressing remains CDI. Patient provided handout and verbal instructions on what to monitor PICC line insertion site for, pt verbalized understanding of this teaching. Patient left the [facility] ambulatory in no sign of distress.

## 2018-04-24 NOTE — PROGRESS NOTES
"Pharmacy Kinetics 66 y.o. male on vancomycin day # 3 3/30/2017    Currently on Vancomycin 1100 mg iv q12hr    Indication for Treatment: infected urinary prosthesis    Pertinent history per medical record: Admitted on 3/28/2017 for increasing pain, and difficulty urination.  Patient had urinary sphincter placed on 3/22 for management of urinary incontinence (s/p prostatectomy).  On 3/25 patient returned to surgery to address hematuria and for clot evacuation.  Patient returned to OR today for removal of infected urinary sphincter.    Other antibiotics: ceftriaxone 1000 mg iv Q24H    Allergies: Review of patient's allergies indicates no known allergies.     List concerns for renal function:elevated SCr (improved), contrast on 3/28     Pertinent cultures to date:    3/28/17 wound, abdominal: Enterococcus    Recent Labs      17   1220  17   0414   WBC  6.9  8.4   NEUTSPOLYS  78.60*  73.90*     Recent Labs      17   1220  17   0952  17   0414   BUN  15  9  8   CREATININE  1.12  0.72  0.53   ALBUMIN  4.9   --    --      Recent Labs      17   0414   VANCOTROUGH  7.1*     Intake/Output Summary (Last 24 hours) at 17 1244  Last data filed at 17 1200   Gross per 24 hour   Intake   3840 ml   Output   4150 ml   Net   -310 ml      Blood pressure 111/68, pulse 62, temperature 36.6 °C (97.8 °F), resp. rate 18, height 1.829 m (6' 0.01\"), weight 70.6 kg (155 lb 10.3 oz), SpO2 95 %. Temp (24hrs), Av.8 °C (98.3 °F), Min:36.4 °C (97.6 °F), Max:37.6 °C (99.6 °F)      A/P   1. Vancomycin dose change: increase to 900 mg q8 hrs  2. Next vancomycin level: 2-3 days  3. Goal trough: 12-16 mcg/mL  4. Comments: POD#2, continued serosanguinous fluid from drains. Abdominal wound cultures now growing Enterococcus. Vanco trough this AM low at 7.1, increasing dose accordingly and will plan to check a level in a day or 2. Pharmacy to follow results and clinical progress.    Srinivasan Guthrie, OlimpiaD        " Ears: no ear pain and no hearing problems.Nose: no nasal congestion and no nasal drainage.Mouth/Throat: no dysphagia, no hoarseness and no throat pain.Neck: no lumps, no pain, no stiffness and no swollen glands

## 2018-05-29 NOTE — PROGRESS NOTES
1. Attempt #: 1    2. HealthConnect Verified: yes    3. Verify PCP: yes    4. Care Team Updated:       •   DME Company (gait device, O2, CPAP, etc.): YES       •   Other Specialists (eye doctor, derm, GYN, cardiology, endo, etc): YES    5.  Reviewed/Updated the following with patient:       •   Communication Preference Obtained? YES       •   Preferred Pharmacy? YES       •   Preferred Lab? YES       •   Family History (document living status of immediate family members and if + hx of cancer, diabetes, hypertension, hyperlipidemia, heart attack, stroke) YES. Was Abstract Encounter opened and chart updated? YES    6. [employer] Activation: already active    7. [employer] Malaika: no    8. Annual Wellness Visit Scheduling  Scheduling Status:Scheduled      9. Care Gap Scheduling (Attempt to Schedule EACH Overdue Care Gap!)     Health Maintenance Due   Topic Date Due   • IMM DTaP/Tdap/Td Vaccine (1 - Tdap) 07/04/1968   • IMM PNEUMOCOCCAL 65+ (ADULT) LOW/MEDIUM RISK SERIES (2 of 2 - PCV13) 09/02/2017        Scheduled patient for Annual Wellness Visit    10. Patient was advised: “This is a free wellness visit. The provider will screen for medical conditions to help you stay healthy. If you have other concerns to address you may be asked to discuss these at a separate visit or there may be an additional fee.”     11. Patient was informed to arrive 15 min prior to their scheduled appointment and bring in their medication bottles.

## 2018-06-12 ENCOUNTER — OFFICE VISIT (OUTPATIENT)
Dept: MEDICAL GROUP | Facility: MEDICAL CENTER | Age: 68
End: 2018-06-12
Payer: MEDICARE

## 2018-06-12 DIAGNOSIS — L57.0 AK (ACTINIC KERATOSIS): ICD-10-CM

## 2018-06-12 DIAGNOSIS — E55.9 VITAMIN D INSUFFICIENCY: ICD-10-CM

## 2018-06-12 DIAGNOSIS — E89.2 H/O PARATHYROIDECTOMY (HCC): ICD-10-CM

## 2018-06-12 DIAGNOSIS — E78.00 PURE HYPERCHOLESTEROLEMIA: ICD-10-CM

## 2018-06-12 DIAGNOSIS — Z85.46 H/O PROSTATE CANCER: ICD-10-CM

## 2018-06-12 DIAGNOSIS — M85.80 OSTEOPENIA OF THE ELDERLY: ICD-10-CM

## 2018-06-12 DIAGNOSIS — N52.31 ERECTILE DYSFUNCTION FOLLOWING RADICAL PROSTATECTOMY: ICD-10-CM

## 2018-06-12 DIAGNOSIS — I34.1 MVP (MITRAL VALVE PROLAPSE): ICD-10-CM

## 2018-06-12 DIAGNOSIS — N39.498 OTHER URINARY INCONTINENCE: ICD-10-CM

## 2018-06-12 DIAGNOSIS — I50.30 (HFPEF) HEART FAILURE WITH PRESERVED EJECTION FRACTION (HCC): Chronic | ICD-10-CM

## 2018-06-12 DIAGNOSIS — M54.2 NECK PAIN ON LEFT SIDE: ICD-10-CM

## 2018-06-12 DIAGNOSIS — Z00.00 MEDICARE ANNUAL WELLNESS VISIT, SUBSEQUENT: ICD-10-CM

## 2018-06-12 PROBLEM — M75.122 COMPLETE ROTATOR CUFF TEAR OF LEFT SHOULDER: Status: RESOLVED | Noted: 2017-10-18 | Resolved: 2018-06-12

## 2018-06-12 PROCEDURE — G0439 PPPS, SUBSEQ VISIT: HCPCS | Performed by: FAMILY MEDICINE

## 2018-06-12 ASSESSMENT — ENCOUNTER SYMPTOMS: GENERAL WELL-BEING: EXCELLENT

## 2018-06-12 ASSESSMENT — ACTIVITIES OF DAILY LIVING (ADL): BATHING_REQUIRES_ASSISTANCE: 0

## 2018-06-12 ASSESSMENT — PATIENT HEALTH QUESTIONNAIRE - PHQ9: CLINICAL INTERPRETATION OF PHQ2 SCORE: 0

## 2018-06-12 NOTE — PROGRESS NOTES
Chief Complaint   Patient presents with   • Annual Wellness Visit         HPI:  Sebastien is a 67 y.o. here for Medicare Annual Wellness Visit        Patient Active Problem List    Diagnosis Date Noted   • Osteopenia of the elderly 07/05/2017   • (HFpEF) heart failure with preserved ejection fraction (HCC) 03/28/2017   • AK (actinic keratosis) 12/14/2016   • H/O parathyroidectomy 09/08/2016   • Urinary incontinence 09/29/2015   • Erectile dysfunction following radical prostatectomy 09/29/2015   • Vitamin D insufficiency 09/29/2015   • H/O prostate cancer 06/02/2015   • Hyperlipidemia 06/02/2015   • MVP (mitral valve prolapse) 06/02/2015   • Neck pain on left side 06/02/2015       Current Outpatient Prescriptions   Medication Sig Dispense Refill   • Naproxen Sodium (ALEVE PO) Take  by mouth.     • Multiple Vitamins-Minerals (CENTRUM SILVER PO) Take  by mouth.     • oxyCODONE immediate-release (ROXICODONE) 5 MG Tab Take 5 mg by mouth every four hours as needed for Severe Pain.     • Probiotic Product (SOLUBLE FIBER/PROBIOTICS PO) Take  by mouth.     • Cyanocobalamin (VITAMIN B 12 PO) Take 2,500 Units by mouth as needed.       No current facility-administered medications for this visit.         Patient is taking medications as noted in medication list.  Current supplements as per medication list.     Allergies: Patient has no allergy information on record.    Current social contact/activities: Pt is very socially active.      Is patient current with immunizations? No, due for PREVNAR (PCV13) , TDAP and SHINGRIX (Shingles). Patient is interested in receiving NONE today.    He  reports that he has never smoked. He has never used smokeless tobacco. He reports that he does not drink alcohol or use drugs.  Counseling given: Not Answered        DPA/Advanced directive: Patient has Advanced Directive, but it is not on file. Instructed to bring in a copy to scan into their chart.    ROS:    Gait: Uses no assistive device   Ostomy:  no   Other tubes: no   Amputations: no   Chronic oxygen use no   Last eye exam couple years ago    Wears hearing aids: no   : Denies any urinary leakage during the last 6 months        Depression Screening    Little interest or pleasure in doing things?  0 - not at all  Feeling down, depressed, or hopeless? 0 - not at all  Patient Health Questionnaire Score: 0    If depressive symptoms identified deferred to follow up visit unless specifically addressed in assessment and plan.    Interpretation of PHQ-9 Total Score   Score Severity   1-4 No Depression   5-9 Mild Depression   10-14 Moderate Depression   15-19 Moderately Severe Depression   20-27 Severe Depression    Screening for Cognitive Impairment    Three Minute Recall (leader, season, table)  3/3    Marvel clock face with all 12 numbers and set the hands to show 10 past 11.  Yes 5/5  If cognitive concerns identified, deferred for follow up unless specifically addressed in assessment and plan.    Fall Risk Assessment    Has the patient had two or more falls in the last year or any fall with injury in the last year?  No  If fall risk identified, deferred for follow up unless specifically addressed in assessment and plan.    Safety Assessment    Throw rugs on floor.  Yes  Handrails on all stairs.  Yes  Good lighting in all hallways.  Yes  Difficulty hearing.  No  Patient counseled about all safety risks that were identified.    Functional Assessment ADLs    Are there any barriers preventing you from cooking for yourself or meeting nutritional needs?  No.    Are there any barriers preventing you from driving safely or obtaining transportation?  No.    Are there any barriers preventing you from using a telephone or calling for help?  No.    Are there any barriers preventing you from shopping?  No.    Are there any barriers preventing you from taking care of your own finances?  No.    Are there any barriers preventing you from managing your medications?  No.    Are  there any barriers preventing you from showering, bathing or dressing yourself?  No.    Are you currently engaging in any exercise or physical activity?  Yes.  Pt hikes 4-5 miles a day, lifts and swims.  What is your perception of your health?  Excellent.    Health Maintenance Summary                IMM DTaP/Tdap/Td Vaccine Overdue 7/4/1968     IMM PNEUMOCOCCAL 65+ (ADULT) LOW/MEDIUM RISK SERIES Overdue 9/2/2017      Done 9/2/2016 Imm Admin: Pneumococcal polysaccharide vaccine (PPSV-23)     Patient has more history with this topic...    Annual Wellness Visit Next Due 7/14/2018      Done 7/13/2017 Visit Dx: Medicare annual wellness visit, subsequent     Patient has more history with this topic...    COLONOSCOPY Next Due 4/10/2024      Done 4/10/2014 REFERRAL TO GI FOR COLONOSCOPY     Patient has more history with this topic...          Patient Care Team:  Caden Rodarte M.D. as PCP - General (Family Medicine)  Sage Ngo M.D. as Consulting Physician (Urology)  Abebe Araiza M.D. as Consulting Physician (Gastroenterology)  Morris Chavarria M.D. as Consulting Physician (Cardiology)  Larisa Ureña O.D. as Consulting Physician (Optometry)  Josh Blank M.D. as Consulting Physician (Surgery)  Sergio Daniel M.D. as Consulting Physician (Orthopaedics)    Social History   Substance Use Topics   • Smoking status: Never Smoker   • Smokeless tobacco: Never Used   • Alcohol use No     Family History   Problem Relation Age of Onset   • Hyperlipidemia Mother    • Hyperlipidemia Father    • Cancer Father      Prostate and Lung   • Lung Disease Father    • Hypertension Father    • Heart Disease Father    • Other Sister      Lupus   • Heart Disease Maternal Grandfather      He  has a past medical history of Arthritis; Cancer (HCC) (07/13); Disorder of thyroid; Heart valve disease; Hiatus hernia syndrome; Unspecified cataract; and Urinary incontinence.   Past Surgical History:   Procedure Laterality Date   •  IRRIGATION & DEBRIDEMENT ORTHO Left 3/10/2018    Procedure: IRRIGATION & DEBRIDEMENT ORTHO OPEN;  Surgeon: Sergio Daniel M.D.;  Location: [facility];  Service: Orthopedics   • SHOULDER ARTHROSCOPY Left 2/6/2018    Procedure: SHOULDER ARTHROSCOPY;  Surgeon: Sergio Daniel M.D.;  Location: [facility];  Service: Orthopedics   • IRRIGATION & DEBRIDEMENT ORTHO Left 2/6/2018    Procedure: IRRIGATION & DEBRIDEMENT ORTHO- SHOULDER  ;  Surgeon: Sergio Daniel M.D.;  Location: [facility];  Service: Orthopedics   • FOREIGN BODY REMOVAL Left 2/6/2018    Procedure: FOREIGN BODY REMOVAL;  Surgeon: Sergio Daniel M.D.;  Location: [facility];  Service: Orthopedics   • SHOULDER ARTHROSCOPY W/ ROTATOR CUFF REPAIR Left 10/18/2017    Procedure: SHOULDER ARTHROSCOPY W/ ROTATOR CUFF REPAIR;  Surgeon: Sergio Daniel M.D.;  Location: [facility];  Service: Orthopedics   • SHOULDER DECOMPRESSION ARTHROSCOPIC Left 10/18/2017    Procedure: SHOULDER DECOMPRESSION ARTHROSCOPIC- SUBACROMIAL;  Surgeon: Sergio Daniel M.D.;  Location: [facility];  Service: Orthopedics   • BICEPS TENODESIS Left 10/18/2017    Procedure: BICEPS TENODESIS;  Surgeon: Sergio Daniel M.D.;  Location: [facility];  Service: Orthopedics   • PARATHYROIDECTOMY N/A 7/5/2017    Procedure: PARATHYROIDECTOMY - MINIMALLY INVASIVE W/RECURRENT LARYNGEAL NERVE MONITORING;  Surgeon: Josh Blank M.D.;  Location: SURGERY SAME DAY [facility];  Service:    • SPHINCTER PROSTHESIS REMOVAL  3/28/2017    Procedure: URINARY SPHINCTER PROSTHESIS REMOVAL;  Surgeon: Benigno Grier M.D.;  Location: [facility];  Service:    • CYSTOSCOPY  3/28/2017    Procedure: CYSTOSCOPY;  Surgeon: Benigno Grier M.D.;  Location: [facility];  Service:    • EVACUATION OF HEMATOMA  3/21/2017    Procedure: EVACUATION OF HEMATOMA-CYSTO CLOT EVACUATION AND SMALL CYSTOSCOPE;  Surgeon:  Frank Lamas M.D.;  Location: [facility];  Service:    • CYSTOSCOPY  3/21/2017    Procedure: CYSTOSCOPY;  Surgeon: Frank Lamas M.D.;  Location: SURGERY [facility];  Service:    • CYSTOSCOPY  3/20/2017    Procedure: CYSTOSCOPY -  FLEXIBLE;  Surgeon: Frank Lamas M.D.;  Location: SURGERY [facility];  Service:    • SPHINCTER PROSTHESIS PLACEMENT  3/20/2017    Procedure: SPHINCTER PROSTHESIS PLACEMENT - ARTIFICIAL URINARY SPHINCTER;  Surgeon: Frank Lamas M.D.;  Location: SURGERY [facility];  Service:    • PROSTATECTOMY, RADICAL RETRO  8/3/2015    Procedure: PROSTATECTOMY RADICAL RETROPUBIC;  Surgeon: Sage Ngo M.D.;  Location: [facility];  Service:    • NODE DISSECTION Bilateral 8/3/2015    Procedure: NODE DISSECTION LYMPH;  Surgeon: Sage Ngo M.D.;  Location: [facility];  Service:    • KNEE ARTHROPLASTY TOTAL  7/3/2014    Performed by Theodore San M.D. at [facility]   • KNEE ARTHROSCOPY  7/3/2014    Performed by Theodore San M.D. at [facility]   • MENISCECTOMY  7/3/2014    Performed by Theodore San M.D. at [facility]   • KNEE REPLACEMENT, TOTAL Right 2014   • INGUINAL HERNIA REPAIR  6/1/2009    Performed by RISHI COOK at [facility]   • OTHER ORTHOPEDIC SURGERY  2003    L Shoulder Repair   • INGUINAL HERNIA LAPAROSCOPIC BILATERAL Bilateral    • OTHER ORTHOPEDIC SURGERY      R Rotator Cuff repair           Exam:     There were no vitals taken for this visit. There is no height or weight on file to calculate BMI.    Constitutional: Alert, no distress.  Skin: Warm, dry, good turgor, no rashes in visible areas.  Eye: Equal, round and reactive, conjunctiva clear, lids normal.  Psych: Alert and oriented x3, normal affect and mood.      Assessment and Plan. The following treatment and monitoring plan is recommended:    1. Medicare annual wellness visit, subsequent  Advised  patient continue healthy lifestyle.  He is status post shoulder surgery and infection. He is back kayaking.  - Naproxen Sodium (ALEVE PO); Take  by mouth.    2. (HFpEF) heart failure with preserved ejection fraction (HCC)  Stable.  Continue to monitor.    3. H/O prostate cancer  Undetectable PSA.  Continue to monitor.    4. H/O parathyroidectomy  Normal PTH on last labs.    5. Pure hypercholesterolemia  Advised patient on healthy lifestyle.    6. Vitamin D insufficiency  Continue to monitor.    7. Osteopenia of the elderly  Stable.  Continue to monitor.    8. MVP (mitral valve prolapse)  Asymptomatic.  Continue to monitor.    9. Erectile dysfunction following radical prostatectomy  Stable.  Continue to monitor.    10. Other urinary incontinence  Stable.  Continue to monitor.    11. Neck pain on left side  Improved with massage.    12. AK (actinic keratosis)  No recurrence per patient.        Services suggested: No services needed at this time  Health Care Screening recommendations as per orders if indicated.  Referrals offered: PT/OT/Nutrition counseling/Behavioral Health/Smoking cessation as per orders if indicated.    Discussion today about general wellness and lifestyle habits:    · Prevent falls and reduce trip hazards; Cautioned about securing or removing rugs.  · Have a working fire alarm and carbon monoxide detector;   · Engage in regular physical activity and social activities       Follow-up: Return in about 1 year (around 6/12/2019) for Annual.

## 2018-07-12 ENCOUNTER — APPOINTMENT (RX ONLY)
Dept: URBAN - METROPOLITAN AREA CLINIC 4 | Facility: CLINIC | Age: 68
Setting detail: DERMATOLOGY
End: 2018-07-12

## 2018-07-12 DIAGNOSIS — L82.1 OTHER SEBORRHEIC KERATOSIS: ICD-10-CM

## 2018-07-12 DIAGNOSIS — L57.0 ACTINIC KERATOSIS: ICD-10-CM

## 2018-07-12 DIAGNOSIS — D22 MELANOCYTIC NEVI: ICD-10-CM

## 2018-07-12 DIAGNOSIS — L81.4 OTHER MELANIN HYPERPIGMENTATION: ICD-10-CM

## 2018-07-12 DIAGNOSIS — D18.0 HEMANGIOMA: ICD-10-CM

## 2018-07-12 DIAGNOSIS — L57.8 OTHER SKIN CHANGES DUE TO CHRONIC EXPOSURE TO NONIONIZING RADIATION: ICD-10-CM

## 2018-07-12 PROBLEM — D22.5 MELANOCYTIC NEVI OF TRUNK: Status: ACTIVE | Noted: 2018-07-12

## 2018-07-12 PROBLEM — D48.5 NEOPLASM OF UNCERTAIN BEHAVIOR OF SKIN: Status: ACTIVE | Noted: 2018-07-12

## 2018-07-12 PROBLEM — D18.01 HEMANGIOMA OF SKIN AND SUBCUTANEOUS TISSUE: Status: ACTIVE | Noted: 2018-07-12

## 2018-07-12 PROCEDURE — ? LIQUID NITROGEN

## 2018-07-12 PROCEDURE — 11100: CPT | Mod: 59

## 2018-07-12 PROCEDURE — 99213 OFFICE O/P EST LOW 20 MIN: CPT | Mod: 25

## 2018-07-12 PROCEDURE — ? BIOPSY BY SHAVE METHOD

## 2018-07-12 PROCEDURE — 17003 DESTRUCT PREMALG LES 2-14: CPT

## 2018-07-12 PROCEDURE — 17000 DESTRUCT PREMALG LESION: CPT

## 2018-07-12 PROCEDURE — ? COUNSELING

## 2018-07-12 ASSESSMENT — LOCATION SIMPLE DESCRIPTION DERM
LOCATION SIMPLE: RIGHT CHEEK
LOCATION SIMPLE: RIGHT UPPER BACK
LOCATION SIMPLE: RIGHT UPPER ARM
LOCATION SIMPLE: RIGHT THIGH
LOCATION SIMPLE: LEFT FOREHEAD
LOCATION SIMPLE: LEFT FOREARM
LOCATION SIMPLE: RIGHT FOREARM
LOCATION SIMPLE: FRONTAL SCALP
LOCATION SIMPLE: LEFT UPPER ARM
LOCATION SIMPLE: LEFT CHEEK
LOCATION SIMPLE: RIGHT SCALP
LOCATION SIMPLE: LEFT THIGH
LOCATION SIMPLE: CHEST

## 2018-07-12 ASSESSMENT — LOCATION ZONE DERM
LOCATION ZONE: SCALP
LOCATION ZONE: FACE
LOCATION ZONE: ARM
LOCATION ZONE: LEG
LOCATION ZONE: TRUNK

## 2018-07-12 ASSESSMENT — LOCATION DETAILED DESCRIPTION DERM
LOCATION DETAILED: LEFT PROXIMAL DORSAL FOREARM
LOCATION DETAILED: RIGHT PROXIMAL DORSAL FOREARM
LOCATION DETAILED: RIGHT CENTRAL MALAR CHEEK
LOCATION DETAILED: RIGHT INFERIOR UPPER BACK
LOCATION DETAILED: MEDIAL FRONTAL SCALP
LOCATION DETAILED: LEFT MEDIAL FOREHEAD
LOCATION DETAILED: LEFT ANTERIOR DISTAL THIGH
LOCATION DETAILED: LEFT INFERIOR CENTRAL MALAR CHEEK
LOCATION DETAILED: RIGHT SUPERIOR MEDIAL UPPER BACK
LOCATION DETAILED: RIGHT LATERAL UPPER BACK
LOCATION DETAILED: LEFT ANTERIOR PROXIMAL UPPER ARM
LOCATION DETAILED: LEFT SUPERIOR MEDIAL FOREHEAD
LOCATION DETAILED: RIGHT ANTERIOR DISTAL THIGH
LOCATION DETAILED: RIGHT ANTERIOR PROXIMAL UPPER ARM
LOCATION DETAILED: RIGHT MID-UPPER BACK
LOCATION DETAILED: LEFT MEDIAL SUPERIOR CHEST
LOCATION DETAILED: RIGHT MEDIAL FRONTAL SCALP

## 2018-07-12 NOTE — PROCEDURE: BIOPSY BY SHAVE METHOD
Notification Instructions: Patient will be notified of biopsy results. However, patient instructed to call the office if not contacted within 2 weeks.
Was A Bandage Applied: Yes
X Size Of Lesion In Cm: 0
Lab: [id number]
Wound Care: Bacitracin
Lab Facility: 
Destruction After The Procedure: No
Hemostasis: Drysol
Depth Of Biopsy: dermis
Consent: Written consent was obtained and risks were reviewed including but not limited to scarring, infection, bleeding, scabbing, incomplete removal, nerve damage and allergy to anesthesia.
Type Of Destruction Used: Electrodesiccation
Cryotherapy Text: The wound bed was treated with cryotherapy after the biopsy was performed.
Post-Care Instructions: I reviewed with the patient in detail post-care instructions. Patient is to keep the biopsy site dry overnight, and then apply bacitracin twice daily until healed. Patient may apply hydrogen peroxide soaks to remove any crusting.
Biopsy Method: Dermablade
Billing Type: Third-Party Bill
Detail Level: Detailed
Electrodesiccation Text: The wound bed was treated with electrodesiccation after the biopsy was performed.
Anesthesia Volume In Cc: 0.5
Curettage Text: The wound bed was treated with curettage after the biopsy was performed.
Electrodesiccation And Curettage Text: The wound bed was treated with electrodesiccation and curettage after the biopsy was performed.
Biopsy Type: H and E
Anesthesia Type: 1% lidocaine with epinephrine
Silver Nitrate Text: The wound bed was treated with silver nitrate after the biopsy was performed.
Dressing: bandage

## 2018-07-12 NOTE — PROCEDURE: LIQUID NITROGEN
Consent: The patient's consent was obtained including but not limited to risks of crusting, scabbing, blistering, scarring, darker or lighter pigmentary change, recurrence, incomplete removal and infection.
Number Of Freeze-Thaw Cycles: 2 freeze-thaw cycles
Post-Care Instructions: I reviewed with the patient in detail post-care instructions. Patient is to wear sunprotection, and avoid picking at any of the treated lesions. Pt may apply Vaseline to crusted or scabbing areas.
Aperture Size (Optional): C
Duration Of Freeze Thaw-Cycle (Seconds): 3
Detail Level: Simple
Render Post-Care Instructions In Note?: no

## 2018-08-08 ENCOUNTER — APPOINTMENT (RX ONLY)
Dept: URBAN - METROPOLITAN AREA CLINIC 4 | Facility: CLINIC | Age: 68
Setting detail: DERMATOLOGY
End: 2018-08-08

## 2018-08-08 PROBLEM — C44.519 BASAL CELL CARCINOMA OF SKIN OF OTHER PART OF TRUNK: Status: ACTIVE | Noted: 2018-08-08

## 2018-08-08 PROCEDURE — 17263 DSTRJ MAL LES T/A/L 2.1-3.0: CPT

## 2018-08-08 PROCEDURE — ? CURETTAGE AND DESTRUCTION

## 2018-08-08 NOTE — PROCEDURE: CURETTAGE AND DESTRUCTION
Additional Information: (Optional): The wound was cleaned, and a pressure dressing was applied.  The patient received detailed post-op instructions.
What Was Performed First?: Curettage
Add Intralesional Injection: No
Concentration (Mg/Ml Or Millions Of Plaque Forming Units/Cc): 0.01
Post-Care Instructions: I reviewed with the patient in detail post-care instructions. Patient is to keep the area dry for 48 hours, and not to engage in any swimming until the area is healed. Should the patient develop any fevers, chills, bleeding, severe pain patient will contact the office immediately.
Consent was obtained from the patient. The risks, benefits and alternatives to therapy were discussed in detail. Specifically, the risks of infection, scarring, bleeding, prolonged wound healing, nerve injury, incomplete removal, allergy to anesthesia and recurrence were addressed. Alternatives to ED&C, such as: surgical removal and XRT were also discussed.  Prior to the procedure, the treatment site was clearly identified and confirmed by the patient. All components of Universal Protocol/PAUSE Rule completed.
Total Volume (Ccs): 1
Size Of Lesion After Curettage: 2.4
Cautery Type: electrodesiccation
Anesthesia Volume In Cc: 4.7
Size Of Lesion In Cm: 2.2
Bill As A Line Item Or As Units: Line Item
Biopsy Photograph Reviewed: Yes
Detail Level: Detailed
Anesthesia Type: 1% lidocaine with epinephrine
Number Of Curettages: 3

## 2018-10-22 ENCOUNTER — HOSPITAL ENCOUNTER (OUTPATIENT)
Dept: LAB | Facility: MEDICAL CENTER | Age: 68
End: 2018-10-22
Attending: UROLOGY
Payer: MEDICARE

## 2018-10-22 LAB — PSA SERPL-MCNC: <0.01 NG/ML (ref 0–4)

## 2018-10-22 PROCEDURE — 84153 ASSAY OF PSA TOTAL: CPT

## 2018-10-22 PROCEDURE — 36415 COLL VENOUS BLD VENIPUNCTURE: CPT

## 2019-01-30 ENCOUNTER — APPOINTMENT (RX ONLY)
Dept: URBAN - METROPOLITAN AREA CLINIC 4 | Facility: CLINIC | Age: 69
Setting detail: DERMATOLOGY
End: 2019-01-30

## 2019-01-30 ENCOUNTER — HOSPITAL ENCOUNTER (OUTPATIENT)
Dept: LAB | Facility: MEDICAL CENTER | Age: 69
End: 2019-01-30
Attending: DERMATOLOGY
Payer: MEDICARE

## 2019-01-30 DIAGNOSIS — D22 MELANOCYTIC NEVI: ICD-10-CM

## 2019-01-30 DIAGNOSIS — D18.0 HEMANGIOMA: ICD-10-CM

## 2019-01-30 DIAGNOSIS — L01.01 NON-BULLOUS IMPETIGO: ICD-10-CM

## 2019-01-30 DIAGNOSIS — L81.4 OTHER MELANIN HYPERPIGMENTATION: ICD-10-CM

## 2019-01-30 DIAGNOSIS — L57.8 OTHER SKIN CHANGES DUE TO CHRONIC EXPOSURE TO NONIONIZING RADIATION: ICD-10-CM

## 2019-01-30 DIAGNOSIS — L82.1 OTHER SEBORRHEIC KERATOSIS: ICD-10-CM

## 2019-01-30 DIAGNOSIS — Z85.828 PERSONAL HISTORY OF OTHER MALIGNANT NEOPLASM OF SKIN: ICD-10-CM

## 2019-01-30 PROBLEM — D18.01 HEMANGIOMA OF SKIN AND SUBCUTANEOUS TISSUE: Status: ACTIVE | Noted: 2019-01-30

## 2019-01-30 PROBLEM — D22.5 MELANOCYTIC NEVI OF TRUNK: Status: ACTIVE | Noted: 2019-01-30

## 2019-01-30 PROCEDURE — 99213 OFFICE O/P EST LOW 20 MIN: CPT

## 2019-01-30 PROCEDURE — ? COUNSELING

## 2019-01-30 PROCEDURE — ? PRESCRIPTION

## 2019-01-30 PROCEDURE — ? ORDER TESTS

## 2019-01-30 PROCEDURE — 87205 SMEAR GRAM STAIN: CPT

## 2019-01-30 PROCEDURE — 87070 CULTURE OTHR SPECIMN AEROBIC: CPT

## 2019-01-30 PROCEDURE — ? ADDITIONAL NOTES

## 2019-01-30 RX ORDER — MUPIROCIN 20 MG/G
OINTMENT TOPICAL
Qty: 1 | Refills: 0 | Status: ERX | COMMUNITY
Start: 2019-01-30

## 2019-01-30 RX ADMIN — MUPIROCIN: 20 OINTMENT TOPICAL at 00:00

## 2019-01-30 ASSESSMENT — LOCATION DETAILED DESCRIPTION DERM
LOCATION DETAILED: RIGHT SUPERIOR UPPER BACK
LOCATION DETAILED: RIGHT ULNAR DORSAL HAND
LOCATION DETAILED: RIGHT INFERIOR CENTRAL MALAR CHEEK
LOCATION DETAILED: LEFT SUPERIOR ANTERIOR NECK
LOCATION DETAILED: LEFT ULNAR DORSAL HAND
LOCATION DETAILED: RIGHT SUPERIOR MEDIAL UPPER BACK

## 2019-01-30 ASSESSMENT — LOCATION SIMPLE DESCRIPTION DERM
LOCATION SIMPLE: RIGHT UPPER BACK
LOCATION SIMPLE: LEFT HAND
LOCATION SIMPLE: RIGHT CHEEK
LOCATION SIMPLE: LEFT ANTERIOR NECK
LOCATION SIMPLE: RIGHT HAND

## 2019-01-30 ASSESSMENT — LOCATION ZONE DERM
LOCATION ZONE: FACE
LOCATION ZONE: NECK
LOCATION ZONE: TRUNK
LOCATION ZONE: HAND

## 2019-01-30 NOTE — PROCEDURE: ORDER TESTS
Billing Type: Third-Party Bill
Performing Laboratory: [id number]
Expected Date Of Service: 01/30/2019
Bill For Surgical Tray: no

## 2019-01-31 LAB
AMBIGUOUS DTTM AMBI4: NORMAL
GRAM STN SPEC: NORMAL
SIGNIFICANT IND 70042: NORMAL
SIGNIFICANT IND 70042: NORMAL
SITE SITE: NORMAL
SITE SITE: NORMAL
SOURCE SOURCE: NORMAL
SOURCE SOURCE: NORMAL

## 2019-02-02 LAB
BACTERIA WND AEROBE CULT: NORMAL
GRAM STN SPEC: NORMAL
SIGNIFICANT IND 70042: NORMAL
SITE SITE: NORMAL
SOURCE SOURCE: NORMAL

## 2019-02-14 RX ORDER — MUPIROCIN 20 MG/G
OINTMENT TOPICAL
Qty: 1 | Refills: 0 | Status: ERX

## 2019-04-19 ENCOUNTER — HOSPITAL ENCOUNTER (OUTPATIENT)
Dept: LAB | Facility: MEDICAL CENTER | Age: 69
End: 2019-04-19
Attending: NURSE PRACTITIONER
Payer: MEDICARE

## 2019-04-19 PROCEDURE — 36415 COLL VENOUS BLD VENIPUNCTURE: CPT

## 2019-04-19 PROCEDURE — 84153 ASSAY OF PSA TOTAL: CPT

## 2019-04-19 PROCEDURE — 84154 ASSAY OF PSA FREE: CPT

## 2019-04-21 LAB
PSA FREE MFR SERPL: NORMAL %
PSA FREE SERPL-MCNC: <0.1 NG/ML
PSA SERPL-MCNC: <0.1 NG/ML (ref 0–4)

## 2019-05-13 NOTE — TELEPHONE ENCOUNTER
"Anesthesia Transfer of Care Note    Patient: Eric Kirkpatrick    Procedure(s) Performed: Procedure(s) (LRB):  COLONOSCOPY (N/A)    Patient location: PACU    Anesthesia Type: general    Transport from OR: Transported from OR on 2-3 L/min O2 by NC with adequate spontaneous ventilation    Post pain: adequate analgesia    Post assessment: no apparent anesthetic complications and tolerated procedure well    Post vital signs: stable    Level of consciousness: sedated    Nausea/Vomiting: no nausea/vomiting    Complications: none    Transfer of care protocol was followed      Last vitals:   Visit Vitals  BP (!) 138/91 (BP Location: Left arm, Patient Position: Lying)   Pulse 77   Temp 36.6 °C (97.9 °F) (Skin)   Resp 10   Ht 6' 1" (1.854 m)   Wt (!) 220 kg (485 lb)   BMI 63.99 kg/m²     " ANNUAL WELLNESS VISIT PRE-VISIT PLANNING     1.  Reviewed note from last office visit with PCP: YES 9/2016    2.  If any orders were placed at last visit, do we have Results/Consult Notes?        •  Labs - Labs were not ordered at last office visit.       •  Imaging - Imaging was not ordered at last office visit.       •  Referrals - No referrals were ordered at last office visit.    3.  Immunizations were updated in [facility] using WebIZ?: Yes       •  [employer] Recommendations: Hep A, adult  Zoster (Shingles)  Influenza w/preserv.  Tdap       •  Is patient due for Tdap? YES. Patient was notified of copay.       •  Is patient due for Shingles? YES. Patient was notified of copay.     4.  Patient is due for the following Health Maintenance Topics:   Health Maintenance Due   Topic Date Due   • IMM DTaP/Tdap/Td Vaccine (1 - Tdap) 07/04/1968   • IMM ZOSTER VACCINE  07/04/2009       - Patient plans to schedule appointment for Immunizations: TDAP.    5.  Reviewed/Updated the following with patient:       •   Preferred Pharmacy? YES       •   Preferred Lab? YES       •   Medications? YES. Was Abstract Encounter opened and chart updated? YES       •   Social History? YES. Was Abstract Encounter opened and chart updated? YES       •   Family History? YES. Was Abstract Encounter opened and chart updated? YES    6.  Care Team Updated:       •   DME Company (gait device, O2, CPAP, etc.): NO       •   Other Specialists (eye doctor, derm, GYN, cardiology, endo, etc): NO    7.  Patient has the following Care Path diagnoses on Problem List:  NONE    8.  Specialty Comments was updated with diagnosis information provided by [facility]: YES    9.  Patient was advised: “This is a free wellness visit. The provider will screen for medical conditions to help you stay healthy. If you have other concerns to address you may be asked to discuss these at a separate visit or there may be an additional fee.”     6.  Patient was informed to arrive 15 min prior  to their scheduled appointment and bring in their medication bottles.

## 2019-06-04 ENCOUNTER — TELEPHONE (OUTPATIENT)
Dept: MEDICAL GROUP | Facility: MEDICAL CENTER | Age: 69
End: 2019-06-04

## 2019-06-04 NOTE — TELEPHONE ENCOUNTER
ESTABLISHED PATIENT PRE-VISIT PLANNING     Patient was NOT contacted to complete PVP.     Note: Patient will not be contacted if there is no indication to call.     1.  Reviewed notes from the last few office visits within the medical group: Yes    2.  If any orders were placed at last visit or intended to be done for this visit (i.e. 6 mos follow-up), do we have Results/Consult Notes?        •  Labs - Labs were not ordered at last office visit.   Note: If patient appointment is for lab review and patient did not complete labs, check with provider if OK to reschedule patient until labs completed.       •  Imaging - Imaging was not ordered at last office visit.       •  Referrals - No referrals were ordered at last office visit.    3. Is this appointment scheduled as a Hospital Follow-Up? No    4.  Immunizations were updated in [email or web address] using [employer]?: Yes       •  [employer] Recommendations: PREVNAR (PCV13) , TDAP, VARICELLA (Chicken Pox)  and SHINGRIX (Shingles)    5.  Patient is due for the following Health Maintenance Topics:   Health Maintenance Due   Topic Date Due   • HEPATITIS C SCREENING  1950   • IMM DTaP/Tdap/Td Vaccine (1 - Tdap) 07/04/1968   • IMM PNEUMOCOCCAL 65+ (ADULT) LOW/MEDIUM RISK SERIES (2 of 2 - PCV13) 09/02/2017   • IMM ZOSTER VACCINES (2 of 3) 09/09/2017     6. Orders for overdue Health Maintenance topics pended in Pre-Charting? NO    7.  AHA (MDX) form printed for Provider? YES    8.  Patient was NOT informed to arrive 15 min prior to their scheduled appointment and bring in their medication bottles.

## 2019-06-05 ENCOUNTER — OFFICE VISIT (OUTPATIENT)
Dept: MEDICAL GROUP | Facility: MEDICAL CENTER | Age: 69
End: 2019-06-05
Payer: MEDICARE

## 2019-06-05 VITALS
DIASTOLIC BLOOD PRESSURE: 80 MMHG | OXYGEN SATURATION: 96 % | BODY MASS INDEX: 20.02 KG/M2 | SYSTOLIC BLOOD PRESSURE: 124 MMHG | TEMPERATURE: 97.9 F | HEART RATE: 65 BPM | WEIGHT: 143 LBS | HEIGHT: 71 IN

## 2019-06-05 DIAGNOSIS — J06.9 UPPER RESPIRATORY TRACT INFECTION, UNSPECIFIED TYPE: ICD-10-CM

## 2019-06-05 DIAGNOSIS — M25.512 ACUTE PAIN OF LEFT SHOULDER: ICD-10-CM

## 2019-06-05 DIAGNOSIS — E78.00 PURE HYPERCHOLESTEROLEMIA: ICD-10-CM

## 2019-06-05 DIAGNOSIS — Z11.59 NEED FOR HEPATITIS C SCREENING TEST: ICD-10-CM

## 2019-06-05 PROCEDURE — 99213 OFFICE O/P EST LOW 20 MIN: CPT | Performed by: FAMILY MEDICINE

## 2019-06-05 ASSESSMENT — PATIENT HEALTH QUESTIONNAIRE - PHQ9: CLINICAL INTERPRETATION OF PHQ2 SCORE: 0

## 2019-06-05 NOTE — PROGRESS NOTES
"Subjective:   Sebastien Horn is a 68 y.o. male here today for nausea    Just came back from plane trip back east. Arrived back in town with a fever at 100.5 a couple days ago. Woke up today and temperature was back to normal. He was able to eat for the first time today in a few days. Cough started about 5 days ago.  Patient is 80% better at this morning.    Left shoulder just recently started to ache when moving in a certain directions. The ache started in left axilla, but that resolved. He has been using heat, which has helped. He has continued to do rehab exercises, although he was not doing the exercises when on vacation.  He noticed some swelling around left shoulder after he restarted exercises when he got home.    Current medicines (including changes today)  Current Outpatient Prescriptions   Medication Sig Dispense Refill   • Naproxen Sodium (ALEVE PO) Take  by mouth.     • Multiple Vitamins-Minerals (CENTRUM SILVER PO) Take  by mouth.     • oxyCODONE immediate-release (ROXICODONE) 5 MG Tab Take 5 mg by mouth every four hours as needed for Severe Pain.     • Probiotic Product (SOLUBLE FIBER/PROBIOTICS PO) Take  by mouth.     • Cyanocobalamin (VITAMIN B 12 PO) Take 2,500 Units by mouth as needed.       No current facility-administered medications for this visit.      He  has a past medical history of Arthritis; Cancer (HCC) (07/13); Disorder of thyroid; Heart valve disease; Hiatus hernia syndrome; Unspecified cataract; and Urinary incontinence.    ROS   + cough, + post nasal drip, no diarrhea, no dysuria.       Objective:     /80 (BP Location: Right arm, Patient Position: Sitting)   Pulse 65   Temp 36.6 °C (97.9 °F)   Ht 1.803 m (5' 11\")   Wt 64.9 kg (143 lb)   SpO2 96%  Body mass index is 19.94 kg/m².   Physical Exam:  Constitutional: Alert, no distress.  Skin: Warm, dry, good turgor, no rashes in visible areas.  Eye: Equal, round and reactive, conjunctiva clear, lids normal.  Psych: Alert and " oriented x3, normal affect and mood.  Lungs: Clear to auscultation bilaterally.  Left shoulder: Full range of movement with 5 out of 5 strength, no tenderness on palpation.      Assessment and Plan:   The following treatment plan was discussed    1. Upper respiratory tract infection, unspecified type  Improving spontaneously.  Continue to monitor.  No concerns for pneumonia on exam today.    2. Acute pain of left shoulder  Appears to be doing well.  Continue to do exercises and use heat as needed.    3. Pure hypercholesterolemia  - Comp Metabolic Panel; Future  - Lipid Profile; Future  - TSH WITH REFLEX TO FT4; Future    4. Need for hepatitis C screening test  - HEP C VIRUS ANTIBODY; Future      Followup: Return if symptoms worsen or fail to improve.

## 2019-06-18 ENCOUNTER — HOSPITAL ENCOUNTER (OUTPATIENT)
Dept: LAB | Facility: MEDICAL CENTER | Age: 69
End: 2019-06-18
Attending: FAMILY MEDICINE
Payer: MEDICARE

## 2019-06-18 DIAGNOSIS — Z11.59 NEED FOR HEPATITIS C SCREENING TEST: ICD-10-CM

## 2019-06-18 DIAGNOSIS — E78.00 PURE HYPERCHOLESTEROLEMIA: ICD-10-CM

## 2019-06-18 LAB
ALBUMIN SERPL BCP-MCNC: 3.9 G/DL (ref 3.2–4.9)
ALBUMIN/GLOB SERPL: 1.4 G/DL
ALP SERPL-CCNC: 60 U/L (ref 30–99)
ALT SERPL-CCNC: 12 U/L (ref 2–50)
ANION GAP SERPL CALC-SCNC: 9 MMOL/L (ref 0–11.9)
AST SERPL-CCNC: 15 U/L (ref 12–45)
BILIRUB SERPL-MCNC: 0.8 MG/DL (ref 0.1–1.5)
BUN SERPL-MCNC: 15 MG/DL (ref 8–22)
CALCIUM SERPL-MCNC: 9.2 MG/DL (ref 8.5–10.5)
CHLORIDE SERPL-SCNC: 104 MMOL/L (ref 96–112)
CHOLEST SERPL-MCNC: 194 MG/DL (ref 100–199)
CO2 SERPL-SCNC: 25 MMOL/L (ref 20–33)
CREAT SERPL-MCNC: 0.54 MG/DL (ref 0.5–1.4)
FASTING STATUS PATIENT QL REPORTED: NORMAL
GLOBULIN SER CALC-MCNC: 2.7 G/DL (ref 1.9–3.5)
GLUCOSE SERPL-MCNC: 87 MG/DL (ref 65–99)
HCV AB SER QL: NEGATIVE
HDLC SERPL-MCNC: 56 MG/DL
LDLC SERPL CALC-MCNC: 125 MG/DL
POTASSIUM SERPL-SCNC: 3.5 MMOL/L (ref 3.6–5.5)
PROT SERPL-MCNC: 6.6 G/DL (ref 6–8.2)
SODIUM SERPL-SCNC: 138 MMOL/L (ref 135–145)
TRIGL SERPL-MCNC: 67 MG/DL (ref 0–149)
TSH SERPL DL<=0.005 MIU/L-ACNC: 1.62 UIU/ML (ref 0.38–5.33)

## 2019-06-18 PROCEDURE — 80061 LIPID PANEL: CPT

## 2019-06-18 PROCEDURE — 80053 COMPREHEN METABOLIC PANEL: CPT

## 2019-06-18 PROCEDURE — 84443 ASSAY THYROID STIM HORMONE: CPT

## 2019-06-18 PROCEDURE — 36415 COLL VENOUS BLD VENIPUNCTURE: CPT

## 2019-06-18 PROCEDURE — 86803 HEPATITIS C AB TEST: CPT

## 2019-06-20 ENCOUNTER — TELEPHONE (OUTPATIENT)
Dept: MEDICAL GROUP | Facility: MEDICAL CENTER | Age: 69
End: 2019-06-20

## 2019-06-20 NOTE — TELEPHONE ENCOUNTER
ESTABLISHED PATIENT PRE-VISIT PLANNING     Patient was NOT contacted to complete PVP.     Note: Patient will not be contacted if there is no indication to call.     1.  Reviewed notes from the last few office visits within the medical group: Yes    2.  If any orders were placed at last visit or intended to be done for this visit (i.e. 6 mos follow-up), do we have Results/Consult Notes?        •  Labs - Labs ordered, completed on 6/18/19 and results are in chart.   Note: If patient appointment is for lab review and patient did not complete labs, check with provider if OK to reschedule patient until labs completed.       •  Imaging - Imaging was not ordered at last office visit.       •  Referrals - No referrals were ordered at last office visit.    3. Is this appointment scheduled as a Hospital Follow-Up? No    4.  Immunizations were updated in Epic using WebIZ?: Epic matches WebIZ       •  Web Iz Recommendations: PREVNAR (PCV13) , TDAP, VARICELLA (Chicken Pox)  and SHINGRIX (Shingles)    5.  Patient is due for the following Health Maintenance Topics:   Health Maintenance Due   Topic Date Due   • IMM DTaP/Tdap/Td Vaccine (1 - Tdap) 07/04/1969   • IMM PNEUMOCOCCAL 65+ (ADULT) LOW/MEDIUM RISK SERIES (2 of 2 - PCV13) 09/02/2017   • IMM ZOSTER VACCINES (2 of 3) 09/09/2017   • Annual Wellness Visit  06/13/2019     6. Orders for overdue Health Maintenance topics pended in Pre-Charting? NO    7.  AHA (MDX) form printed for Provider? YES    8.  Patient was informed to arrive 15 min prior to their scheduled appointment and bring in their medication bottles.

## 2019-06-20 NOTE — TELEPHONE ENCOUNTER
1. Caller Name: Sebastien                                           Call Back Number: [phone] (home)         Patient approves a detailed voicemail message: N\A    Unable to contact pt to schedule Annual into AWV.

## 2019-06-24 ENCOUNTER — OFFICE VISIT (OUTPATIENT)
Dept: MEDICAL GROUP | Facility: MEDICAL CENTER | Age: 69
End: 2019-06-24
Payer: MEDICARE

## 2019-06-24 VITALS
HEART RATE: 63 BPM | RESPIRATION RATE: 16 BRPM | WEIGHT: 149 LBS | HEIGHT: 71 IN | TEMPERATURE: 98.4 F | BODY MASS INDEX: 20.86 KG/M2 | DIASTOLIC BLOOD PRESSURE: 64 MMHG | SYSTOLIC BLOOD PRESSURE: 118 MMHG | OXYGEN SATURATION: 99 %

## 2019-06-24 DIAGNOSIS — M81.0 AGE-RELATED OSTEOPOROSIS WITHOUT CURRENT PATHOLOGICAL FRACTURE: ICD-10-CM

## 2019-06-24 DIAGNOSIS — E78.00 PURE HYPERCHOLESTEROLEMIA: ICD-10-CM

## 2019-06-24 DIAGNOSIS — C61 MALIGNANT NEOPLASM OF PROSTATE (HCC): ICD-10-CM

## 2019-06-24 DIAGNOSIS — Z85.828 H/O NONMELANOMA SKIN CANCER: ICD-10-CM

## 2019-06-24 DIAGNOSIS — Z23 NEED FOR VACCINATION: ICD-10-CM

## 2019-06-24 DIAGNOSIS — I50.30 (HFPEF) HEART FAILURE WITH PRESERVED EJECTION FRACTION (HCC): Chronic | ICD-10-CM

## 2019-06-24 DIAGNOSIS — Z00.00 MEDICARE ANNUAL WELLNESS VISIT, SUBSEQUENT: ICD-10-CM

## 2019-06-24 DIAGNOSIS — M54.2 NECK PAIN ON LEFT SIDE: ICD-10-CM

## 2019-06-24 DIAGNOSIS — I34.1 MVP (MITRAL VALVE PROLAPSE): ICD-10-CM

## 2019-06-24 DIAGNOSIS — E89.2 H/O PARATHYROIDECTOMY (HCC): ICD-10-CM

## 2019-06-24 PROCEDURE — G0009 ADMIN PNEUMOCOCCAL VACCINE: HCPCS | Performed by: FAMILY MEDICINE

## 2019-06-24 PROCEDURE — 90715 TDAP VACCINE 7 YRS/> IM: CPT | Performed by: FAMILY MEDICINE

## 2019-06-24 PROCEDURE — G0439 PPPS, SUBSEQ VISIT: HCPCS | Mod: 25 | Performed by: FAMILY MEDICINE

## 2019-06-24 PROCEDURE — 90670 PCV13 VACCINE IM: CPT | Performed by: FAMILY MEDICINE

## 2019-06-24 PROCEDURE — 90472 IMMUNIZATION ADMIN EACH ADD: CPT | Performed by: FAMILY MEDICINE

## 2019-06-24 PROCEDURE — 8041 PR SCP AHA: Performed by: FAMILY MEDICINE

## 2019-06-24 ASSESSMENT — ENCOUNTER SYMPTOMS: GENERAL WELL-BEING: EXCELLENT

## 2019-06-24 ASSESSMENT — ACTIVITIES OF DAILY LIVING (ADL): BATHING_REQUIRES_ASSISTANCE: 0

## 2019-06-24 ASSESSMENT — PATIENT HEALTH QUESTIONNAIRE - PHQ9: CLINICAL INTERPRETATION OF PHQ2 SCORE: 0

## 2019-06-24 NOTE — PROGRESS NOTES
Chief Complaint   Patient presents with   • Annual Exam         HPI:  Sebastien Horn is a 68 y.o. here for Medicare Annual Wellness Visit     Patient Active Problem List    Diagnosis Date Noted   • H/O nonmelanoma skin cancer 06/24/2019   • Age-related osteoporosis without current pathological fracture 07/05/2017   • (HFpEF) heart failure with preserved ejection fraction (HCC) 03/28/2017   • AK (actinic keratosis) 12/14/2016   • H/O parathyroidectomy 09/08/2016   • Urinary incontinence 09/29/2015   • Erectile dysfunction following radical prostatectomy 09/29/2015   • Vitamin D insufficiency 09/29/2015   • Malignant neoplasm of prostate (HCC) 08/03/2015   • H/O prostate cancer 06/02/2015   • Hyperlipidemia 06/02/2015   • MVP (mitral valve prolapse) 06/02/2015   • Neck pain on left side 06/02/2015       Current Outpatient Prescriptions   Medication Sig Dispense Refill   • Naproxen Sodium (ALEVE PO) Take  by mouth.     • Multiple Vitamins-Minerals (CENTRUM SILVER PO) Take  by mouth.     • oxyCODONE immediate-release (ROXICODONE) 5 MG Tab Take 5 mg by mouth every four hours as needed for Severe Pain.     • Probiotic Product (SOLUBLE FIBER/PROBIOTICS PO) Take  by mouth.     • Cyanocobalamin (VITAMIN B 12 PO) Take 2,500 Units by mouth as needed.       No current facility-administered medications for this visit.             Current supplements as per medication list.       Allergies: Patient has no known allergies.    Current social contact/activities: kayak and hikes     He  reports that he has never smoked. He has never used smokeless tobacco. He reports that he does not drink alcohol or use drugs.  Counseling given: Not Answered      DPA/Advanced Directive:  Patient does not have an Advanced Directive.  A packet and workshop information was given on Advanced Directives.    ROS:    Gait: Uses no assistive device  Ostomy: No  Other tubes: No  Amputations: No  Chronic oxygen use: No  Last eye exam: 1-2 years ago  Wears  hearing aids: No   : Denies any urinary leakage during the last 6 months      Depression Screening    Little interest or pleasure in doing things?  0 - not at all  Feeling down, depressed , or hopeless? 0 - not at all  Patient Health Questionnaire Score: 0     If depressive symptoms identified deferred to follow up visit unless specifically addressed in assessment and plan.    Interpretation of PHQ-9 Total Score   Score Severity   1-4 No Depression   5-9 Mild Depression   10-14 Moderate Depression   15-19 Moderately Severe Depression   20-27 Severe Depression    Screening for Cognitive Impairment    Three Minute Recall (village, kitchen, baby) 3/3    Marvel clock face with all 12 numbers and set the hands to show 10 past 10.  Yes    Cognitive concerns identified deferred for follow up unless specifically addressed in assessment and plan.    Fall Risk Assessment    Has the patient had two or more falls in the last year or any fall with injury in the last year?  No    Safety Assessment    Throw rugs on floor.  Yes  Handrails on all stairs.  No  Good lighting in all hallways.  Yes  Difficulty hearing.  Yes  Patient counseled about all safety risks that were identified.    Functional Assessment ADLs    Are there any barriers preventing you from cooking for yourself or meeting nutritional needs?  No.    Are there any barriers preventing you from driving safely or obtaining transportation?  No.    Are there any barriers preventing you from using a telephone or calling for help?  No.    Are there any barriers preventing you from shopping?  No.    Are there any barriers preventing you from taking care of your own finances?  No.    Are there any barriers preventing you from managing your medications?  No.    Are there any barriers preventing you from showering, bathing or dressing yourself?  No.    Are you currently engaging in any exercise or physical activity?  Yes.     What is your perception of your health?   Excellent.      Health Maintenance Summary                IMM DTaP/Tdap/Td Vaccine Overdue 7/4/1968     IMM PNEUMOCOCCAL 65+ (ADULT) LOW/MEDIUM RISK SERIES Overdue 9/2/2017      Done 9/2/2016 Imm Admin: Pneumococcal polysaccharide vaccine (PPSV-23)     Patient has more history with this topic...    IMM ZOSTER VACCINES Overdue 9/9/2017      Done 7/15/2017 Imm Admin: Zoster Vaccine Live (ZVL) (Zostavax)    Annual Wellness Visit Overdue 6/13/2019      Done 6/12/2018 Visit Dx: Medicare annual wellness visit, subsequent     Patient has more history with this topic...    COLONOSCOPY Next Due 4/10/2024      Done 4/10/2014 REFERRAL TO GI FOR COLONOSCOPY     Patient has more history with this topic...          Patient Care Team:  Caden Rodrate M.D. as PCP - General (Family Medicine)  Sage Ngo M.D. as Consulting Physician (Urology)  Abebe Araiza M.D. as Consulting Physician (Gastroenterology)  Morris Chavarria M.D. as Consulting Physician (Cardiology)  Larisa Ureña O.D. as Consulting Physician (Optometry)  Josh Blank M.D. as Consulting Physician (Surgery)  Sergio Daniel M.D. as Consulting Physician (Orthopaedics)        Social History   Substance Use Topics   • Smoking status: Never Smoker   • Smokeless tobacco: Never Used   • Alcohol use No     Family History   Problem Relation Age of Onset   • Hyperlipidemia Mother    • Hyperlipidemia Father    • Cancer Father         Prostate and Lung   • Lung Disease Father    • Hypertension Father    • Heart Disease Father    • Other Sister         Lupus   • Heart Disease Maternal Grandfather      He  has a past medical history of Arthritis; Cancer (HCC) (07/13); Disorder of thyroid; Heart valve disease; Hiatus hernia syndrome; Unspecified cataract; and Urinary incontinence.   Past Surgical History:   Procedure Laterality Date   • IRRIGATION & DEBRIDEMENT ORTHO Left 3/10/2018    Procedure: IRRIGATION & DEBRIDEMENT ORTHO OPEN;  Surgeon: Sergio Daniel M.D.;   Location: [facility];  Service: Orthopedics   • SHOULDER ARTHROSCOPY Left 2/6/2018    Procedure: SHOULDER ARTHROSCOPY;  Surgeon: Sergio Daniel M.D.;  Location: [facility];  Service: Orthopedics   • IRRIGATION & DEBRIDEMENT ORTHO Left 2/6/2018    Procedure: IRRIGATION & DEBRIDEMENT ORTHO- SHOULDER  ;  Surgeon: Sergio Daniel M.D.;  Location: [facility];  Service: Orthopedics   • FOREIGN BODY REMOVAL Left 2/6/2018    Procedure: FOREIGN BODY REMOVAL;  Surgeon: Sergio Daniel M.D.;  Location: [facility];  Service: Orthopedics   • SHOULDER ARTHROSCOPY W/ ROTATOR CUFF REPAIR Left 10/18/2017    Procedure: SHOULDER ARTHROSCOPY W/ ROTATOR CUFF REPAIR;  Surgeon: Sergio Daniel M.D.;  Location: [facility];  Service: Orthopedics   • SHOULDER DECOMPRESSION ARTHROSCOPIC Left 10/18/2017    Procedure: SHOULDER DECOMPRESSION ARTHROSCOPIC- SUBACROMIAL;  Surgeon: Sergio Daniel M.D.;  Location: [facility];  Service: Orthopedics   • BICEPS TENODESIS Left 10/18/2017    Procedure: BICEPS TENODESIS;  Surgeon: Sergio Daniel M.D.;  Location: [facility];  Service: Orthopedics   • PARATHYROIDECTOMY N/A 7/5/2017    Procedure: PARATHYROIDECTOMY - MINIMALLY INVASIVE W/RECURRENT LARYNGEAL NERVE MONITORING;  Surgeon: Josh Blank M.D.;  Location: SURGERY SAME DAY [facility];  Service:    • SPHINCTER PROSTHESIS REMOVAL  3/28/2017    Procedure: URINARY SPHINCTER PROSTHESIS REMOVAL;  Surgeon: Benigno Grier M.D.;  Location: [facility];  Service:    • CYSTOSCOPY  3/28/2017    Procedure: CYSTOSCOPY;  Surgeon: Benigno Grier M.D.;  Location: [facility];  Service:    • EVACUATION OF HEMATOMA  3/21/2017    Procedure: EVACUATION OF HEMATOMA-CYSTO CLOT EVACUATION AND SMALL CYSTOSCOPE;  Surgeon: Frank Lamas M.D.;  Location: [facility];  Service:    • CYSTOSCOPY  3/21/2017    Procedure: CYSTOSCOPY;  Surgeon:  "Frank Lamas M.D.;  Location: [facility];  Service:    • CYSTOSCOPY  3/20/2017    Procedure: CYSTOSCOPY -  FLEXIBLE;  Surgeon: Frank Lamas M.D.;  Location: [facility];  Service:    • SPHINCTER PROSTHESIS PLACEMENT  3/20/2017    Procedure: SPHINCTER PROSTHESIS PLACEMENT - ARTIFICIAL URINARY SPHINCTER;  Surgeon: Frank Lamas M.D.;  Location: [facility];  Service:    • PROSTATECTOMY, RADICAL RETRO  8/3/2015    Procedure: PROSTATECTOMY RADICAL RETROPUBIC;  Surgeon: Sage Ngo M.D.;  Location: [facility];  Service:    • NODE DISSECTION Bilateral 8/3/2015    Procedure: NODE DISSECTION LYMPH;  Surgeon: Sage Ngo M.D.;  Location: [facility];  Service:    • KNEE ARTHROPLASTY TOTAL  7/3/2014    Performed by Theodore San M.D. at [facility]   • KNEE ARTHROSCOPY  7/3/2014    Performed by Theodore San M.D. at [facility]   • MENISCECTOMY  7/3/2014    Performed by Theodore San M.D. at [facility]   • KNEE REPLACEMENT, TOTAL Right 2014   • INGUINAL HERNIA REPAIR  6/1/2009    Performed by RISHI COOK at [facility]   • OTHER ORTHOPEDIC SURGERY  2003    L Shoulder Repair   • INGUINAL HERNIA LAPAROSCOPIC BILATERAL Bilateral    • OTHER ORTHOPEDIC SURGERY      R Rotator Cuff repair       Exam:   /64 (BP Location: Right arm, Patient Position: Sitting, BP Cuff Size: Adult)   Pulse 63   Temp 36.9 °C (98.4 °F) (Temporal)   Resp 16   Ht 1.803 m (5' 10.98\")   Wt 67.6 kg (149 lb)   SpO2 99%  Body mass index is 20.79 kg/m².    Constitutional: Alert, no distress.  Skin: Warm, dry, good turgor, no rashes in visible areas.  Eye: Equal, round and reactive, conjunctiva clear, lids normal.  ENMT: Lips without lesions, good dentition, oropharynx clear.  Respiratory: Unlabored respiratory effort, lungs clear to auscultation, no wheezes, no ronchi.  Cardiovascular: Normal S1, S2, + " systolic murmur.  Psych: Alert and oriented x3, normal affect and mood.      Assessment and Plan. The following treatment and monitoring plan is recommended:    1. Medicare annual wellness visit, subsequent  - Subsequent Annual Wellness Visit - Includes PPPS ()    2. Malignant neoplasm of prostate (HCC)  Patient is 4 years of being cancer free.  Recent PSA was less than 0.1.  Patient is continuing to follow with urology, Dr. Gregory Lewis every 6 months.  - Subsequent Annual Wellness Visit - Includes PPPS ()    3. (HFpEF) heart failure with preserved ejection fraction (HCC)  Asymptomatic.  Patient kayak about 10 miles this morning and went on a 4 mile hike afterwards.  No chest pain or shortness of breath.  - Subsequent Annual Wellness Visit - Includes PPPS ()    4. MVP (mitral valve prolapse)  Asymptomatic.  Patient kayak about 10 miles this morning and went on a 4 mile hike afterwards.  No chest pain or shortness of breath.  - Subsequent Annual Wellness Visit - Includes PPPS ()    5. H/O parathyroidectomy  Resolved, recent calcium normal.  No concerns for recurrence.  - Subsequent Annual Wellness Visit - Includes PPPS ()    6. Pure hypercholesterolemia  Encouraged healthy lifestyle.  - Subsequent Annual Wellness Visit - Includes PPPS ()    7. Neck pain on left side  Controlled with as needed heating pad.  - Subsequent Annual Wellness Visit - Includes PPPS ()    8. Age-related osteoporosis without current pathological fracture  Patient has had falls while hiking without fracturing bones.  Check DEXA and call with results.  - DS-BONE DENSITY STUDY (DEXA); Future  - Subsequent Annual Wellness Visit - Includes PPPS ()    9. Need for vaccination  - Subsequent Annual Wellness Visit - Includes PPPS ()  - Pneumococcal Conjugate Vaccine 13-Valent  - Tdap Vaccine =>8YO IM    10. H/O nonmelanoma skin cancer  Continue following with dermatology.        Services suggested: No services  needed at this time  Health Care Screening: Age-appropriate preventive services recommended by USPTF and ACIP covered by Medicare were discussed today. Services ordered if indicated and agreed upon by the patient.  Referrals offered: Community-based lifestyle interventions to reduce health risks and promote self-management and wellness, fall prevention, nutrition, physical activity, tobacco-use cessation, weight loss, and mental health services as per orders if indicated.    Discussion today about general wellness and lifestyle habits:    · Prevent falls and reduce trip hazards; Cautioned about securing or removing rugs.  · Have a working fire alarm and carbon monoxide detector;   · Engage in regular physical activity and social activities     Follow-up: Return in about 1 year (around 6/24/2020) for Annual.

## 2019-07-31 ENCOUNTER — APPOINTMENT (RX ONLY)
Dept: URBAN - METROPOLITAN AREA CLINIC 4 | Facility: CLINIC | Age: 69
Setting detail: DERMATOLOGY
End: 2019-07-31

## 2019-07-31 DIAGNOSIS — Z85.828 PERSONAL HISTORY OF OTHER MALIGNANT NEOPLASM OF SKIN: ICD-10-CM

## 2019-07-31 DIAGNOSIS — D22 MELANOCYTIC NEVI: ICD-10-CM

## 2019-07-31 DIAGNOSIS — Z71.89 OTHER SPECIFIED COUNSELING: ICD-10-CM

## 2019-07-31 DIAGNOSIS — L57.8 OTHER SKIN CHANGES DUE TO CHRONIC EXPOSURE TO NONIONIZING RADIATION: ICD-10-CM

## 2019-07-31 DIAGNOSIS — D18.0 HEMANGIOMA: ICD-10-CM

## 2019-07-31 DIAGNOSIS — L82.1 OTHER SEBORRHEIC KERATOSIS: ICD-10-CM

## 2019-07-31 DIAGNOSIS — L81.4 OTHER MELANIN HYPERPIGMENTATION: ICD-10-CM

## 2019-07-31 PROBLEM — D18.01 HEMANGIOMA OF SKIN AND SUBCUTANEOUS TISSUE: Status: ACTIVE | Noted: 2019-07-31

## 2019-07-31 PROBLEM — D22.5 MELANOCYTIC NEVI OF TRUNK: Status: ACTIVE | Noted: 2019-07-31

## 2019-07-31 PROCEDURE — ? SUNSCREEN RECOMMENDATIONS

## 2019-07-31 PROCEDURE — 99214 OFFICE O/P EST MOD 30 MIN: CPT

## 2019-07-31 PROCEDURE — ? COUNSELING

## 2019-07-31 ASSESSMENT — LOCATION DETAILED DESCRIPTION DERM
LOCATION DETAILED: LEFT SUPERIOR UPPER BACK
LOCATION DETAILED: RIGHT SUPERIOR UPPER BACK
LOCATION DETAILED: RIGHT INFERIOR ANTERIOR NECK
LOCATION DETAILED: RIGHT INFERIOR MEDIAL UPPER BACK
LOCATION DETAILED: RIGHT RADIAL DORSAL HAND
LOCATION DETAILED: LEFT RADIAL DORSAL HAND
LOCATION DETAILED: RIGHT INFERIOR CENTRAL MALAR CHEEK

## 2019-07-31 ASSESSMENT — LOCATION SIMPLE DESCRIPTION DERM
LOCATION SIMPLE: RIGHT CHEEK
LOCATION SIMPLE: LEFT HAND
LOCATION SIMPLE: LEFT UPPER BACK
LOCATION SIMPLE: RIGHT ANTERIOR NECK
LOCATION SIMPLE: RIGHT UPPER BACK
LOCATION SIMPLE: RIGHT HAND

## 2019-07-31 ASSESSMENT — LOCATION ZONE DERM
LOCATION ZONE: HAND
LOCATION ZONE: NECK
LOCATION ZONE: FACE
LOCATION ZONE: TRUNK

## 2019-10-16 ENCOUNTER — IMMUNIZATION (OUTPATIENT)
Dept: SOCIAL WORK | Facility: CLINIC | Age: 69
End: 2019-10-16
Payer: MEDICARE

## 2019-10-16 DIAGNOSIS — Z23 NEED FOR VACCINATION: ICD-10-CM

## 2019-10-16 PROCEDURE — 90662 IIV NO PRSV INCREASED AG IM: CPT | Performed by: REGISTERED NURSE

## 2019-10-16 PROCEDURE — G0008 ADMIN INFLUENZA VIRUS VAC: HCPCS | Performed by: REGISTERED NURSE

## 2019-11-11 ENCOUNTER — HOSPITAL ENCOUNTER (OUTPATIENT)
Dept: LAB | Facility: MEDICAL CENTER | Age: 69
End: 2019-11-11
Attending: UROLOGY
Payer: MEDICARE

## 2019-11-11 LAB — PSA SERPL-MCNC: 0.01 NG/ML (ref 0–4)

## 2019-11-11 PROCEDURE — 36415 COLL VENOUS BLD VENIPUNCTURE: CPT

## 2019-11-11 PROCEDURE — 84153 ASSAY OF PSA TOTAL: CPT

## 2020-01-10 ENCOUNTER — PATIENT OUTREACH (OUTPATIENT)
Dept: HEALTH INFORMATION MANAGEMENT | Facility: OTHER | Age: 70
End: 2020-01-10

## 2020-01-10 NOTE — PROGRESS NOTES
Outcome: Left Message to call back so I am able to complete my SCP  introduction    Please transfer to Patient Outreach Team at [phone] when patient returns call.    [facility] Verified: yes    Attempt # 1

## 2020-04-02 NOTE — PROGRESS NOTES
Called member to introduce myself as his SCP . I went over the introduction with him but he declined going over his chart at this time. He stated he had everything he needed at this time as well as all of my information that was sent in the mail. If the member calls back, please transfer to [phone].    Attempt # 2

## 2020-05-05 ENCOUNTER — OFFICE VISIT (OUTPATIENT)
Dept: MEDICAL GROUP | Facility: MEDICAL CENTER | Age: 70
End: 2020-05-05
Payer: MEDICARE

## 2020-05-05 VITALS
OXYGEN SATURATION: 98 % | HEART RATE: 62 BPM | SYSTOLIC BLOOD PRESSURE: 110 MMHG | TEMPERATURE: 98.2 F | BODY MASS INDEX: 21.7 KG/M2 | HEIGHT: 71 IN | DIASTOLIC BLOOD PRESSURE: 66 MMHG | WEIGHT: 155 LBS

## 2020-05-05 DIAGNOSIS — K64.5 EXTERNAL HEMORRHOID, THROMBOSED: ICD-10-CM

## 2020-05-05 PROCEDURE — 99214 OFFICE O/P EST MOD 30 MIN: CPT | Performed by: FAMILY MEDICINE

## 2020-05-05 ASSESSMENT — PATIENT HEALTH QUESTIONNAIRE - PHQ9: CLINICAL INTERPRETATION OF PHQ2 SCORE: 0

## 2020-05-05 NOTE — PROGRESS NOTES
"Subjective:   Sebastien Horn is a 69 y.o. male here today for anal lesion    After kayaking for 4 hours yesterday felt something around anus and felt a lump. This was not something he was bothering with prior to the kayak trip. He put hydrogen peroxide and polysporin yesterday and today lesion is not as irritating.   No straining when having bowel movements.  Patient kayaks almost daily for multiple hours at a time.      Current medicines (including changes today)  Current Outpatient Medications   Medication Sig Dispense Refill   • hydrocortisone rectal (PROCTOZONE HC) 2.5 % Cream 1 application to anus twice daily 30 g 0   • Naproxen Sodium (ALEVE PO) Take  by mouth.     • Multiple Vitamins-Minerals (CENTRUM SILVER PO) Take  by mouth.     • oxyCODONE immediate-release (ROXICODONE) 5 MG Tab Take 5 mg by mouth every four hours as needed for Severe Pain.     • Probiotic Product (SOLUBLE FIBER/PROBIOTICS PO) Take  by mouth.     • Cyanocobalamin (VITAMIN B 12 PO) Take 2,500 Units by mouth as needed.       No current facility-administered medications for this visit.      He  has a past medical history of Arthritis, Cancer (HCC) (07/13), Disorder of thyroid, Heart valve disease, Hiatus hernia syndrome, Unspecified cataract, and Urinary incontinence.    ROS   No constipation, no diarrhea, no fever.       Objective:     /66 (BP Location: Right arm, Patient Position: Sitting)   Pulse 62   Temp 36.8 °C (98.2 °F)   Ht 1.803 m (5' 11\")   Wt 70.3 kg (155 lb)   SpO2 98%  Body mass index is 21.62 kg/m².   Physical Exam:  Constitutional: Alert, no distress.  Skin: Warm, dry, good turgor, no rashes in visible areas.  Eye: Equal, round and reactive, conjunctiva clear, lids normal.  Psych: Alert and oriented x3, normal affect and mood.  Rectal: Firm perianal nodule at the 9 o'clock position.  No ulceration, drainage, or tenderness on palpation.      Assessment and Plan:   The following treatment plan was discussed    1. " External hemorrhoid, thrombosed  New problem.  Prescription for hydrocortisone cream and recommended sitz bath.  Urgent referral to colorectal specialist to discuss possible excision.  Advised patient take a break from kayaking and prolonged sitting.  - REFERRAL TO COLORECTAL SURGERY  - hydrocortisone rectal (PROCTOZONE HC) 2.5 % Cream; 1 application to anus twice daily  Dispense: 30 g; Refill: 0      Followup: Return if symptoms worsen or fail to improve.

## 2020-07-02 ENCOUNTER — PATIENT OUTREACH (OUTPATIENT)
Dept: HEALTH INFORMATION MANAGEMENT | Facility: OTHER | Age: 70
End: 2020-07-02

## 2020-07-02 NOTE — PROGRESS NOTES
Called and wish the member a happy birthday. There was nothing the member needed at this time. If the member calls back, please transfer to [phone].

## 2020-07-20 ENCOUNTER — OFFICE VISIT (OUTPATIENT)
Dept: MEDICAL GROUP | Facility: MEDICAL CENTER | Age: 70
End: 2020-07-20
Payer: MEDICARE

## 2020-07-20 VITALS
HEART RATE: 66 BPM | HEIGHT: 71 IN | BODY MASS INDEX: 21 KG/M2 | WEIGHT: 150 LBS | SYSTOLIC BLOOD PRESSURE: 142 MMHG | OXYGEN SATURATION: 96 % | DIASTOLIC BLOOD PRESSURE: 74 MMHG | TEMPERATURE: 99.3 F

## 2020-07-20 DIAGNOSIS — E55.9 VITAMIN D DEFICIENCY: ICD-10-CM

## 2020-07-20 DIAGNOSIS — Z00.00 MEDICARE ANNUAL WELLNESS VISIT, SUBSEQUENT: ICD-10-CM

## 2020-07-20 DIAGNOSIS — E78.00 PURE HYPERCHOLESTEROLEMIA: ICD-10-CM

## 2020-07-20 DIAGNOSIS — R53.82 CHRONIC FATIGUE: ICD-10-CM

## 2020-07-20 DIAGNOSIS — I70.0 ATHEROSCLEROSIS OF AORTA (HCC): ICD-10-CM

## 2020-07-20 DIAGNOSIS — C61 MALIGNANT NEOPLASM OF PROSTATE (HCC): ICD-10-CM

## 2020-07-20 PROCEDURE — 99999 PR NO CHARGE: CPT | Performed by: FAMILY MEDICINE

## 2020-07-20 PROCEDURE — G0439 PPPS, SUBSEQ VISIT: HCPCS | Performed by: FAMILY MEDICINE

## 2020-07-20 PROCEDURE — 8041 PR SCP AHA: Performed by: FAMILY MEDICINE

## 2020-07-20 ASSESSMENT — PATIENT HEALTH QUESTIONNAIRE - PHQ9: CLINICAL INTERPRETATION OF PHQ2 SCORE: 0

## 2020-07-20 ASSESSMENT — ENCOUNTER SYMPTOMS: GENERAL WELL-BEING: GOOD

## 2020-07-20 ASSESSMENT — ACTIVITIES OF DAILY LIVING (ADL): BATHING_REQUIRES_ASSISTANCE: 0

## 2020-07-20 NOTE — PROGRESS NOTES
Chief Complaint   Patient presents with   • Annual Exam         HPI:  Sebastien Horn is a 70 y.o. here for Medicare Annual Wellness Visit    Patient Active Problem List    Diagnosis Date Noted   • H/O nonmelanoma skin cancer 06/24/2019   • Age-related osteoporosis without current pathological fracture 07/05/2017   • (HFpEF) heart failure with preserved ejection fraction (HCC) 03/28/2017   • AK (actinic keratosis) 12/14/2016   • H/O parathyroidectomy 09/08/2016   • Urinary incontinence 09/29/2015   • Erectile dysfunction following radical prostatectomy 09/29/2015   • Vitamin D deficiency 09/29/2015   • Malignant neoplasm of prostate (HCC) 08/03/2015   • H/O prostate cancer 06/02/2015   • Hyperlipidemia 06/02/2015   • MVP (mitral valve prolapse) 06/02/2015   • Neck pain on left side 06/02/2015       Current Outpatient Medications   Medication Sig Dispense Refill   • hydrocortisone 2.5 % Cream topical cream Apply 1 Application to affected area(s) 2 times a day. To anus 30 g 1   • Naproxen Sodium (ALEVE PO) Take  by mouth.     • Multiple Vitamins-Minerals (CENTRUM SILVER PO) Take  by mouth.     • oxyCODONE immediate-release (ROXICODONE) 5 MG Tab Take 5 mg by mouth every four hours as needed for Severe Pain.     • Probiotic Product (SOLUBLE FIBER/PROBIOTICS PO) Take  by mouth.     • Cyanocobalamin (VITAMIN B 12 PO) Take 2,500 Units by mouth as needed.       No current facility-administered medications for this visit.             Current supplements as per medication list.       Allergies: Patient has no known allergies.    Current social contact/activities: kayak's and is very active.     He  reports that he has never smoked. He has never used smokeless tobacco. He reports that he does not drink alcohol or use drugs.  Counseling given: Not Answered      DPA/Advanced Directive:  Patient has Advanced Directive on file.     ROS:    Gait: Uses no assistive device  Ostomy: No  Other tubes: No  Amputations: No  Chronic  oxygen use: No  Last eye exam: 2017  Wears hearing aids: No   : Denies any urinary leakage during the last 6 months      Depression Screening    Little interest or pleasure in doing things?  0 - not at all  Feeling down, depressed , or hopeless? 0 - not at all  Trouble falling or staying asleep, or sleeping too much?     Feeling tired or having little energy?     Poor appetite or overeating?     Feeling bad about yourself - or that you are a failure or have let yourself or your family down?    Trouble concentrating on things, such as reading the newspaper or watching television?    Moving or speaking so slowly that other people could have noticed.  Or the opposite - being so fidgety or restless that you have been moving around a lot more than usual?     Thoughts that you would be better off dead, or of hurting yourself?     Patient Health Questionnaire Score:      If depressive symptoms identified deferred to follow up visit unless specifically addressed in assessment and plan.    Interpretation of PHQ-9 Total Score   Score Severity   1-4 No Depression   5-9 Mild Depression   10-14 Moderate Depression   15-19 Moderately Severe Depression   20-27 Severe Depression      Screening for Cognitive Impairment    Three Minute Recall (river, adama, finger) 3/3    Marvel clock face with all 12 numbers and set the hands to show 10 past 11.  Yes    Cognitive concerns identified deferred for follow up unless specifically addressed in assessment and plan.    Fall Risk Assessment    Has the patient had two or more falls in the last year or any fall with injury in the last year?  No    Safety Assessment    Throw rugs on floor.  No  Handrails on all stairs.  Yes  Good lighting in all hallways.  Yes  Difficulty hearing.  Yes  Patient counseled about all safety risks that were identified.    Functional Assessment ADLs    Are there any barriers preventing you from cooking for yourself or meeting nutritional needs?  No.    Are there  any barriers preventing you from driving safely or obtaining transportation?  No.    Are there any barriers preventing you from using a telephone or calling for help?  No.    Are there any barriers preventing you from shopping?  No.    Are there any barriers preventing you from taking care of your own finances?  No.    Are there any barriers preventing you from managing your medications?  No.    Are there any barriers preventing you from showering, bathing or dressing yourself? No.    Are you currently engaging in any exercise or physical activity?  Yes.  3-5 hours of kayaking daily;3-5 mile hikes; diving, surging, free weights daily  What is your perception of your health?  Good.      Health Maintenance Summary                IMM ZOSTER VACCINES Overdue 9/9/2017      Done 7/15/2017 Imm Admin: Zoster Vaccine Live (ZVL) (Zostavax)    Annual Wellness Visit Overdue 6/24/2020      Done 6/24/2019 SUBSEQUENT ANNUAL WELLNESS VISIT-INCLUDES PPPS ()     Patient has more history with this topic...    IMM INFLUENZA Next Due 9/1/2020      Done 10/16/2019 Imm Admin: Influenza Vaccine Adult HD     Patient has more history with this topic...    COLONOSCOPY Next Due 4/10/2024      Done 4/10/2014 REFERRAL TO GI FOR COLONOSCOPY     Patient has more history with this topic...    IMM DTaP/Tdap/Td Vaccine Next Due 6/24/2029      Done 6/24/2019 Imm Admin: Tdap Vaccine          Patient Care Team:  Caden Rodarte M.D. as PCP - General (Family Medicine)  Sage Ngo M.D. as Consulting Physician (Urology)  Abebe Araiza M.D. as Consulting Physician (Gastroenterology)  Morris Chavarria M.D. as Consulting Physician (Cardiology)  Larisa Ureña O.D. as Consulting Physician (Optometry)  Josh Blank M.D. as Consulting Physician (Surgery)  Sergio Daniel M.D. as Consulting Physician (Orthopaedics)  Geena Muñoz as [facility]       Social History     Tobacco Use   • Smoking status: Never Smoker   •  Smokeless tobacco: Never Used   Substance Use Topics   • Alcohol use: No     Alcohol/week: 0.0 oz   • Drug use: No     Family History   Problem Relation Age of Onset   • Hyperlipidemia Mother    • Hyperlipidemia Father    • Cancer Father         Prostate and Lung   • Lung Disease Father    • Hypertension Father    • Heart Disease Father    • Other Sister         Lupus   • Heart Disease Maternal Grandfather      He  has a past medical history of Arthritis, Cancer (HCC) (07/13), Disorder of thyroid, Heart valve disease, Hiatus hernia syndrome, Unspecified cataract, and Urinary incontinence.   Past Surgical History:   Procedure Laterality Date   • IRRIGATION & DEBRIDEMENT ORTHO Left 3/10/2018    Procedure: IRRIGATION & DEBRIDEMENT ORTHO OPEN;  Surgeon: Sergio Daniel M.D.;  Location: [facility];  Service: Orthopedics   • SHOULDER ARTHROSCOPY Left 2/6/2018    Procedure: SHOULDER ARTHROSCOPY;  Surgeon: Sergio Daniel M.D.;  Location: [facility];  Service: Orthopedics   • IRRIGATION & DEBRIDEMENT ORTHO Left 2/6/2018    Procedure: IRRIGATION & DEBRIDEMENT ORTHO- SHOULDER  ;  Surgeon: Sergio Daniel M.D.;  Location: [facility];  Service: Orthopedics   • FOREIGN BODY REMOVAL Left 2/6/2018    Procedure: FOREIGN BODY REMOVAL;  Surgeon: Sergio Daniel M.D.;  Location: [facility];  Service: Orthopedics   • SHOULDER ARTHROSCOPY W/ ROTATOR CUFF REPAIR Left 10/18/2017    Procedure: SHOULDER ARTHROSCOPY W/ ROTATOR CUFF REPAIR;  Surgeon: Sergio Daniel M.D.;  Location: [facility];  Service: Orthopedics   • SHOULDER DECOMPRESSION ARTHROSCOPIC Left 10/18/2017    Procedure: SHOULDER DECOMPRESSION ARTHROSCOPIC- SUBACROMIAL;  Surgeon: Sergio Daniel M.D.;  Location: [facility];  Service: Orthopedics   • BICEPS TENODESIS Left 10/18/2017    Procedure: BICEPS TENODESIS;  Surgeon: Sergio Daniel M.D.;  Location: [facility];  Service:  Orthopedics   • PARATHYROIDECTOMY N/A 7/5/2017    Procedure: PARATHYROIDECTOMY - MINIMALLY INVASIVE W/RECURRENT LARYNGEAL NERVE MONITORING;  Surgeon: Josh Blank M.D.;  Location: SURGERY SAME DAY [facility];  Service:    • SPHINCTER PROSTHESIS REMOVAL  3/28/2017    Procedure: URINARY SPHINCTER PROSTHESIS REMOVAL;  Surgeon: Benigno Grier M.D.;  Location: SURGERY [facility];  Service:    • CYSTOSCOPY  3/28/2017    Procedure: CYSTOSCOPY;  Surgeon: Benigno Grier M.D.;  Location: SURGERY [facility];  Service:    • EVACUATION OF HEMATOMA  3/21/2017    Procedure: EVACUATION OF HEMATOMA-CYSTO CLOT EVACUATION AND SMALL CYSTOSCOPE;  Surgeon: Frank Lamas M.D.;  Location: SURGERY [facility];  Service:    • CYSTOSCOPY  3/21/2017    Procedure: CYSTOSCOPY;  Surgeon: Frank Lamas M.D.;  Location: [facility];  Service:    • CYSTOSCOPY  3/20/2017    Procedure: CYSTOSCOPY -  FLEXIBLE;  Surgeon: Frank Lamas M.D.;  Location: [facility];  Service:    • SPHINCTER PROSTHESIS PLACEMENT  3/20/2017    Procedure: SPHINCTER PROSTHESIS PLACEMENT - ARTIFICIAL URINARY SPHINCTER;  Surgeon: Frank Lamas M.D.;  Location: [facility];  Service:    • PROSTATECTOMY, RADICAL RETRO  8/3/2015    Procedure: PROSTATECTOMY RADICAL RETROPUBIC;  Surgeon: Sage Ngo M.D.;  Location: [facility];  Service:    • NODE DISSECTION Bilateral 8/3/2015    Procedure: NODE DISSECTION LYMPH;  Surgeon: Sage Ngo M.D.;  Location: [facility];  Service:    • KNEE ARTHROPLASTY TOTAL  7/3/2014    Performed by Theodore San M.D. at [facility]   • KNEE ARTHROSCOPY  7/3/2014    Performed by Theodore San M.D. at [facility]   • MENISCECTOMY  7/3/2014    Performed by Theodore San M.D. at [facility]   • KNEE REPLACEMENT, TOTAL Right 2014   • INGUINAL HERNIA REPAIR  6/1/2009    Performed by RISHI COOK at [facility]  "TOWER ORS   • OTHER ORTHOPEDIC SURGERY  2003    L Shoulder Repair   • INGUINAL HERNIA LAPAROSCOPIC BILATERAL Bilateral    • OTHER ORTHOPEDIC SURGERY      R Rotator Cuff repair       Exam:   /74 (BP Location: Right arm, Patient Position: Sitting)   Pulse 66   Temp 37.4 °C (99.3 °F)   Ht 1.803 m (5' 11\")   Wt 68 kg (150 lb)   SpO2 96%  Body mass index is 20.92 kg/m².    Constitutional: Alert, no distress.  Skin: Warm, dry, good turgor, no rashes in visible areas.  Eye: Equal, round and reactive, conjunctiva clear, lids normal.  Respiratory: Unlabored respiratory effort, lungs clear to auscultation, no wheezes, no ronchi.  Cardiovascular: Normal S1, S2, no murmur, no edema.  Psych: Alert and oriented x3, normal affect and mood.      Assessment and Plan. The following treatment and monitoring plan is recommended:    1. Medicare annual wellness visit, subsequent  Advised shingles vaccine at pharmacy.  - Subsequent Annual Wellness Visit - Includes PPPS ()    2. Atherosclerosis of aorta (HCC)  Continue healthy lifestyle.  - Subsequent Annual Wellness Visit - Includes PPPS ()    3. Malignant neoplasm of prostate (HCC)  Check PSA and call with results.  - CBC WITH DIFFERENTIAL; Future  - PROSTATE SPECIFIC AG DIAGNOSTIC; Future  - Subsequent Annual Wellness Visit - Includes PPPS ()    4. Vitamin D deficiency  Check labs and call with results.  - VITAMIN D,25 HYDROXY; Future  - Subsequent Annual Wellness Visit - Includes PPPS ()    5. Pure hypercholesterolemia  Check labs and call with results.  - Comp Metabolic Panel; Future  - Lipid Profile; Future  - TSH WITH REFLEX TO FT4; Future  - Subsequent Annual Wellness Visit - Includes PPPS ()    6. Chronic fatigue  Check labs and call with results.  - VITAMIN B12; Future  - Subsequent Annual Wellness Visit - Includes PPPS ()        Services suggested: No services needed at this time  Health Care Screening: Age-appropriate preventive services " recommended by USPTF and ACIP covered by Medicare were discussed today. Services ordered if indicated and agreed upon by the patient.  Referrals offered: Community-based lifestyle interventions to reduce health risks and promote self-management and wellness, fall prevention, nutrition, physical activity, tobacco-use cessation, weight loss, and mental health services as per orders if indicated.    Discussion today about general wellness and lifestyle habits:    · Prevent falls and reduce trip hazards; Cautioned about securing or removing rugs.  · Have a working fire alarm and carbon monoxide detector;   · Engage in regular physical activity and social activities     Follow-up: Return in about 1 year (around 7/20/2021) for Annual.

## 2020-07-21 PROBLEM — I50.30 (HFPEF) HEART FAILURE WITH PRESERVED EJECTION FRACTION (HCC): Chronic | Status: RESOLVED | Noted: 2017-03-28 | Resolved: 2020-07-21

## 2020-08-04 ENCOUNTER — HOSPITAL ENCOUNTER (OUTPATIENT)
Dept: LAB | Facility: MEDICAL CENTER | Age: 70
End: 2020-08-04
Attending: FAMILY MEDICINE
Payer: MEDICARE

## 2020-08-04 DIAGNOSIS — C61 MALIGNANT NEOPLASM OF PROSTATE (HCC): ICD-10-CM

## 2020-08-04 DIAGNOSIS — R53.82 CHRONIC FATIGUE: ICD-10-CM

## 2020-08-04 DIAGNOSIS — E55.9 VITAMIN D DEFICIENCY: ICD-10-CM

## 2020-08-04 DIAGNOSIS — E78.00 PURE HYPERCHOLESTEROLEMIA: ICD-10-CM

## 2020-08-04 LAB
25(OH)D3 SERPL-MCNC: 33 NG/ML (ref 30–100)
ALBUMIN SERPL BCP-MCNC: 4.3 G/DL (ref 3.2–4.9)
ALBUMIN/GLOB SERPL: 2.2 G/DL
ALP SERPL-CCNC: 52 U/L (ref 30–99)
ALT SERPL-CCNC: 11 U/L (ref 2–50)
ANION GAP SERPL CALC-SCNC: 11 MMOL/L (ref 7–16)
AST SERPL-CCNC: 15 U/L (ref 12–45)
BASOPHILS # BLD AUTO: 0.9 % (ref 0–1.8)
BASOPHILS # BLD: 0.04 K/UL (ref 0–0.12)
BILIRUB SERPL-MCNC: 0.7 MG/DL (ref 0.1–1.5)
BUN SERPL-MCNC: 14 MG/DL (ref 8–22)
CALCIUM SERPL-MCNC: 8.9 MG/DL (ref 8.5–10.5)
CHLORIDE SERPL-SCNC: 99 MMOL/L (ref 96–112)
CHOLEST SERPL-MCNC: 192 MG/DL (ref 100–199)
CO2 SERPL-SCNC: 25 MMOL/L (ref 20–33)
CREAT SERPL-MCNC: 0.54 MG/DL (ref 0.5–1.4)
EOSINOPHIL # BLD AUTO: 0.11 K/UL (ref 0–0.51)
EOSINOPHIL NFR BLD: 2.6 % (ref 0–6.9)
ERYTHROCYTE [DISTWIDTH] IN BLOOD BY AUTOMATED COUNT: 47.9 FL (ref 35.9–50)
FASTING STATUS PATIENT QL REPORTED: NORMAL
GLOBULIN SER CALC-MCNC: 2 G/DL (ref 1.9–3.5)
GLUCOSE SERPL-MCNC: 86 MG/DL (ref 65–99)
HCT VFR BLD AUTO: 42.2 % (ref 42–52)
HDLC SERPL-MCNC: 67 MG/DL
HGB BLD-MCNC: 14 G/DL (ref 14–18)
IMM GRANULOCYTES # BLD AUTO: 0 K/UL (ref 0–0.11)
IMM GRANULOCYTES NFR BLD AUTO: 0 % (ref 0–0.9)
LDLC SERPL CALC-MCNC: 114 MG/DL
LYMPHOCYTES # BLD AUTO: 1.41 K/UL (ref 1–4.8)
LYMPHOCYTES NFR BLD: 33.2 % (ref 22–41)
MCH RBC QN AUTO: 32.5 PG (ref 27–33)
MCHC RBC AUTO-ENTMCNC: 33.2 G/DL (ref 33.7–35.3)
MCV RBC AUTO: 97.9 FL (ref 81.4–97.8)
MONOCYTES # BLD AUTO: 0.37 K/UL (ref 0–0.85)
MONOCYTES NFR BLD AUTO: 8.7 % (ref 0–13.4)
NEUTROPHILS # BLD AUTO: 2.32 K/UL (ref 1.82–7.42)
NEUTROPHILS NFR BLD: 54.6 % (ref 44–72)
NRBC # BLD AUTO: 0 K/UL
NRBC BLD-RTO: 0 /100 WBC
PLATELET # BLD AUTO: 181 K/UL (ref 164–446)
PMV BLD AUTO: 10.8 FL (ref 9–12.9)
POTASSIUM SERPL-SCNC: 3.8 MMOL/L (ref 3.6–5.5)
PROT SERPL-MCNC: 6.3 G/DL (ref 6–8.2)
PSA SERPL-MCNC: <0.02 NG/ML (ref 0–4)
RBC # BLD AUTO: 4.31 M/UL (ref 4.7–6.1)
SODIUM SERPL-SCNC: 135 MMOL/L (ref 135–145)
TRIGL SERPL-MCNC: 57 MG/DL (ref 0–149)
TSH SERPL DL<=0.005 MIU/L-ACNC: 1.85 UIU/ML (ref 0.38–5.33)
VIT B12 SERPL-MCNC: 216 PG/ML (ref 211–911)
WBC # BLD AUTO: 4.3 K/UL (ref 4.8–10.8)

## 2020-08-04 PROCEDURE — 36415 COLL VENOUS BLD VENIPUNCTURE: CPT

## 2020-08-04 PROCEDURE — 85025 COMPLETE CBC W/AUTO DIFF WBC: CPT

## 2020-08-04 PROCEDURE — 82607 VITAMIN B-12: CPT

## 2020-08-04 PROCEDURE — 80053 COMPREHEN METABOLIC PANEL: CPT

## 2020-08-04 PROCEDURE — 84153 ASSAY OF PSA TOTAL: CPT

## 2020-08-04 PROCEDURE — 80061 LIPID PANEL: CPT

## 2020-08-04 PROCEDURE — 84443 ASSAY THYROID STIM HORMONE: CPT

## 2020-08-04 PROCEDURE — 82306 VITAMIN D 25 HYDROXY: CPT

## 2020-08-05 ENCOUNTER — TELEPHONE (OUTPATIENT)
Dept: MEDICAL GROUP | Facility: MEDICAL CENTER | Age: 70
End: 2020-08-05

## 2020-08-05 NOTE — TELEPHONE ENCOUNTER
Left a message for patient to call back at [phone].     Lanette Tuttle  [facility] Assistant

## 2020-08-05 NOTE — TELEPHONE ENCOUNTER
----- Message from Caden Rodarte M.D. sent at 8/5/2020  7:35 AM PDT -----  Please notify patient that blood sugar, kidney function, liver function, thyroid function, vitamin D, vitamin B12 are normal.  Prostate cancer screening is normal.  White blood counts are stable with no concerns.  Cholesterol has improved from last year and is overall good.  Blood work should be repeated annually.  Caden Rodarte M.D.

## 2020-10-01 ENCOUNTER — NON-PROVIDER VISIT (OUTPATIENT)
Dept: MEDICAL GROUP | Facility: MEDICAL CENTER | Age: 70
End: 2020-10-01
Payer: MEDICARE

## 2020-10-01 DIAGNOSIS — Z23 NEED FOR VACCINATION: ICD-10-CM

## 2020-10-01 PROCEDURE — 90662 IIV NO PRSV INCREASED AG IM: CPT | Performed by: INTERNAL MEDICINE

## 2020-10-01 PROCEDURE — G0008 ADMIN INFLUENZA VIRUS VAC: HCPCS | Performed by: INTERNAL MEDICINE

## 2020-10-01 NOTE — NON-PROVIDER
"Sebastien Horn is a 70 y.o. male here for a non-provider visit for:   FLU    Reason for immunization: Annual Flu Vaccine  Immunization records indicate need for vaccine: Yes, confirmed with Epic  Minimum interval has been met for this vaccine: Yes  ABN completed: Not Indicated    Order and dose verified by: Lanette SANDERSON Dated  10/01/2020 was given to patient: Yes  All IAC Questionnaire questions were answered \"No.\"    Patient tolerated injection and no adverse effects were observed or reported: Yes    Pt scheduled for next dose in series: Not Indicated    "

## 2020-10-29 ENCOUNTER — APPOINTMENT (RX ONLY)
Dept: URBAN - METROPOLITAN AREA CLINIC 4 | Facility: CLINIC | Age: 70
Setting detail: DERMATOLOGY
End: 2020-10-29

## 2020-10-29 DIAGNOSIS — L82.0 INFLAMED SEBORRHEIC KERATOSIS: ICD-10-CM

## 2020-10-29 DIAGNOSIS — L81.4 OTHER MELANIN HYPERPIGMENTATION: ICD-10-CM

## 2020-10-29 DIAGNOSIS — D22 MELANOCYTIC NEVI: ICD-10-CM

## 2020-10-29 DIAGNOSIS — Z85.828 PERSONAL HISTORY OF OTHER MALIGNANT NEOPLASM OF SKIN: ICD-10-CM

## 2020-10-29 DIAGNOSIS — L82.1 OTHER SEBORRHEIC KERATOSIS: ICD-10-CM

## 2020-10-29 DIAGNOSIS — D18.0 HEMANGIOMA: ICD-10-CM

## 2020-10-29 DIAGNOSIS — L57.0 ACTINIC KERATOSIS: ICD-10-CM

## 2020-10-29 DIAGNOSIS — L57.8 OTHER SKIN CHANGES DUE TO CHRONIC EXPOSURE TO NONIONIZING RADIATION: ICD-10-CM

## 2020-10-29 PROBLEM — D18.01 HEMANGIOMA OF SKIN AND SUBCUTANEOUS TISSUE: Status: ACTIVE | Noted: 2020-10-29

## 2020-10-29 PROBLEM — D22.5 MELANOCYTIC NEVI OF TRUNK: Status: ACTIVE | Noted: 2020-10-29

## 2020-10-29 PROCEDURE — 17000 DESTRUCT PREMALG LESION: CPT | Mod: 59

## 2020-10-29 PROCEDURE — ? LIQUID NITROGEN

## 2020-10-29 PROCEDURE — ? COUNSELING

## 2020-10-29 PROCEDURE — 99214 OFFICE O/P EST MOD 30 MIN: CPT | Mod: 25

## 2020-10-29 PROCEDURE — 17110 DESTRUCTION B9 LES UP TO 14: CPT

## 2020-10-29 ASSESSMENT — LOCATION ZONE DERM
LOCATION ZONE: TRUNK
LOCATION ZONE: FACE
LOCATION ZONE: LEG
LOCATION ZONE: NECK
LOCATION ZONE: HAND
LOCATION ZONE: EAR

## 2020-10-29 ASSESSMENT — LOCATION DETAILED DESCRIPTION DERM
LOCATION DETAILED: RIGHT SUPERIOR HELIX
LOCATION DETAILED: RIGHT INFERIOR CENTRAL MALAR CHEEK
LOCATION DETAILED: LEFT SUPERIOR UPPER BACK
LOCATION DETAILED: RIGHT SUPERIOR UPPER BACK
LOCATION DETAILED: RIGHT INFERIOR ANTERIOR NECK
LOCATION DETAILED: RIGHT RADIAL DORSAL HAND
LOCATION DETAILED: RIGHT PROXIMAL PRETIBIAL REGION
LOCATION DETAILED: LEFT RADIAL DORSAL HAND
LOCATION DETAILED: RIGHT INFERIOR MEDIAL UPPER BACK

## 2020-10-29 ASSESSMENT — LOCATION SIMPLE DESCRIPTION DERM
LOCATION SIMPLE: RIGHT CHEEK
LOCATION SIMPLE: RIGHT UPPER BACK
LOCATION SIMPLE: RIGHT PRETIBIAL REGION
LOCATION SIMPLE: RIGHT ANTERIOR NECK
LOCATION SIMPLE: LEFT UPPER BACK
LOCATION SIMPLE: RIGHT HAND
LOCATION SIMPLE: LEFT HAND
LOCATION SIMPLE: RIGHT EAR

## 2020-10-29 NOTE — HPI: FULL BODY SKIN EXAMINATION
How Severe Are Your Spot(S)?: mild
What Type Of Note Output Would You Prefer (Optional)?: Standard Output
What Is The Reason For Today's Visit?: Full Body Skin Examination
What Is The Reason For Today's Visit? (Being Monitored For X): concerning skin lesions on an annual basis
no chest pain, no cough, and no shortness of breath.

## 2020-10-29 NOTE — PROCEDURE: MIPS QUALITY
Quality 226: Preventive Care And Screening: Tobacco Use: Screening And Cessation Intervention: Patient screened for tobacco use and is an ex/non-smoker
Detail Level: Detailed
Quality 130: Documentation Of Current Medications In The Medical Record: Current Medications Documented
Quality 111:Pneumonia Vaccination Status For Older Adults: Pneumococcal Vaccination Previously Received

## 2020-10-29 NOTE — PROCEDURE: LIQUID NITROGEN
Render Note In Bullet Format When Appropriate: No
Detail Level: Simple
Number Of Freeze-Thaw Cycles: 2 freeze-thaw cycles
Duration Of Freeze Thaw-Cycle (Seconds): 3
Aperture Size (Optional): C
Post-Care Instructions: I reviewed with the patient in detail post-care instructions. Patient is to wear sunprotection, and avoid picking at any of the treated lesions. Pt may apply Vaseline to crusted or scabbing areas.
Consent: The patient's consent was obtained including but not limited to risks of crusting, scabbing, blistering, scarring, darker or lighter pigmentary change, recurrence, incomplete removal and infection.
Number Of Freeze-Thaw Cycles: 1 freeze-thaw cycle
Medical Necessity Information: It is in your best interest to select a reason for this procedure from the list below. All of these items fulfill various CMS LCD requirements except the new and changing color options.
Medical Necessity Clause: This procedure was medically necessary because the lesions that were treated were:
Detail Level: Detailed

## 2020-12-07 NOTE — PROGRESS NOTES
"Member called in with questions regarding the [facility] preferred plan. Verified HIPPA. Member wanted to just verify that he is \"doing the right thing\" because the plan seems too good to be true. I advised that it is a great plan and he would be getting more out of this plan rather than the essential plan. I confirmed that his MOO is $3400 and he has $2000 comprehensive dental services. I also confirmed that his copay for PCP is $0. He understood and had no other questions at this time. I also advised that we were still contracted with [facility]. He then asked about the COVID vaccine and I advised  he could gather more information from the [facility] and [facility]. Used Epic and PQ.  "

## 2021-01-14 ENCOUNTER — APPOINTMENT (OUTPATIENT)
Dept: MEDICAL GROUP | Facility: MEDICAL CENTER | Age: 71
End: 2021-01-14
Payer: MEDICARE

## 2021-01-15 DIAGNOSIS — Z23 NEED FOR VACCINATION: ICD-10-CM

## 2021-03-12 SDOH — ECONOMIC STABILITY: HOUSING INSECURITY: IN THE LAST 12 MONTHS, HOW MANY PLACES HAVE YOU LIVED?: 1

## 2021-03-12 SDOH — ECONOMIC STABILITY: TRANSPORTATION INSECURITY
IN THE PAST 12 MONTHS, HAS THE LACK OF TRANSPORTATION KEPT YOU FROM MEDICAL APPOINTMENTS OR FROM GETTING MEDICATIONS?: NO

## 2021-03-12 SDOH — ECONOMIC STABILITY: INCOME INSECURITY: IN THE LAST 12 MONTHS, WAS THERE A TIME WHEN YOU WERE NOT ABLE TO PAY THE MORTGAGE OR RENT ON TIME?: NO

## 2021-03-12 SDOH — ECONOMIC STABILITY: HOUSING INSECURITY

## 2021-03-12 SDOH — ECONOMIC STABILITY: HOUSING INSECURITY
IN THE LAST 12 MONTHS, WAS THERE A TIME WHEN YOU DID NOT HAVE A STEADY PLACE TO SLEEP OR SLEPT IN A SHELTER (INCLUDING NOW)?: NO

## 2021-03-12 SDOH — HEALTH STABILITY: MENTAL HEALTH
STRESS IS WHEN SOMEONE FEELS TENSE, NERVOUS, ANXIOUS, OR CAN'T SLEEP AT NIGHT BECAUSE THEIR MIND IS TROUBLED. HOW STRESSED ARE YOU?: TO SOME EXTENT

## 2021-03-12 SDOH — HEALTH STABILITY: PHYSICAL HEALTH: ON AVERAGE, HOW MANY DAYS PER WEEK DO YOU ENGAGE IN MODERATE TO STRENUOUS EXERCISE (LIKE A BRISK WALK)?: 7 DAYS

## 2021-03-12 SDOH — ECONOMIC STABILITY: TRANSPORTATION INSECURITY
IN THE PAST 12 MONTHS, HAS LACK OF RELIABLE TRANSPORTATION KEPT YOU FROM MEDICAL APPOINTMENTS, MEETINGS, WORK OR FROM GETTING THINGS NEEDED FOR DAILY LIVING?: NO

## 2021-03-12 SDOH — HEALTH STABILITY: PHYSICAL HEALTH: ON AVERAGE, HOW MANY MINUTES DO YOU ENGAGE IN EXERCISE AT THIS LEVEL?: 150+ MINUTES

## 2021-03-12 ASSESSMENT — SOCIAL DETERMINANTS OF HEALTH (SDOH)
HOW OFTEN DO YOU ATTENT MEETINGS OF THE CLUB OR ORGANIZATION YOU BELONG TO?: NEVER
WITHIN THE PAST 12 MONTHS, THE FOOD YOU BOUGHT JUST DIDN'T LAST AND YOU DIDN'T HAVE MONEY TO GET MORE: NEVER TRUE
DO YOU BELONG TO ANY CLUBS OR ORGANIZATIONS SUCH AS CHURCH GROUPS UNIONS, FRATERNAL OR ATHLETIC GROUPS, OR SCHOOL GROUPS?: NO
HOW OFTEN DO YOU GET TOGETHER WITH FRIENDS OR RELATIVES?: DECLINE
IN A TYPICAL WEEK, HOW MANY TIMES DO YOU TALK ON THE PHONE WITH FAMILY, FRIENDS, OR NEIGHBORS?: MORE THAN THREE TIMES A WEEK
WITHIN THE PAST 12 MONTHS, YOU WORRIED THAT YOUR FOOD WOULD RUN OUT BEFORE YOU GOT THE MONEY TO BUY MORE: NEVER TRUE
HOW OFTEN DO YOU HAVE A DRINK CONTAINING ALCOHOL: NEVER
HOW OFTEN DO YOU ATTEND CHURCH OR RELIGIOUS SERVICES?: NEVER
HOW HARD IS IT FOR YOU TO PAY FOR THE VERY BASICS LIKE FOOD, HOUSING, MEDICAL CARE, AND HEATING?: NOT VERY HARD

## 2021-03-15 ENCOUNTER — OFFICE VISIT (OUTPATIENT)
Dept: MEDICAL GROUP | Facility: MEDICAL CENTER | Age: 71
End: 2021-03-15
Payer: MEDICARE

## 2021-03-15 VITALS
BODY MASS INDEX: 21.84 KG/M2 | HEART RATE: 65 BPM | OXYGEN SATURATION: 95 % | SYSTOLIC BLOOD PRESSURE: 130 MMHG | TEMPERATURE: 97.2 F | DIASTOLIC BLOOD PRESSURE: 98 MMHG | WEIGHT: 155.98 LBS | HEIGHT: 71 IN | RESPIRATION RATE: 18 BRPM

## 2021-03-15 DIAGNOSIS — Z12.5 PROSTATE CANCER SCREENING: ICD-10-CM

## 2021-03-15 DIAGNOSIS — E55.9 VITAMIN D DEFICIENCY: ICD-10-CM

## 2021-03-15 DIAGNOSIS — I34.0 NONRHEUMATIC MITRAL VALVE REGURGITATION: ICD-10-CM

## 2021-03-15 DIAGNOSIS — M81.0 AGE-RELATED OSTEOPOROSIS WITHOUT CURRENT PATHOLOGICAL FRACTURE: ICD-10-CM

## 2021-03-15 DIAGNOSIS — E78.00 PURE HYPERCHOLESTEROLEMIA: ICD-10-CM

## 2021-03-15 DIAGNOSIS — Z13.1 DIABETES MELLITUS SCREENING: ICD-10-CM

## 2021-03-15 DIAGNOSIS — Z85.46 H/O PROSTATE CANCER: ICD-10-CM

## 2021-03-15 PROCEDURE — 99204 OFFICE O/P NEW MOD 45 MIN: CPT | Performed by: INTERNAL MEDICINE

## 2021-03-15 ASSESSMENT — FIBROSIS 4 INDEX: FIB4 SCORE: 1.77

## 2021-03-15 ASSESSMENT — PATIENT HEALTH QUESTIONNAIRE - PHQ9: CLINICAL INTERPRETATION OF PHQ2 SCORE: 0

## 2021-03-15 NOTE — LETTER
[facility]  Deandre Armstrong M.D.  [address]  [place] [address]  Fax: [phone]   Authorization for Release/Disclosure of   Protected Health Information   Name: SEBASTIEN LACEY : 1950 SSN: [id number]   Address: [address] [place] [address]  [place] [address] Phone:    [phone] (home)    I authorize the entity listed below to release/disclose the PHI below to:   [facility]/Deandre Armstrong M.D. and Deandre Armstrong M.D.   Provider or Entity Name:     Address   City, State, Zip   Phone:      Fax:     Reason for request: continuity of care   Information to be released:    [  ] LAST COLONOSCOPY,  including any PATH REPORT and follow-up  [  ] LAST FIT/COLOGUARD RESULT [  ] LAST DEXA  [  ] LAST MAMMOGRAM  [  ] LAST PAP  [  ] LAST LABS [  ] RETINA EXAM REPORT  [  ] IMMUNIZATION RECORDS  [  ] Release all info      [  ] Check here and initial the line next to each item to release ALL health information INCLUDING  _____ Care and treatment for drug and / or alcohol abuse  _____ HIV testing, infection status, or AIDS  _____ Genetic Testing    DATES OF SERVICE OR TIME PERIOD TO BE DISCLOSED: _____________  I understand and acknowledge that:  * This Authorization may be revoked at any time by you in writing, except if your health information has already been used or disclosed.  * Your health information that will be used or disclosed as a result of you signing this authorization could be re-disclosed by the recipient. If this occurs, your re-disclosed health information may no longer be protected by State or Federal laws.  * You may refuse to sign this Authorization. Your refusal will not affect your ability to obtain treatment.  * This Authorization becomes effective upon signing and will  on (date) __________.      If no date is indicated, this Authorization will  one (1) year from the signature date.    Name: Sebastien Lacey    Signature:   Date:      3/15/2021       PLEASE FAX REQUESTED RECORDS BACK TO: [phone]

## 2021-03-15 NOTE — PROGRESS NOTES
New Patient    Sebastien Horn is a 70 y.o. male who presents today with the following:    CC:   Chief Complaint   Patient presents with   • Establish Care       HPI:     H/O prostate cancer  Diagnosed in 2013  Being followed by urology, Dr. Ngo-> Dr. Lamas  Prostatectomy August 2015      Nonrheumatic mitral valve regurgitation  Hx of mitral regurgitation  asymptomatic  Referral to cardiology      Hyperlipidemia  Healthful lifestyle measures  The 10-year ASCVD risk score (Chincarli ARIAS JrCharles, et al., 2013) is: 17.3%    Values used to calculate the score:      Age: 70 years      Sex: Male      Is Non- : No      Diabetic: No      Tobacco smoker: No      Systolic Blood Pressure: 130 mmHg      Is BP treated: No      HDL Cholesterol: 67 mg/dL      Total Cholesterol: 192 mg/dL    She declines statin      Age-related osteoporosis without current pathological fracture  calcium 1200 mg daily from all sources, vitamin D supplement  Weight bearing exercises as tolerated          Current Outpatient Medications   Medication Sig Dispense Refill   • Naproxen Sodium (ALEVE PO) Take  by mouth.       No current facility-administered medications for this visit.       Allergies, past medical history, past surgical history, medications, family history, social history reviewed and updated.    ROS   Constitutional: Denies fevers or chills  Eyes: Denies changes in vision  Ears/Nose/Throat/Mouth: Denies nasal congestion or sore throat   Cardiovascular: Denies chest pain or palpitations   Respiratory: Denies shortness of breath , Denies cough  Gastrointestinal/Hepatic: Denies abd pain, nausea, vomiting   Genitourinary: Denies dysuria or frequency  Musculoskeletal/Rheum: Denies joint pain and swelling   Neurological: Denies headache  Psychiatric: Denies mood disorder   Endocrine: Denies hx of diabetes or thyroid dysfunction  Heme/Oncology/Lymph Nodes: Denies weight changes or enlarged LNs.    Physical Exam  Vitals: BP  "130/98 (BP Location: Left arm, Patient Position: Sitting, BP Cuff Size: Adult)   Pulse 65   Temp 36.2 °C (97.2 °F) (Temporal)   Resp 18   Ht 1.803 m (5' 11\")   Wt 70.8 kg (155 lb 15.6 oz)   SpO2 95%   BMI 21.75 kg/m²   General: Alert, pleasant, NAD  HEENT: Normocephalic.  EOMI, no icterus or pallor.  Conjunctivae and lids normal. External ears normal. Excessive ear wax. Wearing a mask. Oropharynx non-erythematous, mucous membranes moist.  Neck supple.  No thyromegaly or masses palpated.   Lymph: No cervical or supraclavicular lymphadenopathy.  Cardiovascular: Regular rate and rhythm.  Systolic murmur at mitral region  Respiratory: Normal respiratory effort.  Clear to auscultation bilaterally.  Abdomen: Non-distended, soft, non-tender  Skin: Warm, dry, no rashes.  Musculoskeletal: Gait is normal.  Moves all extremities well.  Extremities: No leg edema.  Radial pulses 2+ symmetric.   Psych:  Affect/mood is normal, judgement is good, memory is intact, grooming is appropriate.        Assessment and Plan    1. H/O prostate cancer  Follow with urology    2. Nonrheumatic mitral valve regurgitation  - CBC WITH DIFFERENTIAL; Future  - REFERRAL TO CARDIOLOGY    3. Vitamin D deficiency  - VITAMIN D,25 HYDROXY; Future  Replacement    4. Pure hypercholesterolemia  - Lipid Profile; Future  - TSH WITH REFLEX TO FT4; Future  The 10-year ASCVD risk score (Chin HUGO Jr., et al., 2013) is: 17.3%    Values used to calculate the score:      Age: 70 years      Sex: Male      Is Non- : No      Diabetic: No      Tobacco smoker: No      Systolic Blood Pressure: 130 mmHg      Is BP treated: No      HDL Cholesterol: 67 mg/dL      Total Cholesterol: 192 mg/dL  Healthful lifestyle measures  He declines statin    5. Diabetes mellitus screening  - Comp Metabolic Panel; Future    6. Prostate cancer screening  - PROSTATE SPECIFIC AG SCREENING; Future    7. Age-related osteoporosis without current pathological " fracture  calcium 1200 mg daily from all sources, vitamin D supplement  Weightbearing exercises    Recommend debrox for his excessive cerumen      Follow-up:Return in about 6 months (around 9/15/2021), or if symptoms worsen or fail to improve.    This note was created using voice recognition software. There may be unintended errors in spelling, grammar or content.

## 2021-03-15 NOTE — ASSESSMENT & PLAN NOTE
Diagnosed in 2013  Being followed by urology, Dr. Ngo-> Dr. Lamas  Prostatectomy August 2015    
Healthful lifestyle measures  The 10-year ASCVD risk score (Chin ARIAS Jr., et al., 2013) is: 17.3%    Values used to calculate the score:      Age: 70 years      Sex: Male      Is Non- : No      Diabetic: No      Tobacco smoker: No      Systolic Blood Pressure: 130 mmHg      Is BP treated: No      HDL Cholesterol: 67 mg/dL      Total Cholesterol: 192 mg/dL    She declines statin    
Hx of mitral regurgitation  asymptomatic  Referral to cardiology    
calcium 1200 mg daily from all sources, vitamin D supplement  Weight bearing exercises as tolerated      
Detail Level: Detailed

## 2021-03-29 NOTE — DISCHARGE PLANNING
Application of Fluoride Varnish    Dental Fluoride Varnish and Post-Treatment Instructions: Reviewed with mother   used: No    Dental Fluoride applied to teeth by: Theresa Ziegler MA  Fluoride was well tolerated    LOT #: [id number]  EXPIRATION DATE:  09/17/22      Theresa Ziegler MA     Care Transition Team Assessment    SW intern met with patient at bedside. Patient plans to discharge home alone. Patient reports he has a sister that lives in [place]. Patient states he has plenty of friends and neighbors that can help him if needed. Patient has no questions or concerns at this time.     Information Source  Orientation : Oriented x 4  Information Given By: Patient  Informant's Name: Haider   Who is responsible for making decisions for patient? : Patient    Readmission Evaluation  Is this a readmission?: No    Elopement Risk  Legal Hold: No  Ambulatory or Self Mobile in Wheelchair: Yes  Disoriented: No  Psychiatric Symptoms: None  History of Wandering: No  Elopement this Admit: No  Vocalizing Wanting to Leave: No  Displays Behaviors, Body Language Wanting to Leave: No-Not at Risk for Elopement  Elopement Risk: Not at Risk for Elopement    Interdisciplinary Discharge Planning  Does Admitting Nurse Feel This Could be a Complex Discharge?: No  Primary Care Physician: Dr. Caden Rodarte  Lives with - Patient's Self Care Capacity: Alone and Able to Care For Self  Patient or legal guardian wants to designate a caregiver (see row info): No  Support Systems: Friends / Neighbors  Housing / Facility: 2 Story House  Do You Take your Prescribed Medications Regularly: Yes  Able to Return to Previous ADL's: Yes  Mobility Issues: No  Prior Services: None  Assistance Needed: Yes    Discharge Preparedness  What is your plan after discharge?: Home with help  What are your discharge supports?: Spouse, Other (comment) (self reports good support system)  Prior Functional Level: Independent with Activities of Daily Living  Difficulity with ADLs: None  Difficulity with IADLs: None    Functional Assesment  Prior Functional Level: Independent with Activities of Daily Living    Finances  Financial Barriers to Discharge: No  Prescription Coverage: Yes    Vision / Hearing Impairment  Vision Impairment : No  Right Eye Vision:  Impaired, Wears Glasses  Left Eye Vision: Impaired, Wears Glasses  Hearing Impairment : Yes  Hearing Impairment: Right Ear, Hearing Device Not Available  Does Pt Need Special Equipment for the Hearing Impaired?: No    Values / Beliefs / Concerns  Values / Beliefs Concerns : No    Advance Directive  Advance Directive?: None  Advance Directive offered?: AD Booklet refused    Domestic Abuse  Have you ever been the victim of abuse or violence?: No  Physical Abuse or Sexual Abuse: No  Verbal Abuse or Emotional Abuse: No  Possible Abuse Reported to:: Not Applicable    Psychological Assessment  History of Substance Abuse: None  History of Psychiatric Problems: No  Non-compliant with Treatment: No  Newly Diagnosed Illness: No    Discharge Risks or Barriers  Discharge risks or barriers?: No    Anticipated Discharge Information  Anticipated discharge disposition: Home  Discharge Address: [address] [place] [address] [place] [address]  Discharge Contact Phone Number: [phone]    This note has been reviewed by Shari LARSON

## 2021-04-05 ENCOUNTER — APPOINTMENT (RX ONLY)
Dept: URBAN - METROPOLITAN AREA CLINIC 4 | Facility: CLINIC | Age: 71
Setting detail: DERMATOLOGY
End: 2021-04-05

## 2021-04-05 DIAGNOSIS — L57.8 OTHER SKIN CHANGES DUE TO CHRONIC EXPOSURE TO NONIONIZING RADIATION: ICD-10-CM

## 2021-04-05 DIAGNOSIS — L57.0 ACTINIC KERATOSIS: ICD-10-CM

## 2021-04-05 DIAGNOSIS — L81.4 OTHER MELANIN HYPERPIGMENTATION: ICD-10-CM

## 2021-04-05 DIAGNOSIS — Z85.828 PERSONAL HISTORY OF OTHER MALIGNANT NEOPLASM OF SKIN: ICD-10-CM

## 2021-04-05 DIAGNOSIS — D22 MELANOCYTIC NEVI: ICD-10-CM

## 2021-04-05 DIAGNOSIS — D18.0 HEMANGIOMA: ICD-10-CM

## 2021-04-05 DIAGNOSIS — L82.1 OTHER SEBORRHEIC KERATOSIS: ICD-10-CM

## 2021-04-05 PROBLEM — D22.5 MELANOCYTIC NEVI OF TRUNK: Status: ACTIVE | Noted: 2021-04-05

## 2021-04-05 PROBLEM — D18.01 HEMANGIOMA OF SKIN AND SUBCUTANEOUS TISSUE: Status: ACTIVE | Noted: 2021-04-05

## 2021-04-05 PROBLEM — D48.5 NEOPLASM OF UNCERTAIN BEHAVIOR OF SKIN: Status: ACTIVE | Noted: 2021-04-05

## 2021-04-05 PROCEDURE — ? COUNSELING

## 2021-04-05 PROCEDURE — 17000 DESTRUCT PREMALG LESION: CPT

## 2021-04-05 PROCEDURE — 69100 BIOPSY OF EXTERNAL EAR: CPT | Mod: 59

## 2021-04-05 PROCEDURE — ? BIOPSY BY SHAVE METHOD

## 2021-04-05 PROCEDURE — 17003 DESTRUCT PREMALG LES 2-14: CPT

## 2021-04-05 PROCEDURE — 99213 OFFICE O/P EST LOW 20 MIN: CPT | Mod: 25

## 2021-04-05 PROCEDURE — ? LIQUID NITROGEN

## 2021-04-05 ASSESSMENT — LOCATION DETAILED DESCRIPTION DERM
LOCATION DETAILED: RIGHT INFERIOR CENTRAL MALAR CHEEK
LOCATION DETAILED: MEDIAL FRONTAL SCALP
LOCATION DETAILED: LEFT FOREHEAD
LOCATION DETAILED: RIGHT ULNAR DORSAL HAND
LOCATION DETAILED: LEFT SUPERIOR ANTERIOR NECK
LOCATION DETAILED: LEFT ULNAR DORSAL HAND
LOCATION DETAILED: RIGHT SUPERIOR MEDIAL UPPER BACK
LOCATION DETAILED: RIGHT SUPERIOR UPPER BACK
LOCATION DETAILED: RIGHT FOREHEAD

## 2021-04-05 ASSESSMENT — LOCATION ZONE DERM
LOCATION ZONE: SCALP
LOCATION ZONE: FACE
LOCATION ZONE: NECK
LOCATION ZONE: HAND
LOCATION ZONE: TRUNK

## 2021-04-05 ASSESSMENT — LOCATION SIMPLE DESCRIPTION DERM
LOCATION SIMPLE: RIGHT UPPER BACK
LOCATION SIMPLE: RIGHT FOREHEAD
LOCATION SIMPLE: RIGHT HAND
LOCATION SIMPLE: LEFT FOREHEAD
LOCATION SIMPLE: FRONTAL SCALP
LOCATION SIMPLE: RIGHT CHEEK
LOCATION SIMPLE: LEFT ANTERIOR NECK
LOCATION SIMPLE: LEFT HAND

## 2021-04-05 NOTE — PROCEDURE: LIQUID NITROGEN
Render Post-Care Instructions In Note?: no
Aperture Size (Optional): C
Number Of Freeze-Thaw Cycles: 2 freeze-thaw cycles
Duration Of Freeze Thaw-Cycle (Seconds): 3
Detail Level: Simple
Post-Care Instructions: I reviewed with the patient in detail post-care instructions. Patient is to wear sunprotection, and avoid picking at any of the treated lesions. Pt may apply Vaseline to crusted or scabbing areas.
Consent: The patient's consent was obtained including but not limited to risks of crusting, scabbing, blistering, scarring, darker or lighter pigmentary change, recurrence, incomplete removal and infection.

## 2021-04-12 ENCOUNTER — TELEPHONE (OUTPATIENT)
Dept: MEDICAL GROUP | Facility: PHYSICIAN GROUP | Age: 71
End: 2021-04-12

## 2021-04-12 NOTE — TELEPHONE ENCOUNTER
NEW TO YOU ESTABLISHED PATIENT PRE-VISIT PLANNING     Patient was NOT contacted to complete PVP.     Note: Patient will not be contacted if there is no indication to call.     1.  Reviewed notes from the last few office visits within the medical group: Yes    2.  If any orders were placed at last visit or intended to be done for this visit (i.e. 6 mos follow-up), do we have Results/Consult Notes?         •  Labs - LABS ORDERED ON 03/15/21 BUT HAS NOT COMPLETED  Note: If patient appointment is for lab review and patient did not complete labs, check with provider if OK to reschedule patient until labs completed.       •  Imaging - Imaging was not ordered at last office visit.       •  Referrals - Has referral for [facility] ready to schedule.    3. Is this appointment scheduled as a Hospital Follow-Up? No    4.  Immunizations were updated in Epic using Reconcile Outside Information activity? Yes    5.  Patient is due for the following Health Maintenance Topics:   Health Maintenance Due   Topic Date Due   • IMM ZOSTER VACCINES (2 of 3) 09/09/2017         6.  AHA (Pulse8) form printed for Provider? Email sent to [facility] requesting form

## 2021-04-13 ENCOUNTER — OFFICE VISIT (OUTPATIENT)
Dept: MEDICAL GROUP | Facility: PHYSICIAN GROUP | Age: 71
End: 2021-04-13
Payer: MEDICARE

## 2021-04-13 VITALS
HEIGHT: 70 IN | SYSTOLIC BLOOD PRESSURE: 110 MMHG | DIASTOLIC BLOOD PRESSURE: 72 MMHG | HEART RATE: 54 BPM | OXYGEN SATURATION: 97 % | TEMPERATURE: 97.8 F | RESPIRATION RATE: 18 BRPM | BODY MASS INDEX: 21.76 KG/M2 | WEIGHT: 152 LBS

## 2021-04-13 DIAGNOSIS — R53.83 OTHER FATIGUE: ICD-10-CM

## 2021-04-13 DIAGNOSIS — E55.9 VITAMIN D DEFICIENCY: ICD-10-CM

## 2021-04-13 DIAGNOSIS — Z85.46 H/O PROSTATE CANCER: ICD-10-CM

## 2021-04-13 DIAGNOSIS — M25.50 MULTIPLE JOINT PAIN: ICD-10-CM

## 2021-04-13 DIAGNOSIS — I34.1 MVP (MITRAL VALVE PROLAPSE): ICD-10-CM

## 2021-04-13 DIAGNOSIS — Z23 NEED FOR VACCINATION: ICD-10-CM

## 2021-04-13 DIAGNOSIS — Z13.220 SCREENING CHOLESTEROL LEVEL: ICD-10-CM

## 2021-04-13 DIAGNOSIS — E78.49 OTHER HYPERLIPIDEMIA: ICD-10-CM

## 2021-04-13 DIAGNOSIS — M81.0 AGE-RELATED OSTEOPOROSIS WITHOUT CURRENT PATHOLOGICAL FRACTURE: ICD-10-CM

## 2021-04-13 PROCEDURE — 99204 OFFICE O/P NEW MOD 45 MIN: CPT | Mod: 25 | Performed by: INTERNAL MEDICINE

## 2021-04-13 PROCEDURE — 90750 HZV VACC RECOMBINANT IM: CPT | Performed by: INTERNAL MEDICINE

## 2021-04-13 PROCEDURE — 90471 IMMUNIZATION ADMIN: CPT | Performed by: INTERNAL MEDICINE

## 2021-04-13 ASSESSMENT — FIBROSIS 4 INDEX: FIB4 SCORE: 1.77

## 2021-04-15 ENCOUNTER — TELEPHONE (OUTPATIENT)
Dept: CARDIOLOGY | Facility: MEDICAL CENTER | Age: 71
End: 2021-04-15

## 2021-04-15 NOTE — TELEPHONE ENCOUNTER
Requested records from [facility] cardio (Dr. Chavarria) for most recent office visit note & any cardio related imaging/procedure reports.   F: [phone]

## 2021-04-21 NOTE — PROGRESS NOTES
CC: Establish medical care.  Previous patient of Dr. Rodarte    HPI:  Sebastien presents with the following    1. Need for vaccination  Requesting shingles vaccine    2. H/O prostate cancer  PMH of prostate cancer diagnosed in 2013.  Status post prostatectomy 2015.  Followed by Dr. Gregory Lewis.  Patient was told that he is cancer free.  PSA in August 2020 was less than 0.02.  Updated PSA has been ordered.    3. MVP (mitral valve prolapse)  Remote history prolapse/mitral vegetation.  Patient was referred to cardiology by prior PCP.  Patient was seen and evaluated by cardiologist Dr. Chavarria September 2020.  Patient was sent to be evaluated by , cardiac specialist.  Recommendations were to monitor the patient, not a candidate for surgery.  Patient is asymptomatic, very active and.  Echocardiogram completed in 2014.    4. Other hyperlipidemia  Patient controls cholesterol with diet and healthy lifestyle.  Patient follows a Mediterranean diet, plant-based protein.  He walks about 2 to 3 miles a day.  , triglyceride 57, HDL 67, .    5. Age-related osteoporosis without current pathological fracture  Bone density in 2010 showed lumbar T score -2.6, femoral T score of -1.5.  Patient engages in weightbearing exercise daily.          Patient Active Problem List    Diagnosis Date Noted   • Multiple joint pain 04/13/2021   • Nonrheumatic mitral valve regurgitation 03/15/2021   • H/O nonmelanoma skin cancer 06/24/2019   • Age-related osteoporosis without current pathological fracture 07/05/2017   • AK (actinic keratosis) 12/14/2016   • H/O parathyroidectomy 09/08/2016   • Urinary incontinence 09/29/2015   • Erectile dysfunction following radical prostatectomy 09/29/2015   • Vitamin D deficiency 09/29/2015   • Malignant neoplasm of prostate (HCC) 08/03/2015   • H/O prostate cancer 06/02/2015   • Hyperlipidemia 06/02/2015   • MVP (mitral valve prolapse) 06/02/2015   • Neck pain on left side 06/02/2015        Current Outpatient Medications   Medication Sig Dispense Refill   • Naproxen Sodium (ALEVE PO) Take  by mouth.       No current facility-administered medications for this visit.         Allergies as of 04/13/2021   • (No Known Allergies)        Social History     Socioeconomic History   • Marital status:      Spouse name: Not on file   • Number of children: Not on file   • Years of education: Not on file   • Highest education level: Some college, no degree   Occupational History   • Not on file   Tobacco Use   • Smoking status: Never Smoker   • Smokeless tobacco: Never Used   Substance and Sexual Activity   • Alcohol use: No     Alcohol/week: 0.0 oz   • Drug use: Yes     Types: Marijuana     Comment: at times to help sleep   • Sexual activity: Not Currently   Other Topics Concern   • Not on file   Social History Narrative   • Not on file     Social Determinants of Health     Financial Resource Strain: Low Risk    • Difficulty of Paying Living Expenses: Not very hard   Food Insecurity: No Food Insecurity   • Worried About Running Out of Food in the Last Year: Never true   • Ran Out of Food in the Last Year: Never true   Transportation Needs: No Transportation Needs   • Lack of Transportation (Medical): No   • Lack of Transportation (Non-Medical): No   Physical Activity: Sufficiently Active   • Days of Exercise per Week: 7 days   • Minutes of Exercise per Session: 150+ min   Stress: Stress Concern Present   • Feeling of Stress : To some extent   Social Connections: Moderately Isolated   • Frequency of Communication with Friends and Family: More than three times a week   • Frequency of Social Gatherings with Friends and Family: Patient refused   • Attends Jew Services: Never   • Active Member of Clubs or Organizations: No   • Attends Club or Organization Meetings: Never   • Marital Status:    Intimate Partner Violence:    • Fear of Current or Ex-Partner:    • Emotionally Abused:    •  Physically Abused:    • Sexually Abused:        Family History   Problem Relation Age of Onset   • Hyperlipidemia Mother    • Hyperlipidemia Father    • Cancer Father         Prostate and Lung   • Lung Disease Father    • Hypertension Father    • Heart Disease Father    • Other Sister         Lupus   • Heart Disease Maternal Grandfather        Past Surgical History:   Procedure Laterality Date   • IRRIGATION & DEBRIDEMENT ORTHO Left 3/10/2018    Procedure: IRRIGATION & DEBRIDEMENT ORTHO OPEN;  Surgeon: Sergio Daniel M.D.;  Location: [facility];  Service: Orthopedics   • SHOULDER ARTHROSCOPY Left 2/6/2018    Procedure: SHOULDER ARTHROSCOPY;  Surgeon: Sergio Daniel M.D.;  Location: [facility];  Service: Orthopedics   • IRRIGATION & DEBRIDEMENT ORTHO Left 2/6/2018    Procedure: IRRIGATION & DEBRIDEMENT ORTHO- SHOULDER  ;  Surgeon: Sergio Daniel M.D.;  Location: [facility];  Service: Orthopedics   • FOREIGN BODY REMOVAL Left 2/6/2018    Procedure: FOREIGN BODY REMOVAL;  Surgeon: Sergio Daniel M.D.;  Location: [facility];  Service: Orthopedics   • SHOULDER ARTHROSCOPY W/ ROTATOR CUFF REPAIR Left 10/18/2017    Procedure: SHOULDER ARTHROSCOPY W/ ROTATOR CUFF REPAIR;  Surgeon: Sergio Daniel M.D.;  Location: [facility];  Service: Orthopedics   • SHOULDER DECOMPRESSION ARTHROSCOPIC Left 10/18/2017    Procedure: SHOULDER DECOMPRESSION ARTHROSCOPIC- SUBACROMIAL;  Surgeon: Sergio Daniel M.D.;  Location: [facility];  Service: Orthopedics   • BICEPS TENODESIS Left 10/18/2017    Procedure: BICEPS TENODESIS;  Surgeon: Sergio Daniel M.D.;  Location: [facility];  Service: Orthopedics   • PARATHYROIDECTOMY N/A 7/5/2017    Procedure: PARATHYROIDECTOMY - MINIMALLY INVASIVE W/RECURRENT LARYNGEAL NERVE MONITORING;  Surgeon: Josh Blank M.D.;  Location: SURGERY SAME DAY [facility];  Service:    • SPHINCTER PROSTHESIS  REMOVAL  3/28/2017    Procedure: URINARY SPHINCTER PROSTHESIS REMOVAL;  Surgeon: Benigno Grier M.D.;  Location: [facility];  Service:    • CYSTOSCOPY  3/28/2017    Procedure: CYSTOSCOPY;  Surgeon: Benigno Grier M.D.;  Location: [facility];  Service:    • EVACUATION OF HEMATOMA  3/21/2017    Procedure: EVACUATION OF HEMATOMA-CYSTO CLOT EVACUATION AND SMALL CYSTOSCOPE;  Surgeon: Frank Lamas M.D.;  Location: SURGERY [facility];  Service:    • CYSTOSCOPY  3/21/2017    Procedure: CYSTOSCOPY;  Surgeon: Frank Lamas M.D.;  Location: [facility];  Service:    • CYSTOSCOPY  3/20/2017    Procedure: CYSTOSCOPY -  FLEXIBLE;  Surgeon: Frank Lamas M.D.;  Location: [facility];  Service:    • SPHINCTER PROSTHESIS PLACEMENT  3/20/2017    Procedure: SPHINCTER PROSTHESIS PLACEMENT - ARTIFICIAL URINARY SPHINCTER;  Surgeon: Frank Lamas M.D.;  Location: [facility];  Service:    • PROSTATECTOMY, RADICAL RETRO  8/3/2015    Procedure: PROSTATECTOMY RADICAL RETROPUBIC;  Surgeon: Sage Ngo M.D.;  Location: [facility];  Service:    • NODE DISSECTION Bilateral 8/3/2015    Procedure: NODE DISSECTION LYMPH;  Surgeon: Sage Ngo M.D.;  Location: [facility];  Service:    • KNEE ARTHROPLASTY TOTAL  7/3/2014    Performed by Theodore San M.D. at [facility]   • KNEE ARTHROSCOPY  7/3/2014    Performed by Theodore San M.D. at [facility]   • MENISCECTOMY  7/3/2014    Performed by Theodore San M.D. at [facility]   • KNEE REPLACEMENT, TOTAL Right 2014   • INGUINAL HERNIA REPAIR  6/1/2009    Performed by RISHI COOK at [facility]   • OTHER ORTHOPEDIC SURGERY  2003    L Shoulder Repair   • INGUINAL HERNIA LAPAROSCOPIC BILATERAL Bilateral    • OTHER ORTHOPEDIC SURGERY      R Rotator Cuff repair       ROS: ROS positive for mild fatigue.  Positive multiple joint  "pain,bilateral knees.   Denies any Headache,Chest pain,  Shortness of breath,  Abdominal pain, Changes of bowel or bladder, Lower ext edema, Fevers, Nights sweats, Weight Changes, Focal weakness or numbness.  All other systems are negative.    /72 (BP Location: Left arm, Patient Position: Sitting, BP Cuff Size: Adult)   Pulse (!) 54   Temp 36.6 °C (97.8 °F) (Temporal)   Resp 18   Ht 1.79 m (5' 10.47\")   Wt 68.9 kg (152 lb)   SpO2 97%   BMI 21.52 kg/m²      Constitutional: Alert, no distress, well-groomed.  Skin: Warm, dry, good turgor, no rashes in visible areas.  Eye: Equal, round and reactive, conjunctiva clear, lids normal.  ENMT: Lips without lesions, good dentition, moist mucous membranes.  Neck: Trachea midline, no masses, no thyromegaly.  Respiratory: Unlabored respiratory effort, no cough.  Abdomen: Soft, no gross masses.  MSK: Normal gait, moves all extremities.  Neuro: Grossly non-focal. No cranial nerve deficit. Strength and sensation intact.   Psych: Alert and oriented x3, normal affect and mood.      Assessment and Plan.   70 y.o. male presenting with the following.     1. Need for vaccination    - Shingrix Vaccine    2. H/O prostate cancer  Stable.  Continue to monitor PSA.    3. MVP (mitral valve prolapse)  Patient will keep his cardiology appointment by his PCP.  Stable.    4. Other hyperlipidemia  Continue current diet and lifestyle modifications.    5. Age-related osteoporosis without current pathological fracture  Continue weightbearing exercise  Check vitamin D level  Calcium supplementation    - DS-BONE DENSITY STUDY (DEXA); Future    6. Vitamin D deficiency    - VITAMIN D,25 HYDROXY; Future    7. Multiple joint pain    - Comp Metabolic Panel; Future  - CBC WITH DIFFERENTIAL; Future    8. Other fatigue    - VITAMIN B12; Future  - FOLATE; Future  - TSH; Future  - URINE CULTURE(NEW); Future    9. Screening cholesterol level    - Lipid Profile; Future    "

## 2021-04-29 ENCOUNTER — APPOINTMENT (OUTPATIENT)
Dept: RADIOLOGY | Facility: MEDICAL CENTER | Age: 71
End: 2021-04-29
Attending: INTERNAL MEDICINE
Payer: MEDICARE

## 2021-05-13 ENCOUNTER — HOSPITAL ENCOUNTER (OUTPATIENT)
Dept: RADIOLOGY | Facility: MEDICAL CENTER | Age: 71
End: 2021-05-13
Attending: INTERNAL MEDICINE
Payer: MEDICARE

## 2021-05-13 DIAGNOSIS — M81.0 AGE-RELATED OSTEOPOROSIS WITHOUT CURRENT PATHOLOGICAL FRACTURE: ICD-10-CM

## 2021-05-13 PROCEDURE — 77080 DXA BONE DENSITY AXIAL: CPT

## 2021-05-17 ENCOUNTER — PATIENT MESSAGE (OUTPATIENT)
Dept: HEALTH INFORMATION MANAGEMENT | Facility: OTHER | Age: 71
End: 2021-05-17

## 2021-05-18 ENCOUNTER — TELEPHONE (OUTPATIENT)
Dept: MEDICAL GROUP | Facility: PHYSICIAN GROUP | Age: 71
End: 2021-05-18

## 2021-05-18 NOTE — TELEPHONE ENCOUNTER
ESTABLISHED PATIENT PRE-VISIT PLANNING     Patient was NOT contacted to complete PVP.     Note: Patient will not be contacted if there is no indication to call.     1.  Reviewed notes from the last few office visits within the medical group: Yes    2.  If any orders were placed at last visit or intended to be done for this visit (i.e. 6 mos follow-up), do we have Results/Consult Notes?         •  Labs - Labs ordered, NOT completed. Patient advised to complete prior to next appointment.  Note: If patient appointment is for lab review and patient did not complete labs, check with provider if OK to reschedule patient until labs completed.       •  Imaging - Imaging ordered, completed and results are in chart.       •  Referrals - No referrals were ordered at last office visit.    3. Is this appointment scheduled as a Hospital Follow-Up? No    4.  Immunizations were updated in Epic using Reconcile Outside Information activity? Yes    5.  Patient is due for the following Health Maintenance Topics:   There are no preventive care reminders to display for this patient.    - Patient is up-to-date on all Health Maintenance topics. No records have been requested at this time.    6.  AHA (Pulse8) form printed for Provider? No, patient does not have any open alerts

## 2021-05-19 ENCOUNTER — OFFICE VISIT (OUTPATIENT)
Dept: MEDICAL GROUP | Facility: PHYSICIAN GROUP | Age: 71
End: 2021-05-19
Payer: MEDICARE

## 2021-05-19 VITALS
BODY MASS INDEX: 21.42 KG/M2 | TEMPERATURE: 97.7 F | DIASTOLIC BLOOD PRESSURE: 68 MMHG | OXYGEN SATURATION: 96 % | HEIGHT: 71 IN | SYSTOLIC BLOOD PRESSURE: 122 MMHG | RESPIRATION RATE: 20 BRPM | WEIGHT: 153 LBS | HEART RATE: 52 BPM

## 2021-05-19 DIAGNOSIS — R22.42 SKIN LUMP OF LEG, LEFT: ICD-10-CM

## 2021-05-19 DIAGNOSIS — M81.0 AGE-RELATED OSTEOPOROSIS WITHOUT CURRENT PATHOLOGICAL FRACTURE: ICD-10-CM

## 2021-05-19 PROCEDURE — 99213 OFFICE O/P EST LOW 20 MIN: CPT | Performed by: INTERNAL MEDICINE

## 2021-05-19 RX ORDER — ALENDRONATE SODIUM 70 MG/1
70 TABLET ORAL
Qty: 12 TABLET | Refills: 3 | Status: SHIPPED | OUTPATIENT
Start: 2021-05-19 | End: 2022-04-08 | Stop reason: SDUPTHER

## 2021-05-19 ASSESSMENT — FIBROSIS 4 INDEX: FIB4 SCORE: 1.77

## 2021-05-20 NOTE — PROGRESS NOTES
CC: Follow-up bone density    HPI:  Sebastien presents with the following    1. Age-related osteoporosis without current pathological fracture  Recent bone density showed a lumbar T score -3.2, left femoral T score -2.3.  Most recent bone density was in 2010 and since has worsened.  Patient does not take any medications for osteoporosis.  Patient has a positive family history of osteoporosis in his mother.  Patient CBC and CMP have been within good range.  TSH 1.8.  No testosterone checked, no history of hypogonadism.  Vitamin D level within good range.  Patient states that for the last 30 years he has completely eliminated calcium, dairy in his diet and has also consumed a very large amount of caffeine daily.  No history of fractures.  Patient is extremely active, avid hiker, kayaker.  Non-smoker.    2. Skin lump of leg, left  About 1 month ago, patient noted a large lump in his left shin.  No injury or fall.  Over the last 10 days he has been applying heat and since then has completely decreased in size by half.  No pain or tenderness.      Patient Active Problem List    Diagnosis Date Noted   • Skin lump of leg, left 05/19/2021   • Multiple joint pain 04/13/2021   • Nonrheumatic mitral valve regurgitation 03/15/2021   • H/O nonmelanoma skin cancer 06/24/2019   • Age-related osteoporosis without current pathological fracture 07/05/2017   • AK (actinic keratosis) 12/14/2016   • H/O parathyroidectomy (HCC) 09/08/2016   • Urinary incontinence 09/29/2015   • Erectile dysfunction following radical prostatectomy 09/29/2015   • Vitamin D deficiency 09/29/2015   • Malignant neoplasm of prostate (HCC) 08/03/2015   • H/O prostate cancer 06/02/2015   • Hyperlipidemia 06/02/2015   • MVP (mitral valve prolapse) 06/02/2015   • Neck pain on left side 06/02/2015       Current Outpatient Medications   Medication Sig Dispense Refill   • alendronate (FOSAMAX) 70 MG Tab Take 1 tablet by mouth every 7 days. 12 tablet 3   • Naproxen  Sodium (ALEVE PO) Take  by mouth.       No current facility-administered medications for this visit.         Allergies as of 05/19/2021   • (No Known Allergies)        Social History     Socioeconomic History   • Marital status:      Spouse name: Not on file   • Number of children: Not on file   • Years of education: Not on file   • Highest education level: Some college, no degree   Occupational History   • Not on file   Tobacco Use   • Smoking status: Never Smoker   • Smokeless tobacco: Never Used   Vaping Use   • Vaping Use: Never used   Substance and Sexual Activity   • Alcohol use: No     Alcohol/week: 0.0 oz   • Drug use: Yes     Types: Marijuana     Comment: at times to help sleep   • Sexual activity: Not Currently   Other Topics Concern   • Not on file   Social History Narrative   • Not on file     Social Determinants of Health     Financial Resource Strain: Low Risk    • Difficulty of Paying Living Expenses: Not very hard   Food Insecurity: No Food Insecurity   • Worried About Running Out of Food in the Last Year: Never true   • Ran Out of Food in the Last Year: Never true   Transportation Needs: No Transportation Needs   • Lack of Transportation (Medical): No   • Lack of Transportation (Non-Medical): No   Physical Activity: Sufficiently Active   • Days of Exercise per Week: 7 days   • Minutes of Exercise per Session: 150+ min   Stress: Stress Concern Present   • Feeling of Stress : To some extent   Social Connections: Socially Isolated   • Frequency of Communication with Friends and Family: More than three times a week   • Frequency of Social Gatherings with Friends and Family: Patient refused   • Attends Confucianism Services: Never   • Active Member of Clubs or Organizations: No   • Attends Club or Organization Meetings: Never   • Marital Status:    Intimate Partner Violence:    • Fear of Current or Ex-Partner:    • Emotionally Abused:    • Physically Abused:    • Sexually Abused:         Family History   Problem Relation Age of Onset   • Hyperlipidemia Mother    • Hyperlipidemia Father    • Cancer Father         Prostate and Lung   • Lung Disease Father    • Hypertension Father    • Heart Disease Father    • Other Sister         Lupus   • Heart Disease Maternal Grandfather        Past Surgical History:   Procedure Laterality Date   • IRRIGATION & DEBRIDEMENT ORTHO Left 3/10/2018    Procedure: IRRIGATION & DEBRIDEMENT ORTHO OPEN;  Surgeon: Sergio Daniel M.D.;  Location: [facility];  Service: Orthopedics   • SHOULDER ARTHROSCOPY Left 2/6/2018    Procedure: SHOULDER ARTHROSCOPY;  Surgeon: Sergio Daniel M.D.;  Location: [facility];  Service: Orthopedics   • IRRIGATION & DEBRIDEMENT ORTHO Left 2/6/2018    Procedure: IRRIGATION & DEBRIDEMENT ORTHO- SHOULDER  ;  Surgeon: Sergio Daniel M.D.;  Location: [facility];  Service: Orthopedics   • FOREIGN BODY REMOVAL Left 2/6/2018    Procedure: FOREIGN BODY REMOVAL;  Surgeon: Sergio Daniel M.D.;  Location: [facility];  Service: Orthopedics   • SHOULDER ARTHROSCOPY W/ ROTATOR CUFF REPAIR Left 10/18/2017    Procedure: SHOULDER ARTHROSCOPY W/ ROTATOR CUFF REPAIR;  Surgeon: Sergio Daniel M.D.;  Location: [facility];  Service: Orthopedics   • SHOULDER DECOMPRESSION ARTHROSCOPIC Left 10/18/2017    Procedure: SHOULDER DECOMPRESSION ARTHROSCOPIC- SUBACROMIAL;  Surgeon: Sergio Daniel M.D.;  Location: [facility];  Service: Orthopedics   • BICEPS TENODESIS Left 10/18/2017    Procedure: BICEPS TENODESIS;  Surgeon: Sergio Daniel M.D.;  Location: [facility];  Service: Orthopedics   • PARATHYROIDECTOMY N/A 7/5/2017    Procedure: PARATHYROIDECTOMY - MINIMALLY INVASIVE W/RECURRENT LARYNGEAL NERVE MONITORING;  Surgeon: Josh Blank M.D.;  Location: SURGERY SAME DAY [facility];  Service:    • SPHINCTER PROSTHESIS REMOVAL  3/28/2017    Procedure: URINARY  SPHINCTER PROSTHESIS REMOVAL;  Surgeon: Benigno Grier M.D.;  Location: [facility];  Service:    • CYSTOSCOPY  3/28/2017    Procedure: CYSTOSCOPY;  Surgeon: Benigno Grier M.D.;  Location: [facility];  Service:    • EVACUATION OF HEMATOMA  3/21/2017    Procedure: EVACUATION OF HEMATOMA-CYSTO CLOT EVACUATION AND SMALL CYSTOSCOPE;  Surgeon: Frank Lamas M.D.;  Location: [facility];  Service:    • CYSTOSCOPY  3/21/2017    Procedure: CYSTOSCOPY;  Surgeon: Frank Lamas M.D.;  Location: [facility];  Service:    • CYSTOSCOPY  3/20/2017    Procedure: CYSTOSCOPY -  FLEXIBLE;  Surgeon: Frank Lamas M.D.;  Location: [facility];  Service:    • SPHINCTER PROSTHESIS PLACEMENT  3/20/2017    Procedure: SPHINCTER PROSTHESIS PLACEMENT - ARTIFICIAL URINARY SPHINCTER;  Surgeon: Frank Lamas M.D.;  Location: [facility];  Service:    • PROSTATECTOMY, RADICAL RETRO  8/3/2015    Procedure: PROSTATECTOMY RADICAL RETROPUBIC;  Surgeon: Sage Ngo M.D.;  Location: [facility];  Service:    • NODE DISSECTION Bilateral 8/3/2015    Procedure: NODE DISSECTION LYMPH;  Surgeon: Sage Ngo M.D.;  Location: [facility];  Service:    • KNEE ARTHROPLASTY TOTAL  7/3/2014    Performed by Theodore San M.D. at [facility]   • KNEE ARTHROSCOPY  7/3/2014    Performed by Theodore San M.D. at [facility]   • MENISCECTOMY  7/3/2014    Performed by Theodore San M.D. at [facility]   • KNEE REPLACEMENT, TOTAL Right 2014   • INGUINAL HERNIA REPAIR  6/1/2009    Performed by RISHI COOK at [facility]   • OTHER ORTHOPEDIC SURGERY  2003    L Shoulder Repair   • INGUINAL HERNIA LAPAROSCOPIC BILATERAL Bilateral    • OTHER ORTHOPEDIC SURGERY      R Rotator Cuff repair       ROS: Positive ROS per HPI   denies any Headache,Chest pain,  Shortness of breath,  Abdominal pain, Changes of  "bowel or bladder, Lower ext edema, Fevers, Nights sweats, Weight Changes, Focal weakness or numbness.  All other systems are negative.    /68 (BP Location: Left arm, Patient Position: Sitting, BP Cuff Size: Adult)   Pulse (!) 52   Temp 36.5 °C (97.7 °F) (Temporal)   Resp 20   Ht 1.803 m (5' 11\")   Wt 69.4 kg (153 lb)   SpO2 96%   BMI 21.34 kg/m²      Constitutional: Alert, no distress, well-groomed.  Skin: Warm, dry, good turgor, no rashes in visible areas.  Eye: Equal, round and reactive, conjunctiva clear, lids normal.  ENMT: Lips without lesions, good dentition, moist mucous membranes.  Neck: Trachea midline, no masses, no thyromegaly.  Respiratory: Unlabored respiratory effort, no cough.  Abdomen: Soft, no gross masses.  MSK: Left shin with small less than half a centimeter soft tissue lump under skin noted, movable.  Nontender.  No redness.  Neuro: Grossly non-focal. No cranial nerve deficit. Strength and sensation intact.   Psych: Alert and oriented x3, normal affect and mood.      Assessment and Plan.   70 y.o. male presenting with the following.     1. Age-related osteoporosis without current pathological fracture  Patient will start Fosamax 70 mg p.o. weekly.  Continue weightbearing exercise as tolerated.  Avoid high impact sports.  Discussed supplementations to help with BMD.    2. Skin lump of leg, left  Patient will continue to monitor.  Stable at this time.  Consider imaging if not resolved.    My total time spent caring for the patient on the day of the encounter was 20 minutes.   This does not include time spent on separately billable procedures/tests.      "

## 2021-05-27 ENCOUNTER — PATIENT OUTREACH (OUTPATIENT)
Dept: HEALTH INFORMATION MANAGEMENT | Facility: OTHER | Age: 71
End: 2021-05-27

## 2021-06-04 NOTE — PROGRESS NOTES
Outcome: Left Message to call back to schedule CRISTHIAN    Please transfer to Patient Outreach Team at [phone] when patient returns call.    Attempt # 3

## 2021-06-08 NOTE — PROGRESS NOTES
PT returned call. Verified HIPAA. Pt declined scheduling CRISTHIAN and was not interested in the exam.

## 2021-07-02 ENCOUNTER — OFFICE VISIT (OUTPATIENT)
Dept: MEDICAL GROUP | Facility: PHYSICIAN GROUP | Age: 71
End: 2021-07-02
Payer: MEDICARE

## 2021-07-02 VITALS
WEIGHT: 155.1 LBS | HEART RATE: 67 BPM | HEIGHT: 71 IN | BODY MASS INDEX: 21.71 KG/M2 | OXYGEN SATURATION: 97 % | TEMPERATURE: 98.8 F | SYSTOLIC BLOOD PRESSURE: 138 MMHG | RESPIRATION RATE: 16 BRPM | DIASTOLIC BLOOD PRESSURE: 70 MMHG

## 2021-07-02 DIAGNOSIS — L98.9 LEG SORE: ICD-10-CM

## 2021-07-02 PROCEDURE — 99213 OFFICE O/P EST LOW 20 MIN: CPT | Performed by: FAMILY MEDICINE

## 2021-07-02 RX ORDER — CEPHALEXIN 500 MG/1
500 CAPSULE ORAL 4 TIMES DAILY
Qty: 28 CAPSULE | Refills: 0 | Status: SHIPPED | OUTPATIENT
Start: 2021-07-02 | End: 2021-07-09

## 2021-07-02 ASSESSMENT — FIBROSIS 4 INDEX: FIB4 SCORE: 1.77

## 2021-07-02 NOTE — PROGRESS NOTES
Subjective:     CC:   Chief Complaint   Patient presents with   • Wound Infection     Right Lower Leg          HPI:   Sebastien presents today - he called this am bc of a sore on his R lower shin. He is worried about infections bc had a R TKR years ago and is supposed to take abxs for dental work. Also had a rotator cuff surgery a few yr ago, had a staph infection later needed IV abxs. Needed f/u surgery. Has very thin skin bc grew up [place], worked as a , photojournalist etc.   About 1wk ago he cut his leg in the garage, maybe on the edge of his kayak.   Has been using h2o2. [place]       No problems updated.    Current Outpatient Medications Ordered in Epic   Medication Sig Dispense Refill   • cephALEXin (KEFLEX) 500 MG Cap Take 1 capsule by mouth 4 times a day for 7 days. 28 capsule 0   • alendronate (FOSAMAX) 70 MG Tab Take 1 tablet by mouth every 7 days. 12 tablet 3   • Naproxen Sodium (ALEVE PO) Take  by mouth.       No current Epic-ordered facility-administered medications on file.       Past Medical History:   Diagnosis Date   • Arthritis     knees   • Cancer (HCC) 07/13    Prostate   • Disorder of thyroid    • Heart valve disease     Mild mitral valve problem - per patient cardiologist is not concerned at this time.   • Hiatus hernia syndrome    • Multiple joint pain 4/13/2021   • Skin lump of leg, left 5/19/2021   • Unspecified cataract     Bilateral IOL's   • Urinary incontinence        Social History     Tobacco Use   • Smoking status: Never Smoker   • Smokeless tobacco: Never Used   Vaping Use   • Vaping Use: Never used   Substance Use Topics   • Alcohol use: No     Alcohol/week: 0.0 oz   • Drug use: Yes     Types: Marijuana     Comment: at times to help sleep       Allergies:  Patient has no known allergies.    ROS:  Per HPI  No f/c.       Objective:       Exam:  /60 (BP Location: Right arm, Patient Position: Sitting, BP Cuff Size: Adult)   Pulse 67   Temp 37.1 °C (98.8  "°F) (Temporal)   Resp 16   Ht 1.803 m (5' 11\")   Wt 70.4 kg (155 lb 1.6 oz)   SpO2 97%   BMI 21.63 kg/m²   Body mass index is 21.63 kg/m².  Wt Readings from Last 4 Encounters:   07/02/21 70.4 kg (155 lb 1.6 oz)   05/19/21 69.4 kg (153 lb)   04/13/21 68.9 kg (152 lb)   03/15/21 70.8 kg (155 lb 15.6 oz)       Gen: Alert and oriented, No apparent distress. Appropriately groomed.   Lungs: Normal effort  Ext: No clubbing, cyanosis, edema.  Skin: RLE anterior shin with approx 1x1cm superficial shallow ulcer, no erythema or warmth.         Assessment & Plan:     70 y.o. male with the following -   No sign of cellulitis at this time, but I will print a rx for keflex for him to fill if his leg should worsen and develop sxs of cellulitis.  I also recommended to stay out of the water until his ulcer begins to show signs of healing  1. Leg sore    Other orders  - cephALEXin (KEFLEX) 500 MG Cap; Take 1 capsule by mouth 4 times a day for 7 days.  Dispense: 28 capsule; Refill: 0      Return if symptoms worsen or fail to improve.    Please note that this dictation was created using voice recognition software. I have made every reasonable attempt to correct obvious errors, but I expect that there are errors of grammar and possibly content that I did not discover before finalizing the note.      "

## 2021-08-10 ENCOUNTER — HOSPITAL ENCOUNTER (OUTPATIENT)
Dept: LAB | Facility: MEDICAL CENTER | Age: 71
End: 2021-08-10
Attending: INTERNAL MEDICINE
Payer: MEDICARE

## 2021-08-10 DIAGNOSIS — Z13.220 SCREENING CHOLESTEROL LEVEL: ICD-10-CM

## 2021-08-10 DIAGNOSIS — E55.9 VITAMIN D DEFICIENCY: ICD-10-CM

## 2021-08-10 DIAGNOSIS — M25.50 MULTIPLE JOINT PAIN: ICD-10-CM

## 2021-08-10 DIAGNOSIS — R53.83 OTHER FATIGUE: ICD-10-CM

## 2021-08-10 LAB
25(OH)D3 SERPL-MCNC: 29 NG/ML (ref 30–100)
ALBUMIN SERPL BCP-MCNC: 4.2 G/DL (ref 3.2–4.9)
ALBUMIN/GLOB SERPL: 1.9 G/DL
ALP SERPL-CCNC: 48 U/L (ref 30–99)
ALT SERPL-CCNC: 11 U/L (ref 2–50)
ANION GAP SERPL CALC-SCNC: 12 MMOL/L (ref 7–16)
AST SERPL-CCNC: 11 U/L (ref 12–45)
BASOPHILS # BLD AUTO: 1 % (ref 0–1.8)
BASOPHILS # BLD: 0.04 K/UL (ref 0–0.12)
BILIRUB SERPL-MCNC: 0.8 MG/DL (ref 0.1–1.5)
BUN SERPL-MCNC: 14 MG/DL (ref 8–22)
CALCIUM SERPL-MCNC: 8.6 MG/DL (ref 8.5–10.5)
CHLORIDE SERPL-SCNC: 105 MMOL/L (ref 96–112)
CHOLEST SERPL-MCNC: 189 MG/DL (ref 100–199)
CO2 SERPL-SCNC: 21 MMOL/L (ref 20–33)
CREAT SERPL-MCNC: 0.54 MG/DL (ref 0.5–1.4)
EOSINOPHIL # BLD AUTO: 0.06 K/UL (ref 0–0.51)
EOSINOPHIL NFR BLD: 1.5 % (ref 0–6.9)
ERYTHROCYTE [DISTWIDTH] IN BLOOD BY AUTOMATED COUNT: 47 FL (ref 35.9–50)
FASTING STATUS PATIENT QL REPORTED: NORMAL
FOLATE SERPL-MCNC: 7.1 NG/ML
GLOBULIN SER CALC-MCNC: 2.2 G/DL (ref 1.9–3.5)
GLUCOSE SERPL-MCNC: 89 MG/DL (ref 65–99)
HCT VFR BLD AUTO: 41.4 % (ref 42–52)
HDLC SERPL-MCNC: 64 MG/DL
HGB BLD-MCNC: 13.8 G/DL (ref 14–18)
IMM GRANULOCYTES # BLD AUTO: 0.01 K/UL (ref 0–0.11)
IMM GRANULOCYTES NFR BLD AUTO: 0.2 % (ref 0–0.9)
LDLC SERPL CALC-MCNC: 110 MG/DL
LYMPHOCYTES # BLD AUTO: 1.3 K/UL (ref 1–4.8)
LYMPHOCYTES NFR BLD: 31.7 % (ref 22–41)
MCH RBC QN AUTO: 32.4 PG (ref 27–33)
MCHC RBC AUTO-ENTMCNC: 33.3 G/DL (ref 33.7–35.3)
MCV RBC AUTO: 97.2 FL (ref 81.4–97.8)
MONOCYTES # BLD AUTO: 0.3 K/UL (ref 0–0.85)
MONOCYTES NFR BLD AUTO: 7.3 % (ref 0–13.4)
NEUTROPHILS # BLD AUTO: 2.39 K/UL (ref 1.82–7.42)
NEUTROPHILS NFR BLD: 58.3 % (ref 44–72)
NRBC # BLD AUTO: 0 K/UL
NRBC BLD-RTO: 0 /100 WBC
PLATELET # BLD AUTO: 220 K/UL (ref 164–446)
PMV BLD AUTO: 11 FL (ref 9–12.9)
POTASSIUM SERPL-SCNC: 4 MMOL/L (ref 3.6–5.5)
PROT SERPL-MCNC: 6.4 G/DL (ref 6–8.2)
RBC # BLD AUTO: 4.26 M/UL (ref 4.7–6.1)
SODIUM SERPL-SCNC: 138 MMOL/L (ref 135–145)
TRIGL SERPL-MCNC: 77 MG/DL (ref 0–149)
TSH SERPL DL<=0.005 MIU/L-ACNC: 1.44 UIU/ML (ref 0.38–5.33)
VIT B12 SERPL-MCNC: 229 PG/ML (ref 211–911)
WBC # BLD AUTO: 4.1 K/UL (ref 4.8–10.8)

## 2021-08-10 PROCEDURE — 87086 URINE CULTURE/COLONY COUNT: CPT

## 2021-08-10 PROCEDURE — 36415 COLL VENOUS BLD VENIPUNCTURE: CPT

## 2021-08-10 PROCEDURE — 82306 VITAMIN D 25 HYDROXY: CPT

## 2021-08-10 PROCEDURE — 82746 ASSAY OF FOLIC ACID SERUM: CPT

## 2021-08-10 PROCEDURE — 80053 COMPREHEN METABOLIC PANEL: CPT

## 2021-08-10 PROCEDURE — 82607 VITAMIN B-12: CPT

## 2021-08-10 PROCEDURE — 80061 LIPID PANEL: CPT

## 2021-08-10 PROCEDURE — 85025 COMPLETE CBC W/AUTO DIFF WBC: CPT

## 2021-08-10 PROCEDURE — 84443 ASSAY THYROID STIM HORMONE: CPT

## 2021-08-12 LAB
BACTERIA UR CULT: NORMAL
SIGNIFICANT IND 70042: NORMAL
SITE SITE: NORMAL
SOURCE SOURCE: NORMAL

## 2021-08-13 DIAGNOSIS — Z12.5 SCREENING FOR PROSTATE CANCER: ICD-10-CM

## 2021-08-26 ENCOUNTER — OFFICE VISIT (OUTPATIENT)
Dept: MEDICAL GROUP | Facility: PHYSICIAN GROUP | Age: 71
End: 2021-08-26
Payer: MEDICARE

## 2021-08-26 VITALS
WEIGHT: 152 LBS | OXYGEN SATURATION: 97 % | TEMPERATURE: 98.2 F | HEIGHT: 71 IN | RESPIRATION RATE: 16 BRPM | BODY MASS INDEX: 21.28 KG/M2 | SYSTOLIC BLOOD PRESSURE: 110 MMHG | HEART RATE: 52 BPM | DIASTOLIC BLOOD PRESSURE: 60 MMHG

## 2021-08-26 DIAGNOSIS — M81.0 AGE-RELATED OSTEOPOROSIS WITHOUT CURRENT PATHOLOGICAL FRACTURE: ICD-10-CM

## 2021-08-26 DIAGNOSIS — Z00.00 ENCOUNTER FOR ANNUAL WELLNESS VISIT (AWV) IN MEDICARE PATIENT: ICD-10-CM

## 2021-08-26 DIAGNOSIS — E89.2 H/O PARATHYROIDECTOMY: ICD-10-CM

## 2021-08-26 DIAGNOSIS — Z85.828 H/O NONMELANOMA SKIN CANCER: ICD-10-CM

## 2021-08-26 DIAGNOSIS — I70.0 ATHEROSCLEROSIS OF AORTA (HCC): ICD-10-CM

## 2021-08-26 DIAGNOSIS — E78.5 HYPERLIPIDEMIA, UNSPECIFIED HYPERLIPIDEMIA TYPE: ICD-10-CM

## 2021-08-26 DIAGNOSIS — Z85.46 H/O PROSTATE CANCER: ICD-10-CM

## 2021-08-26 PROCEDURE — G0439 PPPS, SUBSEQ VISIT: HCPCS | Performed by: INTERNAL MEDICINE

## 2021-08-26 ASSESSMENT — FIBROSIS 4 INDEX: FIB4 SCORE: 1.085440840479949042

## 2021-08-26 ASSESSMENT — ACTIVITIES OF DAILY LIVING (ADL): BATHING_REQUIRES_ASSISTANCE: 0

## 2021-08-26 ASSESSMENT — PATIENT HEALTH QUESTIONNAIRE - PHQ9: CLINICAL INTERPRETATION OF PHQ2 SCORE: 0

## 2021-08-26 ASSESSMENT — ENCOUNTER SYMPTOMS: GENERAL WELL-BEING: EXCELLENT

## 2021-08-26 NOTE — PROGRESS NOTES
Chief Complaint   Patient presents with   • Annual Wellness Visit         HPI:  Sebastien is a 71 y.o. here for Medicare Annual Wellness Visit    1. Encounter for annual wellness visit (AWV) in Medicare patient  Due for annual wellness exam  - Subsequent Annual Wellness Visit - Includes PPPS ()    2. Age-related osteoporosis without current pathological fracture  Most recent bone density completed May 2021 showed osteoporosis. Patient started Fosamax 70 mg tablets weekly in May, tolerating medication well. He is very physically fit, exercises daily. He usually runs/walks 6 miles a day, kayaks, does stairclimbing in his house. He reintroduce dairy into his diet. Discontinued caffeine.    - Subsequent Annual Wellness Visit - Includes PPPS ()    3. H/O nonmelanoma skin cancer  Patient is followed by dermatology yearly    - Subsequent Annual Wellness Visit - Includes PPPS ()    4. H/O parathyroidectomy (HCC)  Chronic. Stable.  Continue to monitor.  - Subsequent Annual Wellness Visit - Includes PPPS ()    5. H/O prostate cancer  Patient is followed by urologist, Dr. Lewis.  Patient has been 4-year cancer free.  He has a follow-up appointment with Dr. Lamas in October to recheck PSA levels.  - Subsequent Annual Wellness Visit - Includes PPPS ()    6. Hyperlipidemia, unspecified hyperlipidemia type  Total cholesterol 189, triglyceride 77, HDL 64, .  Patient not taking statin.  - Subsequent Annual Wellness Visit - Includes PPPS ()    7. Atherosclerosis of aorta (HCC)  Chronic.  Stable.  Abdominal ultrasound of the aorta showed mild atherosclerosis in March 2017.  - Subsequent Annual Wellness Visit - Includes PPPS ()      Patient Active Problem List    Diagnosis Date Noted   • Skin lump of leg, left 05/19/2021   • Multiple joint pain 04/13/2021   • Nonrheumatic mitral valve regurgitation 03/15/2021   • H/O nonmelanoma skin cancer 06/24/2019   • Age-related osteoporosis without current  pathological fracture 07/05/2017   • AK (actinic keratosis) 12/14/2016   • H/O parathyroidectomy ([facility]) 09/08/2016   • Urinary incontinence 09/29/2015   • Erectile dysfunction following radical prostatectomy 09/29/2015   • Vitamin D deficiency 09/29/2015   • Malignant neoplasm of prostate (HCC) 08/03/2015   • H/O prostate cancer 06/02/2015   • Hyperlipidemia 06/02/2015   • MVP (mitral valve prolapse) 06/02/2015   • Neck pain on left side 06/02/2015       Current Outpatient Medications   Medication Sig Dispense Refill   • alendronate (FOSAMAX) 70 MG Tab Take 1 tablet by mouth every 7 days. 12 tablet 3   • Naproxen Sodium (ALEVE PO) Take  by mouth.       No current facility-administered medications for this visit.        Patient is taking medications as noted in medication list.  Current supplements as per medication list.     Allergies: Patient has no known allergies.    Current social contact/activities:  Exercises, Goes out w/ friends     Is patient current with immunizations? Yes.    He  reports that he has never smoked. He has never used smokeless tobacco. He reports current drug use. Drug: Marijuana. He reports that he does not drink alcohol.  Counseling given: Not Answered        DPA/Advanced directive: Patient has Advanced Directive on file.     ROS:    Gait: Uses no assistive device   Ostomy: No   Other tubes: No   Amputations: No   Chronic oxygen use No   Last eye exam  2 years ago   Wears hearing aids: No   : Denies any urinary leakage during the last 6 months      Screening: Annual Health Assessment Questions:    1.  Are you currently engaging in any exercise or physical activity? Yes    2.  How would you describe your mood or emotional well-being today? good    3.  Have you had any falls in the last year? No    4.  Have you noticed any problems with your balance or had difficulty walking? No    5.  In the last six months have you experienced any leakage of urine? No    6. DPA/Advanced Directive: Patient  has Advanced Directive on file.           Depression Screening    Little interest or pleasure in doing things?  0 - not at all  Feeling down, depressed, or hopeless? 0 - not at all  Patient Health Questionnaire Score: 0    If depressive symptoms identified deferred to follow up visit unless specifically addressed in assessment and plan.    Interpretation of PHQ-9 Total Score   Score Severity   1-4 No Depression   5-9 Mild Depression   10-14 Moderate Depression   15-19 Moderately Severe Depression   20-27 Severe Depression    Screening for Cognitive Impairment    Three Minute Recall (captain, garden, picture)  2/3    Marvel clock face with all 12 numbers and set the hands to show 5 past 8.  Yes    If cognitive concerns identified, deferred for follow up unless specifically addressed in assessment and plan.    Fall Risk Assessment    Has the patient had two or more falls in the last year or any fall with injury in the last year?  No  If fall risk identified, deferred for follow up unless specifically addressed in assessment and plan.    Safety Assessment    Throw rugs on floor.  Yes  Handrails on all stairs.  Yes  Good lighting in all hallways.  Yes  Difficulty hearing.  No  Patient counseled about all safety risks that were identified.    Functional Assessment ADLs    Are there any barriers preventing you from cooking for yourself or meeting nutritional needs?  No.    Are there any barriers preventing you from driving safely or obtaining transportation?  No.    Are there any barriers preventing you from using a telephone or calling for help?  No.    Are there any barriers preventing you from shopping?  No.    Are there any barriers preventing you from taking care of your own finances?  No.    Are there any barriers preventing you from managing your medications?  No.    Are there any barriers preventing you from showering, bathing or dressing yourself?  No.    Are you currently engaging in any exercise or physical  activity?  Yes.     What is your perception of your health?  Excellent.    Health Maintenance Summary                Annual Wellness Visit Overdue 7/21/2021      Done 7/20/2020 Visit Dx: Medicare annual wellness visit, subsequent     Patient has more history with this topic...    IMM ZOSTER VACCINES Postponed 1/26/2022 Originally 6/8/2021. System: vaccine not available, other system reasons     Done 4/13/2021 Imm Admin: Zoster Vaccine Recombinant (RZV) (SHINGRIX)     Patient has more history with this topic...    IMM INFLUENZA Next Due 9/1/2021      Done 10/1/2020 Imm Admin: Influenza Vaccine Adult HD     Patient has more history with this topic...    COLORECTAL CANCER SCREENING Next Due 4/10/2024     IMM DTaP/Tdap/Td Vaccine Next Due 6/24/2029      Done 6/24/2019 Imm Admin: Tdap Vaccine          Patient Care Team:  Ary Crane M.D. as PCP - General (Internal Medicine)  Sage Ngo M.D. as Consulting Physician (Urology)  Abebe Araiza M.D. as Consulting Physician (Gastroenterology)  Morris Chavarria M.D. as Consulting Physician (Cardiology)  Larisa Ureña O.D. as Consulting Physician (Optometry)  Josh Blank M.D. as Consulting Physician (Surgery)  Sergio Daniel M.D. as Consulting Physician (Orthopaedics)  Geena Muñoz as [facility]     Social History     Tobacco Use   • Smoking status: Never Smoker   • Smokeless tobacco: Never Used   Vaping Use   • Vaping Use: Never used   Substance Use Topics   • Alcohol use: No     Alcohol/week: 0.0 oz   • Drug use: Yes     Types: Marijuana     Comment: at times to help sleep     Family History   Problem Relation Age of Onset   • Hyperlipidemia Mother    • Hyperlipidemia Father    • Cancer Father         Prostate and Lung   • Lung Disease Father    • Hypertension Father    • Heart Disease Father    • Other Sister         Lupus   • Heart Disease Maternal Grandfather      He  has a past medical history of Arthritis, Cancer (HCC)  (07/13), Disorder of thyroid, Heart valve disease, Hiatus hernia syndrome, Multiple joint pain (4/13/2021), Skin lump of leg, left (5/19/2021), Unspecified cataract, and Urinary incontinence.   Past Surgical History:   Procedure Laterality Date   • IRRIGATION & DEBRIDEMENT ORTHO Left 3/10/2018    Procedure: IRRIGATION & DEBRIDEMENT ORTHO OPEN;  Surgeon: Sergio Daniel M.D.;  Location: [facility];  Service: Orthopedics   • SHOULDER ARTHROSCOPY Left 2/6/2018    Procedure: SHOULDER ARTHROSCOPY;  Surgeon: Sergio Daniel M.D.;  Location: [facility];  Service: Orthopedics   • IRRIGATION & DEBRIDEMENT ORTHO Left 2/6/2018    Procedure: IRRIGATION & DEBRIDEMENT ORTHO- SHOULDER  ;  Surgeon: Sergio Daniel M.D.;  Location: [facility];  Service: Orthopedics   • FOREIGN BODY REMOVAL Left 2/6/2018    Procedure: FOREIGN BODY REMOVAL;  Surgeon: Sergio Daniel M.D.;  Location: [facility];  Service: Orthopedics   • SHOULDER ARTHROSCOPY W/ ROTATOR CUFF REPAIR Left 10/18/2017    Procedure: SHOULDER ARTHROSCOPY W/ ROTATOR CUFF REPAIR;  Surgeon: Sergio Daniel M.D.;  Location: [facility];  Service: Orthopedics   • SHOULDER DECOMPRESSION ARTHROSCOPIC Left 10/18/2017    Procedure: SHOULDER DECOMPRESSION ARTHROSCOPIC- SUBACROMIAL;  Surgeon: Sergio Daniel M.D.;  Location: [facility];  Service: Orthopedics   • BICEPS TENODESIS Left 10/18/2017    Procedure: BICEPS TENODESIS;  Surgeon: Sergio Daniel M.D.;  Location: [facility];  Service: Orthopedics   • PARATHYROIDECTOMY N/A 7/5/2017    Procedure: PARATHYROIDECTOMY - MINIMALLY INVASIVE W/RECURRENT LARYNGEAL NERVE MONITORING;  Surgeon: Josh Blnak M.D.;  Location: SURGERY [facility];  Service:    • SPHINCTER PROSTHESIS REMOVAL  3/28/2017    Procedure: URINARY SPHINCTER PROSTHESIS REMOVAL;  Surgeon: Benigno Grier M.D.;  Location: [facility];  Service:    •  "CYSTOSCOPY  3/28/2017    Procedure: CYSTOSCOPY;  Surgeon: Benigno Grier M.D.;  Location: [facility];  Service:    • EVACUATION OF HEMATOMA  3/21/2017    Procedure: EVACUATION OF HEMATOMA-CYSTO CLOT EVACUATION AND SMALL CYSTOSCOPE;  Surgeon: Frank Lamas M.D.;  Location: SURGERY [facility];  Service:    • CYSTOSCOPY  3/21/2017    Procedure: CYSTOSCOPY;  Surgeon: Frank Lamas M.D.;  Location: [facility];  Service:    • CYSTOSCOPY  3/20/2017    Procedure: CYSTOSCOPY -  FLEXIBLE;  Surgeon: Frank Lamas M.D.;  Location: SURGERY [facility];  Service:    • SPHINCTER PROSTHESIS PLACEMENT  3/20/2017    Procedure: SPHINCTER PROSTHESIS PLACEMENT - ARTIFICIAL URINARY SPHINCTER;  Surgeon: Frank Lamas M.D.;  Location: SURGERY [facility];  Service:    • PROSTATECTOMY, RADICAL RETRO  8/3/2015    Procedure: PROSTATECTOMY RADICAL RETROPUBIC;  Surgeon: Sage Ngo M.D.;  Location: [facility];  Service:    • NODE DISSECTION Bilateral 8/3/2015    Procedure: NODE DISSECTION LYMPH;  Surgeon: Sage Ngo M.D.;  Location: [facility];  Service:    • KNEE ARTHROPLASTY TOTAL  7/3/2014    Performed by Theodore San M.D. at [facility]   • KNEE ARTHROSCOPY  7/3/2014    Performed by Theodore San M.D. at [facility]   • MENISCECTOMY  7/3/2014    Performed by Theodore San M.D. at [facility]   • KNEE REPLACEMENT, TOTAL Right 2014   • INGUINAL HERNIA REPAIR  6/1/2009    Performed by RISHI COOK at [facility]   • OTHER ORTHOPEDIC SURGERY  2003    L Shoulder Repair   • INGUINAL HERNIA LAPAROSCOPIC BILATERAL Bilateral    • OTHER ORTHOPEDIC SURGERY      R Rotator Cuff repair           Exam:     /60 (BP Location: Left arm, Patient Position: Sitting, BP Cuff Size: Adult)   Pulse (!) 52   Temp 36.8 °C (98.2 °F) (Temporal)   Resp 16   Ht 1.803 m (5' 11\")   Wt 68.9 kg (152 lb)   SpO2 97%  Body " mass index is 21.2 kg/m².    Hearing fair.    Dentition good  Alert, oriented in no acute distress.  Eye contact is good, speech goal directed, affect calm    Constitutional: Alert, no distress.  Skin: Warm, dry, good turgor, no rashes in visible areas.  Eye: Equal, round and reactive, conjunctiva clear, lids normal.  ENMT: Lips without lesions, good dentition, oropharynx clear.  Neck: Trachea midline, no masses, no thyromegaly. No cervical or supraclavicular lymphadenopathy  Respiratory: Unlabored respiratory effort, lungs clear to auscultation, no wheezes, no ronchi.  Cardiovascular: Normal S1, S2, no murmur, no edema.  Abdomen: Soft, non-tender, no masses, no hepatosplenomegaly.  Psych: Alert and oriented x3, normal affect and mood.    Assessment and Plan. The following treatment and monitoring plan is recommended:    No diagnosis found.    1. Encounter for annual wellness visit (AWV) in Medicare patient    - Subsequent Annual Wellness Visit - Includes PPPS ()    2. Age-related osteoporosis without current pathological fracture  Continue Fosamax 70 mg weekly  Weightbearing exercise to continue  Healthy diet, limiting alcohol    - Subsequent Annual Wellness Visit - Includes PPPS ()    3. H/O nonmelanoma skin cancer  Stable.  Follow-up with dermatology yearly for surveillance.  - Subsequent Annual Wellness Visit - Includes PPPS ()    4. H/O parathyroidectomy (HCC)  Chronic.  Stable.  Continue to monitor  - Subsequent Annual Wellness Visit - Includes PPPS ()    5. H/O prostate cancer  Patient to keep appointment with urology for follow-up PSA  - Subsequent Annual Wellness Visit - Includes PPPS ()    6. Hyperlipidemia, unspecified hyperlipidemia type  Continue current diet and lifestyle modifications  - Subsequent Annual Wellness Visit - Includes PPPS ()    7. Atherosclerosis of aorta (HCC)  Chronic.  Stable.  Continue current plan of care.  Continue to monitor.  - Subsequent Annual  Wellness Visit - Includes PPPS ()      Services suggested: No services needed at this time  Health Care Screening recommendations as per orders if indicated.  Referrals offered: PT/OT/Nutrition counseling/Behavioral Health/Smoking cessation as per orders if indicated.    Discussion today about general wellness and lifestyle habits:    · Prevent falls and reduce trip hazards; Cautioned about securing or removing rugs.  · Have a working fire alarm and carbon monoxide detector;   · Engage in regular physical activity and social activities       Follow-up: No follow-ups on file.

## 2021-11-29 ENCOUNTER — HOSPITAL ENCOUNTER (OUTPATIENT)
Dept: LAB | Facility: MEDICAL CENTER | Age: 71
End: 2021-11-29
Attending: FAMILY MEDICINE
Payer: MEDICARE

## 2021-11-29 DIAGNOSIS — Z12.5 SCREENING FOR PROSTATE CANCER: ICD-10-CM

## 2021-11-29 LAB — PSA SERPL-MCNC: <0.02 NG/ML (ref 0–4)

## 2021-11-29 PROCEDURE — 84153 ASSAY OF PSA TOTAL: CPT

## 2021-11-29 PROCEDURE — 36415 COLL VENOUS BLD VENIPUNCTURE: CPT

## 2021-12-29 ENCOUNTER — APPOINTMENT (RX ONLY)
Dept: URBAN - METROPOLITAN AREA CLINIC 4 | Facility: CLINIC | Age: 71
Setting detail: DERMATOLOGY
End: 2021-12-29

## 2021-12-29 DIAGNOSIS — Z85.828 PERSONAL HISTORY OF OTHER MALIGNANT NEOPLASM OF SKIN: ICD-10-CM

## 2021-12-29 DIAGNOSIS — L57.8 OTHER SKIN CHANGES DUE TO CHRONIC EXPOSURE TO NONIONIZING RADIATION: ICD-10-CM

## 2021-12-29 DIAGNOSIS — D18.0 HEMANGIOMA: ICD-10-CM

## 2021-12-29 DIAGNOSIS — D22 MELANOCYTIC NEVI: ICD-10-CM

## 2021-12-29 DIAGNOSIS — L57.0 ACTINIC KERATOSIS: ICD-10-CM

## 2021-12-29 DIAGNOSIS — L82.1 OTHER SEBORRHEIC KERATOSIS: ICD-10-CM

## 2021-12-29 DIAGNOSIS — L81.4 OTHER MELANIN HYPERPIGMENTATION: ICD-10-CM

## 2021-12-29 PROBLEM — D48.5 NEOPLASM OF UNCERTAIN BEHAVIOR OF SKIN: Status: ACTIVE | Noted: 2021-12-29

## 2021-12-29 PROBLEM — D18.01 HEMANGIOMA OF SKIN AND SUBCUTANEOUS TISSUE: Status: ACTIVE | Noted: 2021-12-29

## 2021-12-29 PROBLEM — D22.5 MELANOCYTIC NEVI OF TRUNK: Status: ACTIVE | Noted: 2021-12-29

## 2021-12-29 PROCEDURE — ? LIQUID NITROGEN

## 2021-12-29 PROCEDURE — ? PHOTO-DOCUMENTATION

## 2021-12-29 PROCEDURE — 69100 BIOPSY OF EXTERNAL EAR: CPT | Mod: 59

## 2021-12-29 PROCEDURE — ? COUNSELING

## 2021-12-29 PROCEDURE — 17003 DESTRUCT PREMALG LES 2-14: CPT

## 2021-12-29 PROCEDURE — 17000 DESTRUCT PREMALG LESION: CPT

## 2021-12-29 PROCEDURE — ? BIOPSY BY SHAVE METHOD

## 2021-12-29 PROCEDURE — 99213 OFFICE O/P EST LOW 20 MIN: CPT | Mod: 25

## 2021-12-29 ASSESSMENT — LOCATION DETAILED DESCRIPTION DERM
LOCATION DETAILED: LEFT SUPERIOR ANTERIOR NECK
LOCATION DETAILED: RIGHT SUPERIOR UPPER BACK
LOCATION DETAILED: RIGHT ULNAR DORSAL HAND
LOCATION DETAILED: LEFT ULNAR DORSAL HAND
LOCATION DETAILED: LEFT NASAL SIDEWALL
LOCATION DETAILED: RIGHT SUPERIOR MEDIAL UPPER BACK
LOCATION DETAILED: LEFT DISTAL DORSAL FOREARM
LOCATION DETAILED: RIGHT INFERIOR CENTRAL MALAR CHEEK
LOCATION DETAILED: NASAL SUPRATIP
LOCATION DETAILED: LEFT VENTRAL PROXIMAL FOREARM

## 2021-12-29 ASSESSMENT — LOCATION SIMPLE DESCRIPTION DERM
LOCATION SIMPLE: LEFT ANTERIOR NECK
LOCATION SIMPLE: RIGHT CHEEK
LOCATION SIMPLE: LEFT FOREARM
LOCATION SIMPLE: LEFT HAND
LOCATION SIMPLE: RIGHT HAND
LOCATION SIMPLE: NOSE
LOCATION SIMPLE: LEFT NOSE
LOCATION SIMPLE: RIGHT UPPER BACK

## 2021-12-29 ASSESSMENT — LOCATION ZONE DERM
LOCATION ZONE: TRUNK
LOCATION ZONE: FACE
LOCATION ZONE: ARM
LOCATION ZONE: HAND
LOCATION ZONE: NOSE
LOCATION ZONE: NECK

## 2021-12-29 NOTE — PROCEDURE: LIQUID NITROGEN
Consent: The patient's consent was obtained including but not limited to risks of crusting, scabbing, blistering, scarring, darker or lighter pigmentary change, recurrence, incomplete removal and infection.
Number Of Freeze-Thaw Cycles: 2 freeze-thaw cycles
Render Post-Care Instructions In Note?: no
Post-Care Instructions: I reviewed with the patient in detail post-care instructions. Patient is to wear sunprotection, and avoid picking at any of the treated lesions. Pt may apply Vaseline to crusted or scabbing areas.
Detail Level: Simple
Show Aperture Variable?: Yes
Duration Of Freeze Thaw-Cycle (Seconds): 3
Aperture Size (Optional): C

## 2022-01-18 NOTE — IP AVS SNAPSHOT
You.i Access Code: Activation code not generated  Current You.i Status: Patient Declined    [employer]  A secure, online tool to manage your health information     [email or web address]® is a secure, online tool that connects you to your personalized health information from the privacy of your home -- day or night - making it very easy for you to manage your healthcare. Once the activation process is completed, you can even access your medical information using the You.i amlaika, which is available for free in the Apple Malaika store or Google Play store.     You.i provides the following levels of access (as shown below):   My Chart Features   [facility] Primary Care Doctor [facility]  Specialists [facility]  Urgent  Care Non-[facility]  Primary Care  Doctor   Email your healthcare team securely and privately 24/7 X X X X   Manage appointments: schedule your next appointment; view details of past/upcoming appointments X      Request prescription refills. X      View recent personal medical records, including lab and immunizations X X X X   View health record, including health history, allergies, medications X X X X   Read reports about your outpatient visits, procedures, consult and ER notes X X X X   See your discharge summary, which is a recap of your hospital and/or ER visit that includes your diagnosis, lab results, and care plan. X X       How to register for You.i:  1. Go to  [email or web address].  2. Click on the Sign Up Now box, which takes you to the New Member Sign Up page. You will need to provide the following information:  a. Enter your You.i Access Code exactly as it appears at the top of this page. (You will not need to use this code after you’ve completed the sign-up process. If you do not sign up before the expiration date, you must request a new code.)   b. Enter your date of birth.   c. Enter your home email address.   d. Click Submit, and follow the next screen’s instructions.  3. Create a You.i ID.  Date of Service:  1/18/2022    Patient:  Viviana Mccall    Chief Complaint:  Wound Check and Diarrhea       HPI:  Viviana Mccall is a 79 y.o.  female who presents for evaluation of wound check, diarrhea. Patient presents from the wound center for admission. Patient has decubitus ulcers. She has had what she describes as constant diarrhea which is irritating the site. Because of this she has not been eating for fear that when she eats she has to have a bowel movement which then irritates more. She has had unintentional weight loss, she is not eating or drinking. She has general fatigue and weakness. Her only pain is in her buttocks region, hurts all the time, worse with any bowel movements. Otherwise no fevers chills chest pain shortness of breath. No abdominal pain nausea vomiting. Past Medical History:   Diagnosis Date    Atrial fibrillation (HCC)     Atrial flutter (HCC)     Edema     Hypertension     Hypokalemia     Hypothyroidism     Insomnia     Major depressive disorder     Pain     UTI (urinary tract infection)     Vitamin D deficiency        Past Surgical History:   Procedure Laterality Date    HX APPENDECTOMY      approx age 20    HX GI  12/01/2021    bowel resection    HX ORTHOPAEDIC  05/01/2021    R hip          No family history on file.     Social History     Socioeconomic History    Marital status:      Spouse name: Not on file    Number of children: Not on file    Years of education: Not on file    Highest education level: Not on file   Occupational History    Not on file   Tobacco Use    Smoking status: Never Smoker    Smokeless tobacco: Never Used   Vaping Use    Vaping Use: Never used   Substance and Sexual Activity    Alcohol use: Not Currently     Alcohol/week: 3.0 standard drinks     Types: 3 Glasses of wine per week    Drug use: Never    Sexual activity: Not on file   Other Topics Concern     Service Not Asked    Blood Transfusions Not Asked    Caffeine Concern Not Asked    Occupational Exposure Not Asked    Hobby Hazards Not Asked    Sleep Concern Not Asked    Stress Concern Not Asked    Weight Concern Not Asked    Special Diet Not Asked    Back Care Not Asked    Exercise Not Asked    Bike Helmet Not Asked   [address] Not Asked    Self-Exams Not Asked   Social History Narrative    Not on file     Social Determinants of Health     Financial Resource Strain:     Difficulty of Paying Living Expenses: Not on file   Food Insecurity:     Worried About Running Out of Food in the Last Year: Not on file    Florenitno of Food in the Last Year: Not on file   Transportation Needs:     Lack of Transportation (Medical): Not on file    Lack of Transportation (Non-Medical): Not on file   Physical Activity:     Days of Exercise per Week: Not on file    Minutes of Exercise per Session: Not on file   Stress:     Feeling of Stress : Not on file   Social Connections:     Frequency of Communication with Friends and Family: Not on file    Frequency of Social Gatherings with Friends and Family: Not on file    Attends Jew Services: Not on file    Active Member of [address] [place] [address] or Organizations: Not on file    Attends Club or Organization Meetings: Not on file    Marital Status: Not on file   Intimate Partner Violence:     Fear of Current or Ex-Partner: Not on file    Emotionally Abused: Not on file    Physically Abused: Not on file    Sexually Abused: Not on file   Housing Stability:     Unable to Pay for Housing in the Last Year: Not on file    Number of [place] in the Last Year: Not on file    Unstable Housing in the Last Year: Not on file         ALLERGIES: Codeine, Gluten, and Sulfa (sulfonamide antibiotics)    Review of Systems   Constitutional: Positive for appetite change and unexpected weight change. Gastrointestinal: Positive for diarrhea. Skin: Positive for color change and wound.    All other systems reviewed and are This will be your Cureeo login ID and cannot be changed, so think of one that is secure and easy to remember.  4. Create a C[employer]eo password. You can change your password at any time.  5. Enter your Password Reset Question and Answer. This can be used at a later time if you forget your password.   6. Enter your e-mail address. This allows you to receive e-mail notifications when new information is available in Cureeo.  7. Click Sign Up. You can now view your health information.    For assistance activating your Cureeo account, call [phone]         negative. Vitals:    01/18/22 1521   BP: (!) 94/54   Pulse: 97   Resp: 18   Temp: 97.9 °F (36.6 °C)            Physical Exam  Vitals and nursing note reviewed. Exam conducted with a chaperone present. HENT:      Head: Normocephalic and atraumatic. Nose: Nose normal.      Mouth/Throat:      Mouth: Mucous membranes are dry. Eyes:      General: No scleral icterus. Cardiovascular:      Rate and Rhythm: Normal rate. Pulses: Normal pulses. Pulmonary:      Effort: Pulmonary effort is normal. No respiratory distress. Abdominal:      General: Abdomen is flat. Musculoskeletal:         General: No deformity. Skin:     Capillary Refill: Capillary refill takes less than 2 seconds. Comments: Decubitus ulcer on the backside recently evaluated and covered by wound care. Erythematous excoriations to the remainder of the buttocks and thigh   Neurological:      Mental Status: She is alert and oriented to person, place, and time. Psychiatric:         Mood and Affect: Mood normal.          MDM     VITAL SIGNS:  Patient Vitals for the past 4 hrs:   Temp Pulse Resp BP   01/18/22 1521 97.9 °F (36.6 °C) 97 18 (!) 94/54         LABS:  Recent Results (from the past 6 hour(s))   SAMPLES BEING HELD    Collection Time: 01/18/22  3:23 PM   Result Value Ref Range    SAMPLES BEING HELD 1PST,1LAV,1RED,1BLU     COMMENT        Add-on orders for these samples will be processed based on acceptable specimen integrity and analyte stability, which may vary by analyte.    CBC WITH AUTOMATED DIFF    Collection Time: 01/18/22  3:23 PM   Result Value Ref Range    WBC 9.2 3.6 - 11.0 K/uL    RBC 4.27 3.80 - 5.20 M/uL    HGB 12.0 11.5 - 16.0 g/dL    HCT 39.4 35.0 - 47.0 %    MCV 92.3 80.0 - 99.0 FL    MCH 28.1 26.0 - 34.0 PG    MCHC 30.5 30.0 - 36.5 g/dL    RDW 16.9 (H) 11.5 - 14.5 %    PLATELET 152 086 - 466 K/uL    MPV 11.6 8.9 - 12.9 FL    NRBC 0.0 0  WBC    ABSOLUTE NRBC 0.00 0.00 - 0.01 K/uL    NEUTROPHILS 54 32 - 75 %    LYMPHOCYTES 33 12 - 49 %    MONOCYTES 6 5 - 13 %    EOSINOPHILS 6 0 - 7 %    BASOPHILS 1 0 - 1 %    IMMATURE GRANULOCYTES 0 0.0 - 0.5 %    ABS. NEUTROPHILS 4.9 1.8 - 8.0 K/UL    ABS. LYMPHOCYTES 3.1 0.8 - 3.5 K/UL    ABS. MONOCYTES 0.6 0.0 - 1.0 K/UL    ABS. EOSINOPHILS 0.5 (H) 0.0 - 0.4 K/UL    ABS. BASOPHILS 0.1 0.0 - 0.1 K/UL    ABS. IMM. GRANS. 0.0 0.00 - 0.04 K/UL    DF AUTOMATED     METABOLIC PANEL, COMPREHENSIVE    Collection Time: 01/18/22  3:23 PM   Result Value Ref Range    Sodium 134 (L) 136 - 145 mmol/L    Potassium 5.5 (H) 3.5 - 5.1 mmol/L    Chloride 105 97 - 108 mmol/L    CO2 24 21 - 32 mmol/L    Anion gap 5 5 - 15 mmol/L    Glucose 118 (H) 65 - 100 mg/dL    BUN 56 (H) 6 - 20 MG/DL    Creatinine 1.39 (H) 0.55 - 1.02 MG/DL    BUN/Creatinine ratio 40 (H) 12 - 20      GFR est AA 43 (L) >60 ml/min/1.73m2    GFR est non-AA 36 (L) >60 ml/min/1.73m2    Calcium 9.9 8.5 - 10.1 MG/DL    Bilirubin, total 0.4 0.2 - 1.0 MG/DL    ALT (SGPT) 103 (H) 12 - 78 U/L    AST (SGOT) 79 (H) 15 - 37 U/L    Alk. phosphatase 215 (H) 45 - 117 U/L    Protein, total 7.0 6.4 - 8.2 g/dL    Albumin 2.7 (L) 3.5 - 5.0 g/dL    Globulin 4.3 (H) 2.0 - 4.0 g/dL    A-G Ratio 0.6 (L) 1.1 - 2.2     MAGNESIUM    Collection Time: 01/18/22  3:23 PM   Result Value Ref Range    Magnesium 1.6 1.6 - 2.4 mg/dL        IMAGING:  No orders to display         Medications During Visit:  Medications - No data to display      DECISION MAKING:  Andry Garces is a 79 y.o. female who comes in as above. Here, patient has large amount of excoriation and decubitus ulcer to her backside. She notes massive amounts of diarrhea and was sent here for admission. She has failure to thrive as she is not eating or drinking. Here she did have solid stool so I doubt CDifF. Patient will be admitted for further management      IMPRESSION:  1.  Pressure injury of skin of contiguous region involving back, buttock, and hip, unspecified injury stage, unspecified laterality    2. Weight loss    3. Failure to thrive in adult        DISPOSITION:  Admitted        Procedures        Perfect Serve Consult for Admission  6:40 PM    ED Room Number: RJ88/04  Patient Name and age:  Sarahi Cleaves 79 y.o.  female  Working Diagnosis:   1. Pressure injury of skin of contiguous region involving back, buttock, and hip, unspecified injury stage, unspecified laterality    2. Weight loss    3. Failure to thrive in adult        COVID-19 Suspicion:  no  Sepsis present:  no  Reassessment needed: no  Code Status:  Full Code  Readmission: no  Isolation Requirements:  no  Recommended Level of Care:  med/surg  Department:[facility] Adult ED - 21   Other:  Sent from wound care for admission. Decubitus ulcer with diarrhea. Formed stool in room. Patient not eating, losing weight.  Stable

## 2022-01-26 NOTE — DISCHARGE INSTRUCTIONS
ACTIVITY: Rest and take it easy for the first 24 hours.  A responsible adult is recommended to remain with you during that time.  It is normal to feel sleepy.  We encourage you to not do anything that requires balance, judgment or coordination.    MILD FLU-LIKE SYMPTOMS ARE NORMAL. YOU MAY EXPERIENCE GENERALIZED MUSCLE ACHES, THROAT IRRITATION, HEADACHE AND/OR SOME NAUSEA.    FOR 24 HOURS DO NOT:  Drive, operate machinery or run household appliances.  Drink beer or alcoholic beverages.     Discharge instructions after parathyroidectomy:      Recovering After Surgery:    Get plenty of rest.    Care for your incision as directed.    Avoid heavy lifting and strenuous activity for 2 weeks.    Walk a few times daily. But don’t push yourself too hard. Slowly increase your pace and distance, as you feel able.    Return to work when your doctor says it’s okay.    Pain Control:  Most patients do well after thyroid surgery. You may take ibuprofen or Tylenol as needed for pain. If you need stronger pain medication, call your doctor's office.         Keeping Your Doctor’s Appointments:    See your doctor for regular visits. These are needed to monitor your health.    Have routine blood tests done. These check the level of thyroid hormone in your body. This helps your doctor know whether to adjust the dosage of your medication if needed.    Tell your doctor about any signs of further thyroid problems.    Signs that you may have too much thyroid hormone in your body include:                Restlessness                Rapid weight loss                Sweating                Signs that you may have too little thyroid hormone in your body include:                Fatigue or sluggishness                Puffy hands, face, or feet                Hoarseness                Muscle pain                Slow pulse (less than 60 beats per minute)    When to Call Your Doctor:  Call your doctor right away if you have any of the  following:  Fever above 101°F  Swelling or bleeding at the incision site  Choking  Trouble breathing  A sore throat that lasts longer than 7 days  Tingling or cramps in your hands, feet, or lips    Calcium Supplementation  After parathyroidectomy, you may have low calcium levels. Symptoms of low calcium levels include tingling or numbness in the fingers or lips, or muscle spasms.  To prevent low calcium levels, you should take Citracal Max. The usual dosage is 2 tablets three times daily. You may buy this OTC. If you have symptoms of low calcium, take two extra tablets and call your doctor's office.     Your doctor may also prescribe Rocaltrol to help with your calcium levels. Take this as prescribed.    FOLLOW-UP:  · Please call my office at [phone] to make an appointment in 1 week    Office address:   [place] [address]    Josh Blank M.D.  [facility]  [phone]  Make important decisions or sign legal documents.          DIET: To avoid nausea, slowly advance diet as tolerated, avoiding spicy or greasy foods for the first day.  Add more substantial food to your diet according to your physician's instructions.  Babies can be fed formula or breast milk as soon as they are hungry.  INCREASE FLUIDS AND FIBER TO AVOID CONSTIPATION.    SURGICAL DRESSING/BATHING: no soaking incision area    FOLLOW-UP APPOINTMENT:  A follow-up appointment should be arranged with your doctor in keep as scheduled.    You should CALL YOUR PHYSICIAN if you develop:  Fever greater than 101 degrees F.  Pain not relieved by medication, or persistent nausea or vomiting.  Excessive bleeding (blood soaking through dressing) or unexpected drainage from the wound.  Extreme redness or swelling around the incision site, drainage of pus or foul smelling drainage.  Inability to urinate or empty your bladder within 8 hours.  Problems with breathing or chest pain.    You should call 911 if you develop problems with breathing  or chest pain.  If you are unable to contact your doctor or surgical center, you should go to the nearest emergency room or urgent care center.  Physician's telephone #: [phone]    If any questions arise, call your doctor.  If your doctor is not available, please feel free to call the Surgical Center at [phone].  The Center is open Monday through Friday from 7AM to 7PM.  You can also call the HEALTH HOTLINE open 24 hours/day, 7 days/week and speak to a nurse at [phone], or toll free at [phone].    A registered nurse may call you a few days after your surgery to see how you are doing after your procedure.    MEDICATIONS: Resume taking daily medication.  Take prescribed pain medication with food.  If no medication is prescribed, you may take non-aspirin pain medication if needed.  PAIN MEDICATION CAN BE VERY CONSTIPATING.  Take a stool softener or laxative such as senokot, pericolace, or milk of magnesia if needed.    Prescription given for NORCO PERCOCET.  Last pain medication given at 11:15, may take next dose at 5:15pm.    Your physician has prescribed pain medication that includes Acetaminophen (Tylenol), do not take additional Acetaminophen (Tylenol) while taking the prescribed medication.    Depression / Suicide Risk    As you are discharged from this [facility] facility, it is important to learn how to keep safe from harming yourself.    Recognize the warning signs:  · Abrupt changes in personality, positive or negative- including increase in energy   · Giving away possessions  · Change in eating patterns- significant weight changes-  positive or negative  · Change in sleeping patterns- unable to sleep or sleeping all the time   · Unwillingness or inability to communicate  · Depression  · Unusual sadness, discouragement and loneliness  · Talk of wanting to die  · Neglect of personal appearance   · Rebelliousness- reckless behavior  · Withdrawal from people/activities they  love  · Confusion- inability to concentrate     If you or a loved one observes any of these behaviors or has concerns about self-harm, here's what you can do:  · Talk about it- your feelings and reasons for harming yourself  · Remove any means that you might use to hurt yourself (examples: pills, rope, extension cords, firearm)  · Get professional help from the community (Mental Health, Substance Abuse, psychological counseling)  · Do not be alone:Call your Safe Contact- someone whom you trust who will be there for you.  · Call your local CRISIS HOTLINE 696-6785 or 230-463-3408  · Call your local Children's Mobile Crisis Response Team [place] (701) 585-2430 or www.[employer]  · Call the toll free National Suicide Prevention Hotlines   · National Suicide Prevention Lifeline 719-137-NDWX (5678)  · National Hope Line Network 800-SUICIDE (314-0061)   Tremfya Counseling: I discussed with the patient the risks of guselkumab including but not limited to immunosuppression, serious infections, worsening of inflammatory bowel disease and drug reactions.  The patient understands that monitoring is required including a PPD at baseline and must alert us or the primary physician if symptoms of infection or other concerning signs are noted.

## 2022-02-03 ENCOUNTER — OFFICE VISIT (OUTPATIENT)
Dept: MEDICAL GROUP | Facility: PHYSICIAN GROUP | Age: 72
End: 2022-02-03
Payer: MEDICARE

## 2022-02-03 VITALS
WEIGHT: 155.6 LBS | BODY MASS INDEX: 21.78 KG/M2 | RESPIRATION RATE: 16 BRPM | OXYGEN SATURATION: 96 % | HEIGHT: 71 IN | HEART RATE: 65 BPM | TEMPERATURE: 99.2 F | DIASTOLIC BLOOD PRESSURE: 60 MMHG | SYSTOLIC BLOOD PRESSURE: 110 MMHG

## 2022-02-03 DIAGNOSIS — B35.1 TOENAIL FUNGUS: ICD-10-CM

## 2022-02-03 DIAGNOSIS — I70.0 ATHEROSCLEROSIS OF AORTA (HCC): ICD-10-CM

## 2022-02-03 DIAGNOSIS — M20.42 HAMMER TOE OF LEFT FOOT: ICD-10-CM

## 2022-02-03 PROCEDURE — 99213 OFFICE O/P EST LOW 20 MIN: CPT | Performed by: INTERNAL MEDICINE

## 2022-02-03 ASSESSMENT — PATIENT HEALTH QUESTIONNAIRE - PHQ9: CLINICAL INTERPRETATION OF PHQ2 SCORE: 0

## 2022-02-03 ASSESSMENT — FIBROSIS 4 INDEX: FIB4 SCORE: 1.085440840479949042

## 2022-02-03 NOTE — PROGRESS NOTES
CC:     HPI:  Sebastien presents with the following    1. Atherosclerosis of aorta (HCC)  Chronic.  Stable.  Abdominal ultrasound of the aorta showed mild atherosclerosis in March 2017    2. Toenail fungus  Patient comes in with complaints of left second toe with toenail fungus, thickened nails and redness.  Patient denies any pain.    3. Hammer toe of left foot  Chronic history of hammertoes bilaterally.      Patient Active Problem List    Diagnosis Date Noted   • Atherosclerosis of aorta (HCC) 08/26/2021   • Skin lump of leg, left 05/19/2021   • Multiple joint pain 04/13/2021   • Nonrheumatic mitral valve regurgitation 03/15/2021   • H/O nonmelanoma skin cancer 06/24/2019   • Age-related osteoporosis without current pathological fracture 07/05/2017   • AK (actinic keratosis) 12/14/2016   • H/O parathyroidectomy ([facility]) 09/08/2016   • Urinary incontinence 09/29/2015   • Erectile dysfunction following radical prostatectomy 09/29/2015   • Vitamin D deficiency 09/29/2015   • Malignant neoplasm of prostate (HCC) 08/03/2015   • H/O prostate cancer 06/02/2015   • Hyperlipidemia 06/02/2015   • MVP (mitral valve prolapse) 06/02/2015   • Neck pain on left side 06/02/2015       Current Outpatient Medications   Medication Sig Dispense Refill   • alendronate (FOSAMAX) 70 MG Tab Take 1 tablet by mouth every 7 days. 12 tablet 3   • Naproxen Sodium (ALEVE PO) Take  by mouth.       No current facility-administered medications for this visit.         Allergies as of 02/03/2022   • (No Known Allergies)        Social History     Socioeconomic History   • Marital status:      Spouse name: Not on file   • Number of children: Not on file   • Years of education: Not on file   • Highest education level: Some college, no degree   Occupational History   • Not on file   Tobacco Use   • Smoking status: Never Smoker   • Smokeless tobacco: Never Used   Vaping Use   • Vaping Use: Never used   Substance and Sexual Activity   • Alcohol use:  No     Alcohol/week: 0.0 oz   • Drug use: Yes     Types: Marijuana     Comment: at times to help sleep   • Sexual activity: Not Currently   Other Topics Concern   • Not on file   Social History Narrative   • Not on file     Social Determinants of Health     Financial Resource Strain: Low Risk    • Difficulty of Paying Living Expenses: Not very hard   Food Insecurity: No Food Insecurity   • Worried About Running Out of Food in the Last Year: Never true   • Ran Out of Food in the Last Year: Never true   Transportation Needs: No Transportation Needs   • Lack of Transportation (Medical): No   • Lack of Transportation (Non-Medical): No   Physical Activity: Sufficiently Active   • Days of Exercise per Week: 7 days   • Minutes of Exercise per Session: 150+ min   Stress: Stress Concern Present   • Feeling of Stress : To some extent   Social Connections: Socially Isolated   • Frequency of Communication with Friends and Family: More than three times a week   • Frequency of Social Gatherings with Friends and Family: Patient refused   • Attends Orthodox Services: Never   • Active Member of Clubs or Organizations: No   • Attends Club or Organization Meetings: Never   • Marital Status:    Intimate Partner Violence:    • Fear of Current or Ex-Partner: Not on file   • Emotionally Abused: Not on file   • Physically Abused: Not on file   • Sexually Abused: Not on file   Housing Stability: Unknown   • Unable to Pay for Housing in the Last Year: No   • Number of Places Lived in the Last Year: 1   • Unstable Housing in the Last Year: Not on file       Family History   Problem Relation Age of Onset   • Hyperlipidemia Mother    • Hyperlipidemia Father    • Cancer Father         Prostate and Lung   • Lung Disease Father    • Hypertension Father    • Heart Disease Father    • Other Sister         Lupus   • Heart Disease Maternal Grandfather        Past Surgical History:   Procedure Laterality Date   • IRRIGATION & DEBRIDEMENT ORTHO  Left 3/10/2018    Procedure: IRRIGATION & DEBRIDEMENT ORTHO OPEN;  Surgeon: Sergio Daniel M.D.;  Location: [facility];  Service: Orthopedics   • SHOULDER ARTHROSCOPY Left 2/6/2018    Procedure: SHOULDER ARTHROSCOPY;  Surgeon: Sergio Daniel M.D.;  Location: [facility];  Service: Orthopedics   • IRRIGATION & DEBRIDEMENT ORTHO Left 2/6/2018    Procedure: IRRIGATION & DEBRIDEMENT ORTHO- SHOULDER  ;  Surgeon: Sergio Daniel M.D.;  Location: [facility];  Service: Orthopedics   • FOREIGN BODY REMOVAL Left 2/6/2018    Procedure: FOREIGN BODY REMOVAL;  Surgeon: Sergio Daniel M.D.;  Location: [facility];  Service: Orthopedics   • SHOULDER ARTHROSCOPY W/ ROTATOR CUFF REPAIR Left 10/18/2017    Procedure: SHOULDER ARTHROSCOPY W/ ROTATOR CUFF REPAIR;  Surgeon: Sergio Daniel M.D.;  Location: [facility];  Service: Orthopedics   • SHOULDER DECOMPRESSION ARTHROSCOPIC Left 10/18/2017    Procedure: SHOULDER DECOMPRESSION ARTHROSCOPIC- SUBACROMIAL;  Surgeon: Sergio Daniel M.D.;  Location: [facility];  Service: Orthopedics   • BICEPS TENODESIS Left 10/18/2017    Procedure: BICEPS TENODESIS;  Surgeon: Sergio Daniel M.D.;  Location: [facility];  Service: Orthopedics   • PARATHYROIDECTOMY N/A 7/5/2017    Procedure: PARATHYROIDECTOMY - MINIMALLY INVASIVE W/RECURRENT LARYNGEAL NERVE MONITORING;  Surgeon: Josh Blank M.D.;  Location: SURGERY SAME DAY [facility];  Service:    • SPHINCTER PROSTHESIS REMOVAL  3/28/2017    Procedure: URINARY SPHINCTER PROSTHESIS REMOVAL;  Surgeon: Benigno Grier M.D.;  Location: [facility];  Service:    • CYSTOSCOPY  3/28/2017    Procedure: CYSTOSCOPY;  Surgeon: Benigno Grier M.D.;  Location: [facility];  Service:    • EVACUATION OF HEMATOMA  3/21/2017    Procedure: EVACUATION OF HEMATOMA-CYSTO CLOT EVACUATION AND SMALL CYSTOSCOPE;  Surgeon: Frank Lamas M.D.;  Location:  "[facility];  Service:    • CYSTOSCOPY  3/21/2017    Procedure: CYSTOSCOPY;  Surgeon: Frank Lamas M.D.;  Location: [facility];  Service:    • CYSTOSCOPY  3/20/2017    Procedure: CYSTOSCOPY -  FLEXIBLE;  Surgeon: Frank Lamas M.D.;  Location: [facility];  Service:    • SPHINCTER PROSTHESIS PLACEMENT  3/20/2017    Procedure: SPHINCTER PROSTHESIS PLACEMENT - ARTIFICIAL URINARY SPHINCTER;  Surgeon: Frank Lamas M.D.;  Location: [facility];  Service:    • PROSTATECTOMY, RADICAL RETRO  8/3/2015    Procedure: PROSTATECTOMY RADICAL RETROPUBIC;  Surgeon: Sage Ngo M.D.;  Location: [facility];  Service:    • NODE DISSECTION Bilateral 8/3/2015    Procedure: NODE DISSECTION LYMPH;  Surgeon: Sage Ngo M.D.;  Location: [facility];  Service:    • KNEE ARTHROPLASTY TOTAL  7/3/2014    Performed by Theodore San M.D. at [facility]   • KNEE ARTHROSCOPY  7/3/2014    Performed by Theodore San M.D. at [facility]   • MENISCECTOMY  7/3/2014    Performed by Theodore San M.D. at [facility]   • KNEE REPLACEMENT, TOTAL Right 2014   • INGUINAL HERNIA REPAIR  6/1/2009    Performed by RISHI COOK at [facility]   • OTHER ORTHOPEDIC SURGERY  2003    L Shoulder Repair   • INGUINAL HERNIA LAPAROSCOPIC BILATERAL Bilateral    • OTHER ORTHOPEDIC SURGERY      R Rotator Cuff repair       ROS:  Denies any Headache,Chest pain,  Shortness of breath,  Abdominal pain, Changes of bowel or bladder, Lower ext edema, Fevers, Nights sweats, Weight Changes, Focal weakness or numbness.  All other systems are negative.    /60 (BP Location: Left arm, Patient Position: Sitting, BP Cuff Size: Adult)   Pulse 65   Temp 37.3 °C (99.2 °F) (Temporal)   Resp 16   Ht 1.803 m (5' 11\")   Wt 70.6 kg (155 lb 9.6 oz)   SpO2 96%   BMI 21.70 kg/m²      Constitutional: Alert, no distress, " well-groomed.  Skin: Warm, dry, good turgor, no rashes in visible areas.  Eye: Equal, round and reactive, conjunctiva clear, lids normal.  ENMT: Lips without lesions, good dentition, moist mucous membranes.  Neck: Trachea midline, no masses, no thyromegaly.  Respiratory: Unlabored respiratory effort, no cough.  Abdomen: Soft, no gross masses.  MSK: Normal gait, moves all extremities.  Left second toe with hammertoe deformity, thickened yellow nail with black discoloration underneath the nail.  No cellulitis.  No tenderness to palpation.  Neuro: Grossly non-focal. No cranial nerve deficit. Strength and sensation intact.   Psych: Alert and oriented x3, normal affect and mood.    Assessment and Plan.   71 y.o. male presenting with the following.     1. Atherosclerosis of aorta (HCC)  Chronic.  Stable.  Continue to monitor.  Treat risk factors.    2. Toenail fungus    - Referral to Podiatry    3. Hammer toe of left foot    - Referral to Podiatry    My total time spent caring for the patient on the day of the encounter was 20 minutes.   This does not include time spent on separately billable procedures/tests.

## 2022-02-23 ENCOUNTER — APPOINTMENT (RX ONLY)
Dept: URBAN - METROPOLITAN AREA CLINIC 4 | Facility: CLINIC | Age: 72
Setting detail: DERMATOLOGY
End: 2022-02-23

## 2022-02-23 DIAGNOSIS — L57.8 OTHER SKIN CHANGES DUE TO CHRONIC EXPOSURE TO NONIONIZING RADIATION: ICD-10-CM

## 2022-02-23 DIAGNOSIS — Z85.828 PERSONAL HISTORY OF OTHER MALIGNANT NEOPLASM OF SKIN: ICD-10-CM

## 2022-02-23 DIAGNOSIS — T07XXXA ABRASION OR FRICTION BURN OF OTHER, MULTIPLE, AND UNSPECIFIED SITES, WITHOUT MENTION OF INFECTION: ICD-10-CM

## 2022-02-23 DIAGNOSIS — L81.4 OTHER MELANIN HYPERPIGMENTATION: ICD-10-CM

## 2022-02-23 DIAGNOSIS — D18.0 HEMANGIOMA: ICD-10-CM

## 2022-02-23 DIAGNOSIS — D22 MELANOCYTIC NEVI: ICD-10-CM

## 2022-02-23 DIAGNOSIS — L82.1 OTHER SEBORRHEIC KERATOSIS: ICD-10-CM

## 2022-02-23 PROBLEM — D22.5 MELANOCYTIC NEVI OF TRUNK: Status: ACTIVE | Noted: 2022-02-23

## 2022-02-23 PROBLEM — D18.01 HEMANGIOMA OF SKIN AND SUBCUTANEOUS TISSUE: Status: ACTIVE | Noted: 2022-02-23

## 2022-02-23 PROBLEM — S90.935A UNSPECIFIED SUPERFICIAL INJURY OF LEFT LESSER TOE(S), INITIAL ENCOUNTER: Status: ACTIVE | Noted: 2022-02-23

## 2022-02-23 PROCEDURE — 99213 OFFICE O/P EST LOW 20 MIN: CPT

## 2022-02-23 PROCEDURE — ? ADDITIONAL NOTES

## 2022-02-23 PROCEDURE — ? COUNSELING

## 2022-02-23 PROCEDURE — ? REFERRAL CORRESPONDENCE

## 2022-02-23 PROCEDURE — ? DEFER

## 2022-02-23 ASSESSMENT — LOCATION SIMPLE DESCRIPTION DERM
LOCATION SIMPLE: RIGHT UPPER BACK
LOCATION SIMPLE: RIGHT CHEEK
LOCATION SIMPLE: LEFT ANTERIOR NECK
LOCATION SIMPLE: LEFT HAND
LOCATION SIMPLE: RIGHT HAND
LOCATION SIMPLE: LEFT 2ND TOE
LOCATION SIMPLE: LEFT 2ND TOE

## 2022-02-23 ASSESSMENT — LOCATION ZONE DERM
LOCATION ZONE: TOENAIL
LOCATION ZONE: TRUNK
LOCATION ZONE: FACE
LOCATION ZONE: HAND
LOCATION ZONE: NECK
LOCATION ZONE: TOENAIL

## 2022-02-23 ASSESSMENT — LOCATION DETAILED DESCRIPTION DERM
LOCATION DETAILED: RIGHT ULNAR DORSAL HAND
LOCATION DETAILED: RIGHT SUPERIOR UPPER BACK
LOCATION DETAILED: LEFT ULNAR DORSAL HAND
LOCATION DETAILED: LEFT 2ND TOENAIL
LOCATION DETAILED: LEFT 2ND TOENAIL
LOCATION DETAILED: RIGHT INFERIOR CENTRAL MALAR CHEEK
LOCATION DETAILED: LEFT SUPERIOR ANTERIOR NECK
LOCATION DETAILED: RIGHT SUPERIOR MEDIAL UPPER BACK

## 2022-02-23 NOTE — PROCEDURE: ADDITIONAL NOTES
Render Risk Assessment In Note?: no
Detail Level: Simple
Additional Notes: Possible bleeding under nail \\nNo Mendoza signs\\nWill refer to Podiatrist \\nPhotos taken today

## 2022-02-23 NOTE — PROCEDURE: DEFER
Introduction Text (Please End With A Colon): Refer patient to Austyn Davis MD
Detail Level: Detailed

## 2022-08-01 ENCOUNTER — TELEPHONE (OUTPATIENT)
Dept: HEALTH INFORMATION MANAGEMENT | Facility: OTHER | Age: 72
End: 2022-08-01

## 2022-08-03 ENCOUNTER — OFFICE VISIT (OUTPATIENT)
Dept: MEDICAL GROUP | Facility: PHYSICIAN GROUP | Age: 72
End: 2022-08-03
Payer: MEDICARE

## 2022-08-03 VITALS
BODY MASS INDEX: 21.36 KG/M2 | RESPIRATION RATE: 16 BRPM | OXYGEN SATURATION: 95 % | HEART RATE: 78 BPM | TEMPERATURE: 98.1 F | HEIGHT: 71 IN | DIASTOLIC BLOOD PRESSURE: 78 MMHG | SYSTOLIC BLOOD PRESSURE: 128 MMHG | WEIGHT: 152.6 LBS

## 2022-08-03 DIAGNOSIS — Z00.00 HEALTHCARE MAINTENANCE: ICD-10-CM

## 2022-08-03 DIAGNOSIS — F32.9 REACTIVE DEPRESSION: ICD-10-CM

## 2022-08-03 DIAGNOSIS — E55.9 VITAMIN D DEFICIENCY: ICD-10-CM

## 2022-08-03 DIAGNOSIS — R68.89 FEELING UNWELL: ICD-10-CM

## 2022-08-03 PROCEDURE — 99214 OFFICE O/P EST MOD 30 MIN: CPT | Performed by: FAMILY MEDICINE

## 2022-08-03 ASSESSMENT — ANXIETY QUESTIONNAIRES
2. NOT BEING ABLE TO STOP OR CONTROL WORRYING: NEARLY EVERY DAY
IF YOU CHECKED OFF ANY PROBLEMS ON THIS QUESTIONNAIRE, HOW DIFFICULT HAVE THESE PROBLEMS MADE IT FOR YOU TO DO YOUR WORK, TAKE CARE OF THINGS AT HOME, OR GET ALONG WITH OTHER PEOPLE: SOMEWHAT DIFFICULT
6. BECOMING EASILY ANNOYED OR IRRITABLE: MORE THAN HALF THE DAYS
3. WORRYING TOO MUCH ABOUT DIFFERENT THINGS: NEARLY EVERY DAY
5. BEING SO RESTLESS THAT IT IS HARD TO SIT STILL: NOT AT ALL
7. FEELING AFRAID AS IF SOMETHING AWFUL MIGHT HAPPEN: NEARLY EVERY DAY
GAD7 TOTAL SCORE: 16
4. TROUBLE RELAXING: MORE THAN HALF THE DAYS
1. FEELING NERVOUS, ANXIOUS, OR ON EDGE: NEARLY EVERY DAY

## 2022-08-03 ASSESSMENT — PATIENT HEALTH QUESTIONNAIRE - PHQ9
5. POOR APPETITE OR OVEREATING: 0 - NOT AT ALL
CLINICAL INTERPRETATION OF PHQ2 SCORE: 6
SUM OF ALL RESPONSES TO PHQ QUESTIONS 1-9: 9

## 2022-08-03 ASSESSMENT — FIBROSIS 4 INDEX: FIB4 SCORE: 1.1

## 2022-08-03 NOTE — ASSESSMENT & PLAN NOTE
[facility] lab work from 8 months ago showed he had a vitamin D deficiency, states he is currently not taking vitamin D3 over-the-counter.  Discussed taking a vitamin D3 2000 units daily as this can help support his immune function and mood.

## 2022-08-03 NOTE — PROGRESS NOTES
Subjective:     CC:   Chief Complaint   Patient presents with   • Headache     X 10 days, on going for years on and off,    • Bowel Problem     Mild diarrhea, well shaped, good color, 10 days        HPI:   Sebastien presents today with fatigue, decreased appetite (x1 day) headaches, loose stools x 10 days. States he has had headache for over 10 years in the same place (left lower occipital area), did see Neurology for this who recommended injections however did not do them d/t prostate cancer (10 years ago). States with the headaches he tends to feel queasy and will take Aleve and it resolves.  States over the last 10 days, his headaches have been daily resolving with anti-inflammatories.  Denies chest pain, shortness of breath, fevers, chills, abdominal pain, vomiting, sick contacts, or recent travel.  He states that he has not had any dietary changes and eats a very very healthy and cannot change anything to do with his diet, is still hiking 2 hours every morning as well.  He endorses that the only somewhat new medication he started taking was Fosamax as his recent DEXA scan showed osteoporosis.  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  States he is feeling very stressed out with the news and tends to watch it all day throughout the day, as he is a retired environmental  and is feeling felt very stressed out due to climate change and politics and everything that is going on around the world.    Vitamin D deficiency  [facility] lab work from 8 months ago showed he had a vitamin D deficiency, states he is currently not taking vitamin D3 over-the-counter.  Discussed taking a vitamin D3 2000 units daily as this can help support his immune function and mood.    Reactive depression  New problem, PHQ-9 in clinic  "today 9 as well as MARV-7: 16.  States that this is due to watching the news daily most of the day and thinking about politics and climate change has gotten feeling very worked up, stressed, anxious, and depressed.  Discussed only watching for short amount of time each day and then shutting the TV off and reading or doing other activities that bring him vic.  Also discussed behavioral health referral which he will defer until his lab work is back.  Discussed continue to eat healthy and exercise daily which will help with this as well.      Current Outpatient Medications Ordered in Epic   Medication Sig Dispense Refill   • alendronate (FOSAMAX) 70 MG Tab Take 1 Tablet by mouth every 7 days. 12 Tablet 3   • Naproxen Sodium (ALEVE PO) Take  by mouth.       No current Epic-ordered facility-administered medications on file.       Health Maintenance: Completed      Objective:     Exam:  /78 (BP Location: Right arm, Patient Position: Sitting, BP Cuff Size: Adult)   Pulse 78   Temp 36.7 °C (98.1 °F) (Temporal)   Resp 16   Ht 1.803 m (5' 11\")   Wt 69.2 kg (152 lb 9.6 oz)   SpO2 95%   BMI 21.28 kg/m²  Body mass index is 21.28 kg/m².    Physical Exam  Vitals reviewed.   Constitutional:       Appearance: Normal appearance.   Eyes:      Conjunctiva/sclera: Conjunctivae normal.      Pupils: Pupils are equal, round, and reactive to light.   Cardiovascular:      Rate and Rhythm: Normal rate and regular rhythm.   Pulmonary:      Effort: Pulmonary effort is normal. No respiratory distress.      Breath sounds: Normal breath sounds. No stridor. No wheezing, rhonchi or rales.   Chest:      Chest wall: No tenderness.   Abdominal:      General: Abdomen is flat. Bowel sounds are normal.   Musculoskeletal:      Right lower leg: No edema.      Left lower leg: No edema.   Skin:     General: Skin is warm and dry.   Neurological:      Mental Status: He is alert and oriented to person, place, and time.   Psychiatric:         Mood and " Affect: Mood normal.         Behavior: Behavior normal.          A chaperone was offered to the patient during today's exam. Patient declined chaperone.      Assessment & Plan:     72 y.o. male with the following -     1. Healthcare maintenance  - CBC WITH DIFFERENTIAL; Future  - Comp Metabolic Panel; Future  - TSH WITH REFLEX TO FT4; Future  - URINALYSIS,CULTURE IF INDICATED; Future  - VITAMIN D,25 HYDROXY; Future  - Lipid Profile; Future  - VITAMIN B12; Future  - FOLATE; Future    2. Feeling unwell  New problem x10 days.  States that he has a generalized feeling of being unwell with daily headaches and loose stools which is abnormal for him.  Discussed he is due for annual lab work so we will obtain some lab work as well as get a UA.  He currently has not exhibiting any signs or symptoms of systemic infection, he does have high stress and is feeling somewhat depressed. PHQ 9: 9 and MARV 7: 16 today in clinic.  Discussed a referral to behavioral health for his stress and anxieties around what is going on in the world he defers a referral at this time until his lab work comes back and states of his lab work all looks good then he will be open to a referral to behavioral health to be able to talk to somebody to relieve his stress. Denies SI/HI.  After talking in clinic today he said he somewhat feels better even after coming in and just talking for a little while.  Discussed supplementing his vitamin D and B12 as they were low on last lab check almost a year ago.  Instructions given on dosing.  Follow-up with PCP in 2 months for his annual wellness visit.    I spent a total of 37 minutes with record review, exam, communication with the patient, communication with other providers, and documentation of this encounter.      No follow-ups on file.    Please note that this dictation was created using voice recognition software. I have made every reasonable attempt to correct obvious errors, but I expect that there are  errors of grammar and possibly content that I did not discover before finalizing the note.

## 2022-08-03 NOTE — ASSESSMENT & PLAN NOTE
New problem, PHQ-9 in clinic today 9 as well as MARV-7: 16.  States that this is due to watching the news daily most of the day and thinking about politics and climate change has gotten feeling very worked up, stressed, anxious, and depressed.  Discussed only watching for short amount of time each day and then shutting the TV off and reading or doing other activities that bring him vic.  Also discussed behavioral health referral which he will defer until his lab work is back.  Discussed continue to eat healthy and exercise daily which will help with this as well.

## 2022-08-03 NOTE — PATIENT INSTRUCTIONS
Vit D3- take 2000 units daily  B12- take 1000 mcg daily of [id number] is a fairly common problem, and fortunately there are many good treatments available.  Listed below are things you can do to help with this issue.  I recommend you start with the first suggestion listed, and if that is not enough to help then keep working your way down the list until you get to the point where you are having normal bowel movements, then try backing off down the list to see if you can maintain normal bowel movements with something less aggressive.    1)  Increasing water intake to about 80 ounces a day (a bit more than a half gallon of water) can help.  In addition, regular exercise can make an enormous difference besides being generally good for you anyway.  Making sure your diet includes plenty of fruits and vegetables is also very helpful.    2)  Adding additional fiber to your diet with products like metamucil, benefiber, citrucel, or psyllium can help by adding bulk to your stool, keeping it from getting quite so packed down.    3) Polyethylene glycol (Miralax or its generic equivalent) is an osmotic laxative, meaning it brings extra water into your colon, helping keep your stool from getting hard and packed down.  One capful a day should give normal soft stool within about 2-3 days.  If not, consider trying 1 capful twice a day.    4) Milk of magnesia (30 ml daily) or magnesium citrate (1/2 to 1 bottle daily), or a bisacodyl (Dulcolax) suppository can be used next to get things moving.    5) If this has been ineffective, using Senna-S, 1-2 tablets one to two times daily can be helpful.    6) If all these measures have been ineffective, you can try a mineral oil enema or a warm tap water enema to see if this helps things.

## 2022-08-04 ENCOUNTER — HOSPITAL ENCOUNTER (OUTPATIENT)
Dept: LAB | Facility: MEDICAL CENTER | Age: 72
End: 2022-08-04
Attending: FAMILY MEDICINE
Payer: MEDICARE

## 2022-08-04 DIAGNOSIS — Z00.00 HEALTHCARE MAINTENANCE: ICD-10-CM

## 2022-08-04 LAB
25(OH)D3 SERPL-MCNC: 30 NG/ML (ref 30–100)
ALBUMIN SERPL BCP-MCNC: 4.8 G/DL (ref 3.2–4.9)
ALBUMIN/GLOB SERPL: 2.7 G/DL
ALP SERPL-CCNC: 41 U/L (ref 30–99)
ALT SERPL-CCNC: 9 U/L (ref 2–50)
ANION GAP SERPL CALC-SCNC: 11 MMOL/L (ref 7–16)
APPEARANCE UR: CLEAR
AST SERPL-CCNC: 17 U/L (ref 12–45)
BASOPHILS # BLD AUTO: 1.1 % (ref 0–1.8)
BASOPHILS # BLD: 0.04 K/UL (ref 0–0.12)
BILIRUB SERPL-MCNC: 1 MG/DL (ref 0.1–1.5)
BILIRUB UR QL STRIP.AUTO: NEGATIVE
BUN SERPL-MCNC: 14 MG/DL (ref 8–22)
CALCIUM SERPL-MCNC: 9 MG/DL (ref 8.5–10.5)
CHLORIDE SERPL-SCNC: 102 MMOL/L (ref 96–112)
CHOLEST SERPL-MCNC: 206 MG/DL (ref 100–199)
CO2 SERPL-SCNC: 23 MMOL/L (ref 20–33)
COLOR UR: YELLOW
CREAT SERPL-MCNC: 0.6 MG/DL (ref 0.5–1.4)
EOSINOPHIL # BLD AUTO: 0.07 K/UL (ref 0–0.51)
EOSINOPHIL NFR BLD: 2 % (ref 0–6.9)
ERYTHROCYTE [DISTWIDTH] IN BLOOD BY AUTOMATED COUNT: 45.5 FL (ref 35.9–50)
FASTING STATUS PATIENT QL REPORTED: NORMAL
FOLATE SERPL-MCNC: 7.5 NG/ML
GFR SERPLBLD CREATININE-BSD FMLA CKD-EPI: 102 ML/MIN/1.73 M 2
GLOBULIN SER CALC-MCNC: 1.8 G/DL (ref 1.9–3.5)
GLUCOSE SERPL-MCNC: 97 MG/DL (ref 65–99)
GLUCOSE UR STRIP.AUTO-MCNC: NEGATIVE MG/DL
HCT VFR BLD AUTO: 41.6 % (ref 42–52)
HDLC SERPL-MCNC: 69 MG/DL
HGB BLD-MCNC: 14 G/DL (ref 14–18)
IMM GRANULOCYTES # BLD AUTO: 0.01 K/UL (ref 0–0.11)
IMM GRANULOCYTES NFR BLD AUTO: 0.3 % (ref 0–0.9)
KETONES UR STRIP.AUTO-MCNC: ABNORMAL MG/DL
LDLC SERPL CALC-MCNC: 125 MG/DL
LEUKOCYTE ESTERASE UR QL STRIP.AUTO: NEGATIVE
LYMPHOCYTES # BLD AUTO: 1.14 K/UL (ref 1–4.8)
LYMPHOCYTES NFR BLD: 32 % (ref 22–41)
MCH RBC QN AUTO: 32.3 PG (ref 27–33)
MCHC RBC AUTO-ENTMCNC: 33.7 G/DL (ref 33.7–35.3)
MCV RBC AUTO: 96.1 FL (ref 81.4–97.8)
MICRO URNS: ABNORMAL
MONOCYTES # BLD AUTO: 0.31 K/UL (ref 0–0.85)
MONOCYTES NFR BLD AUTO: 8.7 % (ref 0–13.4)
NEUTROPHILS # BLD AUTO: 1.99 K/UL (ref 1.82–7.42)
NEUTROPHILS NFR BLD: 55.9 % (ref 44–72)
NITRITE UR QL STRIP.AUTO: NEGATIVE
NRBC # BLD AUTO: 0 K/UL
NRBC BLD-RTO: 0 /100 WBC
PH UR STRIP.AUTO: 6 [PH] (ref 5–8)
PLATELET # BLD AUTO: 203 K/UL (ref 164–446)
PMV BLD AUTO: 10.5 FL (ref 9–12.9)
POTASSIUM SERPL-SCNC: 3.9 MMOL/L (ref 3.6–5.5)
PROT SERPL-MCNC: 6.6 G/DL (ref 6–8.2)
PROT UR QL STRIP: NEGATIVE MG/DL
RBC # BLD AUTO: 4.33 M/UL (ref 4.7–6.1)
RBC UR QL AUTO: NEGATIVE
SODIUM SERPL-SCNC: 136 MMOL/L (ref 135–145)
SP GR UR STRIP.AUTO: 1.02
TRIGL SERPL-MCNC: 62 MG/DL (ref 0–149)
TSH SERPL DL<=0.005 MIU/L-ACNC: 2.15 UIU/ML (ref 0.38–5.33)
UROBILINOGEN UR STRIP.AUTO-MCNC: 0.2 MG/DL
VIT B12 SERPL-MCNC: 222 PG/ML (ref 211–911)
WBC # BLD AUTO: 3.6 K/UL (ref 4.8–10.8)

## 2022-08-04 PROCEDURE — 36415 COLL VENOUS BLD VENIPUNCTURE: CPT

## 2022-08-04 PROCEDURE — 82306 VITAMIN D 25 HYDROXY: CPT

## 2022-08-04 PROCEDURE — 80053 COMPREHEN METABOLIC PANEL: CPT

## 2022-08-04 PROCEDURE — 81003 URINALYSIS AUTO W/O SCOPE: CPT

## 2022-08-04 PROCEDURE — 85025 COMPLETE CBC W/AUTO DIFF WBC: CPT

## 2022-08-04 PROCEDURE — 84443 ASSAY THYROID STIM HORMONE: CPT

## 2022-08-04 PROCEDURE — 82746 ASSAY OF FOLIC ACID SERUM: CPT

## 2022-08-04 PROCEDURE — 80061 LIPID PANEL: CPT

## 2022-08-04 PROCEDURE — 82607 VITAMIN B-12: CPT

## 2022-08-29 ENCOUNTER — APPOINTMENT (RX ONLY)
Dept: URBAN - METROPOLITAN AREA CLINIC 4 | Facility: CLINIC | Age: 72
Setting detail: DERMATOLOGY
End: 2022-08-29

## 2022-08-29 DIAGNOSIS — L57.8 OTHER SKIN CHANGES DUE TO CHRONIC EXPOSURE TO NONIONIZING RADIATION: ICD-10-CM

## 2022-08-29 DIAGNOSIS — L81.4 OTHER MELANIN HYPERPIGMENTATION: ICD-10-CM

## 2022-08-29 DIAGNOSIS — D22 MELANOCYTIC NEVI: ICD-10-CM

## 2022-08-29 DIAGNOSIS — D18.0 HEMANGIOMA: ICD-10-CM

## 2022-08-29 DIAGNOSIS — L82.1 OTHER SEBORRHEIC KERATOSIS: ICD-10-CM

## 2022-08-29 DIAGNOSIS — Z85.828 PERSONAL HISTORY OF OTHER MALIGNANT NEOPLASM OF SKIN: ICD-10-CM

## 2022-08-29 PROBLEM — D18.01 HEMANGIOMA OF SKIN AND SUBCUTANEOUS TISSUE: Status: ACTIVE | Noted: 2022-08-29

## 2022-08-29 PROBLEM — D22.5 MELANOCYTIC NEVI OF TRUNK: Status: ACTIVE | Noted: 2022-08-29

## 2022-08-29 PROBLEM — D48.5 NEOPLASM OF UNCERTAIN BEHAVIOR OF SKIN: Status: ACTIVE | Noted: 2022-08-29

## 2022-08-29 PROCEDURE — ? BIOPSY BY SHAVE METHOD

## 2022-08-29 PROCEDURE — ? COUNSELING

## 2022-08-29 PROCEDURE — 11102 TANGNTL BX SKIN SINGLE LES: CPT

## 2022-08-29 PROCEDURE — ? REFERRAL CORRESPONDENCE

## 2022-08-29 PROCEDURE — 99213 OFFICE O/P EST LOW 20 MIN: CPT | Mod: 25

## 2022-08-29 ASSESSMENT — LOCATION ZONE DERM
LOCATION ZONE: HAND
LOCATION ZONE: FACE
LOCATION ZONE: TRUNK
LOCATION ZONE: NECK

## 2022-08-29 ASSESSMENT — LOCATION DETAILED DESCRIPTION DERM
LOCATION DETAILED: LEFT SUPERIOR ANTERIOR NECK
LOCATION DETAILED: RIGHT ULNAR DORSAL HAND
LOCATION DETAILED: RIGHT SUPERIOR MEDIAL UPPER BACK
LOCATION DETAILED: RIGHT INFERIOR CENTRAL MALAR CHEEK
LOCATION DETAILED: RIGHT SUPERIOR UPPER BACK
LOCATION DETAILED: LEFT ULNAR DORSAL HAND

## 2022-08-29 ASSESSMENT — LOCATION SIMPLE DESCRIPTION DERM
LOCATION SIMPLE: RIGHT HAND
LOCATION SIMPLE: RIGHT CHEEK
LOCATION SIMPLE: LEFT HAND
LOCATION SIMPLE: LEFT ANTERIOR NECK
LOCATION SIMPLE: RIGHT UPPER BACK

## 2022-09-02 ENCOUNTER — TELEPHONE (OUTPATIENT)
Dept: HEALTH INFORMATION MANAGEMENT | Facility: OTHER | Age: 72
End: 2022-09-02

## 2022-09-26 ENCOUNTER — APPOINTMENT (RX ONLY)
Dept: URBAN - METROPOLITAN AREA CLINIC 4 | Facility: CLINIC | Age: 72
Setting detail: DERMATOLOGY
End: 2022-09-26

## 2022-09-26 PROBLEM — C44.729 SQUAMOUS CELL CARCINOMA OF SKIN OF LEFT LOWER LIMB, INCLUDING HIP: Status: ACTIVE | Noted: 2022-09-26

## 2022-09-26 PROCEDURE — 11603 EXC TR-EXT MAL+MARG 2.1-3 CM: CPT

## 2022-09-26 PROCEDURE — 13121 CMPLX RPR S/A/L 2.6-7.5 CM: CPT

## 2022-09-26 PROCEDURE — ? EXCISION

## 2022-09-26 NOTE — PROCEDURE: MIPS QUALITY
Quality 226: Preventive Care And Screening: Tobacco Use: Screening And Cessation Intervention: Patient screened for tobacco use and is an ex/non-smoker
Quality 111:Pneumonia Vaccination Status For Older Adults: Pneumococcal vaccine (PPSV23) administered on or after patient’s 60th birthday and before the end of the measurement period
Quality 130: Documentation Of Current Medications In The Medical Record: Current Medications Documented
Detail Level: Detailed
Quality 265: Biopsy Follow-Up: Biopsy results reviewed, communicated, tracked, and documented

## 2022-09-26 NOTE — PROCEDURE: EXCISION
Surgeon (Optional): Reji
Biopsy Photograph Reviewed: Yes
Previous Accession (Optional): [id number]
Size Of Lesion In Cm: 1.9
X Size Of Lesion In Cm (Optional): 0
Size Of Margin In Cm: 0.2
Anesthesia Volume In Cc: 12
Was An Eye Clamp Used?: No
Eye Clamp Note Details: An eye clamp was used during the procedure.
Excision Method: Elliptical
Saucerization Depth: dermis and superficial adipose tissue
Repair Type: Complex
Suturegard Retention Suture: 2-0 Nylon
Retention Suture Bite Size: 3 mm
Length To Time In Minutes Device Was In Place: 10
Number Of Hemigard Strips Per Side: 1
Intermediate / Complex Repair - Final Wound Length In Cm: 6.9
Width Of Defect Perpendicular To Closure In Cm (Required): 1.7
Distance Of Undermining In Cm (Required): 2.3
Undermining Type: Entire Wound
Debridement Text: The wound edges were debrided prior to proceeding with the closure to facilitate wound healing.
Helical Rim Text: The closure involved the helical rim.
Vermilion Border Text: The closure involved the vermilion border.
Nostril Rim Text: The closure involved the nostril rim.
Retention Suture Text: Retention sutures were placed to support the closure and prevent dehiscence.
Lab: [id number]
Lab Facility: 
Graft Donor Site Bandage (Optional-Leave Blank If You Don't Want In Note): Steri-strips and a pressure bandage were applied to the donor site.
Epidermal Closure Graft Donor Site (Optional): simple interrupted
Billing Type: Third-Party Bill
Excision Depth: adipose tissue
Scalpel Size: 15 blade
Anesthesia Type: 1% lidocaine with epinephrine
Additional Anesthesia Volume In Cc: 6
Hemostasis: Electrocautery
Estimated Blood Loss (Cc): minimal
Detail Level: Detailed
Repair Anesthesia Method: local infiltration
Deep Sutures: 2-0 Vicryl
Dermal Closure: buried vertical mattress
Epidermal Sutures: 5-0 Caprosyn
Epidermal Closure: running subcuticular
Wound Care: Bacitracin
Dressing: dry sterile dressing
Suturegard Intro: Intraoperative tissue expansion was performed, utilizing the SUTUREGARD device, in order to reduce wound tension.
Suturegard Body: The suture ends were repeatedly re-tightened and re-clamped to achieve the desired tissue expansion.
Hemigard Intro: Due to skin fragility and wound tension, it was decided to use HEMIGARD adhesive retention suture devices to permit a linear closure. The skin was cleaned and dried for a 6cm distance away from the wound. Excessive hair, if present, was removed to allow for adhesion.
Hemigard Postcare Instructions: The HEMIGARD strips are to remain completely dry for at least 5-7 days.
Positioning (Leave Blank If You Do Not Want): The patient was placed in a comfortable position exposing the surgical site.
Pre-Excision Curettage Text (Leave Blank If You Do Not Want): Prior to drawing the surgical margin the visible lesion was removed with electrodesiccation and curettage to clearly define the lesion size.
Complex Repair Preamble Text (Leave Blank If You Do Not Want): Extensive wide undermining was performed.
Intermediate Repair Preamble Text (Leave Blank If You Do Not Want): Undermining was performed with blunt dissection.
Curvilinear Excision Additional Text (Leave Blank If You Do Not Want): The margin was drawn around the clinically apparent lesion.  A curvilinear shape was then drawn on the skin incorporating the lesion and margins.  Incisions were then made along these lines to the appropriate tissue plane and the lesion was extirpated.
Fusiform Excision Additional Text (Leave Blank If You Do Not Want): The margin was drawn around the clinically apparent lesion.  A fusiform shape was then drawn on the skin incorporating the lesion and margins.  Incisions were then made along these lines to the appropriate tissue plane and the lesion was extirpated.
Elliptical Excision Additional Text (Leave Blank If You Do Not Want): The margin was drawn around the clinically apparent lesion.  An elliptical shape was then drawn on the skin incorporating the lesion and margins.  Incisions were then made along these lines to the appropriate tissue plane and the lesion was extirpated.
Saucerization Excision Additional Text (Leave Blank If You Do Not Want): The margin was drawn around the clinically apparent lesion.  Incisions were then made along these lines, in a tangential fashion, to the appropriate tissue plane and the lesion was extirpated.
Slit Excision Additional Text (Leave Blank If You Do Not Want): A linear line was drawn on the skin overlying the lesion. An incision was made slowly until the lesion was visualized.  Once visualized, the lesion was removed with blunt dissection.
Excisional Biopsy Additional Text (Leave Blank If You Do Not Want): The margin was drawn around the clinically apparent lesion. An elliptical shape was then drawn on the skin incorporating the lesion and margins.  Incisions were then made along these lines to the appropriate tissue plane and the lesion was extirpated.
Perilesional Excision Additional Text (Leave Blank If You Do Not Want): The margin was drawn around the clinically apparent lesion. Incisions were then made along these lines to the appropriate tissue plane and the lesion was extirpated.
Repair Performed By Another Provider Text (Leave Blank If You Do Not Want): After the tissue was excised the defect was repaired by another provider.
No Repair - Repaired With Adjacent Surgical Defect Text (Leave Blank If You Do Not Want): After the excision the defect was repaired concurrently with another surgical defect which was in close approximation.
Adjacent Tissue Transfer Text: The defect edges were debeveled with a #15 scalpel blade.  Given the location of the defect and the proximity to free margins an adjacent tissue transfer was deemed most appropriate.  Using a sterile surgical marker, an appropriate flap was drawn incorporating the defect and placing the expected incisions within the relaxed skin tension lines where possible.    The area thus outlined was incised deep to adipose tissue with a #15 scalpel blade.  The skin margins were undermined to an appropriate distance in all directions utilizing iris scissors.
Advancement Flap (Single) Text: The defect edges were debeveled with a #15 scalpel blade.  Given the location of the defect and the proximity to free margins a single advancement flap was deemed most appropriate.  Using a sterile surgical marker, an appropriate advancement flap was drawn incorporating the defect and placing the expected incisions within the relaxed skin tension lines where possible.    The area thus outlined was incised deep to adipose tissue with a #15 scalpel blade.  The skin margins were undermined to an appropriate distance in all directions utilizing iris scissors.
Advancement Flap (Double) Text: The defect edges were debeveled with a #15 scalpel blade.  Given the location of the defect and the proximity to free margins a double advancement flap was deemed most appropriate.  Using a sterile surgical marker, the appropriate advancement flaps were drawn incorporating the defect and placing the expected incisions within the relaxed skin tension lines where possible.    The area thus outlined was incised deep to adipose tissue with a #15 scalpel blade.  The skin margins were undermined to an appropriate distance in all directions utilizing iris scissors.
Burow's Advancement Flap Text: The defect edges were debeveled with a #15 scalpel blade.  Given the location of the defect and the proximity to free margins a Burow's advancement flap was deemed most appropriate.  Using a sterile surgical marker, the appropriate advancement flap was drawn incorporating the defect and placing the expected incisions within the relaxed skin tension lines where possible.    The area thus outlined was incised deep to adipose tissue with a #15 scalpel blade.  The skin margins were undermined to an appropriate distance in all directions utilizing iris scissors.
Chonodrocutaneous Helical Advancement Flap Text: The defect edges were debeveled with a #15 scalpel blade.  Given the location of the defect and the proximity to free margins a chondrocutaneous helical advancement flap was deemed most appropriate.  Using a sterile surgical marker, the appropriate advancement flap was drawn incorporating the defect and placing the expected incisions within the relaxed skin tension lines where possible.    The area thus outlined was incised deep to adipose tissue with a #15 scalpel blade.  The skin margins were undermined to an appropriate distance in all directions utilizing iris scissors.
Crescentic Advancement Flap Text: The defect edges were debeveled with a #15 scalpel blade.  Given the location of the defect and the proximity to free margins a crescentic advancement flap was deemed most appropriate.  Using a sterile surgical marker, the appropriate advancement flap was drawn incorporating the defect and placing the expected incisions within the relaxed skin tension lines where possible.    The area thus outlined was incised deep to adipose tissue with a #15 scalpel blade.  The skin margins were undermined to an appropriate distance in all directions utilizing iris scissors.
A-T Advancement Flap Text: The defect edges were debeveled with a #15 scalpel blade.  Given the location of the defect, shape of the defect and the proximity to free margins an A-T advancement flap was deemed most appropriate.  Using a sterile surgical marker, an appropriate advancement flap was drawn incorporating the defect and placing the expected incisions within the relaxed skin tension lines where possible.    The area thus outlined was incised deep to adipose tissue with a #15 scalpel blade.  The skin margins were undermined to an appropriate distance in all directions utilizing iris scissors.
O-T Advancement Flap Text: The defect edges were debeveled with a #15 scalpel blade.  Given the location of the defect, shape of the defect and the proximity to free margins an O-T advancement flap was deemed most appropriate.  Using a sterile surgical marker, an appropriate advancement flap was drawn incorporating the defect and placing the expected incisions within the relaxed skin tension lines where possible.    The area thus outlined was incised deep to adipose tissue with a #15 scalpel blade.  The skin margins were undermined to an appropriate distance in all directions utilizing iris scissors.
O-L Flap Text: The defect edges were debeveled with a #15 scalpel blade.  Given the location of the defect, shape of the defect and the proximity to free margins an O-L flap was deemed most appropriate.  Using a sterile surgical marker, an appropriate advancement flap was drawn incorporating the defect and placing the expected incisions within the relaxed skin tension lines where possible.    The area thus outlined was incised deep to adipose tissue with a #15 scalpel blade.  The skin margins were undermined to an appropriate distance in all directions utilizing iris scissors.
O-Z Flap Text: The defect edges were debeveled with a #15 scalpel blade.  Given the location of the defect, shape of the defect and the proximity to free margins an O-Z flap was deemed most appropriate.  Using a sterile surgical marker, an appropriate transposition flap was drawn incorporating the defect and placing the expected incisions within the relaxed skin tension lines where possible. The area thus outlined was incised deep to adipose tissue with a #15 scalpel blade.  The skin margins were undermined to an appropriate distance in all directions utilizing iris scissors.
Double O-Z Flap Text: The defect edges were debeveled with a #15 scalpel blade.  Given the location of the defect, shape of the defect and the proximity to free margins a Double O-Z flap was deemed most appropriate.  Using a sterile surgical marker, an appropriate transposition flap was drawn incorporating the defect and placing the expected incisions within the relaxed skin tension lines where possible. The area thus outlined was incised deep to adipose tissue with a #15 scalpel blade.  The skin margins were undermined to an appropriate distance in all directions utilizing iris scissors.
V-Y Flap Text: The defect edges were debeveled with a #15 scalpel blade.  Given the location of the defect, shape of the defect and the proximity to free margins a V-Y flap was deemed most appropriate.  Using a sterile surgical marker, an appropriate advancement flap was drawn incorporating the defect and placing the expected incisions within the relaxed skin tension lines where possible.    The area thus outlined was incised deep to adipose tissue with a #15 scalpel blade.  The skin margins were undermined to an appropriate distance in all directions utilizing iris scissors.
Advancement-Rotation Flap Text: The defect edges were debeveled with a #15 scalpel blade.  Given the location of the defect, shape of the defect and the proximity to free margins an advancement-rotation flap was deemed most appropriate.  Using a sterile surgical marker, an appropriate flap was drawn incorporating the defect and placing the expected incisions within the relaxed skin tension lines where possible. The area thus outlined was incised deep to adipose tissue with a #15 scalpel blade.  The skin margins were undermined to an appropriate distance in all directions utilizing iris scissors.
Mercedes Flap Text: The defect edges were debeveled with a #15 scalpel blade.  Given the location of the defect, shape of the defect and the proximity to free margins a Mercedes flap was deemed most appropriate.  Using a sterile surgical marker, an appropriate advancement flap was drawn incorporating the defect and placing the expected incisions within the relaxed skin tension lines where possible. The area thus outlined was incised deep to adipose tissue with a #15 scalpel blade.  The skin margins were undermined to an appropriate distance in all directions utilizing iris scissors.
Modified Advancement Flap Text: The defect edges were debeveled with a #15 scalpel blade.  Given the location of the defect, shape of the defect and the proximity to free margins a modified advancement flap was deemed most appropriate.  Using a sterile surgical marker, an appropriate advancement flap was drawn incorporating the defect and placing the expected incisions within the relaxed skin tension lines where possible.    The area thus outlined was incised deep to adipose tissue with a #15 scalpel blade.  The skin margins were undermined to an appropriate distance in all directions utilizing iris scissors.
Mucosal Advancement Flap Text: Given the location of the defect, shape of the defect and the proximity to free margins a mucosal advancement flap was deemed most appropriate. Incisions were made with a 15 blade scalpel in the appropriate fashion along the cutaneous vermilion border and the mucosal lip. The remaining actinically damaged mucosal tissue was excised.  The mucosal advancement flap was then elevated to the gingival sulcus with care taken to preserve the neurovascular structures and advanced into the primary defect. Care was taken to ensure that precise realignment of the vermilion border was achieved.
Peng Advancement Flap Text: The defect edges were debeveled with a #15 scalpel blade.  Given the location of the defect, shape of the defect and the proximity to free margins a Peng advancement flap was deemed most appropriate.  Using a sterile surgical marker, an appropriate advancement flap was drawn incorporating the defect and placing the expected incisions within the relaxed skin tension lines where possible. The area thus outlined was incised deep to adipose tissue with a #15 scalpel blade.  The skin margins were undermined to an appropriate distance in all directions utilizing iris scissors.
Hatchet Flap Text: The defect edges were debeveled with a #15 scalpel blade.  Given the location of the defect, shape of the defect and the proximity to free margins a hatchet flap was deemed most appropriate.  Using a sterile surgical marker, an appropriate hatchet flap was drawn incorporating the defect and placing the expected incisions within the relaxed skin tension lines where possible.    The area thus outlined was incised deep to adipose tissue with a #15 scalpel blade.  The skin margins were undermined to an appropriate distance in all directions utilizing iris scissors.
Rotation Flap Text: The defect edges were debeveled with a #15 scalpel blade.  Given the location of the defect, shape of the defect and the proximity to free margins a rotation flap was deemed most appropriate.  Using a sterile surgical marker, an appropriate rotation flap was drawn incorporating the defect and placing the expected incisions within the relaxed skin tension lines where possible.    The area thus outlined was incised deep to adipose tissue with a #15 scalpel blade.  The skin margins were undermined to an appropriate distance in all directions utilizing iris scissors.
Spiral Flap Text: The defect edges were debeveled with a #15 scalpel blade.  Given the location of the defect, shape of the defect and the proximity to free margins a spiral flap was deemed most appropriate.  Using a sterile surgical marker, an appropriate rotation flap was drawn incorporating the defect and placing the expected incisions within the relaxed skin tension lines where possible. The area thus outlined was incised deep to adipose tissue with a #15 scalpel blade.  The skin margins were undermined to an appropriate distance in all directions utilizing iris scissors.
Staged Advancement Flap Text: The defect edges were debeveled with a #15 scalpel blade.  Given the location of the defect, shape of the defect and the proximity to free margins a staged advancement flap was deemed most appropriate.  Using a sterile surgical marker, an appropriate advancement flap was drawn incorporating the defect and placing the expected incisions within the relaxed skin tension lines where possible. The area thus outlined was incised deep to adipose tissue with a #15 scalpel blade.  The skin margins were undermined to an appropriate distance in all directions utilizing iris scissors.
Star Wedge Flap Text: The defect edges were debeveled with a #15 scalpel blade.  Given the location of the defect, shape of the defect and the proximity to free margins a star wedge flap was deemed most appropriate.  Using a sterile surgical marker, an appropriate rotation flap was drawn incorporating the defect and placing the expected incisions within the relaxed skin tension lines where possible. The area thus outlined was incised deep to adipose tissue with a #15 scalpel blade.  The skin margins were undermined to an appropriate distance in all directions utilizing iris scissors.
Transposition Flap Text: The defect edges were debeveled with a #15 scalpel blade.  Given the location of the defect and the proximity to free margins a transposition flap was deemed most appropriate.  Using a sterile surgical marker, an appropriate transposition flap was drawn incorporating the defect.    The area thus outlined was incised deep to adipose tissue with a #15 scalpel blade.  The skin margins were undermined to an appropriate distance in all directions utilizing iris scissors.
Muscle Hinge Flap Text: The defect edges were debeveled with a #15 scalpel blade.  Given the size, depth and location of the defect and the proximity to free margins a muscle hinge flap was deemed most appropriate.  Using a sterile surgical marker, an appropriate hinge flap was drawn incorporating the defect. The area thus outlined was incised with a #15 scalpel blade.  The skin margins were undermined to an appropriate distance in all directions utilizing iris scissors.
Mustarde Flap Text: The defect edges were debeveled with a #15 scalpel blade.  Given the size, depth and location of the defect and the proximity to free margins a Mustarde flap was deemed most appropriate.  Using a sterile surgical marker, an appropriate flap was drawn incorporating the defect. The area thus outlined was incised with a #15 scalpel blade.  The skin margins were undermined to an appropriate distance in all directions utilizing iris scissors.
Nasal Turnover Hinge Flap Text: The defect edges were debeveled with a #15 scalpel blade.  Given the size, depth, location of the defect and the defect being full thickness a nasal turnover hinge flap was deemed most appropriate.  Using a sterile surgical marker, an appropriate hinge flap was drawn incorporating the defect. The area thus outlined was incised with a #15 scalpel blade. The flap was designed to recreate the nasal mucosal lining and the alar rim. The skin margins were undermined to an appropriate distance in all directions utilizing iris scissors.
Nasalis-Muscle-Based Myocutaneous Island Pedicle Flap Text: Using a #15 blade, an incision was made around the donor flap to the level of the nasalis muscle. Wide lateral undermining was then performed in both the subcutaneous plane above the nasalis muscle, and in a submuscular plane just above periosteum. This allowed the formation of a free nasalis muscle axial pedicle (based on the angular artery) which was still attached to the actual cutaneous flap, increasing its mobility and vascular viability. Hemostasis was obtained with pinpoint electrocoagulation. The flap was mobilized into position and the pivotal anchor points positioned and stabilized with buried interrupted sutures. Subcutaneous and dermal tissues were closed in a multilayered fashion with sutures. Tissue redundancies were excised, and the epidermal edges were apposed without significant tension and sutured with sutures.
Orbicularis Oris Muscle Flap Text: The defect edges were debeveled with a #15 scalpel blade.  Given that the defect affected the competency of the oral sphincter an orbicularis oris muscle flap was deemed most appropriate to restore this competency and normal muscle function.  Using a sterile surgical marker, an appropriate flap was drawn incorporating the defect. The area thus outlined was incised with a #15 scalpel blade.
Melolabial Transposition Flap Text: The defect edges were debeveled with a #15 scalpel blade.  Given the location of the defect and the proximity to free margins a melolabial flap was deemed most appropriate.  Using a sterile surgical marker, an appropriate melolabial transposition flap was drawn incorporating the defect.    The area thus outlined was incised deep to adipose tissue with a #15 scalpel blade.  The skin margins were undermined to an appropriate distance in all directions utilizing iris scissors.
Rhombic Flap Text: The defect edges were debeveled with a #15 scalpel blade.  Given the location of the defect and the proximity to free margins a rhombic flap was deemed most appropriate.  Using a sterile surgical marker, an appropriate rhombic flap was drawn incorporating the defect.    The area thus outlined was incised deep to adipose tissue with a #15 scalpel blade.  The skin margins were undermined to an appropriate distance in all directions utilizing iris scissors.
Rhomboid Transposition Flap Text: The defect edges were debeveled with a #15 scalpel blade.  Given the location of the defect and the proximity to free margins a rhomboid transposition flap was deemed most appropriate.  Using a sterile surgical marker, an appropriate rhomboid flap was drawn incorporating the defect.    The area thus outlined was incised deep to adipose tissue with a #15 scalpel blade.  The skin margins were undermined to an appropriate distance in all directions utilizing iris scissors.
Bi-Rhombic Flap Text: The defect edges were debeveled with a #15 scalpel blade.  Given the location of the defect and the proximity to free margins a bi-rhombic flap was deemed most appropriate.  Using a sterile surgical marker, an appropriate rhombic flap was drawn incorporating the defect. The area thus outlined was incised deep to adipose tissue with a #15 scalpel blade.  The skin margins were undermined to an appropriate distance in all directions utilizing iris scissors.
Helical Rim Advancement Flap Text: The defect edges were debeveled with a #15 blade scalpel.  Given the location of the defect and the proximity to free margins (helical rim) a double helical rim advancement flap was deemed most appropriate.  Using a sterile surgical marker, the appropriate advancement flaps were drawn incorporating the defect and placing the expected incisions between the helical rim and antihelix where possible.  The area thus outlined was incised through and through with a #15 scalpel blade.  With a skin hook and iris scissors, the flaps were gently and sharply undermined and freed up.
Bilateral Helical Rim Advancement Flap Text: The defect edges were debeveled with a #15 blade scalpel.  Given the location of the defect and the proximity to free margins (helical rim) a bilateral helical rim advancement flap was deemed most appropriate.  Using a sterile surgical marker, the appropriate advancement flaps were drawn incorporating the defect and placing the expected incisions between the helical rim and antihelix where possible.  The area thus outlined was incised through and through with a #15 scalpel blade.  With a skin hook and iris scissors, the flaps were gently and sharply undermined and freed up.
Ear Star Wedge Flap Text: The defect edges were debeveled with a #15 blade scalpel.  Given the location of the defect and the proximity to free margins (helical rim) an ear star wedge flap was deemed most appropriate.  Using a sterile surgical marker, the appropriate flap was drawn incorporating the defect and placing the expected incisions between the helical rim and antihelix where possible.  The area thus outlined was incised through and through with a #15 scalpel blade.
Banner Transposition Flap Text: The defect edges were debeveled with a #15 scalpel blade.  Given the location of the defect and the proximity to free margins a Banner transposition flap was deemed most appropriate.  Using a sterile surgical marker, an appropriate flap drawn around the defect. The area thus outlined was incised deep to adipose tissue with a #15 scalpel blade.  The skin margins were undermined to an appropriate distance in all directions utilizing iris scissors.
Bilobed Flap Text: The defect edges were debeveled with a #15 scalpel blade.  Given the location of the defect and the proximity to free margins a bilobe flap was deemed most appropriate.  Using a sterile surgical marker, an appropriate bilobe flap drawn around the defect.    The area thus outlined was incised deep to adipose tissue with a #15 scalpel blade.  The skin margins were undermined to an appropriate distance in all directions utilizing iris scissors.
Bilobed Transposition Flap Text: The defect edges were debeveled with a #15 scalpel blade.  Given the location of the defect and the proximity to free margins a bilobed transposition flap was deemed most appropriate.  Using a sterile surgical marker, an appropriate bilobe flap drawn around the defect.    The area thus outlined was incised deep to adipose tissue with a #15 scalpel blade.  The skin margins were undermined to an appropriate distance in all directions utilizing iris scissors.
Trilobed Flap Text: The defect edges were debeveled with a #15 scalpel blade.  Given the location of the defect and the proximity to free margins a trilobed flap was deemed most appropriate.  Using a sterile surgical marker, an appropriate trilobed flap drawn around the defect.    The area thus outlined was incised deep to adipose tissue with a #15 scalpel blade.  The skin margins were undermined to an appropriate distance in all directions utilizing iris scissors.
Dorsal Nasal Flap Text: The defect edges were debeveled with a #15 scalpel blade.  Given the location of the defect and the proximity to free margins a dorsal nasal flap was deemed most appropriate.  Using a sterile surgical marker, an appropriate dorsal nasal flap was drawn around the defect.    The area thus outlined was incised deep to adipose tissue with a #15 scalpel blade.  The skin margins were undermined to an appropriate distance in all directions utilizing iris scissors.
Island Pedicle Flap Text: The defect edges were debeveled with a #15 scalpel blade.  Given the location of the defect, shape of the defect and the proximity to free margins an island pedicle advancement flap was deemed most appropriate.  Using a sterile surgical marker, an appropriate advancement flap was drawn incorporating the defect, outlining the appropriate donor tissue and placing the expected incisions within the relaxed skin tension lines where possible.    The area thus outlined was incised deep to adipose tissue with a #15 scalpel blade.  The skin margins were undermined to an appropriate distance in all directions around the primary defect and laterally outward around the island pedicle utilizing iris scissors.  There was minimal undermining beneath the pedicle flap.
Island Pedicle Flap With Canthal Suspension Text: The defect edges were debeveled with a #15 scalpel blade.  Given the location of the defect, shape of the defect and the proximity to free margins an island pedicle advancement flap was deemed most appropriate.  Using a sterile surgical marker, an appropriate advancement flap was drawn incorporating the defect, outlining the appropriate donor tissue and placing the expected incisions within the relaxed skin tension lines where possible. The area thus outlined was incised deep to adipose tissue with a #15 scalpel blade.  The skin margins were undermined to an appropriate distance in all directions around the primary defect and laterally outward around the island pedicle utilizing iris scissors.  There was minimal undermining beneath the pedicle flap. A suspension suture was placed in the canthal tendon to prevent tension and prevent ectropion.
Alar Island Pedicle Flap Text: The defect edges were debeveled with a #15 scalpel blade.  Given the location of the defect, shape of the defect and the proximity to the alar rim an island pedicle advancement flap was deemed most appropriate.  Using a sterile surgical marker, an appropriate advancement flap was drawn incorporating the defect, outlining the appropriate donor tissue and placing the expected incisions within the nasal ala running parallel to the alar rim. The area thus outlined was incised with a #15 scalpel blade.  The skin margins were undermined minimally to an appropriate distance in all directions around the primary defect and laterally outward around the island pedicle utilizing iris scissors.  There was minimal undermining beneath the pedicle flap.
Double Island Pedicle Flap Text: The defect edges were debeveled with a #15 scalpel blade.  Given the location of the defect, shape of the defect and the proximity to free margins a double island pedicle advancement flap was deemed most appropriate.  Using a sterile surgical marker, an appropriate advancement flap was drawn incorporating the defect, outlining the appropriate donor tissue and placing the expected incisions within the relaxed skin tension lines where possible.    The area thus outlined was incised deep to adipose tissue with a #15 scalpel blade.  The skin margins were undermined to an appropriate distance in all directions around the primary defect and laterally outward around the island pedicle utilizing iris scissors.  There was minimal undermining beneath the pedicle flap.
Island Pedicle Flap-Requiring Vessel Identification Text: The defect edges were debeveled with a #15 scalpel blade.  Given the location of the defect, shape of the defect and the proximity to free margins an island pedicle advancement flap was deemed most appropriate.  Using a sterile surgical marker, an appropriate advancement flap was drawn, based on the axial vessel mentioned above, incorporating the defect, outlining the appropriate donor tissue and placing the expected incisions within the relaxed skin tension lines where possible.    The area thus outlined was incised deep to adipose tissue with a #15 scalpel blade.  The skin margins were undermined to an appropriate distance in all directions around the primary defect and laterally outward around the island pedicle utilizing iris scissors.  There was minimal undermining beneath the pedicle flap.
Keystone Flap Text: The defect edges were debeveled with a #15 scalpel blade.  Given the location of the defect, shape of the defect a keystone flap was deemed most appropriate.  Using a sterile surgical marker, an appropriate keystone flap was drawn incorporating the defect, outlining the appropriate donor tissue and placing the expected incisions within the relaxed skin tension lines where possible. The area thus outlined was incised deep to adipose tissue with a #15 scalpel blade.  The skin margins were undermined to an appropriate distance in all directions around the primary defect and laterally outward around the flap utilizing iris scissors.
O-T Plasty Text: The defect edges were debeveled with a #15 scalpel blade.  Given the location of the defect, shape of the defect and the proximity to free margins an O-T plasty was deemed most appropriate.  Using a sterile surgical marker, an appropriate O-T plasty was drawn incorporating the defect and placing the expected incisions within the relaxed skin tension lines where possible.    The area thus outlined was incised deep to adipose tissue with a #15 scalpel blade.  The skin margins were undermined to an appropriate distance in all directions utilizing iris scissors.
O-Z Plasty Text: The defect edges were debeveled with a #15 scalpel blade.  Given the location of the defect, shape of the defect and the proximity to free margins an O-Z plasty (double transposition flap) was deemed most appropriate.  Using a sterile surgical marker, the appropriate transposition flaps were drawn incorporating the defect and placing the expected incisions within the relaxed skin tension lines where possible.    The area thus outlined was incised deep to adipose tissue with a #15 scalpel blade.  The skin margins were undermined to an appropriate distance in all directions utilizing iris scissors.  Hemostasis was achieved with electrocautery.  The flaps were then transposed into place, one clockwise and the other counterclockwise, and anchored with interrupted buried subcutaneous sutures.
Double O-Z Plasty Text: The defect edges were debeveled with a #15 scalpel blade.  Given the location of the defect, shape of the defect and the proximity to free margins a Double O-Z plasty (double transposition flap) was deemed most appropriate.  Using a sterile surgical marker, the appropriate transposition flaps were drawn incorporating the defect and placing the expected incisions within the relaxed skin tension lines where possible. The area thus outlined was incised deep to adipose tissue with a #15 scalpel blade.  The skin margins were undermined to an appropriate distance in all directions utilizing iris scissors.  Hemostasis was achieved with electrocautery.  The flaps were then transposed into place, one clockwise and the other counterclockwise, and anchored with interrupted buried subcutaneous sutures.
V-Y Plasty Text: The defect edges were debeveled with a #15 scalpel blade.  Given the location of the defect, shape of the defect and the proximity to free margins an V-Y advancement flap was deemed most appropriate.  Using a sterile surgical marker, an appropriate advancement flap was drawn incorporating the defect and placing the expected incisions within the relaxed skin tension lines where possible.    The area thus outlined was incised deep to adipose tissue with a #15 scalpel blade.  The skin margins were undermined to an appropriate distance in all directions utilizing iris scissors.
H Plasty Text: Given the location of the defect, shape of the defect and the proximity to free margins a H-plasty was deemed most appropriate for repair.  Using a sterile surgical marker, the appropriate advancement arms of the H-plasty were drawn incorporating the defect and placing the expected incisions within the relaxed skin tension lines where possible. The area thus outlined was incised deep to adipose tissue with a #15 scalpel blade. The skin margins were undermined to an appropriate distance in all directions utilizing iris scissors.  The opposing advancement arms were then advanced into place in opposite direction and anchored with interrupted buried subcutaneous sutures.
W Plasty Text: The lesion was extirpated to the level of the fat with a #15 scalpel blade.  Given the location of the defect, shape of the defect and the proximity to free margins a W-plasty was deemed most appropriate for repair.  Using a sterile surgical marker, the appropriate transposition arms of the W-plasty were drawn incorporating the defect and placing the expected incisions within the relaxed skin tension lines where possible.    The area thus outlined was incised deep to adipose tissue with a #15 scalpel blade.  The skin margins were undermined to an appropriate distance in all directions utilizing iris scissors.  The opposing transposition arms were then transposed into place in opposite direction and anchored with interrupted buried subcutaneous sutures.
Z Plasty Text: The lesion was extirpated to the level of the fat with a #15 scalpel blade.  Given the location of the defect, shape of the defect and the proximity to free margins a Z-plasty was deemed most appropriate for repair.  Using a sterile surgical marker, the appropriate transposition arms of the Z-plasty were drawn incorporating the defect and placing the expected incisions within the relaxed skin tension lines where possible.    The area thus outlined was incised deep to adipose tissue with a #15 scalpel blade.  The skin margins were undermined to an appropriate distance in all directions utilizing iris scissors.  The opposing transposition arms were then transposed into place in opposite direction and anchored with interrupted buried subcutaneous sutures.
Zygomaticofacial Flap Text: Given the location of the defect, shape of the defect and the proximity to free margins a zygomaticofacial flap was deemed most appropriate for repair.  Using a sterile surgical marker, the appropriate flap was drawn incorporating the defect and placing the expected incisions within the relaxed skin tension lines where possible. The area thus outlined was incised deep to adipose tissue with a #15 scalpel blade with preservation of a vascular pedicle.  The skin margins were undermined to an appropriate distance in all directions utilizing iris scissors.  The flap was then placed into the defect and anchored with interrupted buried subcutaneous sutures.
Cheek Interpolation Flap Text: A decision was made to reconstruct the defect utilizing an interpolation axial flap and a staged reconstruction.  A telfa template was made of the defect.  This telfa template was then used to outline the Cheek Interpolation flap.  The donor area for the pedicle flap was then injected with anesthesia.  The flap was excised through the skin and subcutaneous tissue down to the layer of the underlying musculature.  The interpolation flap was carefully excised within this deep plane to maintain its blood supply.  The edges of the donor site were undermined.   The donor site was closed in a primary fashion.  The pedicle was then rotated into position and sutured.  Once the tube was sutured into place, adequate blood supply was confirmed with blanching and refill.  The pedicle was then wrapped with xeroform gauze and dressed appropriately with a telfa and gauze bandage to ensure continued blood supply and protect the attached pedicle.
Cheek-To-Nose Interpolation Flap Text: A decision was made to reconstruct the defect utilizing an interpolation axial flap and a staged reconstruction.  A telfa template was made of the defect.  This telfa template was then used to outline the Cheek-To-Nose Interpolation flap.  The donor area for the pedicle flap was then injected with anesthesia.  The flap was excised through the skin and subcutaneous tissue down to the layer of the underlying musculature.  The interpolation flap was carefully excised within this deep plane to maintain its blood supply.  The edges of the donor site were undermined.   The donor site was closed in a primary fashion.  The pedicle was then rotated into position and sutured.  Once the tube was sutured into place, adequate blood supply was confirmed with blanching and refill.  The pedicle was then wrapped with xeroform gauze and dressed appropriately with a telfa and gauze bandage to ensure continued blood supply and protect the attached pedicle.
Interpolation Flap Text: A decision was made to reconstruct the defect utilizing an interpolation axial flap and a staged reconstruction.  A telfa template was made of the defect.  This telfa template was then used to outline the interpolation flap.  The donor area for the pedicle flap was then injected with anesthesia.  The flap was excised through the skin and subcutaneous tissue down to the layer of the underlying musculature.  The interpolation flap was carefully excised within this deep plane to maintain its blood supply.  The edges of the donor site were undermined.   The donor site was closed in a primary fashion.  The pedicle was then rotated into position and sutured.  Once the tube was sutured into place, adequate blood supply was confirmed with blanching and refill.  The pedicle was then wrapped with xeroform gauze and dressed appropriately with a telfa and gauze bandage to ensure continued blood supply and protect the attached pedicle.
Melolabial Interpolation Flap Text: A decision was made to reconstruct the defect utilizing an interpolation axial flap and a staged reconstruction.  A telfa template was made of the defect.  This telfa template was then used to outline the melolabial interpolation flap.  The donor area for the pedicle flap was then injected with anesthesia.  The flap was excised through the skin and subcutaneous tissue down to the layer of the underlying musculature.  The pedicle flap was carefully excised within this deep plane to maintain its blood supply.  The edges of the donor site were undermined.   The donor site was closed in a primary fashion.  The pedicle was then rotated into position and sutured.  Once the tube was sutured into place, adequate blood supply was confirmed with blanching and refill.  The pedicle was then wrapped with xeroform gauze and dressed appropriately with a telfa and gauze bandage to ensure continued blood supply and protect the attached pedicle.
Mastoid Interpolation Flap Text: A decision was made to reconstruct the defect utilizing an interpolation axial flap and a staged reconstruction.  A telfa template was made of the defect.  This telfa template was then used to outline the mastoid interpolation flap.  The donor area for the pedicle flap was then injected with anesthesia.  The flap was excised through the skin and subcutaneous tissue down to the layer of the underlying musculature.  The pedicle flap was carefully excised within this deep plane to maintain its blood supply.  The edges of the donor site were undermined.   The donor site was closed in a primary fashion.  The pedicle was then rotated into position and sutured.  Once the tube was sutured into place, adequate blood supply was confirmed with blanching and refill.  The pedicle was then wrapped with xeroform gauze and dressed appropriately with a telfa and gauze bandage to ensure continued blood supply and protect the attached pedicle.
Posterior Auricular Interpolation Flap Text: A decision was made to reconstruct the defect utilizing an interpolation axial flap and a staged reconstruction.  A telfa template was made of the defect.  This telfa template was then used to outline the posterior auricular interpolation flap.  The donor area for the pedicle flap was then injected with anesthesia.  The flap was excised through the skin and subcutaneous tissue down to the layer of the underlying musculature.  The pedicle flap was carefully excised within this deep plane to maintain its blood supply.  The edges of the donor site were undermined.   The donor site was closed in a primary fashion.  The pedicle was then rotated into position and sutured.  Once the tube was sutured into place, adequate blood supply was confirmed with blanching and refill.  The pedicle was then wrapped with xeroform gauze and dressed appropriately with a telfa and gauze bandage to ensure continued blood supply and protect the attached pedicle.
Paramedian Forehead Flap Text: A decision was made to reconstruct the defect utilizing an interpolation axial flap and a staged reconstruction.  A telfa template was made of the defect.  This telfa template was then used to outline the paramedian forehead pedicle flap.  The donor area for the pedicle flap was then injected with anesthesia.  The flap was excised through the skin and subcutaneous tissue down to the layer of the underlying musculature.  The pedicle flap was carefully excised within this deep plane to maintain its blood supply.  The edges of the donor site were undermined.   The donor site was closed in a primary fashion.  The pedicle was then rotated into position and sutured.  Once the tube was sutured into place, adequate blood supply was confirmed with blanching and refill.  The pedicle was then wrapped with xeroform gauze and dressed appropriately with a telfa and gauze bandage to ensure continued blood supply and protect the attached pedicle.
Abbe Flap (Upper To Lower Lip) Text: The defect of the lower lip was assessed and measured.  Given the location and size of the defect, an Abbe flap was deemed most appropriate.  Using a sterile surgical marker, an appropriate Abbe flap was measured and drawn on the upper lip. Local anesthesia was then infiltrated.  A scalpel was then used to incise the upper lip through and through the skin, vermilion, muscle and mucosa, leaving the flap pedicled on the opposite side.  The flap was then rotated and transferred to the lower lip defect.  The flap was then sutured into place with a three layer technique, closing the orbicularis oris muscle layer with subcutaneous buried sutures, followed by a mucosal layer and an epidermal layer.
Abbe Flap (Lower To Upper Lip) Text: The defect of the upper lip was assessed and measured.  Given the location and size of the defect, an Abbe flap was deemed most appropriate.  Using a sterile surgical marker, an appropriate Abbe flap was measured and drawn on the lower lip. Local anesthesia was then infiltrated. A scalpel was then used to incise the upper lip through and through the skin, vermilion, muscle and mucosa, leaving the flap pedicled on the opposite side.  The flap was then rotated and transferred to the lower lip defect.  The flap was then sutured into place with a three layer technique, closing the orbicularis oris muscle layer with subcutaneous buried sutures, followed by a mucosal layer and an epidermal layer.
Estlander Flap (Upper To Lower Lip) Text: The defect of the lower lip was assessed and measured.  Given the location and size of the defect, an Estlander flap was deemed most appropriate.  Using a sterile surgical marker, an appropriate Estlander flap was measured and drawn on the upper lip. Local anesthesia was then infiltrated. A scalpel was then used to incise the lateral aspect of the flap, through skin, muscle and mucosa, leaving the flap pedicled medially.  The flap was then rotated and positioned to fill the lower lip defect.  The flap was then sutured into place with a three layer technique, closing the orbicularis oris muscle layer with subcutaneous buried sutures, followed by a mucosal layer and an epidermal layer.
Lip Wedge Excision Repair Text: Given the location of the defect and the proximity to free margins a full thickness wedge repair was deemed most appropriate.  Using a sterile surgical marker, the appropriate repair was drawn incorporating the defect and placing the expected incisions perpendicular to the vermilion border.  The vermilion border was also meticulously outlined to ensure appropriate reapproximation during the repair.  The area thus outlined was incised through and through with a #15 scalpel blade.  The muscularis and dermis were reaproximated with deep sutures following hemostasis. Care was taken to realign the vermilion border before proceeding with the superficial closure.  Once the vermilion was realigned the superfical and mucosal closure was finished.
Ftsg Text: The defect edges were debeveled with a #15 scalpel blade.  Given the location of the defect, shape of the defect and the proximity to free margins a full thickness skin graft was deemed most appropriate.  Using a sterile surgical marker, the primary defect shape was transferred to the donor site. The area thus outlined was incised deep to adipose tissue with a #15 scalpel blade.  The harvested graft was then trimmed of adipose tissue until only dermis and epidermis was left.  The skin margins of the secondary defect were undermined to an appropriate distance in all directions utilizing iris scissors.  The secondary defect was closed with interrupted buried subcutaneous sutures.  The skin edges were then re-apposed with running  sutures.  The skin graft was then placed in the primary defect and oriented appropriately.
Split-Thickness Skin Graft Text: The defect edges were debeveled with a #15 scalpel blade.  Given the location of the defect, shape of the defect and the proximity to free margins a split thickness skin graft was deemed most appropriate.  Using a sterile surgical marker, the primary defect shape was transferred to the donor site. The split thickness graft was then harvested.  The skin graft was then placed in the primary defect and oriented appropriately.
Burow's Graft Text: The defect edges were debeveled with a #15 scalpel blade.  Given the location of the defect, shape of the defect, the proximity to free margins and the presence of a standing cone deformity a Burow's skin graft was deemed most appropriate. The standing cone was removed and this tissue was then trimmed to the shape of the primary defect. The adipose tissue was also removed until only dermis and epidermis were left.  The skin margins of the secondary defect were undermined to an appropriate distance in all directions utilizing iris scissors.  The secondary defect was closed with interrupted buried subcutaneous sutures.  The skin edges were then re-apposed with running  sutures.  The skin graft was then placed in the primary defect and oriented appropriately.
Cartilage Graft Text: The defect edges were debeveled with a #15 scalpel blade.  Given the location of the defect, shape of the defect, the fact the defect involved a full thickness cartilage defect a cartilage graft was deemed most appropriate.  An appropriate donor site was identified, cleansed, and anesthetized. The cartilage graft was then harvested and transferred to the recipient site, oriented appropriately and then sutured into place.  The secondary defect was then repaired using a primary closure.
Composite Graft Text: The defect edges were debeveled with a #15 scalpel blade.  Given the location of the defect, shape of the defect, the proximity to free margins and the fact the defect was full thickness a composite graft was deemed most appropriate.  The defect was outline and then transferred to the donor site.  A full thickness graft was then excised from the donor site. The graft was then placed in the primary defect, oriented appropriately and then sutured into place.  The secondary defect was then repaired using a primary closure.
Epidermal Autograft Text: The defect edges were debeveled with a #15 scalpel blade.  Given the location of the defect, shape of the defect and the proximity to free margins an epidermal autograft was deemed most appropriate.  Using a sterile surgical marker, the primary defect shape was transferred to the donor site. The epidermal graft was then harvested.  The skin graft was then placed in the primary defect and oriented appropriately.
Dermal Autograft Text: The defect edges were debeveled with a #15 scalpel blade.  Given the location of the defect, shape of the defect and the proximity to free margins a dermal autograft was deemed most appropriate.  Using a sterile surgical marker, the primary defect shape was transferred to the donor site. The area thus outlined was incised deep to adipose tissue with a #15 scalpel blade.  The harvested graft was then trimmed of adipose and epidermal tissue until only dermis was left.  The skin graft was then placed in the primary defect and oriented appropriately.
Skin Substitute Text: The defect edges were debeveled with a #15 scalpel blade.  Given the location of the defect, shape of the defect and the proximity to free margins a skin substitute graft was deemed most appropriate.  The graft material was trimmed to fit the size of the defect. The graft was then placed in the primary defect and oriented appropriately.
Tissue Cultured Epidermal Autograft Text: The defect edges were debeveled with a #15 scalpel blade.  Given the location of the defect, shape of the defect and the proximity to free margins a tissue cultured epidermal autograft was deemed most appropriate.  The graft was then trimmed to fit the size of the defect.  The graft was then placed in the primary defect and oriented appropriately.
Xenograft Text: The defect edges were debeveled with a #15 scalpel blade.  Given the location of the defect, shape of the defect and the proximity to free margins a xenograft was deemed most appropriate.  The graft was then trimmed to fit the size of the defect.  The graft was then placed in the primary defect and oriented appropriately.
Purse String (Intermediate) Text: Given the location of the defect and the characteristics of the surrounding skin a purse string intermediate closure was deemed most appropriate.  Undermining was performed circumfirentially around the surgical defect.  A purse string suture was then placed and tightened.
Purse String (Simple) Text: Given the location of the defect and the characteristics of the surrounding skin a purse string simple closure was deemed most appropriate.  Undermining was performed circumferentially around the surgical defect.  A purse string suture was then placed and tightened.
Partial Purse String (Intermediate) Text: Given the location of the defect and the characteristics of the surrounding skin an intermediate purse string closure was deemed most appropriate.  Undermining was performed circumferentially around the surgical defect.  A purse string suture was then placed and tightened. Wound tension of the circular defect prevented complete closure of the wound.
Partial Purse String (Simple) Text: Given the location of the defect and the characteristics of the surrounding skin a simple purse string closure was deemed most appropriate.  Undermining was performed circumferentially around the surgical defect.  A purse string suture was then placed and tightened. Wound tension of the circular defect prevented complete closure of the wound.
Complex Repair And Single Advancement Flap Text: The defect edges were debeveled with a #15 scalpel blade.  The primary defect was closed partially with a complex linear closure.  Given the location of the remaining defect, shape of the defect and the proximity to free margins a single advancement flap was deemed most appropriate for complete closure of the defect.  Using a sterile surgical marker, an appropriate advancement flap was drawn incorporating the defect and placing the expected incisions within the relaxed skin tension lines where possible.    The area thus outlined was incised deep to adipose tissue with a #15 scalpel blade.  The skin margins were undermined to an appropriate distance in all directions utilizing iris scissors.
Complex Repair And Double Advancement Flap Text: The defect edges were debeveled with a #15 scalpel blade.  The primary defect was closed partially with a complex linear closure.  Given the location of the remaining defect, shape of the defect and the proximity to free margins a double advancement flap was deemed most appropriate for complete closure of the defect.  Using a sterile surgical marker, an appropriate advancement flap was drawn incorporating the defect and placing the expected incisions within the relaxed skin tension lines where possible.    The area thus outlined was incised deep to adipose tissue with a #15 scalpel blade.  The skin margins were undermined to an appropriate distance in all directions utilizing iris scissors.
Complex Repair And Modified Advancement Flap Text: The defect edges were debeveled with a #15 scalpel blade.  The primary defect was closed partially with a complex linear closure.  Given the location of the remaining defect, shape of the defect and the proximity to free margins a modified advancement flap was deemed most appropriate for complete closure of the defect.  Using a sterile surgical marker, an appropriate advancement flap was drawn incorporating the defect and placing the expected incisions within the relaxed skin tension lines where possible.    The area thus outlined was incised deep to adipose tissue with a #15 scalpel blade.  The skin margins were undermined to an appropriate distance in all directions utilizing iris scissors.
Complex Repair And A-T Advancement Flap Text: The defect edges were debeveled with a #15 scalpel blade.  The primary defect was closed partially with a complex linear closure.  Given the location of the remaining defect, shape of the defect and the proximity to free margins an A-T advancement flap was deemed most appropriate for complete closure of the defect.  Using a sterile surgical marker, an appropriate advancement flap was drawn incorporating the defect and placing the expected incisions within the relaxed skin tension lines where possible.    The area thus outlined was incised deep to adipose tissue with a #15 scalpel blade.  The skin margins were undermined to an appropriate distance in all directions utilizing iris scissors.
Complex Repair And O-T Advancement Flap Text: The defect edges were debeveled with a #15 scalpel blade.  The primary defect was closed partially with a complex linear closure.  Given the location of the remaining defect, shape of the defect and the proximity to free margins an O-T advancement flap was deemed most appropriate for complete closure of the defect.  Using a sterile surgical marker, an appropriate advancement flap was drawn incorporating the defect and placing the expected incisions within the relaxed skin tension lines where possible.    The area thus outlined was incised deep to adipose tissue with a #15 scalpel blade.  The skin margins were undermined to an appropriate distance in all directions utilizing iris scissors.
Complex Repair And O-L Flap Text: The defect edges were debeveled with a #15 scalpel blade.  The primary defect was closed partially with a complex linear closure.  Given the location of the remaining defect, shape of the defect and the proximity to free margins an O-L flap was deemed most appropriate for complete closure of the defect.  Using a sterile surgical marker, an appropriate flap was drawn incorporating the defect and placing the expected incisions within the relaxed skin tension lines where possible.    The area thus outlined was incised deep to adipose tissue with a #15 scalpel blade.  The skin margins were undermined to an appropriate distance in all directions utilizing iris scissors.
Complex Repair And Bilobe Flap Text: The defect edges were debeveled with a #15 scalpel blade.  The primary defect was closed partially with a complex linear closure.  Given the location of the remaining defect, shape of the defect and the proximity to free margins a bilobe flap was deemed most appropriate for complete closure of the defect.  Using a sterile surgical marker, an appropriate advancement flap was drawn incorporating the defect and placing the expected incisions within the relaxed skin tension lines where possible.    The area thus outlined was incised deep to adipose tissue with a #15 scalpel blade.  The skin margins were undermined to an appropriate distance in all directions utilizing iris scissors.
Complex Repair And Melolabial Flap Text: The defect edges were debeveled with a #15 scalpel blade.  The primary defect was closed partially with a complex linear closure.  Given the location of the remaining defect, shape of the defect and the proximity to free margins a melolabial flap was deemed most appropriate for complete closure of the defect.  Using a sterile surgical marker, an appropriate advancement flap was drawn incorporating the defect and placing the expected incisions within the relaxed skin tension lines where possible.    The area thus outlined was incised deep to adipose tissue with a #15 scalpel blade.  The skin margins were undermined to an appropriate distance in all directions utilizing iris scissors.
Complex Repair And Rotation Flap Text: The defect edges were debeveled with a #15 scalpel blade.  The primary defect was closed partially with a complex linear closure.  Given the location of the remaining defect, shape of the defect and the proximity to free margins a rotation flap was deemed most appropriate for complete closure of the defect.  Using a sterile surgical marker, an appropriate advancement flap was drawn incorporating the defect and placing the expected incisions within the relaxed skin tension lines where possible.    The area thus outlined was incised deep to adipose tissue with a #15 scalpel blade.  The skin margins were undermined to an appropriate distance in all directions utilizing iris scissors.
Complex Repair And Rhombic Flap Text: The defect edges were debeveled with a #15 scalpel blade.  The primary defect was closed partially with a complex linear closure.  Given the location of the remaining defect, shape of the defect and the proximity to free margins a rhombic flap was deemed most appropriate for complete closure of the defect.  Using a sterile surgical marker, an appropriate advancement flap was drawn incorporating the defect and placing the expected incisions within the relaxed skin tension lines where possible.    The area thus outlined was incised deep to adipose tissue with a #15 scalpel blade.  The skin margins were undermined to an appropriate distance in all directions utilizing iris scissors.
Complex Repair And Transposition Flap Text: The defect edges were debeveled with a #15 scalpel blade.  The primary defect was closed partially with a complex linear closure.  Given the location of the remaining defect, shape of the defect and the proximity to free margins a transposition flap was deemed most appropriate for complete closure of the defect.  Using a sterile surgical marker, an appropriate advancement flap was drawn incorporating the defect and placing the expected incisions within the relaxed skin tension lines where possible.    The area thus outlined was incised deep to adipose tissue with a #15 scalpel blade.  The skin margins were undermined to an appropriate distance in all directions utilizing iris scissors.
Complex Repair And V-Y Plasty Text: The defect edges were debeveled with a #15 scalpel blade.  The primary defect was closed partially with a complex linear closure.  Given the location of the remaining defect, shape of the defect and the proximity to free margins a V-Y plasty was deemed most appropriate for complete closure of the defect.  Using a sterile surgical marker, an appropriate advancement flap was drawn incorporating the defect and placing the expected incisions within the relaxed skin tension lines where possible.    The area thus outlined was incised deep to adipose tissue with a #15 scalpel blade.  The skin margins were undermined to an appropriate distance in all directions utilizing iris scissors.
Complex Repair And M Plasty Text: The defect edges were debeveled with a #15 scalpel blade.  The primary defect was closed partially with a complex linear closure.  Given the location of the remaining defect, shape of the defect and the proximity to free margins an M plasty was deemed most appropriate for complete closure of the defect.  Using a sterile surgical marker, an appropriate advancement flap was drawn incorporating the defect and placing the expected incisions within the relaxed skin tension lines where possible.    The area thus outlined was incised deep to adipose tissue with a #15 scalpel blade.  The skin margins were undermined to an appropriate distance in all directions utilizing iris scissors.
Complex Repair And Double M Plasty Text: The defect edges were debeveled with a #15 scalpel blade.  The primary defect was closed partially with a complex linear closure.  Given the location of the remaining defect, shape of the defect and the proximity to free margins a double M plasty was deemed most appropriate for complete closure of the defect.  Using a sterile surgical marker, an appropriate advancement flap was drawn incorporating the defect and placing the expected incisions within the relaxed skin tension lines where possible.    The area thus outlined was incised deep to adipose tissue with a #15 scalpel blade.  The skin margins were undermined to an appropriate distance in all directions utilizing iris scissors.
Complex Repair And W Plasty Text: The defect edges were debeveled with a #15 scalpel blade.  The primary defect was closed partially with a complex linear closure.  Given the location of the remaining defect, shape of the defect and the proximity to free margins a W plasty was deemed most appropriate for complete closure of the defect.  Using a sterile surgical marker, an appropriate advancement flap was drawn incorporating the defect and placing the expected incisions within the relaxed skin tension lines where possible.    The area thus outlined was incised deep to adipose tissue with a #15 scalpel blade.  The skin margins were undermined to an appropriate distance in all directions utilizing iris scissors.
Complex Repair And Z Plasty Text: The defect edges were debeveled with a #15 scalpel blade.  The primary defect was closed partially with a complex linear closure.  Given the location of the remaining defect, shape of the defect and the proximity to free margins a Z plasty was deemed most appropriate for complete closure of the defect.  Using a sterile surgical marker, an appropriate advancement flap was drawn incorporating the defect and placing the expected incisions within the relaxed skin tension lines where possible.    The area thus outlined was incised deep to adipose tissue with a #15 scalpel blade.  The skin margins were undermined to an appropriate distance in all directions utilizing iris scissors.
Complex Repair And Dorsal Nasal Flap Text: The defect edges were debeveled with a #15 scalpel blade.  The primary defect was closed partially with a complex linear closure.  Given the location of the remaining defect, shape of the defect and the proximity to free margins a dorsal nasal flap was deemed most appropriate for complete closure of the defect.  Using a sterile surgical marker, an appropriate flap was drawn incorporating the defect and placing the expected incisions within the relaxed skin tension lines where possible.    The area thus outlined was incised deep to adipose tissue with a #15 scalpel blade.  The skin margins were undermined to an appropriate distance in all directions utilizing iris scissors.
Complex Repair And Ftsg Text: The defect edges were debeveled with a #15 scalpel blade.  The primary defect was closed partially with a complex linear closure.  Given the location of the defect, shape of the defect and the proximity to free margins a full thickness skin graft was deemed most appropriate to repair the remaining defect.  The graft was trimmed to fit the size of the remaining defect.  The graft was then placed in the primary defect, oriented appropriately, and sutured into place.
Complex Repair And Burow's Graft Text: The defect edges were debeveled with a #15 scalpel blade.  The primary defect was closed partially with a complex linear closure.  Given the location of the defect, shape of the defect, the proximity to free margins and the presence of a standing cone deformity a Burow's graft was deemed most appropriate to repair the remaining defect.  The graft was trimmed to fit the size of the remaining defect.  The graft was then placed in the primary defect, oriented appropriately, and sutured into place.
Complex Repair And Split-Thickness Skin Graft Text: The defect edges were debeveled with a #15 scalpel blade.  The primary defect was closed partially with a complex linear closure.  Given the location of the defect, shape of the defect and the proximity to free margins a split thickness skin graft was deemed most appropriate to repair the remaining defect.  The graft was trimmed to fit the size of the remaining defect.  The graft was then placed in the primary defect, oriented appropriately, and sutured into place.
Complex Repair And Epidermal Autograft Text: The defect edges were debeveled with a #15 scalpel blade.  The primary defect was closed partially with a complex linear closure.  Given the location of the defect, shape of the defect and the proximity to free margins an epidermal autograft was deemed most appropriate to repair the remaining defect.  The graft was trimmed to fit the size of the remaining defect.  The graft was then placed in the primary defect, oriented appropriately, and sutured into place.
Complex Repair And Dermal Autograft Text: The defect edges were debeveled with a #15 scalpel blade.  The primary defect was closed partially with a complex linear closure.  Given the location of the defect, shape of the defect and the proximity to free margins an dermal autograft was deemed most appropriate to repair the remaining defect.  The graft was trimmed to fit the size of the remaining defect.  The graft was then placed in the primary defect, oriented appropriately, and sutured into place.
Complex Repair And Tissue Cultured Epidermal Autograft Text: The defect edges were debeveled with a #15 scalpel blade.  The primary defect was closed partially with a complex linear closure.  Given the location of the defect, shape of the defect and the proximity to free margins an tissue cultured epidermal autograft was deemed most appropriate to repair the remaining defect.  The graft was trimmed to fit the size of the remaining defect.  The graft was then placed in the primary defect, oriented appropriately, and sutured into place.
Complex Repair And Xenograft Text: The defect edges were debeveled with a #15 scalpel blade.  The primary defect was closed partially with a complex linear closure.  Given the location of the defect, shape of the defect and the proximity to free margins a xenograft was deemed most appropriate to repair the remaining defect.  The graft was trimmed to fit the size of the remaining defect.  The graft was then placed in the primary defect, oriented appropriately, and sutured into place.
Complex Repair And Skin Substitute Graft Text: The defect edges were debeveled with a #15 scalpel blade.  The primary defect was closed partially with a complex linear closure.  Given the location of the remaining defect, shape of the defect and the proximity to free margins a skin substitute graft was deemed most appropriate to repair the remaining defect.  The graft was trimmed to fit the size of the remaining defect.  The graft was then placed in the primary defect, oriented appropriately, and sutured into place.
Path Notes (To The Dermatopathologist): Please check margins.
Consent was obtained from the patient. The risks and benefits to therapy were discussed in detail. Specifically, the risks of infection, scarring, bleeding, prolonged wound healing, incomplete removal, allergy to anesthesia, nerve injury and recurrence were addressed. Prior to the procedure, the treatment site was clearly identified and confirmed by the patient. All components of Universal Protocol/PAUSE Rule completed.
Post-Care Instructions: I reviewed with the patient in detail post-care instructions:\\n1. Apply bacitracin over the steri-strips.  \\n2. Cut non-stick pad (Telfa) to cover the steri-strips\\n3. Apply tape (hypafix) over the non-stick pad\\n4. Change once per day for 5 days\\n5. Shower with bandage on, change bandage after shower\\n\\nPatient is not to engage in any heavy lifting, exercise, hot tub, or swimming for the next 14 days. Should the patient develop any fevers, chills, bleeding, severe pain patient will contact the office immediately.
Home Suture Removal Text: Patient was provided a home suture removal kit and will remove their sutures at home.  If they have any questions or difficulties they will call the office.
Where Do You Want The Question To Include Opioid Counseling Located?: Case Summary Tab
Information: Selecting Yes will display possible errors in your note based on the variables you have selected. This validation is only offered as a suggestion for you. PLEASE NOTE THAT THE VALIDATION TEXT WILL BE REMOVED WHEN YOU FINALIZE YOUR NOTE. IF YOU WANT TO FAX A PRELIMINARY NOTE YOU WILL NEED TO TOGGLE THIS TO 'NO' IF YOU DO NOT WANT IT IN YOUR FAXED NOTE.

## 2022-10-13 ENCOUNTER — OFFICE VISIT (OUTPATIENT)
Dept: MEDICAL GROUP | Facility: PHYSICIAN GROUP | Age: 72
End: 2022-10-13
Payer: MEDICARE

## 2022-10-13 VITALS
HEIGHT: 71 IN | RESPIRATION RATE: 16 BRPM | OXYGEN SATURATION: 98 % | BODY MASS INDEX: 21.32 KG/M2 | DIASTOLIC BLOOD PRESSURE: 50 MMHG | WEIGHT: 152.3 LBS | SYSTOLIC BLOOD PRESSURE: 116 MMHG | HEART RATE: 53 BPM | TEMPERATURE: 97.9 F

## 2022-10-13 DIAGNOSIS — Z85.828 H/O NONMELANOMA SKIN CANCER: ICD-10-CM

## 2022-10-13 DIAGNOSIS — M81.0 AGE-RELATED OSTEOPOROSIS WITHOUT CURRENT PATHOLOGICAL FRACTURE: ICD-10-CM

## 2022-10-13 DIAGNOSIS — Z85.46 H/O PROSTATE CANCER: ICD-10-CM

## 2022-10-13 DIAGNOSIS — M47.812 SPONDYLOSIS OF CERVICAL REGION WITHOUT MYELOPATHY OR RADICULOPATHY: ICD-10-CM

## 2022-10-13 DIAGNOSIS — Z00.00 ENCOUNTER FOR ANNUAL WELLNESS VISIT (AWV) IN MEDICARE PATIENT: ICD-10-CM

## 2022-10-13 PROBLEM — F32.9 REACTIVE DEPRESSION: Status: RESOLVED | Noted: 2022-08-03 | Resolved: 2022-10-13

## 2022-10-13 PROCEDURE — G0439 PPPS, SUBSEQ VISIT: HCPCS | Performed by: INTERNAL MEDICINE

## 2022-10-13 RX ORDER — HYDROCODONE BITARTRATE AND ACETAMINOPHEN 5; 325 MG/1; MG/1
TABLET ORAL
COMMUNITY
Start: 2022-09-28 | End: 2023-04-05

## 2022-10-13 RX ORDER — METRONIDAZOLE 500 MG/1
TABLET ORAL
COMMUNITY
Start: 2022-09-28 | End: 2023-04-05

## 2022-10-13 RX ORDER — AMOXICILLIN 500 MG/1
CAPSULE ORAL
COMMUNITY
Start: 2022-10-04 | End: 2023-05-02

## 2022-10-13 ASSESSMENT — FIBROSIS 4 INDEX: FIB4 SCORE: 2.04

## 2022-10-13 ASSESSMENT — ACTIVITIES OF DAILY LIVING (ADL): BATHING_REQUIRES_ASSISTANCE: 0

## 2022-10-13 ASSESSMENT — PATIENT HEALTH QUESTIONNAIRE - PHQ9: CLINICAL INTERPRETATION OF PHQ2 SCORE: 0

## 2022-10-13 ASSESSMENT — ENCOUNTER SYMPTOMS: GENERAL WELL-BEING: EXCELLENT

## 2022-10-13 NOTE — PROGRESS NOTES
Chief Complaint   Patient presents with    Annual Wellness Visit         HPI:  Sebastien is a 72 y.o. here for Medicare Annual Wellness Visit    1. Encounter for annual wellness visit (AWV) in Medicare patient  Annual wellness visit  - Subsequent Annual Wellness Visit - Includes PPPS ()    2. H/O prostate cancer  Chronic.  PMH of prostate cancer diagnosed in 2013.  Status post prostatectomy in 2015.  Patient is followed by Dr. Lamas his urologist.  Has follow-up appointment in November.  Recent PSA less than 0.2.  - Subsequent Annual Wellness Visit - Includes PPPS ()    3. Spondylosis of cervical region without myelopathy or radiculopathy  History of degenerative disease in cervical spine.  Followed by Dr. Gonzales and received cortisone injections this morning.  - Subsequent Annual Wellness Visit - Includes PPPS ()    4. Age-related osteoporosis without current pathological fracture  Patient is treated with Fosamax 70 mg weekly.  Patient will need to undergo tooth extraction due to a cracked tooth this month.  Concerned about osteonecrosis.  Patient completed his bone density in May 2021.  - Subsequent Annual Wellness Visit - Includes PPPS ()  - DS-BONE DENSITY STUDY (DEXA); Future    5. H/O nonmelanoma skin cancer  Patient regularly follows with his dermatologist.  No new skin lesions.  - Subsequent Annual Wellness Visit - Includes PPPS ()      Patient Active Problem List    Diagnosis Date Noted    Reactive depression 08/03/2022    Atherosclerosis of aorta (HCC) 08/26/2021    Skin lump of leg, left 05/19/2021    Multiple joint pain 04/13/2021    Nonrheumatic mitral valve regurgitation 03/15/2021    H/O nonmelanoma skin cancer 06/24/2019    Age-related osteoporosis without current pathological fracture 07/05/2017    AK (actinic keratosis) 12/14/2016    H/O parathyroidectomy (HCC) 09/08/2016    Urinary incontinence 09/29/2015    Erectile dysfunction following radical prostatectomy 09/29/2015     Vitamin D deficiency 09/29/2015    Malignant neoplasm of prostate (HCC) 08/03/2015    H/O prostate cancer 06/02/2015    Hyperlipidemia 06/02/2015    MVP (mitral valve prolapse) 06/02/2015    Neck pain on left side 06/02/2015       Current Outpatient Medications   Medication Sig Dispense Refill    Naproxen Sodium (ALEVE PO) Take  by mouth.      amoxicillin (AMOXIL) 500 MG Cap  (Patient not taking: Reported on 10/13/2022)      HYDROcodone-acetaminophen (NORCO) 5-325 MG Tab per tablet  (Patient not taking: Reported on 10/13/2022)      metroNIDAZOLE (FLAGYL) 500 MG Tab  (Patient not taking: Reported on 10/13/2022)      alendronate (FOSAMAX) 70 MG Tab Take 1 Tablet by mouth every 7 days. (Patient not taking: Reported on 10/13/2022) 12 Tablet 3     No current facility-administered medications for this visit.        Patient is taking medications as noted in medication list.  Current supplements as per medication list.     Allergies: Patient has no known allergies.    Current social contact/activities:  Exercise, kayaking, scuba diving    Is patient current with immunizations? Yes.    He  reports that he has never smoked. He has never used smokeless tobacco. He reports current drug use. Drug: Marijuana. He reports that he does not drink alcohol.  Counseling given: Not Answered      ROS:    Gait: Uses no assistive device   Ostomy: No   Other tubes: No   Amputations: No   Chronic oxygen use No   Last eye exam  years ago   Wears hearing aids: No   : Denies any urinary leakage during the last 6 months      Screening:        Depression Screening  Little interest or pleasure in doing things?  0 - not at all  Feeling down, depressed, or hopeless? 0 - not at all  Patient Health Questionnaire Score: 0    If depressive symptoms identified deferred to follow up visit unless specifically addressed in assessment and plan.    Interpretation of PHQ-9 Total Score   Score Severity   1-4 No Depression   5-9 Mild Depression   10-14 Moderate  Depression   15-19 Moderately Severe Depression   20-27 Severe Depression    Screening for Cognitive Impairment  Three Minute Recall (daughter, heaven, mountain)  3/3    Marvel clock face with all 12 numbers and set the hands to show 10 past 11.  Yes    If cognitive concerns identified, deferred for follow up unless specifically addressed in assessment and plan.    Fall Risk Assessment  Has the patient had two or more falls in the last year or any fall with injury in the last year?  No  If fall risk identified, deferred for follow up unless specifically addressed in assessment and plan.    Safety Assessment  Throw rugs on floor.  Yes  Handrails on all stairs.  Yes  Good lighting in all hallways.  Yes  Difficulty hearing.  No  Patient counseled about all safety risks that were identified.    Functional Assessment ADLs  Are there any barriers preventing you from cooking for yourself or meeting nutritional needs?  No.    Are there any barriers preventing you from driving safely or obtaining transportation?  No.    Are there any barriers preventing you from using a telephone or calling for help?  No.    Are there any barriers preventing you from shopping?  No.    Are there any barriers preventing you from taking care of your own finances?  No.    Are there any barriers preventing you from managing your medications?  No.    Are there any barriers preventing you from showering, bathing or dressing yourself?  No.    Are you currently engaging in any exercise or physical activity?  Yes.     What is your perception of your health?  Excellent.    Advance Care Planning  Do you have an Advance Directive, Living Will, Durable Power of , or POLST?  .    Health Maintenance Summary            Overdue - IMM HEP B VACCINE (1 of 3 - 3-dose series) Overdue - never done      No completion history exists for this topic.              Overdue - Annual Wellness Visit (Every 366 Days) Overdue since 8/27/2022 08/26/2021   Subsequent Annual Wellness Visit - Includes PPPS ()    07/20/2020  Subsequent Annual Wellness Visit - Includes PPPS ()    07/20/2020  Visit Dx: Medicare annual wellness visit, subsequent    06/24/2019  Subsequent Annual Wellness Visit - Includes PPPS ()    06/24/2019  Visit Dx: Medicare annual wellness visit, subsequent    Only the first 5 history entries have been loaded, but more history exists.              Postponed - IMM ZOSTER VACCINES (3 of 3) Postponed until 1/3/2023      04/13/2021  Imm Admin: Zoster Vaccine Recombinant (RZV) (SHINGRIX)    07/15/2017  Imm Admin: Zoster Vaccine Live (ZVL) (Zostavax) - HISTORICAL DATA              COLORECTAL CANCER SCREENING (COLONOSCOPY - Every 10 Years) Next due on 4/10/2024      11/14/2014  OCCULT BLOOD FECES IMMUNOASSAY    04/10/2014  AMB REFERRAL TO GI FOR COLONOSCOPY    04/10/2014  REFERRAL TO GI FOR COLONOSCOPY              IMM DTaP/Tdap/Td Vaccine (2 - Td or Tdap) Next due on 6/24/2029 06/24/2019  Imm Admin: Tdap Vaccine              HEPATITIS C SCREENING  Completed      06/18/2019  HEP C VIRUS ANTIBODY              IMM PNEUMOCOCCAL VACCINE: 65+ Years (Series Information) Completed      06/24/2019  Imm Admin: Pneumococcal Conjugate Vaccine (Prevnar/PCV-13)    09/02/2016  Imm Admin: Pneumococcal polysaccharide vaccine (PPSV-23)    07/03/2012  Imm Admin: Pneumococcal polysaccharide vaccine (PPSV-23)              COVID-19 Vaccine (Series Information) Completed      05/26/2022  Imm Admin: PFIZER CORTES CAP SARS-COV-2 VACCINATION (12+)    09/25/2021  Imm Admin: PFIZER PURPLE CAP SARS-COV-2 VACCINATION (12+)    02/09/2021  Imm Admin: PFIZER PURPLE CAP SARS-COV-2 VACCINATION (12+)    01/19/2021  Imm Admin: PFIZER PURPLE CAP SARS-COV-2 VACCINATION (12+)              IMM INFLUENZA (Series Information) Completed      09/26/2022  Imm Admin: Influenza Vaccine Adult HD    09/17/2021  Imm Admin: Influenza Vaccine Adult HD    10/01/2020  Imm Admin: Influenza  Vaccine Adult HD    10/16/2019  Imm Admin: Influenza Vaccine Adult HD    10/09/2019  Imm Admin: Influenza Vaccine Quad Inj (Preserved)    Only the first 5 history entries have been loaded, but more history exists.              IMM MENINGOCOCCAL ACWY VACCINE (Series Information) Aged Out      No completion history exists for this topic.                    Patient Care Team:  Ary Crane M.D. as PCP - General (Internal Medicine)  Zaria Guo M.D. as PCP - [facility] Paneled  Sage Ngo M.D. as Consulting Physician (Urology)  Abebe Araiza M.D. as Consulting Physician (Gastroenterology)  Morris Chavarria M.D. as Consulting Physician (Cardiovascular Disease (Cardiology))  Larisa Ureña O.D. as Consulting Physician (Optometry)  Josh Blank M.D. as Consulting Physician (Surgery)  Sergio Daniel M.D. as Consulting Physician (Orthopedic Surgery)  Geena Muñoz as [facility]     Social History     Tobacco Use    Smoking status: Never    Smokeless tobacco: Never   Vaping Use    Vaping Use: Never used   Substance Use Topics    Alcohol use: No     Alcohol/week: 0.0 oz    Drug use: Yes     Types: Marijuana     Comment: at times to help sleep     Family History   Problem Relation Age of Onset    Hyperlipidemia Mother     Hyperlipidemia Father     Cancer Father         Prostate and Lung    Lung Disease Father     Hypertension Father     Heart Disease Father     Other Sister         Lupus    Heart Disease Maternal Grandfather      He  has a past medical history of Arthritis, Atherosclerosis of aorta (HCC) (8/26/2021), Cancer (HCC) (07/13), Disorder of thyroid, Heart valve disease, Hiatus hernia syndrome, Multiple joint pain (4/13/2021), Skin lump of leg, left (5/19/2021), Unspecified cataract, and Urinary incontinence.   Past Surgical History:   Procedure Laterality Date    IRRIGATION & DEBRIDEMENT ORTHO Left 3/10/2018    Procedure: IRRIGATION & DEBRIDEMENT ORTHO OPEN;  Surgeon: Sergio  EFREN Daniel M.D.;  Location: [facility];  Service: Orthopedics    SHOULDER ARTHROSCOPY Left 2/6/2018    Procedure: SHOULDER ARTHROSCOPY;  Surgeon: Sergio Daniel M.D.;  Location: [facility];  Service: Orthopedics    IRRIGATION & DEBRIDEMENT ORTHO Left 2/6/2018    Procedure: IRRIGATION & DEBRIDEMENT ORTHO- SHOULDER  ;  Surgeon: Sergio Daniel M.D.;  Location: [facility];  Service: Orthopedics    FOREIGN BODY REMOVAL Left 2/6/2018    Procedure: FOREIGN BODY REMOVAL;  Surgeon: Sergio Daniel M.D.;  Location: [facility];  Service: Orthopedics    SHOULDER ARTHROSCOPY W/ ROTATOR CUFF REPAIR Left 10/18/2017    Procedure: SHOULDER ARTHROSCOPY W/ ROTATOR CUFF REPAIR;  Surgeon: Sergio Daniel M.D.;  Location: [facility];  Service: Orthopedics    SHOULDER DECOMPRESSION ARTHROSCOPIC Left 10/18/2017    Procedure: SHOULDER DECOMPRESSION ARTHROSCOPIC- SUBACROMIAL;  Surgeon: Sergio Daniel M.D.;  Location: [facility];  Service: Orthopedics    BICEPS TENODESIS Left 10/18/2017    Procedure: BICEPS TENODESIS;  Surgeon: Sergio Daniel M.D.;  Location: [facility];  Service: Orthopedics    PARATHYROIDECTOMY N/A 7/5/2017    Procedure: PARATHYROIDECTOMY - MINIMALLY INVASIVE W/RECURRENT LARYNGEAL NERVE MONITORING;  Surgeon: Josh Blank M.D.;  Location: SURGERY SAME DAY [facility];  Service:     SPHINCTER PROSTHESIS REMOVAL  3/28/2017    Procedure: URINARY SPHINCTER PROSTHESIS REMOVAL;  Surgeon: Benigno Grier M.D.;  Location: [facility];  Service:     CYSTOSCOPY  3/28/2017    Procedure: CYSTOSCOPY;  Surgeon: Benigno Grier M.D.;  Location: [facility];  Service:     EVACUATION OF HEMATOMA  3/21/2017    Procedure: EVACUATION OF HEMATOMA-CYSTO CLOT EVACUATION AND SMALL CYSTOSCOPE;  Surgeon: Frank Lamas M.D.;  Location: [facility];  Service:     CYSTOSCOPY  3/21/2017    Procedure: CYSTOSCOPY;   "Surgeon: Frank Lamas M.D.;  Location: SURGERY [facility];  Service:     CYSTOSCOPY  3/20/2017    Procedure: CYSTOSCOPY -  FLEXIBLE;  Surgeon: Frank Lamas M.D.;  Location: SURGERY [facility];  Service:     SPHINCTER PROSTHESIS PLACEMENT  3/20/2017    Procedure: SPHINCTER PROSTHESIS PLACEMENT - ARTIFICIAL URINARY SPHINCTER;  Surgeon: Frank Lamas M.D.;  Location: SURGERY [facility];  Service:     PROSTATECTOMY, RADICAL RETRO  8/3/2015    Procedure: PROSTATECTOMY RADICAL RETROPUBIC;  Surgeon: Sgae Ngo M.D.;  Location: SURGERY [facility];  Service:     NODE DISSECTION Bilateral 8/3/2015    Procedure: NODE DISSECTION LYMPH;  Surgeon: Sage Ngo M.D.;  Location: SURGERY [facility];  Service:     KNEE ARTHROPLASTY TOTAL  7/3/2014    Performed by Theodore San M.D. at [facility]    KNEE ARTHROSCOPY  7/3/2014    Performed by Theodore San M.D. at [facility]    MENISCECTOMY  7/3/2014    Performed by Theodore San M.D. at [facility]    KNEE REPLACEMENT, TOTAL Right 2014    INGUINAL HERNIA REPAIR  6/1/2009    Performed by RISHI COOK at [facility]    OTHER ORTHOPEDIC SURGERY  2003    L Shoulder Repair    INGUINAL HERNIA LAPAROSCOPIC BILATERAL Bilateral     OTHER ORTHOPEDIC SURGERY      R Rotator Cuff repair           Exam:     Pulse (!) 53   Temp 36.6 °C (97.9 °F) (Temporal)   Resp 16   Ht 1.803 m (5' 11\")   SpO2 98%  Body mass index is 20.92 kg/m².    Hearing fair.  Dentition good  Alert, oriented in no acute distress.  Eye contact is good, speech goal directed, affect calm    Constitutional: Alert, no distress.  Skin: Warm, dry, good turgor, no rashes in visible areas.  Eye: Equal, round and reactive, conjunctiva clear, lids normal.  ENMT: Lips without lesions, good dentition, oropharynx clear.  Neck: Trachea midline, no masses, no thyromegaly. No cervical or supraclavicular lymphadenopathy  Respiratory: " Unlabored respiratory effort, lungs clear to auscultation, no wheezes, no ronchi.  Cardiovascular: Normal S1, S2, no murmur, no edema.  Abdomen: Soft, non-tender, no masses, no hepatosplenomegaly.  Psych: Alert and oriented x3, normal affect and mood.      Assessment and Plan. The following treatment and monitoring plan is recommended:    No diagnosis found.    1. Encounter for annual wellness visit (AWV) in Medicare patient  Annual wellness visit.  - Subsequent Annual Wellness Visit - Includes PPPS ()    2. H/O prostate cancer  Chronic.  Stable.  Continue follow-up appointment and monitoring by urology.  - Subsequent Annual Wellness Visit - Includes PPPS ()    3. Spondylosis of cervical region without myelopathy or radiculopathy  Chronic.  Stable.  Continue plan of care per pain management.  - Subsequent Annual Wellness Visit - Includes PPPS ()    4. Age-related osteoporosis without current pathological fracture  Patient will restart Fosamax after dental procedure/teeth extraction.  Recheck bone density in May 2023.  - Subsequent Annual Wellness Visit - Includes PPPS ()  - DS-BONE DENSITY STUDY (DEXA); Future    5. H/O nonmelanoma skin cancer  Stable.  Continue follow-up with dermatology as needed.  - Subsequent Annual Wellness Visit - Includes PPPS ()    Services suggested: No services needed at this time  Health Care Screening recommendations as per orders if indicated.  Referrals offered: PT/OT/Nutrition counseling/Behavioral Health/Smoking cessation as per orders if indicated.    Discussion today about general wellness and lifestyle habits:    Prevent falls and reduce trip hazards; Cautioned about securing or removing rugs.  Have a working fire alarm and carbon monoxide detector;   Engage in regular physical activity and social activities       Follow-up: No follow-ups on file.

## 2022-10-20 ENCOUNTER — TELEPHONE (OUTPATIENT)
Dept: MEDICAL GROUP | Facility: PHYSICIAN GROUP | Age: 72
End: 2022-10-20
Payer: MEDICARE

## 2022-10-20 DIAGNOSIS — Z13.820 SCREENING FOR OSTEOPOROSIS: ICD-10-CM

## 2022-10-20 NOTE — TELEPHONE ENCOUNTER
Imaging called stating they need a new bine density scan order given the fact that patients order expires in April and is scheduled in May.   Please Advise

## 2022-12-27 ENCOUNTER — HOSPITAL ENCOUNTER (OUTPATIENT)
Dept: LAB | Facility: MEDICAL CENTER | Age: 72
End: 2022-12-27
Attending: UROLOGY
Payer: MEDICARE

## 2022-12-27 LAB — PSA SERPL-MCNC: 0.02 NG/ML (ref 0–4)

## 2022-12-27 PROCEDURE — 84153 ASSAY OF PSA TOTAL: CPT

## 2022-12-27 PROCEDURE — 36415 COLL VENOUS BLD VENIPUNCTURE: CPT

## 2023-01-26 ENCOUNTER — DOCUMENTATION (OUTPATIENT)
Dept: HEALTH INFORMATION MANAGEMENT | Facility: OTHER | Age: 73
End: 2023-01-26
Payer: MEDICARE

## 2023-01-26 ENCOUNTER — PATIENT MESSAGE (OUTPATIENT)
Dept: HEALTH INFORMATION MANAGEMENT | Facility: OTHER | Age: 73
End: 2023-01-26

## 2023-03-08 ENCOUNTER — APPOINTMENT (RX ONLY)
Dept: URBAN - METROPOLITAN AREA CLINIC 4 | Facility: CLINIC | Age: 73
Setting detail: DERMATOLOGY
End: 2023-03-08

## 2023-03-08 DIAGNOSIS — Z85.828 PERSONAL HISTORY OF OTHER MALIGNANT NEOPLASM OF SKIN: ICD-10-CM

## 2023-03-08 DIAGNOSIS — L57.0 ACTINIC KERATOSIS: ICD-10-CM

## 2023-03-08 DIAGNOSIS — D18.0 HEMANGIOMA: ICD-10-CM

## 2023-03-08 DIAGNOSIS — L81.4 OTHER MELANIN HYPERPIGMENTATION: ICD-10-CM

## 2023-03-08 DIAGNOSIS — D22 MELANOCYTIC NEVI: ICD-10-CM

## 2023-03-08 DIAGNOSIS — L82.1 OTHER SEBORRHEIC KERATOSIS: ICD-10-CM

## 2023-03-08 DIAGNOSIS — L82.0 INFLAMED SEBORRHEIC KERATOSIS: ICD-10-CM

## 2023-03-08 DIAGNOSIS — L57.8 OTHER SKIN CHANGES DUE TO CHRONIC EXPOSURE TO NONIONIZING RADIATION: ICD-10-CM

## 2023-03-08 PROBLEM — D18.01 HEMANGIOMA OF SKIN AND SUBCUTANEOUS TISSUE: Status: ACTIVE | Noted: 2023-03-08

## 2023-03-08 PROBLEM — D22.5 MELANOCYTIC NEVI OF TRUNK: Status: ACTIVE | Noted: 2023-03-08

## 2023-03-08 PROCEDURE — 17000 DESTRUCT PREMALG LESION: CPT | Mod: 59

## 2023-03-08 PROCEDURE — ? LIQUID NITROGEN

## 2023-03-08 PROCEDURE — 17110 DESTRUCTION B9 LES UP TO 14: CPT

## 2023-03-08 PROCEDURE — 17003 DESTRUCT PREMALG LES 2-14: CPT | Mod: 59

## 2023-03-08 PROCEDURE — 99213 OFFICE O/P EST LOW 20 MIN: CPT | Mod: 25

## 2023-03-08 PROCEDURE — ? COUNSELING

## 2023-03-08 ASSESSMENT — LOCATION SIMPLE DESCRIPTION DERM
LOCATION SIMPLE: LEFT HAND
LOCATION SIMPLE: RIGHT UPPER BACK
LOCATION SIMPLE: SCALP
LOCATION SIMPLE: RIGHT INFERIOR EYELID
LOCATION SIMPLE: RIGHT FOREHEAD
LOCATION SIMPLE: RIGHT HAND
LOCATION SIMPLE: RIGHT CHEEK
LOCATION SIMPLE: RIGHT ANTERIOR NECK
LOCATION SIMPLE: LEFT FOREHEAD
LOCATION SIMPLE: LEFT UPPER BACK
LOCATION SIMPLE: FRONTAL SCALP
LOCATION SIMPLE: LEFT TEMPLE
LOCATION SIMPLE: NECK

## 2023-03-08 ASSESSMENT — LOCATION DETAILED DESCRIPTION DERM
LOCATION DETAILED: RIGHT MID PREAURICULAR CHEEK
LOCATION DETAILED: RIGHT SUPERIOR LATERAL NECK
LOCATION DETAILED: RIGHT LATERAL INFERIOR EYELID
LOCATION DETAILED: RIGHT RADIAL DORSAL HAND
LOCATION DETAILED: RIGHT SUPERIOR UPPER BACK
LOCATION DETAILED: RIGHT LATERAL MANDIBULAR CHEEK
LOCATION DETAILED: RIGHT INFERIOR ANTERIOR NECK
LOCATION DETAILED: LEFT RADIAL DORSAL HAND
LOCATION DETAILED: LEFT INFERIOR FOREHEAD
LOCATION DETAILED: MEDIAL FRONTAL SCALP
LOCATION DETAILED: LEFT CENTRAL TEMPLE
LOCATION DETAILED: RIGHT INFERIOR POSTAURICULAR SKIN
LOCATION DETAILED: RIGHT INFERIOR MEDIAL UPPER BACK
LOCATION DETAILED: RIGHT INFERIOR LATERAL MALAR CHEEK
LOCATION DETAILED: LEFT SUPERIOR UPPER BACK
LOCATION DETAILED: RIGHT INFERIOR LATERAL FOREHEAD
LOCATION DETAILED: RIGHT INFERIOR CENTRAL MALAR CHEEK
LOCATION DETAILED: RIGHT LATERAL MALAR CHEEK

## 2023-03-08 ASSESSMENT — LOCATION ZONE DERM
LOCATION ZONE: SCALP
LOCATION ZONE: HAND
LOCATION ZONE: EYELID
LOCATION ZONE: FACE
LOCATION ZONE: TRUNK
LOCATION ZONE: NECK

## 2023-03-08 NOTE — PROCEDURE: LIQUID NITROGEN
Detail Level: Simple
Render Note In Bullet Format When Appropriate: No
Number Of Freeze-Thaw Cycles: 2 freeze-thaw cycles
Show Aperture Variable?: Yes
Consent: The patient's consent was obtained including but not limited to risks of crusting, scabbing, blistering, scarring, darker or lighter pigmentary change, recurrence, incomplete removal and infection.
Aperture Size (Optional): C
Post-Care Instructions: I reviewed with the patient in detail post-care instructions. Patient is to wear sunprotection, and avoid picking at any of the treated lesions. Pt may apply Vaseline to crusted or scabbing areas.
Duration Of Freeze Thaw-Cycle (Seconds): 3
Medical Necessity Clause: This procedure was medically necessary because the lesions that were treated were:
Spray Paint Text: The liquid nitrogen was applied to the skin utilizing a spray paint frosting technique.
Medical Necessity Information: It is in your best interest to select a reason for this procedure from the list below. All of these items fulfill various CMS LCD requirements except the new and changing color options.
Detail Level: Detailed
Number Of Freeze-Thaw Cycles: 1 freeze-thaw cycle

## 2023-03-08 NOTE — HPI: EVALUATION OF SKIN LESION(S)
UROLOGY CLINIC VISIT PATIENT INSTRUCTIONS    Bactrim was refilled to [facility] in [place].    Follow up with Yady Foreman PA-C in 1 year with kidney ultrasound and labs beforehand.     If you have any issues, questions or concerns in the meantime, do not hesitate to contact us at [phone] or via [employer].     It was a pleasure meeting with you today.  Thank you for allowing me and my team the privilege of caring for you today.  YOU are the reason we are here, and I truly hope we provided you with the excellent service you deserve.  Please let us know if there is anything else we can do for you so that we can be sure you are leaving completely satisfied with your care experience.      
What Type Of Note Output Would You Prefer (Optional)?: Standard Output
Hpi Title: Evaluation of Skin Lesions

## 2023-04-05 ENCOUNTER — OFFICE VISIT (OUTPATIENT)
Dept: CARDIOLOGY | Facility: MEDICAL CENTER | Age: 73
End: 2023-04-05
Payer: MEDICARE

## 2023-04-05 VITALS
HEART RATE: 58 BPM | HEIGHT: 71 IN | DIASTOLIC BLOOD PRESSURE: 60 MMHG | SYSTOLIC BLOOD PRESSURE: 124 MMHG | OXYGEN SATURATION: 98 % | WEIGHT: 146 LBS | BODY MASS INDEX: 20.44 KG/M2 | RESPIRATION RATE: 16 BRPM

## 2023-04-05 DIAGNOSIS — I34.1 MITRAL VALVE PROLAPSE: ICD-10-CM

## 2023-04-05 DIAGNOSIS — R06.02 SHORTNESS OF BREATH: ICD-10-CM

## 2023-04-05 DIAGNOSIS — I34.0 MITRAL VALVE INSUFFICIENCY, UNSPECIFIED ETIOLOGY: ICD-10-CM

## 2023-04-05 LAB — EKG IMPRESSION: NORMAL

## 2023-04-05 PROCEDURE — 99204 OFFICE O/P NEW MOD 45 MIN: CPT | Mod: 25 | Performed by: INTERNAL MEDICINE

## 2023-04-05 PROCEDURE — 93000 ELECTROCARDIOGRAM COMPLETE: CPT | Performed by: INTERNAL MEDICINE

## 2023-04-05 ASSESSMENT — ENCOUNTER SYMPTOMS
PALPITATIONS: 0
FEVER: 0
LOSS OF CONSCIOUSNESS: 0
INSOMNIA: 0
SHORTNESS OF BREATH: 0
TINGLING: 1
ABDOMINAL PAIN: 0
BRUISES/BLEEDS EASILY: 1
CHILLS: 0
DIZZINESS: 0
MYALGIAS: 0
BLURRED VISION: 0
NECK PAIN: 1
SPUTUM PRODUCTION: 1
HEADACHES: 1
ORTHOPNEA: 0
PND: 0

## 2023-04-05 ASSESSMENT — FIBROSIS 4 INDEX: FIB4 SCORE: 2.04

## 2023-04-05 NOTE — PROGRESS NOTES
"Chief Complaint   Patient presents with    Shortness of Breath       Subjective     Sebastien Horn is a 72 y.o. male who presents today self-referred to establish cardiac care.    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  He established with Morris Chavarria MD with [facility].  At 1 point it was felt the patient had severe mitral regurgitation based on echocardiographic findings but continued to remain asymptomatic.  Nonetheless he was referred [place], [place] to Dr. Mane Negrete CT surgeon but it was not felt that surgery was indicated at that time.  The patient has continued to exercise at a high level snowshoeing and hiking 5 to 6 miles nearly every day in addition to weightlifting and kayaking without any exercise intolerance including symptoms of significant shortness of breath or chest pain.      In 11/2022 he developed a moderate COVID-19 infection that lasted 7 days.  He fully recovered.  About 6 weeks ago he had a 3-day history of mild shortness of breath but which spontaneously resolved and has not reoccurred.    He denies palpitations though has been told that he has a \"blip in the heartbeat\".  He has no history of hypertension, diabetes mellitus or dyslipidemia.  He does not smoke cigarettes.  He takes a had of marijuana a day.  He drinks 3 cups of coffee a day.  Does not drink alcohol.    No family history of premature heart disease.    Other medical problems include prostate cancer with prostatectomy 2012 with no recurrence.  No thyroid disease.  He denies lung disease, peptic ulcer disease, gallbladder or liver disease.  No kidney disease, TIA symptoms or stroke.    No family history of premature heart disease.    Past Medical History:   Diagnosis Date    Arthritis     knees    Atherosclerosis of aorta (HCC) 8/26/2021    Cancer (HCC) 07/13    Prostate    Disorder of " thyroid     Heart valve disease     Mild mitral valve problem - per patient cardiologist is not concerned at this time.    Hiatus hernia syndrome     Multiple joint pain 4/13/2021    Skin lump of leg, left 5/19/2021    Spondylosis of cervical region without myelopathy or radiculopathy 10/13/2022    Unspecified cataract     Bilateral IOL's    Urinary incontinence      Past Surgical History:   Procedure Laterality Date    IRRIGATION & DEBRIDEMENT ORTHO Left 3/10/2018    Procedure: IRRIGATION & DEBRIDEMENT ORTHO OPEN;  Surgeon: Sergio Daniel M.D.;  Location: [facility];  Service: Orthopedics    SHOULDER ARTHROSCOPY Left 2/6/2018    Procedure: SHOULDER ARTHROSCOPY;  Surgeon: Sergio Daniel M.D.;  Location: [facility];  Service: Orthopedics    IRRIGATION & DEBRIDEMENT ORTHO Left 2/6/2018    Procedure: IRRIGATION & DEBRIDEMENT ORTHO- SHOULDER  ;  Surgeon: Sergio Daniel M.D.;  Location: [facility];  Service: Orthopedics    FOREIGN BODY REMOVAL Left 2/6/2018    Procedure: FOREIGN BODY REMOVAL;  Surgeon: Sergio Daniel M.D.;  Location: [facility];  Service: Orthopedics    SHOULDER ARTHROSCOPY W/ ROTATOR CUFF REPAIR Left 10/18/2017    Procedure: SHOULDER ARTHROSCOPY W/ ROTATOR CUFF REPAIR;  Surgeon: Sergio Daniel M.D.;  Location: [facility];  Service: Orthopedics    SHOULDER DECOMPRESSION ARTHROSCOPIC Left 10/18/2017    Procedure: SHOULDER DECOMPRESSION ARTHROSCOPIC- SUBACROMIAL;  Surgeon: Sergio Daniel M.D.;  Location: [facility];  Service: Orthopedics    BICEPS TENODESIS Left 10/18/2017    Procedure: BICEPS TENODESIS;  Surgeon: Sergio Daniel M.D.;  Location: [facility];  Service: Orthopedics    PARATHYROIDECTOMY N/A 7/5/2017    Procedure: PARATHYROIDECTOMY - MINIMALLY INVASIVE W/RECURRENT LARYNGEAL NERVE MONITORING;  Surgeon: Josh Blank M.D.;  Location: SURGERY SAME DAY [facility];  Service:     SPHINCTER  PROSTHESIS REMOVAL  3/28/2017    Procedure: URINARY SPHINCTER PROSTHESIS REMOVAL;  Surgeon: Benigno Grier M.D.;  Location: SURGERY [facility];  Service:     CYSTOSCOPY  3/28/2017    Procedure: CYSTOSCOPY;  Surgeon: Benigno Grier M.D.;  Location: SURGERY [facility];  Service:     EVACUATION OF HEMATOMA  3/21/2017    Procedure: EVACUATION OF HEMATOMA-CYSTO CLOT EVACUATION AND SMALL CYSTOSCOPE;  Surgeon: Frank Lamas M.D.;  Location: SURGERY [facility];  Service:     CYSTOSCOPY  3/21/2017    Procedure: CYSTOSCOPY;  Surgeon: Frank Lamas M.D.;  Location: SURGERY [facility];  Service:     CYSTOSCOPY  3/20/2017    Procedure: CYSTOSCOPY -  FLEXIBLE;  Surgeon: Frank Lamas M.D.;  Location: [facility];  Service:     SPHINCTER PROSTHESIS PLACEMENT  3/20/2017    Procedure: SPHINCTER PROSTHESIS PLACEMENT - ARTIFICIAL URINARY SPHINCTER;  Surgeon: Frank Lamas M.D.;  Location: [facility];  Service:     PROSTATECTOMY, RADICAL RETRO  8/3/2015    Procedure: PROSTATECTOMY RADICAL RETROPUBIC;  Surgeon: Sage Ngo M.D.;  Location: [facility];  Service:     NODE DISSECTION Bilateral 8/3/2015    Procedure: NODE DISSECTION LYMPH;  Surgeon: Saeg Ngo M.D.;  Location: [facility];  Service:     KNEE ARTHROPLASTY TOTAL  7/3/2014    Performed by Theodore San M.D. at [facility]    KNEE ARTHROSCOPY  7/3/2014    Performed by Theodore San M.D. at [facility]    MENISCECTOMY  7/3/2014    Performed by Theodore San M.D. at [facility]    KNEE REPLACEMENT, TOTAL Right 2014    INGUINAL HERNIA REPAIR  6/1/2009    Performed by RISHI COOK at [facility]    OTHER ORTHOPEDIC SURGERY  2003    L Shoulder Repair    INGUINAL HERNIA LAPAROSCOPIC BILATERAL Bilateral     OTHER ORTHOPEDIC SURGERY      R Rotator Cuff repair     Family History   Problem Relation Age of Onset    Hyperlipidemia Mother      Hyperlipidemia Father     Cancer Father         Prostate and Lung    Lung Disease Father     Hypertension Father     Heart Disease Father     Other Sister         Lupus    Heart Disease Maternal Grandfather      Social History     Socioeconomic History    Marital status:      Spouse name: Not on file    Number of children: Not on file    Years of education: Not on file    Highest education level: Some college, no degree   Occupational History    Not on file   Tobacco Use    Smoking status: Never    Smokeless tobacco: Never   Vaping Use    Vaping Use: Never used   Substance and Sexual Activity    Alcohol use: No     Alcohol/week: 0.0 oz    Drug use: Yes     Types: Marijuana     Comment: at times to help sleep    Sexual activity: Not Currently   Other Topics Concern    Not on file   Social History Narrative    Not on file     Social Determinants of Health     Financial Resource Strain: Not on file   Food Insecurity: Not on file   Transportation Needs: Not on file   Physical Activity: Not on file   Stress: Not on file   Social Connections: Not on file   Intimate Partner Violence: Not on file   Housing Stability: Not on file     No Known Allergies  Outpatient Encounter Medications as of 4/5/2023   Medication Sig Dispense Refill    amoxicillin (AMOXIL) 500 MG Cap       alendronate (FOSAMAX) 70 MG Tab Take 1 Tablet by mouth every 7 days. 12 Tablet 3    Naproxen Sodium (ALEVE PO) Take  by mouth.      [DISCONTINUED] HYDROcodone-acetaminophen (NORCO) 5-325 MG Tab per tablet  (Patient not taking: Reported on 10/13/2022)      [DISCONTINUED] metroNIDAZOLE (FLAGYL) 500 MG Tab  (Patient not taking: Reported on 10/13/2022)       No facility-administered encounter medications on file as of 4/5/2023.     Review of Systems   Constitutional:  Negative for chills and fever.   HENT:  Positive for tinnitus. Negative for congestion.    Eyes:  Negative for blurred vision.   Respiratory:  Positive for sputum production. Negative for  "shortness of breath.    Cardiovascular:  Negative for chest pain, palpitations, orthopnea, leg swelling and PND.   Gastrointestinal:  Negative for abdominal pain.   Genitourinary:  Negative for dysuria.   Musculoskeletal:  Positive for joint pain and neck pain. Negative for myalgias.   Skin:  Negative for rash.   Neurological:  Positive for tingling and headaches. Negative for dizziness and loss of consciousness.   Endo/Heme/Allergies:  Bruises/bleeds easily.   Psychiatric/Behavioral:  The patient does not have insomnia.             Objective     /60 (BP Location: Right arm, Patient Position: Sitting, BP Cuff Size: Adult)   Pulse (!) 58   Resp 16   Ht 1.803 m (5' 11\")   Wt 66.2 kg (146 lb)   SpO2 98%   BMI 20.36 kg/m²     Physical Exam  Vitals reviewed.   Constitutional:       General: He is not in acute distress.     Appearance: He is well-developed.   Eyes:      Conjunctiva/sclera: Conjunctivae normal.      Pupils: Pupils are equal, round, and reactive to light.   Neck:      Vascular: No JVD.   Cardiovascular:      Rate and Rhythm: Normal rate and regular rhythm.      Pulses:           Radial pulses are 1+ on the right side and 1+ on the left side.      Heart sounds: Murmur heard.     No friction rub. No gallop.   Pulmonary:      Effort: Pulmonary effort is normal. No accessory muscle usage or respiratory distress.      Breath sounds: Normal breath sounds. No wheezing or rales.   Chest:      Comments: Kyphosis  Abdominal:      General: There is no distension.      Palpations: Abdomen is soft. There is no mass.      Tenderness: There is no abdominal tenderness.   Musculoskeletal:      Cervical back: Normal range of motion and neck supple.      Right lower leg: No edema.      Left lower leg: No edema.   Skin:     General: Skin is warm and dry.      Findings: No rash.      Nails: There is no clubbing.   Neurological:      Mental Status: He is alert and oriented to person, place, and time.   Psychiatric:   "       Behavior: Behavior normal.       Echocardiogram 1/20/2014  Normal left ventricular systolic function  Left ventricular ejection fraction 60-65%  Mild left atrial enlargement  Prolapse of the posterior mitral valve leaflet  Mitral digitation mild to moderate    EKG 4/5/2022 sinus arrhythmia, personally reviewed and interpreted    Assessment & Plan     1. Shortness of breath  EKG      2. Mitral valve prolapse  EC-ECHOCARDIOGRAM COMPLETE W/O CONT      3. Mitral valve insufficiency, unspecified etiology  EC-ECHOCARDIOGRAM COMPLETE W/O CONT          Medical Decision Making: Today's Assessment/Status/Plan:   Mitral valve prolapse involving the posterior leaflet  Mitral regurgitation  Prostate cancer with prostatectomy 2012    Recommendation Discussion  Clinically the patient is asymptomatic in reference to his mitral valve regurgitation.  Agrees to proceed with repeat echocardiogram, last study was 2014  I will make further recommendations based on the echocardiogram results  Follow-up 6 months, sooner if necessary

## 2023-04-19 ENCOUNTER — HOSPITAL ENCOUNTER (OUTPATIENT)
Dept: CARDIOLOGY | Facility: MEDICAL CENTER | Age: 73
End: 2023-04-19
Attending: INTERNAL MEDICINE
Payer: MEDICARE

## 2023-04-19 DIAGNOSIS — I34.1 MITRAL VALVE PROLAPSE: ICD-10-CM

## 2023-04-19 DIAGNOSIS — I34.0 MITRAL VALVE INSUFFICIENCY, UNSPECIFIED ETIOLOGY: ICD-10-CM

## 2023-04-19 LAB
LV EJECT FRACT  99904: 70
LV EJECT FRACT MOD 2C 99903: 59.43
LV EJECT FRACT MOD 4C 99902: 64.65
LV EJECT FRACT MOD BP 99901: 62.36

## 2023-04-19 PROCEDURE — 93306 TTE W/DOPPLER COMPLETE: CPT | Mod: 26 | Performed by: INTERNAL MEDICINE

## 2023-04-19 PROCEDURE — 93306 TTE W/DOPPLER COMPLETE: CPT

## 2023-04-20 ENCOUNTER — TELEPHONE (OUTPATIENT)
Dept: CARDIOLOGY | Facility: MEDICAL CENTER | Age: 73
End: 2023-04-20
Payer: MEDICARE

## 2023-04-20 NOTE — TELEPHONE ENCOUNTER
[facility]    Caller: Sebastien Horn     Topic/issue: Pt states he has missed calls from [facility] regarding his echo and would like a call back. Please try both phone numbers.     Callback Number: [phone] (home) [phone] (work)     Thank You    Mirta KAM

## 2023-04-20 NOTE — TELEPHONE ENCOUNTER
Returned patient's phone call, [phone], and patient stated he had 3 missed calls from SW but his cell phone hadn't been working correctly. Patient was able to review findings but had concerns that will need to be addressed by SW.     To SW: Patient returned call about echo results. Please advise, thank you!

## 2023-04-22 ENCOUNTER — PATIENT MESSAGE (OUTPATIENT)
Dept: MEDICAL GROUP | Facility: PHYSICIAN GROUP | Age: 73
End: 2023-04-22
Payer: MEDICARE

## 2023-04-22 DIAGNOSIS — M81.0 AGE-RELATED OSTEOPOROSIS WITHOUT CURRENT PATHOLOGICAL FRACTURE: ICD-10-CM

## 2023-04-24 ENCOUNTER — TELEPHONE (OUTPATIENT)
Dept: CARDIOLOGY | Facility: MEDICAL CENTER | Age: 73
End: 2023-04-24
Payer: MEDICARE

## 2023-04-24 DIAGNOSIS — I34.0 SEVERE MITRAL REGURGITATION: ICD-10-CM

## 2023-04-24 DIAGNOSIS — I34.1 MITRAL VALVE PROLAPSE: ICD-10-CM

## 2023-04-24 RX ORDER — ALENDRONATE SODIUM 70 MG/1
70 TABLET ORAL
Qty: 12 TABLET | Refills: 3 | Status: SHIPPED | OUTPATIENT
Start: 2023-04-24 | End: 2023-12-27 | Stop reason: SDUPTHER

## 2023-04-24 NOTE — TELEPHONE ENCOUNTER
Called spoke with patient about the results of his echocardiogram showing severe mitral regurgitation with mitral valve prolapse more prominently involving the posterior leaflet with malcoaptation  Recommended JUAQUIN which will be arranged

## 2023-04-24 NOTE — TELEPHONE ENCOUNTER
[facility]    Caller: Sebastien Horn     Topic/issue: Patient Returning call.     Callback Number: [phone]     Thank you   Carly HEATH

## 2023-04-25 ENCOUNTER — APPOINTMENT (OUTPATIENT)
Dept: ADMISSIONS | Facility: MEDICAL CENTER | Age: 73
End: 2023-04-25
Attending: INTERNAL MEDICINE
Payer: MEDICARE

## 2023-04-25 ENCOUNTER — TELEPHONE (OUTPATIENT)
Dept: CARDIOLOGY | Facility: MEDICAL CENTER | Age: 73
End: 2023-04-25
Payer: MEDICARE

## 2023-04-25 NOTE — TELEPHONE ENCOUNTER
Patient is scheduled on 5-4-23 for a JUAQUIN w/anesthesia with .No meds to stop and patient to check in at 6:00 for an 8:00 procedure. H&P was done on 4-5-23 by . Pre admit to call patient.

## 2023-05-02 ENCOUNTER — PRE-ADMISSION TESTING (OUTPATIENT)
Dept: ADMISSIONS | Facility: MEDICAL CENTER | Age: 73
End: 2023-05-02
Attending: INTERNAL MEDICINE
Payer: MEDICARE

## 2023-05-02 RX ORDER — ACETAMINOPHEN 500 MG
500-1000 TABLET ORAL EVERY 6 HOURS PRN
COMMUNITY
End: 2023-09-14

## 2023-05-04 ENCOUNTER — APPOINTMENT (OUTPATIENT)
Dept: CARDIOLOGY | Facility: MEDICAL CENTER | Age: 73
End: 2023-05-04
Attending: INTERNAL MEDICINE
Payer: MEDICARE

## 2023-05-04 ENCOUNTER — ANESTHESIA (OUTPATIENT)
Dept: CARDIOLOGY | Facility: MEDICAL CENTER | Age: 73
End: 2023-05-04
Payer: MEDICARE

## 2023-05-04 ENCOUNTER — HOSPITAL ENCOUNTER (OUTPATIENT)
Facility: MEDICAL CENTER | Age: 73
End: 2023-05-04
Attending: INTERNAL MEDICINE | Admitting: INTERNAL MEDICINE
Payer: MEDICARE

## 2023-05-04 ENCOUNTER — ANESTHESIA EVENT (OUTPATIENT)
Dept: CARDIOLOGY | Facility: MEDICAL CENTER | Age: 73
End: 2023-05-04
Payer: MEDICARE

## 2023-05-04 VITALS
TEMPERATURE: 98.4 F | RESPIRATION RATE: 13 BRPM | OXYGEN SATURATION: 95 % | HEIGHT: 71 IN | WEIGHT: 154.76 LBS | BODY MASS INDEX: 21.67 KG/M2 | HEART RATE: 55 BPM | DIASTOLIC BLOOD PRESSURE: 64 MMHG | SYSTOLIC BLOOD PRESSURE: 131 MMHG

## 2023-05-04 DIAGNOSIS — I34.1 MITRAL VALVE PROLAPSE: ICD-10-CM

## 2023-05-04 DIAGNOSIS — I34.0 SEVERE MITRAL REGURGITATION: ICD-10-CM

## 2023-05-04 PROBLEM — K21.9 GASTROESOPHAGEAL REFLUX DISEASE: Status: ACTIVE | Noted: 2023-05-04

## 2023-05-04 PROCEDURE — 99100 ANES PT EXTEME AGE<1 YR&>70: CPT | Performed by: ANESTHESIOLOGY

## 2023-05-04 PROCEDURE — 700111 HCHG RX REV CODE 636 W/ 250 OVERRIDE (IP): Performed by: ANESTHESIOLOGY

## 2023-05-04 PROCEDURE — 160002 HCHG RECOVERY MINUTES (STAT)

## 2023-05-04 PROCEDURE — 160035 HCHG PACU - 1ST 60 MINS PHASE I

## 2023-05-04 PROCEDURE — 93312 ECHO TRANSESOPHAGEAL: CPT

## 2023-05-04 PROCEDURE — 93312 ECHO TRANSESOPHAGEAL: CPT | Mod: 26 | Performed by: INTERNAL MEDICINE

## 2023-05-04 PROCEDURE — 700105 HCHG RX REV CODE 258: Performed by: INTERNAL MEDICINE

## 2023-05-04 PROCEDURE — 01922 ANES N-INVAS IMG/RADJ THER: CPT | Performed by: ANESTHESIOLOGY

## 2023-05-04 PROCEDURE — 700101 HCHG RX REV CODE 250: Performed by: ANESTHESIOLOGY

## 2023-05-04 RX ORDER — HYDROMORPHONE HYDROCHLORIDE 1 MG/ML
0.2 INJECTION, SOLUTION INTRAMUSCULAR; INTRAVENOUS; SUBCUTANEOUS
Status: DISCONTINUED | OUTPATIENT
Start: 2023-05-04 | End: 2023-05-04 | Stop reason: HOSPADM

## 2023-05-04 RX ORDER — DIPHENHYDRAMINE HYDROCHLORIDE 50 MG/ML
12.5 INJECTION INTRAMUSCULAR; INTRAVENOUS
Status: DISCONTINUED | OUTPATIENT
Start: 2023-05-04 | End: 2023-05-04 | Stop reason: HOSPADM

## 2023-05-04 RX ORDER — LIDOCAINE HYDROCHLORIDE 10 MG/ML
INJECTION, SOLUTION EPIDURAL; INFILTRATION; INTRACAUDAL; PERINEURAL PRN
Status: DISCONTINUED | OUTPATIENT
Start: 2023-05-04 | End: 2023-05-04 | Stop reason: SURG

## 2023-05-04 RX ORDER — SENNOSIDES 8.6 MG
1300 CAPSULE ORAL EVERY 6 HOURS PRN
COMMUNITY
End: 2023-09-14

## 2023-05-04 RX ORDER — HYDRALAZINE HYDROCHLORIDE 20 MG/ML
5 INJECTION INTRAMUSCULAR; INTRAVENOUS
Status: DISCONTINUED | OUTPATIENT
Start: 2023-05-04 | End: 2023-05-04 | Stop reason: HOSPADM

## 2023-05-04 RX ORDER — LABETALOL HYDROCHLORIDE 5 MG/ML
5 INJECTION, SOLUTION INTRAVENOUS
Status: DISCONTINUED | OUTPATIENT
Start: 2023-05-04 | End: 2023-05-04 | Stop reason: HOSPADM

## 2023-05-04 RX ORDER — HALOPERIDOL 5 MG/ML
1 INJECTION INTRAMUSCULAR
Status: DISCONTINUED | OUTPATIENT
Start: 2023-05-04 | End: 2023-05-04 | Stop reason: HOSPADM

## 2023-05-04 RX ORDER — ONDANSETRON 2 MG/ML
4 INJECTION INTRAMUSCULAR; INTRAVENOUS
Status: DISCONTINUED | OUTPATIENT
Start: 2023-05-04 | End: 2023-05-04 | Stop reason: HOSPADM

## 2023-05-04 RX ORDER — HYDROMORPHONE HYDROCHLORIDE 1 MG/ML
0.4 INJECTION, SOLUTION INTRAMUSCULAR; INTRAVENOUS; SUBCUTANEOUS
Status: DISCONTINUED | OUTPATIENT
Start: 2023-05-04 | End: 2023-05-04 | Stop reason: HOSPADM

## 2023-05-04 RX ORDER — SODIUM CHLORIDE, SODIUM LACTATE, POTASSIUM CHLORIDE, CALCIUM CHLORIDE 600; 310; 30; 20 MG/100ML; MG/100ML; MG/100ML; MG/100ML
INJECTION, SOLUTION INTRAVENOUS CONTINUOUS
Status: DISCONTINUED | OUTPATIENT
Start: 2023-05-04 | End: 2023-05-04 | Stop reason: HOSPADM

## 2023-05-04 RX ORDER — OXYCODONE HCL 5 MG/5 ML
5 SOLUTION, ORAL ORAL
Status: DISCONTINUED | OUTPATIENT
Start: 2023-05-04 | End: 2023-05-04 | Stop reason: HOSPADM

## 2023-05-04 RX ORDER — EPHEDRINE SULFATE 50 MG/ML
5 INJECTION, SOLUTION INTRAVENOUS
Status: DISCONTINUED | OUTPATIENT
Start: 2023-05-04 | End: 2023-05-04 | Stop reason: HOSPADM

## 2023-05-04 RX ORDER — HYDROMORPHONE HYDROCHLORIDE 1 MG/ML
0.1 INJECTION, SOLUTION INTRAMUSCULAR; INTRAVENOUS; SUBCUTANEOUS
Status: DISCONTINUED | OUTPATIENT
Start: 2023-05-04 | End: 2023-05-04 | Stop reason: HOSPADM

## 2023-05-04 RX ORDER — OXYCODONE HCL 5 MG/5 ML
10 SOLUTION, ORAL ORAL
Status: DISCONTINUED | OUTPATIENT
Start: 2023-05-04 | End: 2023-05-04 | Stop reason: HOSPADM

## 2023-05-04 RX ADMIN — PROPOFOL 20 MG: 10 INJECTION, EMULSION INTRAVENOUS at 08:22

## 2023-05-04 RX ADMIN — PROPOFOL 20 MG: 10 INJECTION, EMULSION INTRAVENOUS at 08:34

## 2023-05-04 RX ADMIN — PROPOFOL 20 MG: 10 INJECTION, EMULSION INTRAVENOUS at 08:32

## 2023-05-04 RX ADMIN — PROPOFOL 20 MG: 10 INJECTION, EMULSION INTRAVENOUS at 08:18

## 2023-05-04 RX ADMIN — PROPOFOL 20 MG: 10 INJECTION, EMULSION INTRAVENOUS at 08:24

## 2023-05-04 RX ADMIN — PROPOFOL 20 MG: 10 INJECTION, EMULSION INTRAVENOUS at 08:21

## 2023-05-04 RX ADMIN — PROPOFOL 40 MG: 10 INJECTION, EMULSION INTRAVENOUS at 08:15

## 2023-05-04 RX ADMIN — PROPOFOL 20 MG: 10 INJECTION, EMULSION INTRAVENOUS at 08:25

## 2023-05-04 RX ADMIN — SODIUM CHLORIDE, POTASSIUM CHLORIDE, SODIUM LACTATE AND CALCIUM CHLORIDE: 600; 310; 30; 20 INJECTION, SOLUTION INTRAVENOUS at 08:07

## 2023-05-04 RX ADMIN — LIDOCAINE HYDROCHLORIDE 50 MG: 10 INJECTION, SOLUTION EPIDURAL; INFILTRATION; INTRACAUDAL; PERINEURAL at 08:14

## 2023-05-04 RX ADMIN — PROPOFOL 20 MG: 10 INJECTION, EMULSION INTRAVENOUS at 08:38

## 2023-05-04 RX ADMIN — PROPOFOL 20 MG: 10 INJECTION, EMULSION INTRAVENOUS at 08:36

## 2023-05-04 RX ADMIN — PROPOFOL 20 MG: 10 INJECTION, EMULSION INTRAVENOUS at 08:17

## 2023-05-04 RX ADMIN — PROPOFOL 20 MG: 10 INJECTION, EMULSION INTRAVENOUS at 08:23

## 2023-05-04 RX ADMIN — PROPOFOL 20 MG: 10 INJECTION, EMULSION INTRAVENOUS at 08:19

## 2023-05-04 RX ADMIN — PROPOFOL 60 MG: 10 INJECTION, EMULSION INTRAVENOUS at 08:14

## 2023-05-04 RX ADMIN — PROPOFOL 20 MG: 10 INJECTION, EMULSION INTRAVENOUS at 08:42

## 2023-05-04 RX ADMIN — PROPOFOL 20 MG: 10 INJECTION, EMULSION INTRAVENOUS at 08:27

## 2023-05-04 RX ADMIN — PROPOFOL 20 MG: 10 INJECTION, EMULSION INTRAVENOUS at 08:30

## 2023-05-04 RX ADMIN — PROPOFOL 20 MG: 10 INJECTION, EMULSION INTRAVENOUS at 08:33

## 2023-05-04 RX ADMIN — PROPOFOL 20 MG: 10 INJECTION, EMULSION INTRAVENOUS at 08:20

## 2023-05-04 RX ADMIN — EPHEDRINE SULFATE 5 MG: 50 INJECTION, SOLUTION INTRAVENOUS at 08:32

## 2023-05-04 RX ADMIN — PROPOFOL 20 MG: 10 INJECTION, EMULSION INTRAVENOUS at 08:16

## 2023-05-04 RX ADMIN — PROPOFOL 20 MG: 10 INJECTION, EMULSION INTRAVENOUS at 08:31

## 2023-05-04 RX ADMIN — EPHEDRINE SULFATE 5 MG: 50 INJECTION, SOLUTION INTRAVENOUS at 08:39

## 2023-05-04 RX ADMIN — PROPOFOL 20 MG: 10 INJECTION, EMULSION INTRAVENOUS at 08:26

## 2023-05-04 RX ADMIN — PROPOFOL 20 MG: 10 INJECTION, EMULSION INTRAVENOUS at 08:29

## 2023-05-04 RX ADMIN — PROPOFOL 20 MG: 10 INJECTION, EMULSION INTRAVENOUS at 08:28

## 2023-05-04 RX ADMIN — PROPOFOL 20 MG: 10 INJECTION, EMULSION INTRAVENOUS at 08:40

## 2023-05-04 ASSESSMENT — FIBROSIS 4 INDEX: FIB4 SCORE: 2.04

## 2023-05-04 NOTE — DISCHARGE INSTRUCTIONS
HOME CARE INSTRUCTIONS    ACTIVITY: Rest and take it easy for the first 24 hours.  A responsible adult is recommended to remain with you during that time.  It is normal to feel sleepy.  We encourage you to not do anything that requires balance, judgment or coordination.    FOR 24 HOURS DO NOT:  Drive, operate machinery or run household appliances.  Drink beer or alcoholic beverages.  Make important decisions or sign legal documents.    SPECIAL INSTRUCTIONS: soft diet to start with today.     DIET: To avoid nausea, slowly advance diet as tolerated, avoiding spicy or greasy foods for the first day.  Add more substantial food to your diet according to your physician's instructions.  Babies can be fed formula or breast milk as soon as they are hungry.  INCREASE FLUIDS AND FIBER TO AVOID CONSTIPATION.    MEDICATIONS: Resume taking daily medication.  Take prescribed pain medication with food.  If no medication is prescribed, you may take non-aspirin pain medication if needed.  PAIN MEDICATION CAN BE VERY CONSTIPATING.  Take a stool softener or laxative such as senokot, pericolace, or milk of magnesia if needed.    Your follow- up appointment should be arranged with your doctor ; call to schedule.    You should CALL YOUR PHYSICIAN if you develop:  Fever greater than 101 degrees F.  Pain not relieved by medication, or persistent nausea or vomiting.  Excessive bleeding (blood soaking through dressing) or unexpected drainage from the wound.  Extreme redness or swelling around the incision site, drainage of pus or foul smelling drainage.  Inability to urinate or empty your bladder within 8 hours.  Problems with breathing or chest pain.    You should call 911 if you develop problems with breathing or chest pain.  If you are unable to contact your doctor or surgical center, you should go to the nearest emergency room or urgent care center.  Physician's telephone #: [phone]    MILD FLU-LIKE SYMPTOMS ARE NORMAL.  YOU MAY  EXPERIENCE GENERALIZED MUSCLE ACHES, THROAT IRRITATION, HEADACHE AND/OR SOME NAUSEA.    If any questions arise, call your doctor.  If your doctor is not available, please feel free to call the Surgical Center at [phone].  The Center is open Monday through Friday from 7AM to 7PM.      A registered nurse may call you a few days after your surgery to see how you are doing after your procedure.    You may also receive a survey in the mail within the next two weeks and we ask that you take a few moments to complete the survey and return it to us.  Our goal is to provide you with very good care and we value your comments.     Depression / Suicide Risk    As you are discharged from this [facility] facility, it is important to learn how to keep safe from harming yourself.    Recognize the warning signs:  Abrupt changes in personality, positive or negative- including increase in energy   Giving away possessions  Change in eating patterns- significant weight changes-  positive or negative  Change in sleeping patterns- unable to sleep or sleeping all the time   Unwillingness or inability to communicate  Depression  Unusual sadness, discouragement and loneliness  Talk of wanting to die  Neglect of personal appearance   Rebelliousness- reckless behavior  Withdrawal from people/activities they love  Confusion- inability to concentrate     If you or a loved one observes any of these behaviors or has concerns about self-harm, here's what you can do:  Talk about it- your feelings and reasons for harming yourself  Remove any means that you might use to hurt yourself (examples: pills, rope, extension cords, firearm)  Get professional help from the community (Mental Health, Substance Abuse, psychological counseling)  Do not be alone:Call your Safe Contact- someone whom you trust who will be there for you.  Call your local CRISIS HOTLINE 969-4296 or 466-982-6982  Call your local Children's Mobile Crisis Response Team [place] (148) 585-9260 or www.iCharts  Call the toll free National Suicide Prevention Hotlines   National Suicide Prevention Lifeline 842-837-AYET (3275)  [facility] 800-SUICIDE (998-8537)    I acknowledge receipt and understanding of these Home Care instructions.

## 2023-05-04 NOTE — ANESTHESIA TIME REPORT
Anesthesia Start and Stop Event Times     Date Time Event    5/4/2023 0735 Ready for Procedure     0807 Anesthesia Start     0855 Anesthesia Stop        Responsible Staff  05/04/23    Name Role Begin End    Joelle Cramer M.D. Anesth 0807 0855        Overtime Reason:  no overtime (within assigned shift)    Comments:

## 2023-05-04 NOTE — ANESTHESIA PREPROCEDURE EVALUATION
Date/Time: 05/04/23 0800    Scheduled providers: Favian Allen M.D.    Procedure: EC-JUAQUIN W/O CONT    Diagnosis:       Severe mitral regurgitation [I34.0]      Mitral valve prolapse [I34.1]      Age-related osteoporosis without current pathological fracture [M81.0]    Location: [facility] IM[facility] - ECHOCARDIOLOGY [facility]          Relevant Problems   ANESTHESIA   (negative) Sleep apnea      PULMONARY   (negative) Asthma   (negative) Chronic obstructive pulmonary disease (COPD) (HCC)   (negative) Shortness of breath      NEURO   (negative) CVA (cerebral vascular accident) (HCC)   (negative) Neuromuscular disease (HCC)   (negative) TIA (transient ischemic attack)      CARDIAC   (positive) Atherosclerosis of aorta ([facility])   (positive) MVP (mitral valve prolapse)   (positive) Nonrheumatic mitral valve regurgitation   (negative) Angina of effort ([facility])   (negative) JACOME (dyspnea on exertion)   (negative) Dysrhythmia   (negative) Orthopnea      GI   (positive) Gastroesophageal reflux disease      ENDO   (negative) Diabetes mellitus type 1 ([facility])   (negative) Diabetes mellitus, type 2 (HCC)      Other   (positive) H/O parathyroidectomy ([facility])   (positive) H/O prostate cancer   (positive) Hyperlipidemia   (positive) Spondylosis of cervical region without myelopathy or radiculopathy   Mitral valve prolapse on TTE.  Asymptomatic,  Excellent exercise tolerance.    Physical Exam    Airway   Mallampati: I  TM distance: >3 FB  Neck ROM: full       Cardiovascular - normal exam  Rhythm: regular  Rate: normal  (+) murmur     Dental - normal exam           Pulmonary - normal exam  Breath sounds clear to auscultation     Abdominal    Neurological - normal exam                 Anesthesia Plan    ASA 2       Plan - MAC               Induction: intravenous      Pertinent diagnostic labs and testing reviewed    Informed Consent:    Anesthetic plan and risks discussed with patient.

## 2023-05-04 NOTE — OR NURSING
Discharge instructions reviewed with patient. Verbalizes understanding. Questions answered. PIV removed. Patient able to void without difficulty. Steady on feet. Patient walked off unit with RN.

## 2023-05-04 NOTE — ANESTHESIA POSTPROCEDURE EVALUATION
Patient: Sebastien Horn    Procedure Summary     Date: 05/04/23 Room / Location: [facility] IMAGING - ECHOCARDIOLOGY [facility]    Anesthesia Start: 0807 Anesthesia Stop: 0855    Procedure: EC-JUAQUIN W/O CONT Diagnosis:       Severe mitral regurgitation      Mitral valve prolapse      Age-related osteoporosis without current pathological fracture    Scheduled Providers: Favian Allen M.D.; Joelle Cramer M.D. Responsible Provider: Joelle Cramer M.D.    Anesthesia Type: MAC ASA Status: 2          Final Anesthesia Type: MAC  Last vitals  BP   Blood Pressure : 131/64    Temp   36.9 °C (98.4 °F)    Pulse   (!) 55   Resp   13    SpO2   95 %      Anesthesia Post Evaluation    Patient location during evaluation: PACU  Patient participation: complete - patient participated  Level of consciousness: awake and alert    Airway patency: patent  Anesthetic complications: no  Cardiovascular status: hemodynamically stable  Respiratory status: acceptable  Hydration status: euvolemic    PONV: none          No notable events documented.     Nurse Pain Score: 0 (NPRS)

## 2023-05-06 LAB — LV EJECT FRACT  99904: 70

## 2023-05-07 NOTE — RESULT ENCOUNTER NOTE
Called the patient and reviewed the results of his JUAQUIN showing severe mitral regurgitation with MVP. Asymptomatic. After further discussion with continue clinical monitoring with follow up

## 2023-05-08 ENCOUNTER — DOCUMENTATION (OUTPATIENT)
Dept: CARDIOLOGY | Facility: MEDICAL CENTER | Age: 73
End: 2023-05-08
Payer: MEDICARE

## 2023-05-08 NOTE — PROGRESS NOTES
This patient has been flagged through our echocardiogram surveillance with the following echocardiogram results:    Severe Mitral Regurgitation    Cardiologist: Dr. Allen    Current Plan of Care for Structural Heart Disease: Asymptomatic per Dr. Allen, added to surveillance tracking list    Next Echo date needed by: 4/19/2024    Next Follow up appointment needed by: 4/5/2024

## 2023-05-30 ENCOUNTER — OFFICE VISIT (OUTPATIENT)
Dept: MEDICAL GROUP | Facility: PHYSICIAN GROUP | Age: 73
End: 2023-05-30
Payer: MEDICARE

## 2023-05-30 VITALS
DIASTOLIC BLOOD PRESSURE: 64 MMHG | RESPIRATION RATE: 16 BRPM | SYSTOLIC BLOOD PRESSURE: 108 MMHG | HEART RATE: 55 BPM | OXYGEN SATURATION: 98 % | WEIGHT: 152.4 LBS | TEMPERATURE: 97.8 F | BODY MASS INDEX: 21.34 KG/M2 | HEIGHT: 71 IN

## 2023-05-30 DIAGNOSIS — M81.0 AGE-RELATED OSTEOPOROSIS WITHOUT CURRENT PATHOLOGICAL FRACTURE: ICD-10-CM

## 2023-05-30 DIAGNOSIS — R53.83 OTHER FATIGUE: ICD-10-CM

## 2023-05-30 DIAGNOSIS — E55.9 VITAMIN D DEFICIENCY: ICD-10-CM

## 2023-05-30 DIAGNOSIS — G31.9 CEREBRAL ATROPHY (HCC): ICD-10-CM

## 2023-05-30 DIAGNOSIS — I34.1 MVP (MITRAL VALVE PROLAPSE): ICD-10-CM

## 2023-05-30 DIAGNOSIS — Z85.46 H/O PROSTATE CANCER: ICD-10-CM

## 2023-05-30 DIAGNOSIS — I70.0 ATHEROSCLEROSIS OF AORTA (HCC): ICD-10-CM

## 2023-05-30 PROCEDURE — 3078F DIAST BP <80 MM HG: CPT | Performed by: INTERNAL MEDICINE

## 2023-05-30 PROCEDURE — 3074F SYST BP LT 130 MM HG: CPT | Performed by: INTERNAL MEDICINE

## 2023-05-30 PROCEDURE — 99214 OFFICE O/P EST MOD 30 MIN: CPT | Performed by: INTERNAL MEDICINE

## 2023-05-30 ASSESSMENT — FIBROSIS 4 INDEX: FIB4 SCORE: 2.04

## 2023-05-30 ASSESSMENT — PATIENT HEALTH QUESTIONNAIRE - PHQ9: CLINICAL INTERPRETATION OF PHQ2 SCORE: 0

## 2023-05-31 NOTE — PROGRESS NOTES
CC: Cardiology follow-up, medication.    HPI:  Sebastien presents with the following    1. MVP (mitral valve prolapse)  Patient followed by cardiology for mitral valve prolapse.  Patient was recently seen by cardiology for complaints of shortness of breath.  Initial echocardiogram showed worsening mitral valve prolapse as compared to 2014.  Patient underwent follow-up JUAQUIN which showed severe MR, however patient is mostly asymptomatic and is extremely active.  Patient declined surgical intervention and will be closely monitored by cardiology.  No restrictions/limitations for physical activity per cardiology.    2. Age-related osteoporosis without current pathological fracture  Patient completed a bone density May 2021 which showed a lumbar T score -3.2, femoral T score -2.3.  Patient has been taking Fosamax 70 mg weekly for the last 18 months.  He discontinued his Fosamax for 2 months prior to a tooth extraction.    3. Atherosclerosis of aorta (HCC)  Chronic.  Stable.  Abdominal ultrasound of the aorta showed mild atherosclerosis in March 2017    4. Cerebral atrophy (HCC)  Chronic.  Stable.  Brain MRI 2015 showed mild age-related cerebral atrophy.    5. H/O prostate cancer  Patient with a PMH of prostate cancer, status post prostatectomy in 2017 is followed by Dr. Lamas yearly.  Patient will be due for his PSA level in the fall.          Patient Active Problem List    Diagnosis Date Noted    Cerebral atrophy ([facility]) 05/30/2023    Gastroesophageal reflux disease 05/04/2023    Spondylosis of cervical region without myelopathy or radiculopathy 10/13/2022    Atherosclerosis of aorta (HCC) 08/26/2021    Skin lump of leg, left 05/19/2021    Multiple joint pain 04/13/2021    Nonrheumatic mitral valve regurgitation 03/15/2021    H/O nonmelanoma skin cancer 06/24/2019    Age-related osteoporosis without current pathological fracture 07/05/2017    AK (actinic keratosis) 12/14/2016    H/O parathyroidectomy ([facility]) 09/08/2016     Urinary incontinence 09/29/2015    Erectile dysfunction following radical prostatectomy 09/29/2015    Vitamin D deficiency 09/29/2015    Malignant neoplasm of prostate (HCC) 08/03/2015    H/O prostate cancer 06/02/2015    Hyperlipidemia 06/02/2015    MVP (mitral valve prolapse) 06/02/2015    Neck pain on left side 06/02/2015       Current Outpatient Medications   Medication Sig Dispense Refill    alendronate (FOSAMAX) 70 MG Tab Take 1 Tablet by mouth every 7 days. 12 Tablet 3    acetaminophen (TYLENOL 8 HOUR ARTHRITIS PAIN) 650 MG CR tablet Take 1,300 mg by mouth every 6 hours as needed.      acetaminophen (TYLENOL) 500 MG Tab Take 500-1,000 mg by mouth every 6 hours as needed. (Patient not taking: Reported on 5/30/2023)       No current facility-administered medications for this visit.         Allergies as of 05/30/2023    (No Known Allergies)        Social History     Socioeconomic History    Marital status:      Spouse name: Not on file    Number of children: Not on file    Years of education: Not on file    Highest education level: Some college, no degree   Occupational History    Not on file   Tobacco Use    Smoking status: Never    Smokeless tobacco: Never   Vaping Use    Vaping Use: Never used   Substance and Sexual Activity    Alcohol use: No     Alcohol/week: 0.0 oz    Drug use: Yes     Types: Inhaled     Comment: Cannabis, last use 5/3/23 am    Sexual activity: Not Currently   Other Topics Concern    Not on file   Social History Narrative    Not on file     Social Determinants of Health     Financial Resource Strain: Low Risk  (3/12/2021)    Overall Financial Resource Strain (CARDIA)     Difficulty of Paying Living Expenses: Not very hard   Food Insecurity: No Food Insecurity (3/12/2021)    Hunger Vital Sign     Worried About Running Out of Food in the Last Year: Never true     Ran Out of Food in the Last Year: Never true   Transportation Needs: No Transportation Needs (3/12/2021)    PRAPARE -  Transportation     Lack of Transportation (Medical): No     Lack of Transportation (Non-Medical): No   Physical Activity: Sufficiently Active (3/12/2021)    Exercise Vital Sign     Days of Exercise per Week: 7 days     Minutes of Exercise per Session: 150+ min   Stress: Stress Concern Present (3/12/2021)    [facility] of Occupational Health - Occupational Stress Questionnaire     Feeling of Stress : To some extent   Social Connections: Socially Isolated (3/12/2021)    Social Connection and Isolation Panel [NHANES]     Frequency of Communication with Friends and Family: More than three times a week     Frequency of Social Gatherings with Friends and Family: Patient refused     Attends [employer] Services: Never     Active Member of Clubs or Organizations: No     Attends Club or Organization Meetings: Never     Marital Status:    Intimate Partner Violence: Not on file   Housing Stability: Unknown (3/12/2021)    Housing Stability Vital Sign     Unable to Pay for Housing in the Last Year: No     Number of Places Lived in the Last Year: 1     Unstable Housing in the Last Year: Not on file       Family History   Problem Relation Age of Onset    Hyperlipidemia Mother     Hyperlipidemia Father     Cancer Father         Prostate and Lung    Lung Disease Father     Hypertension Father     Heart Disease Father     Other Sister         Lupus    Heart Disease Maternal Grandfather        Past Surgical History:   Procedure Laterality Date    IRRIGATION & DEBRIDEMENT ORTHO Left 3/10/2018    Procedure: IRRIGATION & DEBRIDEMENT ORTHO OPEN;  Surgeon: Sergio Daniel M.D.;  Location: SURGERY [facility];  Service: Orthopedics    SHOULDER ARTHROSCOPY Left 2/6/2018    Procedure: SHOULDER ARTHROSCOPY;  Surgeon: Sergio Daniel M.D.;  Location: SURGERY [facility];  Service: Orthopedics    IRRIGATION & DEBRIDEMENT ORTHO Left 2/6/2018    Procedure: IRRIGATION & DEBRIDEMENT ORTHO- SHOULDER  ;  Surgeon: Sergio Daniel  M.D.;  Location: [facility];  Service: Orthopedics    FOREIGN BODY REMOVAL Left 2/6/2018    Procedure: FOREIGN BODY REMOVAL;  Surgeon: Sergio Daniel M.D.;  Location: [facility];  Service: Orthopedics    SHOULDER ARTHROSCOPY W/ ROTATOR CUFF REPAIR Left 10/18/2017    Procedure: SHOULDER ARTHROSCOPY W/ ROTATOR CUFF REPAIR;  Surgeon: Sergio Daniel M.D.;  Location: [facility];  Service: Orthopedics    SHOULDER DECOMPRESSION ARTHROSCOPIC Left 10/18/2017    Procedure: SHOULDER DECOMPRESSION ARTHROSCOPIC- SUBACROMIAL;  Surgeon: Sergio Daniel M.D.;  Location: [facility];  Service: Orthopedics    BICEPS TENODESIS Left 10/18/2017    Procedure: BICEPS TENODESIS;  Surgeon: Sergio Daniel M.D.;  Location: [facility];  Service: Orthopedics    PARATHYROIDECTOMY N/A 7/5/2017    Procedure: PARATHYROIDECTOMY - MINIMALLY INVASIVE W/RECURRENT LARYNGEAL NERVE MONITORING;  Surgeon: Josh Blank M.D.;  Location: SURGERY SAME DAY [facility];  Service:     SPHINCTER PROSTHESIS REMOVAL  3/28/2017    Procedure: URINARY SPHINCTER PROSTHESIS REMOVAL;  Surgeon: Benigno Grier M.D.;  Location: [facility];  Service:     CYSTOSCOPY  3/28/2017    Procedure: CYSTOSCOPY;  Surgeon: Benigno Grier M.D.;  Location: [facility];  Service:     EVACUATION OF HEMATOMA  3/21/2017    Procedure: EVACUATION OF HEMATOMA-CYSTO CLOT EVACUATION AND SMALL CYSTOSCOPE;  Surgeon: Frank Lamas M.D.;  Location: [facility];  Service:     CYSTOSCOPY  3/21/2017    Procedure: CYSTOSCOPY;  Surgeon: Frank Lamas M.D.;  Location: [facility];  Service:     CYSTOSCOPY  3/20/2017    Procedure: CYSTOSCOPY -  FLEXIBLE;  Surgeon: Frank Lamas M.D.;  Location: [facility];  Service:     SPHINCTER PROSTHESIS PLACEMENT  3/20/2017    Procedure: SPHINCTER PROSTHESIS PLACEMENT - ARTIFICIAL URINARY SPHINCTER;  Surgeon: Frank Lamas M.D.;  Location:  "SURGERY [facility];  Service:     PROSTATECTOMY, RADICAL RETRO  8/3/2015    Procedure: PROSTATECTOMY RADICAL RETROPUBIC;  Surgeon: Sage Ngo M.D.;  Location: SURGERY [facility];  Service:     NODE DISSECTION Bilateral 8/3/2015    Procedure: NODE DISSECTION LYMPH;  Surgeon: Sage Ngo M.D.;  Location: SURGERY [facility];  Service:     KNEE ARTHROPLASTY TOTAL  7/3/2014    Performed by Theodore San M.D. at SURGERY [facility]    KNEE ARTHROSCOPY  7/3/2014    Performed by Theodore San M.D. at [facility]    MENISCECTOMY  7/3/2014    Performed by Theodore San M.D. at SURGERY [facility]    KNEE REPLACEMENT, TOTAL Right 2014    INGUINAL HERNIA REPAIR  6/1/2009    Performed by RISHI COOK at SURGERY [facility]    OTHER ORTHOPEDIC SURGERY  2003    L Shoulder Repair    INGUINAL HERNIA LAPAROSCOPIC BILATERAL Bilateral     OTHER ORTHOPEDIC SURGERY      R Rotator Cuff repair       ROS: Positive ROS per HPI.  Denies any Headache,Chest pain,  Shortness of breath,  Abdominal pain, Changes of bowel or bladder, Lower ext edema, Fevers, Nights sweats, Weight Changes, Focal weakness or numbness.  All other systems are negative.    /64 (BP Location: Left arm, Patient Position: Sitting, BP Cuff Size: Adult)   Pulse (!) 55   Temp 36.6 °C (97.8 °F) (Temporal)   Resp 16   Ht 1.803 m (5' 11\")   Wt 69.1 kg (152 lb 6.4 oz)   SpO2 98%   BMI 21.26 kg/m²      Physical Exam:  Gen:         Alert and oriented, No apparent distress.  HEENT:   Perrla, TM clear,  Oralpharynx no erythema or exudates.  Neck:       No Jugular venous distension, Lymphadenopathy, Thyromegaly, Bruits.  Lungs:     Clear to auscultation bilaterally, no wheezing rhonchi or crackles.  CV:          Regular rate and rhythm.  Positive murmur.  Abd:         Soft non tender, non distended. Normal active bowel sounds.             Ext:          No clubbing, cyanosis, edema.      Assessment and " Plan.   72 y.o. male presenting with the following.     1. MVP (mitral valve prolapse)  Chronic.  Stable.  Continue monitoring per cardiology.    2. Age-related osteoporosis without current pathological fracture  Patient will complete follow-up bone density.  Discussed with patient to continue Fosamax at this point.    3. Atherosclerosis of aorta (HCC)  Chronic.  Stable.  Continue to monitor.    4. Cerebral atrophy (HCC)  Chronic.  Stable.  Continue to monitor.    5. H/O prostate cancer    - PROSTATE SPECIFIC AG DIAGNOSTIC; Future    6. Vitamin D deficiency    - VITAMIN D,25 HYDROXY (DEFICIENCY); Future    7. Other fatigue    - CBC WITH DIFFERENTIAL; Future  - Comp Metabolic Panel; Future  - TSH WITH REFLEX TO FT4; Future      My total time spent caring for the patient on the day of the encounter was 30 minutes.   This does not include time spent on separately billable procedures/tests.

## 2023-06-15 ENCOUNTER — HOSPITAL ENCOUNTER (OUTPATIENT)
Dept: RADIOLOGY | Facility: MEDICAL CENTER | Age: 73
End: 2023-06-15
Attending: INTERNAL MEDICINE
Payer: MEDICARE

## 2023-06-15 DIAGNOSIS — M81.0 AGE-RELATED OSTEOPOROSIS WITHOUT CURRENT PATHOLOGICAL FRACTURE: ICD-10-CM

## 2023-06-15 PROCEDURE — 77080 DXA BONE DENSITY AXIAL: CPT

## 2023-09-13 ENCOUNTER — APPOINTMENT (RX ONLY)
Dept: URBAN - METROPOLITAN AREA CLINIC 4 | Facility: CLINIC | Age: 73
Setting detail: DERMATOLOGY
End: 2023-09-13

## 2023-09-13 DIAGNOSIS — L57.8 OTHER SKIN CHANGES DUE TO CHRONIC EXPOSURE TO NONIONIZING RADIATION: ICD-10-CM

## 2023-09-13 DIAGNOSIS — L81.4 OTHER MELANIN HYPERPIGMENTATION: ICD-10-CM

## 2023-09-13 DIAGNOSIS — D22 MELANOCYTIC NEVI: ICD-10-CM

## 2023-09-13 DIAGNOSIS — L57.0 ACTINIC KERATOSIS: ICD-10-CM

## 2023-09-13 DIAGNOSIS — Z85.828 PERSONAL HISTORY OF OTHER MALIGNANT NEOPLASM OF SKIN: ICD-10-CM

## 2023-09-13 DIAGNOSIS — D18.0 HEMANGIOMA: ICD-10-CM

## 2023-09-13 DIAGNOSIS — L82.1 OTHER SEBORRHEIC KERATOSIS: ICD-10-CM

## 2023-09-13 PROBLEM — D22.5 MELANOCYTIC NEVI OF TRUNK: Status: ACTIVE | Noted: 2023-09-13

## 2023-09-13 PROBLEM — D48.5 NEOPLASM OF UNCERTAIN BEHAVIOR OF SKIN: Status: ACTIVE | Noted: 2023-09-13

## 2023-09-13 PROBLEM — D18.01 HEMANGIOMA OF SKIN AND SUBCUTANEOUS TISSUE: Status: ACTIVE | Noted: 2023-09-13

## 2023-09-13 PROCEDURE — ? LIQUID NITROGEN

## 2023-09-13 PROCEDURE — ? COUNSELING

## 2023-09-13 PROCEDURE — 99213 OFFICE O/P EST LOW 20 MIN: CPT | Mod: 25

## 2023-09-13 PROCEDURE — ? BIOPSY BY SHAVE METHOD

## 2023-09-13 PROCEDURE — 17000 DESTRUCT PREMALG LESION: CPT | Mod: 59

## 2023-09-13 PROCEDURE — 11102 TANGNTL BX SKIN SINGLE LES: CPT

## 2023-09-13 ASSESSMENT — LOCATION ZONE DERM
LOCATION ZONE: NECK
LOCATION ZONE: TRUNK
LOCATION ZONE: FACE
LOCATION ZONE: HAND
LOCATION ZONE: SCALP

## 2023-09-13 ASSESSMENT — LOCATION DETAILED DESCRIPTION DERM
LOCATION DETAILED: RIGHT RADIAL DORSAL HAND
LOCATION DETAILED: RIGHT INFERIOR CENTRAL MALAR CHEEK
LOCATION DETAILED: RIGHT INFERIOR ANTERIOR NECK
LOCATION DETAILED: RIGHT SUPERIOR UPPER BACK
LOCATION DETAILED: RIGHT INFERIOR MEDIAL UPPER BACK
LOCATION DETAILED: RIGHT MEDIAL FRONTAL SCALP
LOCATION DETAILED: LEFT RADIAL DORSAL HAND
LOCATION DETAILED: LEFT SUPERIOR UPPER BACK

## 2023-09-13 ASSESSMENT — LOCATION SIMPLE DESCRIPTION DERM
LOCATION SIMPLE: RIGHT CHEEK
LOCATION SIMPLE: LEFT HAND
LOCATION SIMPLE: RIGHT HAND
LOCATION SIMPLE: LEFT UPPER BACK
LOCATION SIMPLE: RIGHT UPPER BACK
LOCATION SIMPLE: RIGHT SCALP
LOCATION SIMPLE: RIGHT ANTERIOR NECK

## 2023-09-13 NOTE — PROCEDURE: LIQUID NITROGEN
Detail Level: Simple
Consent: The patient's consent was obtained including but not limited to risks of crusting, scabbing, blistering, scarring, darker or lighter pigmentary change, recurrence, incomplete removal and infection.
Aperture Size (Optional): C
Render Post-Care Instructions In Note?: no
Show Aperture Variable?: Yes
Duration Of Freeze Thaw-Cycle (Seconds): 3
Post-Care Instructions: I reviewed with the patient in detail post-care instructions. Patient is to wear sunprotection, and avoid picking at any of the treated lesions. Pt may apply Vaseline to crusted or scabbing areas.
Number Of Freeze-Thaw Cycles: 2 freeze-thaw cycles

## 2023-09-14 ENCOUNTER — OFFICE VISIT (OUTPATIENT)
Dept: MEDICAL GROUP | Facility: MEDICAL CENTER | Age: 73
End: 2023-09-14
Payer: MEDICARE

## 2023-09-14 VITALS
DIASTOLIC BLOOD PRESSURE: 68 MMHG | SYSTOLIC BLOOD PRESSURE: 122 MMHG | HEIGHT: 71 IN | BODY MASS INDEX: 21.42 KG/M2 | HEART RATE: 75 BPM | TEMPERATURE: 98.3 F | OXYGEN SATURATION: 98 % | WEIGHT: 153 LBS
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
## 2023-09-14 DIAGNOSIS — Z85.46 H/O PROSTATE CANCER: ICD-10-CM

## 2023-09-14 DIAGNOSIS — M81.0 AGE-RELATED OSTEOPOROSIS WITHOUT CURRENT PATHOLOGICAL FRACTURE: ICD-10-CM

## 2023-09-14 DIAGNOSIS — E78.5 HYPERLIPIDEMIA, UNSPECIFIED HYPERLIPIDEMIA TYPE: ICD-10-CM

## 2023-09-14 DIAGNOSIS — Z00.00 HEALTHCARE MAINTENANCE: ICD-10-CM

## 2023-09-14 PROBLEM — R22.42 SKIN LUMP OF LEG, LEFT: Status: RESOLVED | Noted: 2021-05-19 | Resolved: 2023-09-14

## 2023-09-14 PROCEDURE — 3074F SYST BP LT 130 MM HG: CPT | Performed by: STUDENT IN AN ORGANIZED HEALTH CARE EDUCATION/TRAINING PROGRAM

## 2023-09-14 PROCEDURE — 3078F DIAST BP <80 MM HG: CPT | Performed by: STUDENT IN AN ORGANIZED HEALTH CARE EDUCATION/TRAINING PROGRAM

## 2023-09-14 PROCEDURE — 99214 OFFICE O/P EST MOD 30 MIN: CPT | Performed by: STUDENT IN AN ORGANIZED HEALTH CARE EDUCATION/TRAINING PROGRAM

## 2023-09-14 ASSESSMENT — FIBROSIS 4 INDEX: FIB4 SCORE: 2.07

## 2023-09-14 ASSESSMENT — ENCOUNTER SYMPTOMS
PALPITATIONS: 0
BLURRED VISION: 0
BLOOD IN STOOL: 0
HEARTBURN: 0
DIZZINESS: 0
SHORTNESS OF BREATH: 0
FEVER: 0
WHEEZING: 0
SORE THROAT: 0
COUGH: 0
HEADACHES: 0
FALLS: 0
MYALGIAS: 0
DEPRESSION: 0
NAUSEA: 0

## 2023-09-14 NOTE — PROGRESS NOTES
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 had few skin cuts and bruises on shoulder and right hip region . Patient states no pain in bone or joints and has restarted hiking since yesterday. Skin cuts where checked by his dermatologist yesterday and told that its healing good and no concerns. Discussed with patient that probably we should get an xray of the right hip but patient declined stating he is doing good and not concerned .       Age-Related Osteoporosis Without Current Pathological Fracture    Chronic, stable   Taking alendronate 70 mg once a week.  Tolerating well, denies side effects.   Getting good amount of calcium in diet and vitamin D supplement  Weight bearing exercises as tolerated       H/O Prostate Cancer    Diagnosed in 2013  Being followed by urology, Dr. Ngo-> Dr. Lamas  Prostatectomy August 2015  Following up with urology once a year               Health Maintenance: Completed    ROS:  Review of Systems   Constitutional:  Negative for fever and malaise/fatigue.   HENT:  Negative for congestion and sore throat.    Eyes:  Negative for blurred vision.   Respiratory:  Negative for cough, shortness of breath and wheezing.    Cardiovascular:  Negative for chest pain, palpitations and leg swelling.   Gastrointestinal:  Negative for blood in stool, heartburn and nausea.   Genitourinary:  Negative for dysuria and urgency.   Musculoskeletal:  Negative for falls and myalgias.   Skin:  Positive for rash.   Neurological:  Negative for dizziness and headaches.   Psychiatric/Behavioral:  Negative for depression and suicidal ideas.        Review of systems unremarkable except for concerns noted by patient or items listed.    Please see HPI for additional ROS.      Objective:     Exam:  /68 (BP  "Location: Left arm, Patient Position: Sitting, BP Cuff Size: Large adult long)   Pulse 75   Temp 36.8 °C (98.3 °F) (Temporal)   Ht 1.803 m (5' 10.98\")   Wt 69.4 kg (153 lb)   SpO2 98%   BMI 21.35 kg/m²  Body mass index is 21.35 kg/m².    Physical Exam  Constitutional:       General: He is not in acute distress.     Appearance: Normal appearance.   HENT:      Head: Normocephalic.   Eyes:      Conjunctiva/sclera: Conjunctivae normal.   Cardiovascular:      Rate and Rhythm: Normal rate and regular rhythm.      Pulses: Normal pulses.      Heart sounds: Normal heart sounds.   Pulmonary:      Effort: Pulmonary effort is normal.      Breath sounds: Normal breath sounds.   Skin:     General: Skin is warm.      Findings: Bruising present.   Neurological:      Mental Status: He is alert and oriented to person, place, and time.   Psychiatric:         Mood and Affect: Mood normal.         Behavior: Behavior normal.       Labs: reviewed     Assessment & Plan:     73 y.o. male with the following -     1. Age-related osteoporosis without current pathological fracture     Chronic, stable  Continue alendronate 70 mg every 7 days  Instructions provided to take medication with lots of water, to be taken up bright see get not to lay down for at least 30 minutes to avoid esophagitis.     2. H/O prostate cancer     Status post prostatectomy in 2015.  Chronic, stable  Continue to follow-up with urology     3. Accident due to mechanical fall without injury, subsequent encounter       Fall precautions discussed with patient  Currently patient has some bruises which are in the healing stage  He denies any hip pain at present, normal gait on examination.  Recommended to get right hip x-ray to rule out any fractures which patient declined at present.  Recommended patient to seek medical attention if any worsening of swelling or pain symptoms         HCC Gap Form    Last edited 09/14/23 14:39 PDT by Jasmyn Ordaz M.D.           Return in " about 3 months (around 12/14/2023).    Please note that this dictation was created using voice recognition software. I have made every reasonable attempt to correct obvious errors, but I expect that there are errors of grammar and possibly content that I did not discover before finalizing the note.

## 2023-09-15 ENCOUNTER — TELEPHONE (OUTPATIENT)
Dept: HEALTH INFORMATION MANAGEMENT | Facility: OTHER | Age: 73
End: 2023-09-15

## 2023-09-29 ENCOUNTER — APPOINTMENT (RX ONLY)
Dept: URBAN - METROPOLITAN AREA CLINIC 4 | Facility: CLINIC | Age: 73
Setting detail: DERMATOLOGY
End: 2023-09-29

## 2023-09-29 DIAGNOSIS — D485 NEOPLASM OF UNCERTAIN BEHAVIOR OF SKIN: ICD-10-CM

## 2023-09-29 DIAGNOSIS — L57.0 ACTINIC KERATOSIS: ICD-10-CM

## 2023-09-29 PROBLEM — D48.5 NEOPLASM OF UNCERTAIN BEHAVIOR OF SKIN: Status: ACTIVE | Noted: 2023-09-29

## 2023-09-29 PROBLEM — D04.71 CARCINOMA IN SITU OF SKIN OF RIGHT LOWER LIMB, INCLUDING HIP: Status: ACTIVE | Noted: 2023-09-29

## 2023-09-29 PROCEDURE — 99213 OFFICE O/P EST LOW 20 MIN: CPT | Mod: 25

## 2023-09-29 PROCEDURE — 17263 DSTRJ MAL LES T/A/L 2.1-3.0: CPT

## 2023-09-29 PROCEDURE — ? CURETTAGE AND DESTRUCTION

## 2023-09-29 PROCEDURE — ? OBSERVATION

## 2023-09-29 PROCEDURE — ? COUNSELING

## 2023-09-29 ASSESSMENT — LOCATION SIMPLE DESCRIPTION DERM: LOCATION SIMPLE: LEFT PRETIBIAL REGION

## 2023-09-29 ASSESSMENT — LOCATION ZONE DERM: LOCATION ZONE: LEG

## 2023-09-29 ASSESSMENT — LOCATION DETAILED DESCRIPTION DERM: LOCATION DETAILED: LEFT PROXIMAL PRETIBIAL REGION

## 2023-09-29 NOTE — PROCEDURE: CURETTAGE AND DESTRUCTION
Detail Level: Detailed
Number Of Curettages: 3
Size Of Lesion In Cm: 1.9
Size Of Lesion After Curettage: 2.3
Add Intralesional Injection: No
Concentration (Mg/Ml Or Millions Of Plaque Forming Units/Cc): 0.01
Total Volume (Ccs): 1
Anesthesia Type: 1% lidocaine with epinephrine
Anesthesia Volume In Cc: 6.4
Cautery Type: electrodesiccation
What Was Performed First?: Curettage
Final Size Statement: The size of the lesion after curettage was
Additional Information: (Optional): The wound was cleaned, and a pressure dressing was applied.  The patient received detailed post-op instructions.
Consent was obtained from the patient. The risks, benefits and alternatives to therapy were discussed in detail. Specifically, the risks of infection, scarring, bleeding, prolonged wound healing, nerve injury, incomplete removal, allergy to anesthesia and recurrence were addressed. Alternatives to ED&C, such as: surgical removal and XRT were also discussed.  Prior to the procedure, the treatment site was clearly identified and confirmed by the patient. All components of Universal Protocol/PAUSE Rule completed.
Post-Care Instructions: I reviewed with the patient in detail post-care instructions. Patient is to keep the area dry for 48 hours, and not to engage in any swimming until the area is healed. Should the patient develop any fevers, chills, bleeding, severe pain patient will contact the office immediately.
Bill As A Line Item Or As Units: Line Item

## 2023-10-12 ENCOUNTER — OFFICE VISIT (OUTPATIENT)
Dept: CARDIOLOGY | Facility: MEDICAL CENTER | Age: 73
End: 2023-10-12
Attending: INTERNAL MEDICINE
Payer: MEDICARE

## 2023-10-12 VITALS
HEIGHT: 70 IN | HEART RATE: 54 BPM | BODY MASS INDEX: 21.47 KG/M2 | DIASTOLIC BLOOD PRESSURE: 72 MMHG | OXYGEN SATURATION: 97 % | WEIGHT: 150 LBS | SYSTOLIC BLOOD PRESSURE: 110 MMHG | RESPIRATION RATE: 16 BRPM

## 2023-10-12 DIAGNOSIS — I34.1 MITRAL VALVE PROLAPSE: ICD-10-CM

## 2023-10-12 DIAGNOSIS — I34.0 SEVERE MITRAL REGURGITATION: ICD-10-CM

## 2023-10-12 PROCEDURE — 99211 OFF/OP EST MAY X REQ PHY/QHP: CPT | Performed by: INTERNAL MEDICINE

## 2023-10-12 PROCEDURE — 3078F DIAST BP <80 MM HG: CPT | Performed by: INTERNAL MEDICINE

## 2023-10-12 PROCEDURE — 99213 OFFICE O/P EST LOW 20 MIN: CPT | Performed by: INTERNAL MEDICINE

## 2023-10-12 PROCEDURE — 3074F SYST BP LT 130 MM HG: CPT | Performed by: INTERNAL MEDICINE

## 2023-10-12 ASSESSMENT — ENCOUNTER SYMPTOMS
SHORTNESS OF BREATH: 0
MYALGIAS: 0
PALPITATIONS: 0
DIZZINESS: 0
COUGH: 0
LOSS OF CONSCIOUSNESS: 0

## 2023-10-12 ASSESSMENT — FIBROSIS 4 INDEX: FIB4 SCORE: 2.07

## 2023-10-12 NOTE — PROGRESS NOTES
Chief Complaint   Patient presents with    Shortness of Breath    Aortic Atherosclerosis       Subjective     Sebastien Horn is a 73 y.o. male who presents today self-referred to establish cardiac care.    The patient has myxomatous mitral valve disease, mitral valve prolapse, severe mitral regurgitation, prostate cancer with prostatectomy 2012, dyslipidemia.    Since 4/5/2023 appointment the patient has had no cardiac symptoms including chest pain, palpitations, shortness of breath.  He remains active hiking, kayaking, weightlifting and specifically denies any exercise intolerance, shortness of breath, palpitations or lightheadedness.  Lost his balance and 1 hike resulting in a fall but no major injury or head trauma.    He has no history of hypertension, diabetes mellitus or dyslipidemia.  He does not smoke cigarettes.  He takes a had of marijuana a day.  He drinks 3 cups of coffee a day.  Does not drink alcohol.    No family history of premature heart disease.    Other medical problems include prostate cancer with prostatectomy 2012 with no recurrence.  No thyroid disease.  He denies lung disease, peptic ulcer disease, gallbladder or liver disease.  No kidney disease, TIA symptoms or stroke.    No family history of premature heart disease.    Past Medical History:   Diagnosis Date    Arrhythmia     Arthritis     knees    Atherosclerosis of aorta (HCC) 08/26/2021    Cancer (HCC) 07/2013    Prostate    Cerebral atrophy ([facility]) 5/30/2023    Disorder of thyroid     Heart burn     Heart valve disease     Mild mitral valve problem - per patient cardiologist is not concerned at this time.    Hiatus hernia syndrome     Infectious disease 12/1/22    Breakthrough  case of covid    Malignant neoplasm of prostate (HCC) 8/3/2015    Multiple joint pain 04/13/2021    Osteoporosis     Skin lump of leg, left 05/19/2021    Spondylosis of cervical region without myelopathy or radiculopathy 10/13/2022    Unspecified cataract      Bilateral IOL's    Urinary incontinence      Past Surgical History:   Procedure Laterality Date    IRRIGATION & DEBRIDEMENT ORTHO Left 3/10/2018    Procedure: IRRIGATION & DEBRIDEMENT ORTHO OPEN;  Surgeon: Sergio Daniel M.D.;  Location: [facility];  Service: Orthopedics    SHOULDER ARTHROSCOPY Left 2/6/2018    Procedure: SHOULDER ARTHROSCOPY;  Surgeon: Sergio Daniel M.D.;  Location: [facility];  Service: Orthopedics    IRRIGATION & DEBRIDEMENT ORTHO Left 2/6/2018    Procedure: IRRIGATION & DEBRIDEMENT ORTHO- SHOULDER  ;  Surgeon: Sergio Daniel M.D.;  Location: [facility];  Service: Orthopedics    FOREIGN BODY REMOVAL Left 2/6/2018    Procedure: FOREIGN BODY REMOVAL;  Surgeon: Sergio Daniel M.D.;  Location: [facility];  Service: Orthopedics    SHOULDER ARTHROSCOPY W/ ROTATOR CUFF REPAIR Left 10/18/2017    Procedure: SHOULDER ARTHROSCOPY W/ ROTATOR CUFF REPAIR;  Surgeon: Sergio Daniel M.D.;  Location: [facility];  Service: Orthopedics    SHOULDER DECOMPRESSION ARTHROSCOPIC Left 10/18/2017    Procedure: SHOULDER DECOMPRESSION ARTHROSCOPIC- SUBACROMIAL;  Surgeon: Sergio Daniel M.D.;  Location: [facility];  Service: Orthopedics    BICEPS TENODESIS Left 10/18/2017    Procedure: BICEPS TENODESIS;  Surgeon: Sergio Daniel M.D.;  Location: [facility];  Service: Orthopedics    PARATHYROIDECTOMY N/A 7/5/2017    Procedure: PARATHYROIDECTOMY - MINIMALLY INVASIVE W/RECURRENT LARYNGEAL NERVE MONITORING;  Surgeon: Josh Blank M.D.;  Location: SURGERY SAME DAY [facility];  Service:     SPHINCTER PROSTHESIS REMOVAL  3/28/2017    Procedure: URINARY SPHINCTER PROSTHESIS REMOVAL;  Surgeon: Benigno Grier M.D.;  Location: [facility];  Service:     CYSTOSCOPY  3/28/2017    Procedure: CYSTOSCOPY;  Surgeon: Benigno Grier M.D.;  Location: [facility];  Service:     EVACUATION OF HEMATOMA  3/21/2017     Procedure: EVACUATION OF HEMATOMA-CYSTO CLOT EVACUATION AND SMALL CYSTOSCOPE;  Surgeon: Frank Lamas M.D.;  Location: SURGERY [facility];  Service:     CYSTOSCOPY  3/21/2017    Procedure: CYSTOSCOPY;  Surgeon: Frank Lamas M.D.;  Location: SURGERY [facility];  Service:     CYSTOSCOPY  3/20/2017    Procedure: CYSTOSCOPY -  FLEXIBLE;  Surgeon: Frank Lamas M.D.;  Location: SURGERY [facility];  Service:     SPHINCTER PROSTHESIS PLACEMENT  3/20/2017    Procedure: SPHINCTER PROSTHESIS PLACEMENT - ARTIFICIAL URINARY SPHINCTER;  Surgeon: Frank Lamas M.D.;  Location: SURGERY [facility];  Service:     PROSTATECTOMY, RADICAL RETRO  8/3/2015    Procedure: PROSTATECTOMY RADICAL RETROPUBIC;  Surgeon: Sage Ngo M.D.;  Location: SURGERY [facility];  Service:     NODE DISSECTION Bilateral 8/3/2015    Procedure: NODE DISSECTION LYMPH;  Surgeon: Sage Ngo M.D.;  Location: SURGERY [facility];  Service:     KNEE ARTHROPLASTY TOTAL  7/3/2014    Performed by Theodore San M.D. at SURGERY [facility]    KNEE ARTHROSCOPY  7/3/2014    Performed by Theodore San M.D. at SURGERY [facility]    MENISCECTOMY  7/3/2014    Performed by Theodore San M.D. at SURGERY [facility]    KNEE REPLACEMENT, TOTAL Right 2014    INGUINAL HERNIA REPAIR  6/1/2009    Performed by RISHI COOK at SURGERY [facility]    OTHER ORTHOPEDIC SURGERY  2003    L Shoulder Repair    INGUINAL HERNIA LAPAROSCOPIC BILATERAL Bilateral     OTHER ORTHOPEDIC SURGERY      R Rotator Cuff repair     Family History   Problem Relation Age of Onset    Hyperlipidemia Mother     Hyperlipidemia Father     Cancer Father         Prostate and Lung    Lung Disease Father     Hypertension Father     Heart Disease Father     Other Sister         Lupus    Heart Disease Maternal Grandfather      Social History     Socioeconomic History    Marital status:      Spouse name: Not on file    Number of  children: Not on file    Years of education: Not on file    Highest education level: Some college, no degree   Occupational History    Not on file   Tobacco Use    Smoking status: Never    Smokeless tobacco: Never   Vaping Use    Vaping Use: Never used   Substance and Sexual Activity    Alcohol use: No     Alcohol/week: 0.0 oz    Drug use: Yes     Types: Inhaled     Comment: Cannabis, last use 5/3/23 am    Sexual activity: Not Currently   Other Topics Concern    Not on file   Social History Narrative    Not on file     Social Determinants of Health     Financial Resource Strain: Medium Risk (8/29/2023)    Overall Financial Resource Strain (CARDIA)     Difficulty of Paying Living Expenses: Somewhat hard   Food Insecurity: No Food Insecurity (8/29/2023)    Hunger Vital Sign     Worried About Running Out of Food in the Last Year: Never true     Ran Out of Food in the Last Year: Never true   Transportation Needs: No Transportation Needs (8/29/2023)    PRAPARE - Transportation     Lack of Transportation (Medical): No     Lack of Transportation (Non-Medical): No   Physical Activity: Sufficiently Active (8/29/2023)    Exercise Vital Sign     Days of Exercise per Week: 7 days     Minutes of Exercise per Session: 150+ min   Stress: Stress Concern Present (8/29/2023)    [employer] [place] [employer] - Occupational Stress Questionnaire     Feeling of Stress : To some extent   Social Connections: Socially Isolated (8/29/2023)    Social Connection and Isolation Panel [NHANES]     Frequency of Communication with Friends and Family: More than three times a week     Frequency of Social Gatherings with Friends and Family: Never     Attends Shinto Services: Never     Active Member of Clubs or Organizations: No     Attends Club or Organization Meetings: Never     Marital Status:    Intimate Partner Violence: Not on file   Housing Stability: Low Risk  (8/29/2023)    Housing Stability Vital Sign     Unable to Pay  "for Housing in the Last Year: No     Number of Places Lived in the Last Year: 1     Unstable Housing in the Last Year: No     No Known Allergies  Outpatient Encounter Medications as of 10/12/2023   Medication Sig Dispense Refill    melatonin 5 mg Tab Take 5 mg by mouth at bedtime as needed.      alendronate (FOSAMAX) 70 MG Tab Take 1 Tablet by mouth every 7 days. 12 Tablet 3     No facility-administered encounter medications on file as of 10/12/2023.     Review of Systems   Respiratory:  Negative for cough and shortness of breath.    Cardiovascular:  Negative for chest pain and palpitations.   Musculoskeletal:  Negative for myalgias.   Neurological:  Negative for dizziness and loss of consciousness.              Objective     /72 (BP Location: Left arm, Patient Position: Sitting, BP Cuff Size: Adult)   Pulse (!) 54   Resp 16   Ht 1.772 m (5' 9.75\")   Wt 68 kg (150 lb)   SpO2 97%   BMI 21.68 kg/m²     Physical Exam  Vitals reviewed.   Constitutional:       General: He is not in acute distress.     Appearance: He is well-developed.   Eyes:      Conjunctiva/sclera: Conjunctivae normal.      Pupils: Pupils are equal, round, and reactive to light.   Neck:      Vascular: No JVD.   Cardiovascular:      Rate and Rhythm: Normal rate and regular rhythm.      Pulses:           Radial pulses are 1+ on the right side and 1+ on the left side.      Heart sounds: Murmur heard.      No friction rub. No gallop.   Pulmonary:      Effort: Pulmonary effort is normal. No accessory muscle usage or respiratory distress.      Breath sounds: Normal breath sounds. No wheezing or rales.   Chest:      Comments: Kyphosis  Abdominal:      General: There is no distension.      Palpations: Abdomen is soft. There is no mass.      Tenderness: There is no abdominal tenderness.   Musculoskeletal:      Cervical back: Normal range of motion and neck supple.      Right lower leg: No edema.      Left lower leg: No edema.   Skin:     General: " Skin is warm and dry.      Findings: No rash.      Nails: There is no clubbing.   Neurological:      Mental Status: He is alert and oriented to person, place, and time.   Psychiatric:         Behavior: Behavior normal.         Echocardiogram 1/20/2014  Normal left ventricular systolic function  Left ventricular ejection fraction 60-65%  Mild left atrial enlargement  Prolapse of the posterior mitral valve leaflet  Mitral digitation mild to moderate    Transesophageal echocardiogram 5/4/2023  Normal left ventricular systolic function.  The left ventricular ejection fraction is visually estimated to be 70%.  Mitral valve prolapse predominantly involving the thickened posterior   leaflet.  Severe mitral regurgitation, central regurgitant jet.  Normal right ventricular systolic function.    EKG 4/5/2022 sinus arrhythmia, personally reviewed and interpreted    Assessment & Plan     1. Severe mitral regurgitation        2. Mitral valve prolapse            Medical Decision Making: Today's Assessment/Status/Plan:   Mitral valve prolapse involving the posterior leaflet  Mitral regurgitation, severe  Prostate cancer with prostatectomy 2012  COVID infection 11/2022    Recommendation Discussion  Clinically stable asymptomatic in reference to his severe mitral regurgitation, with LVEF 70%  Reviewed JUAQUIN images, significant posterior leaflet prolapse, no obvious evidence of leaflet disruption, torn chordae tendineae or flail leaflet.  Not interested in any surgical intervention at this time  Instructed the patient to continue to closely monitor symptoms for worsening shortness of breath, chest pain, lightheadedness, palpitations and report them to me or seek medical attention in the ER if they occur.  Has new PCP with upcoming appointment and labs pending  RTC 6 months, sooner if necessary

## 2023-11-30 ENCOUNTER — HOSPITAL ENCOUNTER (OUTPATIENT)
Dept: LAB | Facility: MEDICAL CENTER | Age: 73
End: 2023-11-30
Attending: STUDENT IN AN ORGANIZED HEALTH CARE EDUCATION/TRAINING PROGRAM
Payer: MEDICARE

## 2023-11-30 DIAGNOSIS — Z00.00 HEALTHCARE MAINTENANCE: ICD-10-CM

## 2023-11-30 DIAGNOSIS — Z85.46 H/O PROSTATE CANCER: ICD-10-CM

## 2023-11-30 DIAGNOSIS — M81.0 AGE-RELATED OSTEOPOROSIS WITHOUT CURRENT PATHOLOGICAL FRACTURE: ICD-10-CM
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
## 2023-11-30 LAB
ERYTHROCYTE [DISTWIDTH] IN BLOOD BY AUTOMATED COUNT: 45.8 FL (ref 35.9–50)
HCT VFR BLD AUTO: 40.1 % (ref 42–52)
HGB BLD-MCNC: 13.6 G/DL (ref 14–18)
MCH RBC QN AUTO: 33 PG (ref 27–33)
MCHC RBC AUTO-ENTMCNC: 33.9 G/DL (ref 32.3–36.5)
MCV RBC AUTO: 97.3 FL (ref 81.4–97.8)
PLATELET # BLD AUTO: 211 K/UL (ref 164–446)
PMV BLD AUTO: 10.8 FL (ref 9–12.9)
RBC # BLD AUTO: 4.12 M/UL (ref 4.7–6.1)
WBC # BLD AUTO: 4.4 K/UL (ref 4.8–10.8)

## 2023-11-30 PROCEDURE — 84153 ASSAY OF PSA TOTAL: CPT

## 2023-11-30 PROCEDURE — 85027 COMPLETE CBC AUTOMATED: CPT

## 2023-11-30 PROCEDURE — 36415 COLL VENOUS BLD VENIPUNCTURE: CPT

## 2023-11-30 PROCEDURE — 84443 ASSAY THYROID STIM HORMONE: CPT

## 2023-11-30 PROCEDURE — 80053 COMPREHEN METABOLIC PANEL: CPT

## 2023-11-30 PROCEDURE — 80061 LIPID PANEL: CPT

## 2023-11-30 PROCEDURE — 82306 VITAMIN D 25 HYDROXY: CPT

## 2023-12-01 LAB
25(OH)D3 SERPL-MCNC: 31 NG/ML (ref 30–100)
ALBUMIN SERPL BCP-MCNC: 4.4 G/DL (ref 3.2–4.9)
ALBUMIN/GLOB SERPL: 2.1 G/DL
ALP SERPL-CCNC: 39 U/L (ref 30–99)
ALT SERPL-CCNC: 16 U/L (ref 2–50)
ANION GAP SERPL CALC-SCNC: 15 MMOL/L (ref 7–16)
AST SERPL-CCNC: 19 U/L (ref 12–45)
BILIRUB SERPL-MCNC: 0.9 MG/DL (ref 0.1–1.5)
BUN SERPL-MCNC: 14 MG/DL (ref 8–22)
CALCIUM ALBUM COR SERPL-MCNC: 8.9 MG/DL (ref 8.5–10.5)
CALCIUM SERPL-MCNC: 9.2 MG/DL (ref 8.5–10.5)
CHLORIDE SERPL-SCNC: 101 MMOL/L (ref 96–112)
CHOLEST SERPL-MCNC: 208 MG/DL (ref 100–199)
CO2 SERPL-SCNC: 22 MMOL/L (ref 20–33)
CREAT SERPL-MCNC: 0.56 MG/DL (ref 0.5–1.4)
GFR SERPLBLD CREATININE-BSD FMLA CKD-EPI: 103 ML/MIN/1.73 M 2
GLOBULIN SER CALC-MCNC: 2.1 G/DL (ref 1.9–3.5)
GLUCOSE SERPL-MCNC: 79 MG/DL (ref 65–99)
HDLC SERPL-MCNC: 72 MG/DL
LDLC SERPL CALC-MCNC: 124 MG/DL
POTASSIUM SERPL-SCNC: 4 MMOL/L (ref 3.6–5.5)
PROT SERPL-MCNC: 6.5 G/DL (ref 6–8.2)
PSA SERPL-MCNC: <0.02 NG/ML (ref 0–4)
SODIUM SERPL-SCNC: 138 MMOL/L (ref 135–145)
TRIGL SERPL-MCNC: 58 MG/DL (ref 0–149)
TSH SERPL DL<=0.005 MIU/L-ACNC: 2.78 UIU/ML (ref 0.38–5.33)

## 2023-12-05 ENCOUNTER — TELEPHONE (OUTPATIENT)
Dept: CARDIOLOGY | Facility: MEDICAL CENTER | Age: 73
End: 2023-12-05
Payer: MEDICARE

## 2023-12-07 ENCOUNTER — OFFICE VISIT (OUTPATIENT)
Dept: MEDICAL GROUP | Facility: MEDICAL CENTER | Age: 73
End: 2023-12-07
Payer: MEDICARE

## 2023-12-07 VITALS
HEART RATE: 55 BPM | HEIGHT: 68 IN | TEMPERATURE: 98.8 F | SYSTOLIC BLOOD PRESSURE: 118 MMHG | DIASTOLIC BLOOD PRESSURE: 64 MMHG | OXYGEN SATURATION: 97 % | WEIGHT: 156 LBS | BODY MASS INDEX: 23.64 KG/M2

## 2023-12-07 DIAGNOSIS — Z00.00 WELLNESS EXAMINATION: Primary | ICD-10-CM

## 2023-12-07 PROCEDURE — 3074F SYST BP LT 130 MM HG: CPT | Performed by: STUDENT IN AN ORGANIZED HEALTH CARE EDUCATION/TRAINING PROGRAM

## 2023-12-07 PROCEDURE — 99214 OFFICE O/P EST MOD 30 MIN: CPT | Performed by: STUDENT IN AN ORGANIZED HEALTH CARE EDUCATION/TRAINING PROGRAM

## 2023-12-07 PROCEDURE — 3078F DIAST BP <80 MM HG: CPT | Performed by: STUDENT IN AN ORGANIZED HEALTH CARE EDUCATION/TRAINING PROGRAM

## 2023-12-07 ASSESSMENT — FIBROSIS 4 INDEX: FIB4 SCORE: 1.67

## 2023-12-07 ASSESSMENT — PATIENT HEALTH QUESTIONNAIRE - PHQ9: CLINICAL INTERPRETATION OF PHQ2 SCORE: 0

## 2023-12-07 NOTE — PROGRESS NOTES
Subjective:     CC:   Chief Complaint   Patient presents with    Annual Wellness Visit       HPI:   Sebastien Horn is a 73 y.o. male who presents for annual exam    Last Colorectal Cancer Screenin  Last Tdap: 2019  Received HPV series: Aged out  Hx STDs: No    Exercise: moderate regular exercise, aerobic < 3 days a week  Diet: healthy diet       Reviewed lab results.  Mildly reduced hemoglobin at 13.6.  Previous hemoglobin was around 14.  Most likely the slight decrease was due to his recent trauma causing a large hematoma and bruise in his thigh which has healed well with significant decrease in the size. Denies any GI bleed/blood in stool.   We will repeat labs in 6 months for follow up       He  has a past medical history of Arrhythmia, Arthritis, Atherosclerosis of aorta (HCC) (2021), Cancer (HCC) (2013), Cerebral atrophy (HCC) (2023), Disorder of thyroid, Heart burn, Heart valve disease, Hiatus hernia syndrome, Infectious disease (22), Malignant neoplasm of prostate (HCC) (8/3/2015), Multiple joint pain (2021), Osteoporosis, Skin lump of leg, left (2021), Spondylosis of cervical region without myelopathy or radiculopathy (10/13/2022), Unspecified cataract, and Urinary incontinence.  He  has a past surgical history that includes other orthopedic surgery (); other orthopedic surgery; knee arthroplasty total (7/3/2014); knee arthroscopy (7/3/2014); meniscectomy (7/3/2014); knee replacement, total (Right, ); inguinal hernia repair (2009); inguinal hernia laparoscopic bilateral (Bilateral); prostatectomy, radical retro (8/3/2015); node dissection (Bilateral, 8/3/2015); cystoscopy (3/20/2017); sphincter prosthesis placement (3/20/2017); evacuation of hematoma (3/21/2017); cystoscopy (3/21/2017); sphincter prosthesis removal (3/28/2017); cystoscopy (3/28/2017); parathyroidectomy (N/A, 2017); shoulder arthroscopy w/ rotator cuff repair (Left, 10/18/2017);  shoulder decompression arthroscopic (Left, 10/18/2017); biceps tenodesis (Left, 10/18/2017); shoulder arthroscopy (Left, 2/6/2018); irrigation & debridement ortho (Left, 2/6/2018); foreign body removal (Left, 2/6/2018); and irrigation & debridement ortho (Left, 3/10/2018).    Family History   Problem Relation Age of Onset    Hyperlipidemia Mother     Hyperlipidemia Father     Cancer Father         Prostate and Lung    Lung Disease Father     Hypertension Father     Heart Disease Father     Other Sister         Lupus    Heart Disease Maternal Grandfather      Social History     Tobacco Use    Smoking status: Never    Smokeless tobacco: Never   Vaping Use    Vaping Use: Never used   Substance Use Topics    Alcohol use: No     Alcohol/week: 0.0 oz    Drug use: Yes     Types: Inhaled     Comment: Cannabis, last use 5/3/23 am     He  reports that he is not currently sexually active.    Patient Active Problem List    Diagnosis Date Noted    Severe mitral regurgitation 10/12/2023    Mitral valve prolapse 10/12/2023    BMI 21.0-21.9, adult 09/20/2023    Cerebral atrophy (HCC) 05/30/2023    Gastroesophageal reflux disease 05/04/2023    Spondylosis of cervical region without myelopathy or radiculopathy 10/13/2022    Atherosclerosis of aorta (HCC) 08/26/2021    Multiple joint pain 04/13/2021    Nonrheumatic mitral valve regurgitation 03/15/2021    H/O nonmelanoma skin cancer 06/24/2019    Age-related osteoporosis without current pathological fracture 07/05/2017    AK (actinic keratosis) 12/14/2016    History of parathyroidectomy (HCC) 09/08/2016    Urinary incontinence 09/29/2015    Erectile dysfunction following radical prostatectomy 09/29/2015    Vitamin D deficiency 09/29/2015    H/O prostate cancer 06/02/2015    Hyperlipidemia 06/02/2015    MVP (mitral valve prolapse) 06/02/2015    Neck pain on left side 06/02/2015     Current Outpatient Medications   Medication Sig Dispense Refill    melatonin 5 mg Tab Take 5 mg by mouth  "at bedtime as needed.      alendronate (FOSAMAX) 70 MG Tab Take 1 Tablet by mouth every 7 days. 12 Tablet 3     No current facility-administered medications for this visit.     No Known Allergies    Review of Systems   Constitutional: Negative for fever, chills and malaise/fatigue.   HENT: Negative for congestion.    Eyes: Negative for pain.   Respiratory: Negative for cough and shortness of breath.    Cardiovascular: Negative for chest pain and leg swelling.   Gastrointestinal: Negative for nausea, vomiting, abdominal pain and diarrhea.   Genitourinary: Negative for dysuria and hematuria.   Skin: Negative for rash.   Neurological: Negative for dizziness, focal weakness and headaches.   Endo/Heme/Allergies: Does not bruise/bleed easily.   Psychiatric/Behavioral: Negative for depression.  The patient is not nervous/anxious.      Objective:   /64 (BP Location: Left arm, Patient Position: Sitting, BP Cuff Size: Large adult)   Pulse (!) 55   Temp 37.1 °C (98.8 °F) (Temporal)   Ht 1.722 m (5' 7.8\")   Wt 70.8 kg (156 lb)   SpO2 97%   BMI 23.86 kg/m²      Wt Readings from Last 4 Encounters:   12/07/23 70.8 kg (156 lb)   10/12/23 68 kg (150 lb)   09/20/23 68.6 kg (151 lb 4.8 oz)   09/14/23 69.4 kg (153 lb)       Physical Exam  Constitutional: Well-developed and well-nourished. Not diaphoretic. No distress.   Skin: Skin is warm and dry. No rash noted. Small bruise in right thigh due to bumping . No acute concerns or pain or tenderness on exam  Head: Atraumatic without lesions.  Eyes: Conjunctivae and extraocular motions are normal. No scleral icterus.   Ears:  External ears unremarkable. Tympanic membranes clear and intact.  Nose: Nares patent. Septum midline. Turbinates without erythema nor edema. No discharge.   Mouth/Throat: Tongue normal. Oropharynx is clear and moist. Posterior pharynx without erythema or exudates.  Neck: Supple, trachea midline. Normal range of motion. No thyromegaly present. No " lymphadenopathy--cervical or supraclavicular.  Cardiovascular: Regular rate and rhythm, S1 and S2 without murmur, rubs, or gallops.    Respiratory: Effort normal. Clear to auscultation throughout. No adventitious sounds.   Abdomen: Soft, non tender, and without distention. Active bowel sounds in all four quadrants. No rebound, guarding, masses or HSM.  Extremities: No cyanosis, clubbing, erythema, nor edema. Distal pulses intact and symmetric.   Musculoskeletal: All major joints AROM full in all directions without pain.  Neurological: Alert and oriented x 3. Grossly non-focal. Strength and sensation grossly intact. DTRs 2+/3 and symmetric.   Psychiatric:  Behavior, mood, and affect are appropriate.      Assessment and Plan:     1. Wellness examination  - CT-HEART W/O CONT EVAL CALCIUM; Future      Health maintenance:  Discussed:  regular exercise   healthy diet  Sun protection w SPF  Biannual dentist visit   Substance Abuse: Declined  Safe in relationship. Yes  Seat belts, bike helmet, gun safety discussed.  Colon cancer screening: up to date  Last lab results were reviewed by me today   Immunizations per orders  Patient counseled about skin care, diet, supplements, and exercise.  Discussed diet and exercise ,     Discussed statin therapy for hyperlipidemia given ASCVD risk of about 18%. Patient declined statins. Counseling and education provided. Discussed CT cardiac calcium score test as well. Patient interested in calcium score test.    Follow-up: Return in about 4 months (around 4/7/2024) for medications check, chronic problems.

## 2023-12-27 DIAGNOSIS — M81.0 AGE-RELATED OSTEOPOROSIS WITHOUT CURRENT PATHOLOGICAL FRACTURE: ICD-10-CM

## 2023-12-27 RX ORDER — ALENDRONATE SODIUM 70 MG/1
70 TABLET ORAL
Qty: 12 TABLET | Refills: 3 | Status: SHIPPED | OUTPATIENT
Start: 2023-12-27

## 2024-01-11 ENCOUNTER — TELEPHONE (OUTPATIENT)
Dept: CARDIOLOGY | Facility: MEDICAL CENTER | Age: 74
End: 2024-01-11
Payer: MEDICARE

## 2024-01-11 NOTE — TELEPHONE ENCOUNTER
Patient in Structural Heart Moderate Surveillance Program and has not scheduled their annual echo. Please order echo per protocol and notify patient to schedule imaging.     Echo needed by: 5/4/2024    Office visit needed by: 10/12/2024 (scheduled with Dr. Allen 4/12/2024)    Primary Cardiologist: Dr. Allen    RN notified: Navya

## 2024-01-11 NOTE — TELEPHONE ENCOUNTER
"S/W pt to let him know that he is due for echocardiogram by 5/4/24. Pt states he had JUAQUIN because the echocardiogram was giving \"false readings\". He states he and Dr. Allen discussed this and it was determined that echo was not accurate. Pt aware he has severe mitral regurg, still asymptomatic, says the other findings on last echo were not accurate according to last JUAQUIN done.     Pt prefers not to do this test, but is asking that we reach out to SW for his feedback on this before placing order.     Please advise.  "

## 2024-01-17 NOTE — TELEPHONE ENCOUNTER
Can defer the follow-up echocardiogram.  Keep his appointment with me in mid April  Keep me informed if he notices any heart related issues such as palpitations, worsening shortness of breath or chest pain symptoms.  Any questions let me know

## 2024-01-23 NOTE — PROGRESS NOTES
"Advanced [facility]  [facility]  [address]  [address]  [place] [address]  [phone] Fax: [phone]      Initial Evaluation        Referring Physician: Alexsander Griffith PA-C  Primary Physician:  Caden Rodarte MD      Consulting Physicians:  Frank Lamas MD, Benigno Grier MD; surgeons        Wound(s): open surgical wound to lower abdomen, T83.9XXA (complication of implanted penile prosthesis, (artificial urinary sphincter)  Start of Care: 4/12/2017    Subjective:      Sebastien Horn is a 66 y.o. male who presents with an open surgical wound to his right lower abdomen      Patient seen in collaboration with wound care clinician, Fatemeh Torres, MOMO     HPI  Patient is a very pleasant, healthy appearing 65 y/o male with hx of prostate cancer, post radical prostatectomy 8/3/2015. Since surgery, he has had urinary incontinence refractory to medical and behavorial interventions. Pt underwent placement of  AUS ( artificial urinary sphincter) by Dr. Lamas on 3/20/2017. Procedure was completed as an outpatient,  and on 3/21 returned to doctor's office for removal of ford post procedure. Per pt, 2 hours after ford removal, pt developed gross hematuria, dysuria and extreme pain and was told that he had a \" torn urethra.\"  He was directly admitted to hospital for emergent cystoscopy with clot evacuation and ford catheter placement by Dr. Lamas.  He  discharged from [facility] on 3/22/17 with ford in place. Pt then presented to ED on 3/28 with post op infection of implant and was admitted again, from 3/28-4/2, implant was removed on 3/28 by Dr. Grier, leaving him with an open wound. Patient presents today for evaluation and treatment of a  full thickness wound to his right lower abdomen.  He has been packing this wound himself at home with iodoform.    Past Medical History   Diagnosis Date   • Unspecified cataract      Bilateral IOL's   • Urinary incontinence    • Hemorrhagic disorder (CMS-HCC)    • " Arthritis      knees   • Cancer (CMS-[facility]) 07/13     Prostate     Past Surgical History   Procedure Laterality Date   • Other orthopedic surgery  2003     L Shoulder Repair   • Other orthopedic surgery       R Rotator Cuff repair   • Knee arthroplasty total  7/3/2014     Performed by Theodore San M.D. at [facility]   • Knee arthroscopy  7/3/2014     Performed by Theodore San M.D. at [facility]   • Meniscectomy  7/3/2014     Performed by Theodore San M.D. at [facility]   • Knee replacement, total Right 2014   • Inguinal hernia repair  6/1/2009     Performed by RISHI COOK at [facility]   • Inguinal hernia laparoscopic bilateral Bilateral    • Prostatectomy, radical retro  8/3/2015     Procedure: PROSTATECTOMY RADICAL RETROPUBIC;  Surgeon: Sage Ngo M.D.;  Location: [facility];  Service:    • Node dissection Bilateral 8/3/2015     Procedure: NODE DISSECTION LYMPH;  Surgeon: Sage Ngo M.D.;  Location: [facility];  Service:    • Cystoscopy  3/20/2017     Procedure: CYSTOSCOPY -  FLEXIBLE;  Surgeon: Frank Lamas M.D.;  Location: [facility];  Service:    • Sphincter prosthesis placement  3/20/2017     Procedure: SPHINCTER PROSTHESIS PLACEMENT - ARTIFICIAL URINARY SPHINCTER;  Surgeon: Frank Lamas M.D.;  Location: [facility];  Service:    • Evacuation of hematoma  3/21/2017     Procedure: EVACUATION OF HEMATOMA-CYSTO CLOT EVACUATION AND SMALL CYSTOSCOPE;  Surgeon: Frank Lamas M.D.;  Location: [facility];  Service:    • Cystoscopy  3/21/2017     Procedure: CYSTOSCOPY;  Surgeon: Frank Lamas M.D.;  Location: [facility];  Service:    • Sphincter prosthesis removal  3/28/2017     Procedure: URINARY SPHINCTER PROSTHESIS REMOVAL;  Surgeon: Benigno Grier M.D.;  Location: [facility];  Service:    • Cystoscopy  3/28/2017     Procedure: CYSTOSCOPY;   Surgeon: Benigno Grier M.D.;  Location: SURGERY [facility];  Service:      Current outpatient prescriptions:   •  amoxicillin-clavulanate (AUGMENTIN) 875-125 MG Tab, Take 1 Tab by mouth 2 times a day., Disp: 28 Tab, Rfl: 0  •  Cyanocobalamin (VITAMIN B-12) 5000 MCG TABLET DISPERSIBLE, Take 5,000 mcg by mouth every day., Disp: , Rfl:   •  Multiple Vitamins-Minerals (CENTRUM SILVER PO), Take 1 Tab by mouth every day., Disp: , Rfl:   •  Naproxen Sodium (ALEVE) 220 MG CAPS, Take 220 mg by mouth 1 time daily as needed., Disp: , Rfl:   •  oxycodone-acetaminophen (PERCOCET) 5-325 MG Tab, Take 1-2 Tabs by mouth every four hours as needed for Moderate Pain., Disp: 30 Tab, Rfl: 0  •  tramadol (ULTRAM) 50 MG Tab, Take  mg by mouth every four hours as needed (4-6 hrs)., Disp: , Rfl:   •  docusate sodium (COLACE) 100 MG Cap, Take 1 Cap by mouth 2 times a day., Disp: 20 Cap, Rfl: 0 No Known Allergies   Social History     Social History   • Marital Status:      Spouse Name: N/A   • Number of Children: N/A   • Years of Education: N/A     Occupational History   • Not on file.     Social History Main Topics   • Smoking status: Never Smoker    • Smokeless tobacco: Never Used   • Alcohol Use: No   • Drug Use: No   • Sexual Activity: Not Currently     Other Topics Concern   • Not on file     Social History Narrative       Review of Systems   Constitutional: Negative for fever and chills.   Respiratory: Negative for cough and shortness of breath.    Cardiovascular: Negative for chest pain and leg swelling.   Gastrointestinal: Negative for nausea, vomiting, diarrhea and constipation.   Genitourinary: Negative for dysuria and hematuria.        Incontinence   Neurological: Negative for dizziness.   Psychiatric/Behavioral: Negative for depression. The patient is nervous/anxious.      Fall Risk Assessment (ariana all that apply with an X):   65 years or older  X   Fall within the last 2 years, uses   Ambulatory devices  Loss  of protective sensation in feet,   Use of prostethic/orthotic, years    Presence of lower extremity/foot/toe amputation   Taking medication that increases risk (per facility policy) X    Interventions Recommended (if any of the above are selected):   Use of Assistive Device:________________________   Supervision with ambulation:  Caregiver   Assistance with ambulation:  Caregiver   Home safety education:  Educational material provided       Objective:         Physical Exam   Constitutional: He is oriented to person, place, and time. He appears well-developed.   HENT:   Head: Normocephalic.   Eyes: Pupils are equal, round, and reactive to light.   Pulmonary/Chest: Effort normal.   Abdominal: Soft.   Genitourinary:   Penile clamp    Musculoskeletal: Normal range of motion.   Neurological: He is alert and oriented to person, place, and time.   Skin:   Open wound to right lower abdomen, full thickness,    0.6 x 1.5 x 1.2 cm2. Wound bed 100% red, granular tissue   Psychiatric: He has a normal mood and affect.               Assessment/Plan:         1. Surgical wound, non healing, initial encounter -Open surgical wound from removal of failed artificial urinary sphincter.  Clean full, thickness wound. Aquacel Ag placed to manage exudate and bioburden.  Pt to return to clinic 2x/week for wound assessment and debridement.     2. Complication of implanted penile prosthesis, unspecified complication, subsequent encounter (CMS-[facility]) - Artificial urinary sphincter removed 3/28/17 d/t infection   3. H/O prostate cancer       18

## 2024-03-12 ENCOUNTER — TELEPHONE (OUTPATIENT)
Dept: HEALTH INFORMATION MANAGEMENT | Facility: OTHER | Age: 74
End: 2024-03-12

## 2024-03-13 ENCOUNTER — APPOINTMENT (RX ONLY)
Dept: URBAN - METROPOLITAN AREA CLINIC 4 | Facility: CLINIC | Age: 74
Setting detail: DERMATOLOGY
End: 2024-03-13

## 2024-03-13 DIAGNOSIS — L81.4 OTHER MELANIN HYPERPIGMENTATION: ICD-10-CM

## 2024-03-13 DIAGNOSIS — L82.1 OTHER SEBORRHEIC KERATOSIS: ICD-10-CM

## 2024-03-13 DIAGNOSIS — D18.0 HEMANGIOMA: ICD-10-CM

## 2024-03-13 DIAGNOSIS — Z85.828 PERSONAL HISTORY OF OTHER MALIGNANT NEOPLASM OF SKIN: ICD-10-CM

## 2024-03-13 DIAGNOSIS — L57.8 OTHER SKIN CHANGES DUE TO CHRONIC EXPOSURE TO NONIONIZING RADIATION: ICD-10-CM

## 2024-03-13 DIAGNOSIS — D22 MELANOCYTIC NEVI: ICD-10-CM

## 2024-03-13 PROBLEM — D48.5 NEOPLASM OF UNCERTAIN BEHAVIOR OF SKIN: Status: ACTIVE | Noted: 2024-03-13

## 2024-03-13 PROBLEM — D22.5 MELANOCYTIC NEVI OF TRUNK: Status: ACTIVE | Noted: 2024-03-13

## 2024-03-13 PROBLEM — D18.01 HEMANGIOMA OF SKIN AND SUBCUTANEOUS TISSUE: Status: ACTIVE | Noted: 2024-03-13

## 2024-03-13 PROCEDURE — 11102 TANGNTL BX SKIN SINGLE LES: CPT

## 2024-03-13 PROCEDURE — ? COUNSELING

## 2024-03-13 PROCEDURE — 99213 OFFICE O/P EST LOW 20 MIN: CPT | Mod: 25

## 2024-03-13 PROCEDURE — ? BIOPSY BY SHAVE METHOD

## 2024-03-13 ASSESSMENT — LOCATION DETAILED DESCRIPTION DERM
LOCATION DETAILED: RIGHT INFERIOR MEDIAL UPPER BACK
LOCATION DETAILED: LEFT SUPERIOR UPPER BACK
LOCATION DETAILED: RIGHT RADIAL DORSAL HAND
LOCATION DETAILED: RIGHT INFERIOR CENTRAL MALAR CHEEK
LOCATION DETAILED: RIGHT LATERAL TRAPEZIAL NECK
LOCATION DETAILED: RIGHT INFERIOR ANTERIOR NECK
LOCATION DETAILED: RIGHT SUPERIOR UPPER BACK
LOCATION DETAILED: LEFT RADIAL DORSAL HAND

## 2024-03-13 ASSESSMENT — LOCATION ZONE DERM
LOCATION ZONE: TRUNK
LOCATION ZONE: NECK
LOCATION ZONE: FACE
LOCATION ZONE: HAND

## 2024-03-13 ASSESSMENT — LOCATION SIMPLE DESCRIPTION DERM
LOCATION SIMPLE: POSTERIOR NECK
LOCATION SIMPLE: RIGHT CHEEK
LOCATION SIMPLE: RIGHT HAND
LOCATION SIMPLE: RIGHT ANTERIOR NECK
LOCATION SIMPLE: LEFT HAND
LOCATION SIMPLE: RIGHT UPPER BACK
LOCATION SIMPLE: LEFT UPPER BACK

## 2024-04-12 ENCOUNTER — OFFICE VISIT (OUTPATIENT)
Dept: CARDIOLOGY | Facility: MEDICAL CENTER | Age: 74
End: 2024-04-12
Attending: INTERNAL MEDICINE
Payer: MEDICARE

## 2024-04-12 VITALS
WEIGHT: 152 LBS | SYSTOLIC BLOOD PRESSURE: 110 MMHG | DIASTOLIC BLOOD PRESSURE: 78 MMHG | RESPIRATION RATE: 14 BRPM | HEIGHT: 71 IN | BODY MASS INDEX: 21.28 KG/M2 | HEART RATE: 60 BPM | OXYGEN SATURATION: 97 %

## 2024-04-12 DIAGNOSIS — I34.1 MVP (MITRAL VALVE PROLAPSE): ICD-10-CM

## 2024-04-12 DIAGNOSIS — I34.0 SEVERE MITRAL REGURGITATION: ICD-10-CM

## 2024-04-12 LAB — EKG IMPRESSION: NORMAL

## 2024-04-12 PROCEDURE — 93010 ELECTROCARDIOGRAM REPORT: CPT | Performed by: INTERNAL MEDICINE

## 2024-04-12 PROCEDURE — 93005 ELECTROCARDIOGRAM TRACING: CPT | Performed by: INTERNAL MEDICINE

## 2024-04-12 PROCEDURE — 3074F SYST BP LT 130 MM HG: CPT | Performed by: INTERNAL MEDICINE

## 2024-04-12 PROCEDURE — 99213 OFFICE O/P EST LOW 20 MIN: CPT | Mod: 25 | Performed by: INTERNAL MEDICINE

## 2024-04-12 PROCEDURE — 3078F DIAST BP <80 MM HG: CPT | Performed by: INTERNAL MEDICINE

## 2024-04-12 PROCEDURE — 99211 OFF/OP EST MAY X REQ PHY/QHP: CPT | Performed by: INTERNAL MEDICINE

## 2024-04-12 ASSESSMENT — ENCOUNTER SYMPTOMS
COUGH: 0
DIZZINESS: 0
SHORTNESS OF BREATH: 0
PALPITATIONS: 0
MYALGIAS: 0
LOSS OF CONSCIOUSNESS: 0

## 2024-04-12 ASSESSMENT — FIBROSIS 4 INDEX: FIB4 SCORE: 1.67

## 2024-04-12 NOTE — PROGRESS NOTES
"Chief Complaint   Patient presents with    Hyperlipidemia    Mitral Valve Prolapse    Shortness of Breath       Subjective     Sebastien Horn is a 73 y.o. male retired  who presents today for follow-up cardiac care.    The patient has myxomatous mitral valve disease, mitral valve prolapse, severe mitral regurgitation, prostate cancer with prostatectomy 2012, dyslipidemia.    Since 10/12/2023  appointment the patient has had no cardiac symptoms including chest pain, palpitations, shortness of breath.  Still exercises regularly hikes up hills with no limitations except for that which affects his left knee.  Unfortunately cannot kayak anymore due to \"arthritis of my wrists\".    He has no history of hypertension, diabetes mellitus or dyslipidemia.  He does not smoke cigarettes.  He takes a had of marijuana a day.  He drinks 3 cups of coffee a day.  Does not drink alcohol.    No family history of premature heart disease.    Other medical problems include prostate cancer with prostatectomy 2012 with no recurrence.  No thyroid disease.  He denies lung disease, peptic ulcer disease, gallbladder or liver disease.  No kidney disease, TIA symptoms or stroke.    No family history of premature heart disease.    Past Medical History:   Diagnosis Date    Arrhythmia     Arthritis     knees    Atherosclerosis of aorta (HCC) 08/26/2021    Cancer (HCC) 07/2013    Prostate    Cerebral atrophy ([facility]) 5/30/2023    Disorder of thyroid     Heart burn     Heart valve disease     Mild mitral valve problem - per patient cardiologist is not concerned at this time.    Hiatus hernia syndrome     Infectious disease 12/1/22    Breakthrough  case of covid    Malignant neoplasm of prostate (HCC) 8/3/2015    Multiple joint pain 04/13/2021    Osteoporosis     Skin lump of leg, left 05/19/2021    Spondylosis of cervical region without myelopathy or radiculopathy 10/13/2022    Unspecified cataract     Bilateral IOL's    Urinary " incontinence      Past Surgical History:   Procedure Laterality Date    IRRIGATION & DEBRIDEMENT ORTHO Left 3/10/2018    Procedure: IRRIGATION & DEBRIDEMENT ORTHO OPEN;  Surgeon: Sergio Daniel M.D.;  Location: [facility];  Service: Orthopedics    SHOULDER ARTHROSCOPY Left 2/6/2018    Procedure: SHOULDER ARTHROSCOPY;  Surgeon: Sergio Daniel M.D.;  Location: [facility];  Service: Orthopedics    IRRIGATION & DEBRIDEMENT ORTHO Left 2/6/2018    Procedure: IRRIGATION & DEBRIDEMENT ORTHO- SHOULDER  ;  Surgeon: Sergio Daniel M.D.;  Location: [facility];  Service: Orthopedics    FOREIGN BODY REMOVAL Left 2/6/2018    Procedure: FOREIGN BODY REMOVAL;  Surgeon: Sergio Daniel M.D.;  Location: [facility];  Service: Orthopedics    SHOULDER ARTHROSCOPY W/ ROTATOR CUFF REPAIR Left 10/18/2017    Procedure: SHOULDER ARTHROSCOPY W/ ROTATOR CUFF REPAIR;  Surgeon: Sergio Daniel M.D.;  Location: [facility];  Service: Orthopedics    SHOULDER DECOMPRESSION ARTHROSCOPIC Left 10/18/2017    Procedure: SHOULDER DECOMPRESSION ARTHROSCOPIC- SUBACROMIAL;  Surgeon: Sergio Daniel M.D.;  Location: [facility];  Service: Orthopedics    BICEPS TENODESIS Left 10/18/2017    Procedure: BICEPS TENODESIS;  Surgeon: Sergio Daniel M.D.;  Location: [facility];  Service: Orthopedics    PARATHYROIDECTOMY N/A 7/5/2017    Procedure: PARATHYROIDECTOMY - MINIMALLY INVASIVE W/RECURRENT LARYNGEAL NERVE MONITORING;  Surgeon: Josh Blank M.D.;  Location: SUR[facility]ERY [facility];  Service:     SPHINCTER PROSTHESIS REMOVAL  3/28/2017    Procedure: URINARY SPHINCTER PROSTHESIS REMOVAL;  Surgeon: Benigno Grier M.D.;  Location: [facility];  Service:     CYSTOSCOPY  3/28/2017    Procedure: CYSTOSCOPY;  Surgeon: Benigno Grier M.D.;  Location: [facility];  Service:     EVACUATION OF HEMATOMA  3/21/2017    Procedure: EVACUATION OF  HEMATOMA-CYSTO CLOT EVACUATION AND SMALL CYSTOSCOPE;  Surgeon: Frank Lamas M.D.;  Location: SURGERY [facility];  Service:     CYSTOSCOPY  3/21/2017    Procedure: CYSTOSCOPY;  Surgeon: Frank Lamas M.D.;  Location: SURGERY [facility];  Service:     CYSTOSCOPY  3/20/2017    Procedure: CYSTOSCOPY -  FLEXIBLE;  Surgeon: Frank Lamas M.D.;  Location: SURGERY [facility];  Service:     SPHINCTER PROSTHESIS PLACEMENT  3/20/2017    Procedure: SPHINCTER PROSTHESIS PLACEMENT - ARTIFICIAL URINARY SPHINCTER;  Surgeon: Frank Lamas M.D.;  Location: SURGERY [facility];  Service:     PROSTATECTOMY, RADICAL RETRO  8/3/2015    Procedure: PROSTATECTOMY RADICAL RETROPUBIC;  Surgeon: Sage Ngo M.D.;  Location: SURGERY [facility];  Service:     NODE DISSECTION Bilateral 8/3/2015    Procedure: NODE DISSECTION LYMPH;  Surgeon: Sage Ngo M.D.;  Location: SURGERY [facility];  Service:     KNEE ARTHROPLASTY TOTAL  7/3/2014    Performed by Theodore San M.D. at SURGERY [facility]    KNEE ARTHROSCOPY  7/3/2014    Performed by Theodore San M.D. at SURGERY [facility]    MENISCECTOMY  7/3/2014    Performed by Theodore San M.D. at [facility]    KNEE REPLACEMENT, TOTAL Right 2014    INGUINAL HERNIA REPAIR  6/1/2009    Performed by RISHI COOK at [facility]    OTHER ORTHOPEDIC SURGERY  2003    L Shoulder Repair    INGUINAL HERNIA LAPAROSCOPIC BILATERAL Bilateral     OTHER ORTHOPEDIC SURGERY      R Rotator Cuff repair     Family History   Problem Relation Age of Onset    Hyperlipidemia Mother     Hyperlipidemia Father     Cancer Father         Prostate and Lung    Lung Disease Father     Hypertension Father     Heart Disease Father     Other Sister         Lupus    Heart Disease Maternal Grandfather      Social History     Socioeconomic History    Marital status:      Spouse name: Not on file    Number of children: Not on file     Years of education: Not on file    Highest education level: Some college, no degree   Occupational History    Not on file   Tobacco Use    Smoking status: Never    Smokeless tobacco: Never   Vaping Use    Vaping Use: Never used   Substance and Sexual Activity    Alcohol use: No     Alcohol/week: 0.0 oz    Drug use: Yes     Types: Inhaled     Comment: Cannabis, last use 5/3/23 am    Sexual activity: Not Currently   Other Topics Concern    Not on file   Social History Narrative    Not on file     Social Determinants of Health     Financial Resource Strain: Medium Risk (8/29/2023)    Overall Financial Resource Strain (CARDIA)     Difficulty of Paying Living Expenses: Somewhat hard   Food Insecurity: No Food Insecurity (8/29/2023)    Hunger Vital Sign     Worried About Running Out of Food in the Last Year: Never true     Ran Out of Food in the Last Year: Never true   Transportation Needs: No Transportation Needs (8/29/2023)    PRAPARE - Transportation     Lack of Transportation (Medical): No     Lack of Transportation (Non-Medical): No   Physical Activity: Sufficiently Active (8/29/2023)    Exercise Vital Sign     Days of Exercise per Week: 7 days     Minutes of Exercise per Session: 150+ min   Stress: Stress Concern Present (8/29/2023)    [employer] - Occupational Stress Questionnaire     Feeling of Stress : To some extent   Social Connections: Socially Isolated (8/29/2023)    Social Connection and Isolation Panel [NHANES]     Frequency of Communication with Friends and Family: More than three times a week     Frequency of Social Gatherings with Friends and Family: Never     Attends Confucianist Services: Never     Active Member of Clubs or Organizations: No     Attends Club or Organization Meetings: Never     Marital Status:    Intimate Partner Violence: Not on file   Housing Stability: Low Risk  (8/29/2023)    Housing Stability Vital Sign     Unable to Pay for Housing in the Last  "Year: No     Number of Places Lived in the Last Year: 1     Unstable Housing in the Last Year: No     No Known Allergies  Outpatient Encounter Medications as of 4/12/2024   Medication Sig Dispense Refill    Naproxen Sodium (ALEVE PO) Take  by mouth.      alendronate (FOSAMAX) 70 MG Tab Take 1 Tablet by mouth every 7 days. 12 Tablet 3    melatonin 5 mg Tab Take 5 mg by mouth at bedtime as needed.      [DISCONTINUED] cephALEXin (KEFLEX) 500 MG Cap  (Patient not taking: Reported on 4/12/2024)       No facility-administered encounter medications on file as of 4/12/2024.     Review of Systems   Respiratory:  Negative for cough and shortness of breath.    Cardiovascular:  Negative for chest pain and palpitations.   Musculoskeletal:  Negative for myalgias.   Neurological:  Negative for dizziness and loss of consciousness.              Objective     /78 (BP Location: Left arm, Patient Position: Sitting, BP Cuff Size: Adult)   Pulse 60   Resp 14   Ht 1.803 m (5' 11\")   Wt 68.9 kg (152 lb)   SpO2 97%   BMI 21.20 kg/m²     Physical Exam  Vitals reviewed.   Constitutional:       General: He is not in acute distress.  Neck:      Vascular: No JVD.   Cardiovascular:      Rate and Rhythm: Normal rate and regular rhythm.      Pulses:           Radial pulses are 1+ on the right side and 1+ on the left side.      Heart sounds: Murmur heard.      No friction rub. No gallop.   Pulmonary:      Effort: Pulmonary effort is normal. No accessory muscle usage or respiratory distress.      Breath sounds: Normal breath sounds. No wheezing or rales.   Chest:      Comments: Kyphosis  Abdominal:      General: There is no distension.      Palpations: Abdomen is soft. There is no mass.      Tenderness: There is no abdominal tenderness.   Musculoskeletal:      Right lower leg: No edema.      Left lower leg: No edema.   Skin:     General: Skin is warm and dry.      Findings: No rash.      Nails: There is no clubbing.   Neurological:      " Mental Status: He is alert and oriented to person, place, and time.   Psychiatric:         Behavior: Behavior normal.         Echocardiogram 1/20/2014  Normal left ventricular systolic function  Left ventricular ejection fraction 60-65%  Mild left atrial enlargement  Prolapse of the posterior mitral valve leaflet  Mitral digitation mild to moderate    TRANSESOPHAGEAL ECHOCARDIOGRAM 5/4/2023  Normal left ventricular systolic function.  The left ventricular ejection fraction is visually estimated to be 70%.  Mitral valve prolapse predominantly involving the thickened posterior   leaflet.  Severe mitral regurgitation, central regurgitant jet.  Normal right ventricular systolic function.    EKG 4/5/2022 sinus arrhythmia, personally reviewed and interpreted    EKG 4/12/2024 sinus bradycardia, rate 47, personally interpreted    Assessment & Plan     1. MVP (mitral valve prolapse)  EKG      2. Severe mitral regurgitation              Medical Decision Making: Today's Assessment/Status/Plan:   Mitral valve prolapse involving the posterior leaflet  Mitral regurgitation, severe  Prostate cancer with prostatectomy 2012  COVID infection 11/2022    Recommendation Discussion  Clinically stable with no cardiac symptoms.  EKG today shows sinus bradycardia rate 47.  Not interested in surgical intervention.  Discussed with the patient to monitor for any worsening symptoms including but not limited to predominantly shortness of breath or palpitations but also chest pain, dizziness, near fainting, excessive fatigue and unable to do activities with the same energy level and if any of these occur to contact the clinic for earlier evaluation.  RTC 6 months, sooner if necessary

## 2024-05-02 ENCOUNTER — APPOINTMENT (OUTPATIENT)
Dept: RADIOLOGY | Facility: MEDICAL CENTER | Age: 74
End: 2024-05-02
Payer: MEDICARE

## 2024-05-28 ASSESSMENT — ENCOUNTER SYMPTOMS: GENERAL WELL-BEING: EXCELLENT

## 2024-05-28 ASSESSMENT — PATIENT HEALTH QUESTIONNAIRE - PHQ9
2. FEELING DOWN, DEPRESSED, IRRITABLE, OR HOPELESS: SEVERAL DAYS
1. LITTLE INTEREST OR PLEASURE IN DOING THINGS: SEVERAL DAYS

## 2024-05-28 ASSESSMENT — ACTIVITIES OF DAILY LIVING (ADL): BATHING_REQUIRES_ASSISTANCE: 0

## 2024-05-28 NOTE — ASSESSMENT & PLAN NOTE
Chronic, ongoing. Last DEXA 2023 with osteoporosis of the lumbar spine and osteopenia of the proximal left femur with T scores of -2.5 and -2.2 respectively. He maintains on alendronate 70mg weekly. Advise calcium and vitamin D supplementation with weightbearing exercises as tolerated. Follow up with PCP at least annually for continued monitoring and management

## 2024-05-28 NOTE — ASSESSMENT & PLAN NOTE
Chronic, ongoing. Most recent lipid panel from 11/2023 with LDL at 124. Declines statin therapy. He adheres to a Mediterranean diet and exercises daily. Follow up with PCP at least annually for continued monitoring and management.   Lab Results   Component Value Date/Time    CHOLSTRLTOT 208 (H) 11/30/2023 10:54 AM     (H) 11/30/2023 10:54 AM    HDL 72 11/30/2023 10:54 AM    TRIGLYCERIDE 58 11/30/2023 10:54 AM

## 2024-05-28 NOTE — ASSESSMENT & PLAN NOTE
"Chronic, stable. Diagnosed via findings on Brain MRI in 2015. Pt denies any significant memory or cognitive changes. Minicog today completed without error. Follow up with PCP at least annually for continued monitoring and management.    3/2015 MR Brain  \"Mild age-related cerebral atrophy.\"  "

## 2024-05-28 NOTE — ASSESSMENT & PLAN NOTE
"Chronic, stable. Diagnosis made following incidental finding of disease on imaging. Associated with HLD. Encouraged Mediterranean diet and regular physical exercise. Follow up with PCP at least annually for continued monitoring.     3/2017 US Aorta  \"There is mild atherosclerosis of the visualized abdominal aorta.\"  "

## 2024-05-29 ENCOUNTER — OFFICE VISIT (OUTPATIENT)
Dept: FAMILY PLANNING/WOMEN'S HEALTH CLINIC | Facility: PHYSICIAN GROUP | Age: 74
End: 2024-05-29
Payer: MEDICARE

## 2024-05-29 VITALS
SYSTOLIC BLOOD PRESSURE: 110 MMHG | HEIGHT: 71 IN | DIASTOLIC BLOOD PRESSURE: 60 MMHG | WEIGHT: 150 LBS | BODY MASS INDEX: 21 KG/M2

## 2024-05-29 DIAGNOSIS — G31.9 CEREBRAL ATROPHY (HCC): ICD-10-CM

## 2024-05-29 DIAGNOSIS — K21.00 GASTROESOPHAGEAL REFLUX DISEASE WITH ESOPHAGITIS, UNSPECIFIED WHETHER HEMORRHAGE: ICD-10-CM

## 2024-05-29 DIAGNOSIS — N39.498 OTHER URINARY INCONTINENCE: ICD-10-CM

## 2024-05-29 DIAGNOSIS — I70.0 ATHEROSCLEROSIS OF AORTA (HCC): ICD-10-CM

## 2024-05-29 DIAGNOSIS — M81.0 AGE-RELATED OSTEOPOROSIS WITHOUT CURRENT PATHOLOGICAL FRACTURE: ICD-10-CM

## 2024-05-29 DIAGNOSIS — E78.5 HYPERLIPIDEMIA, UNSPECIFIED HYPERLIPIDEMIA TYPE: ICD-10-CM

## 2024-05-29 DIAGNOSIS — I34.0 SEVERE MITRAL REGURGITATION: ICD-10-CM

## 2024-05-29 SDOH — ECONOMIC STABILITY: FOOD INSECURITY: WITHIN THE PAST 12 MONTHS, YOU WORRIED THAT YOUR FOOD WOULD RUN OUT BEFORE YOU GOT MONEY TO BUY MORE.: NEVER TRUE

## 2024-05-29 SDOH — ECONOMIC STABILITY: INCOME INSECURITY: HOW HARD IS IT FOR YOU TO PAY FOR THE VERY BASICS LIKE FOOD, HOUSING, MEDICAL CARE, AND HEATING?: NOT HARD AT ALL

## 2024-05-29 SDOH — ECONOMIC STABILITY: FOOD INSECURITY: WITHIN THE PAST 12 MONTHS, THE FOOD YOU BOUGHT JUST DIDN'T LAST AND YOU DIDN'T HAVE MONEY TO GET MORE.: NEVER TRUE

## 2024-05-29 SDOH — ECONOMIC STABILITY: TRANSPORTATION INSECURITY
IN THE PAST 12 MONTHS, HAS LACK OF TRANSPORTATION KEPT YOU FROM MEETINGS, WORK, OR FROM GETTING THINGS NEEDED FOR DAILY LIVING?: NO

## 2024-05-29 ASSESSMENT — FIBROSIS 4 INDEX: FIB4 SCORE: 1.67

## 2024-05-29 ASSESSMENT — PAIN SCALES - GENERAL: PAINLEVEL: NO PAIN

## 2024-05-29 ASSESSMENT — PATIENT HEALTH QUESTIONNAIRE - PHQ9
CLINICAL INTERPRETATION OF PHQ2 SCORE: 2
5. POOR APPETITE OR OVEREATING: 0 - NOT AT ALL
SUM OF ALL RESPONSES TO PHQ QUESTIONS 1-9: 4

## 2024-05-29 NOTE — ASSESSMENT & PLAN NOTE
Chronic, ongoing, secondary to history of prostatectomy. Pt feels he manages this issue well enough. Follow up with PCP at least annually for continued monitoring and management.

## 2024-05-29 NOTE — PROGRESS NOTES
Comprehensive Health Assessment Program     Sebastien Horn is a 73 y.o. here for his comprehensive health assessment.    Patient Active Problem List    Diagnosis Date Noted    Severe mitral regurgitation 10/12/2023    Mitral valve prolapse 10/12/2023    BMI 20.0-20.9, adult 09/20/2023    Cerebral atrophy (HCC) 05/30/2023    Gastroesophageal reflux disease 05/04/2023    Spondylosis of cervical region without myelopathy or radiculopathy 10/13/2022    Atherosclerosis of aorta (HCC) 08/26/2021    Multiple joint pain 04/13/2021    H/O nonmelanoma skin cancer 06/24/2019    Age-related osteoporosis without current pathological fracture 07/05/2017    AK (actinic keratosis) 12/14/2016    History of parathyroidectomy 09/08/2016    Urinary incontinence 09/29/2015    Erectile dysfunction following radical prostatectomy 09/29/2015    Vitamin D deficiency 09/29/2015    H/O prostate cancer 06/02/2015    Hyperlipidemia 06/02/2015    Neck pain on left side 06/02/2015       Current Outpatient Medications   Medication Sig Dispense Refill    Naproxen Sodium (ALEVE PO) Take  by mouth.      alendronate (FOSAMAX) 70 MG Tab Take 1 Tablet by mouth every 7 days. 12 Tablet 3    melatonin 5 mg Tab Take 10 mg by mouth at bedtime as needed.       No current facility-administered medications for this visit.          Current supplements as per medication list.     Allergies:   Patient has no known allergies.  Social History     Tobacco Use    Smoking status: Never    Smokeless tobacco: Never   Vaping Use    Vaping status: Never Used   Substance Use Topics    Alcohol use: No     Alcohol/week: 0.0 oz    Drug use: Not Currently     Types: Inhaled     Comment: Cannabis, last use 5/3/23 am     Family History   Problem Relation Age of Onset    Hyperlipidemia Mother     Hyperlipidemia Father     Cancer Father         Prostate and Lung    Lung Disease Father     Hypertension Father     Heart Disease Father     Other Sister         Lupus     Heart Disease Maternal Grandfather      Sebastien  has a past medical history of Arrhythmia, Arthritis, Atherosclerosis of aorta ([facility]) (08/26/2021), Cancer ([facility]) (07/2013), Cerebral atrophy ([facility]) (5/30/2023), Disorder of thyroid, Heart burn, Heart valve disease, Hiatus hernia syndrome, Infectious disease (12/1/22), Malignant neoplasm of prostate ([facility]) (8/3/2015), Multiple joint pain (04/13/2021), Osteoporosis, Skin lump of leg, left (05/19/2021), Spondylosis of cervical region without myelopathy or radiculopathy (10/13/2022), Unspecified cataract, and Urinary incontinence.   Past Surgical History:   Procedure Laterality Date    IRRIGATION & DEBRIDEMENT ORTHO Left 3/10/2018    Procedure: IRRIGATION & DEBRIDEMENT ORTHO OPEN;  Surgeon: Sergio Daniel M.D.;  Location: [facility];  Service: Orthopedics    SHOULDER ARTHROSCOPY Left 2/6/2018    Procedure: SHOULDER ARTHROSCOPY;  Surgeon: Sergio Daniel M.D.;  Location: [facility];  Service: Orthopedics    IRRIGATION & DEBRIDEMENT ORTHO Left 2/6/2018    Procedure: IRRIGATION & DEBRIDEMENT ORTHO- SHOULDER  ;  Surgeon: Sergio Daniel M.D.;  Location: [facility];  Service: Orthopedics    FOREIGN BODY REMOVAL Left 2/6/2018    Procedure: FOREIGN BODY REMOVAL;  Surgeon: Sergio Daniel M.D.;  Location: [facility];  Service: Orthopedics    SHOULDER ARTHROSCOPY W/ ROTATOR CUFF REPAIR Left 10/18/2017    Procedure: SHOULDER ARTHROSCOPY W/ ROTATOR CUFF REPAIR;  Surgeon: Sergio Daniel M.D.;  Location: [facility];  Service: Orthopedics    SHOULDER DECOMPRESSION ARTHROSCOPIC Left 10/18/2017    Procedure: SHOULDER DECOMPRESSION ARTHROSCOPIC- SUBACROMIAL;  Surgeon: Sergio Daniel M.D.;  Location: [facility];  Service: Orthopedics    BICEPS TENODESIS Left 10/18/2017    Procedure: BICEPS TENODESIS;  Surgeon: Sergio Daniel M.D.;  Location: [facility];  Service: Orthopedics     PARATHYROIDECTOMY N/A 7/5/2017    Procedure: PARATHYROIDECTOMY - MINIMALLY INVASIVE W/RECURRENT LARYNGEAL NERVE MONITORING;  Surgeon: Josh Blank M.D.;  Location: SURGERY SAME DAY [facility];  Service:     SPHINCTER PROSTHESIS REMOVAL  3/28/2017    Procedure: URINARY SPHINCTER PROSTHESIS REMOVAL;  Surgeon: Benigno Grier M.D.;  Location: SURGERY [facility];  Service:     CYSTOSCOPY  3/28/2017    Procedure: CYSTOSCOPY;  Surgeon: Benigno Grier M.D.;  Location: SURGERY [facility];  Service:     EVACUATION OF HEMATOMA  3/21/2017    Procedure: EVACUATION OF HEMATOMA-CYSTO CLOT EVACUATION AND SMALL CYSTOSCOPE;  Surgeon: Frank Lamas M.D.;  Location: SURGERY [facility];  Service:     CYSTOSCOPY  3/21/2017    Procedure: CYSTOSCOPY;  Surgeon: Frank Lamas M.D.;  Location: SURGERY [facility];  Service:     CYSTOSCOPY  3/20/2017    Procedure: CYSTOSCOPY -  FLEXIBLE;  Surgeon: Frank Lamas M.D.;  Location: SURGERY [facility];  Service:     SPHINCTER PROSTHESIS PLACEMENT  3/20/2017    Procedure: SPHINCTER PROSTHESIS PLACEMENT - ARTIFICIAL URINARY SPHINCTER;  Surgeon: Frank Lamas M.D.;  Location: SURGERY [facility];  Service:     PROSTATECTOMY, RADICAL RETRO  8/3/2015    Procedure: PROSTATECTOMY RADICAL RETROPUBIC;  Surgeon: Sage Ngo M.D.;  Location: SURGERY [facility];  Service:     NODE DISSECTION Bilateral 8/3/2015    Procedure: NODE DISSECTION LYMPH;  Surgeon: Sage Ngo M.D.;  Location: SURGERY [facility];  Service:     KNEE ARTHROPLASTY TOTAL  7/3/2014    Performed by Theodore San M.D. at SURGERY [facility]    KNEE ARTHROSCOPY  7/3/2014    Performed by Theodore San M.D. at SURGERY [facility]    MENISCECTOMY  7/3/2014    Performed by Theodore San M.D. at [facility]    KNEE REPLACEMENT, TOTAL Right 2014    INGUINAL HERNIA REPAIR  6/1/2009    Performed by RISHI COOK at [facility]    OTHER ORTHOPEDIC  SURGERY  2003    L Shoulder Repair    INGUINAL HERNIA LAPAROSCOPIC BILATERAL Bilateral     OTHER ORTHOPEDIC SURGERY      R Rotator Cuff repair       Screening:  In the last six months have you experienced any leakage of urine? Yes, sequale of prostatectomy    Depression Screening  Little interest or pleasure in doing things?  1 - several days  Feeling down, depressed , or hopeless? 1 - several days  Trouble falling or staying asleep, or sleeping too much?  2 - more than half the days  Feeling tired or having little energy?  0 - not at all  Poor appetite or overeating?  0 - not at all  Feeling bad about yourself - or that you are a failure or have let yourself or your family down? 0 - not at all  Trouble concentrating on things, such as reading the newspaper or watching television? 0 - not at all  Moving or speaking so slowly that other people could have noticed.  Or the opposite - being so fidgety or restless that you have been moving around a lot more than usual?  0 - not at all  Thoughts that you would be better off dead, or of hurting yourself?  0 - not at all  Patient Health Questionnaire Score: 4    If depressive symptoms identified deferred to follow up visit unless specifically addressed in assessment and plan.    Interpretation of PHQ-9 Total Score   Score Severity   1-4 No Depression   5-9 Mild Depression   10-14 Moderate Depression   15-19 Moderately Severe Depression   20-27 Severe Depression    Screening for Cognitive Impairment  Do you or any of your friends or family members have any concern about your memory? No  Three Minute Recall (Leader, Season, Table) 3/3    Marvel clock face with all 12 numbers and set the hands to show 10 minutes after 11.  Yes 5  Cognitive concerns identified deferred for follow up unless specifically addressed in assessment and plan.    Fall Risk Assessment  Has the patient had two or more falls in the last year or any fall with injury in the last year?  No    Safety  Assessment  Do you always wear your seatbelt?  Yes  Any changes to home needed to function safely? No  Difficulty hearing.  No  Patient counseled about all safety risks that were identified.    Functional Assessment ADLs  Are there any barriers preventing you from cooking for yourself or meeting nutritional needs?  No.    Are there any barriers preventing you from driving safely or obtaining transportation?  No.    Are there any barriers preventing you from using a telephone or calling for help?  No    Are there any barriers preventing you from shopping?  No.    Are there any barriers preventing you from taking care of your own finances?  No    Are there any barriers preventing you from managing your medications?  No    Are there any barriers preventing you from showering, bathing or dressing yourself? No    Are there any barriers preventing you from doing housework or laundry? No    Are there any barriers preventing you from using the toilet?No    Are you currently engaging in any exercise or physical activity?  Yes. Lift weights, 6-7 mile hike daily, stretches in the morning     Self-Assessment of Health  What is your perception of your health? Excellent    Do you sleep more than six hours a night? Yes    In the past 7 days, how much did pain keep you from doing your normal work? None    Do you spend quality time with family or friends (virtually or in person)? No    Do you usually eat a heart healthy diet that constists of a variety of fruits, vegetables, whole grains and fiber? Yes    Do you eat foods high in fat and/or Fast Food more than three times per week? No    How concerned are you that your medical conditions are not being well managed? Not at all    Are you worried that in the next 2 months, you may not have stable housing that you own, rent, or stay in as part of a household? No        Advance Care Planning  Do you have an Advance Directive, Living Will, Durable Power of , or POLST? Yes       Durable Power of    is on file      Health Maintenance Summary            Overdue - Colorectal Cancer Screening (Colonoscopy - Every 10 Years) Overdue since 4/10/2024      11/14/2014  OCCULT BLOOD FECES IMMUNOASSAY    04/10/2014  AMB REFERRAL TO GI FOR COLONOSCOPY    04/10/2014  REFERRAL TO GI FOR COLONOSCOPY              Annual Wellness Visit (Yearly) Next due on 5/29/2025 05/29/2024  Level of Service: ANNUAL WELLNESS VISIT-INCLUDES PPPS SUBSEQUE*    12/07/2023  Level of Service: PA PREVENTIVE VISIT,EST,65 & OVER    09/20/2023  Level of Service: PA ANNUAL WELLNESS VISIT-INCLUDES PPPS SUBSEQUE*    10/13/2022  Subsequent Annual Wellness Visit - Includes PPPS ()    08/26/2021  Subsequent Annual Wellness Visit - Includes PPPS ()    Only the first 5 history entries have been loaded, but more history exists.              IMM DTaP/Tdap/Td Vaccine (2 - Td or Tdap) Next due on 6/24/2029 06/24/2019  Imm Admin: Tdap Vaccine              Hepatitis C Screening  Tentatively Complete      06/18/2019  Hepatitis C Antibody component of HEP C VIRUS ANTIBODY              Pneumococcal Vaccine: 65+ Years (Series Information) Completed      06/24/2019  Imm Admin: Pneumococcal Conjugate Vaccine (Prevnar/PCV-13)    09/02/2016  Imm Admin: Pneumococcal polysaccharide vaccine (PPSV-23)    07/03/2012  Imm Admin: Pneumococcal polysaccharide vaccine (PPSV-23)              COVID-19 Vaccine (Series Information) Completed      10/06/2023  Imm Admin: Comirnaty (Covid-19 Vaccine, Mrna, 4820-8101 Formula)    05/09/2023  Imm Admin: PFIZER BIVALENT SARS-COV-2 VACCINE (12+)    05/26/2022  Imm Admin: PFIZER CORTES CAP SARS-COV-2 VACCINATION (12+)    09/25/2021  Imm Admin: PFIZER PURPLE CAP SARS-COV-2 VACCINATION (12+)    02/09/2021  Imm Admin: PFIZER PURPLE CAP SARS-COV-2 VACCINATION (12+)    Only the first 5 history entries have been loaded, but more history exists.              Zoster (Shingles) Vaccines (Series Information)  Completed      10/18/2023  Imm Admin: Zoster Vaccine Recombinant (RZV) (SHINGRIX)    04/13/2021  Imm Admin: Zoster Vaccine Recombinant (RZV) (SHINGRIX)    07/15/2017  Imm Admin: Zoster Vaccine Live (ZVL) (Zostavax) - HISTORICAL DATA              Influenza Vaccine (Series Information) Completed      10/26/2023  Imm Admin: Influenza Vaccine Adult HD    09/11/2023  Imm Admin: Influenza Vaccine Quad Inj (Pf)    09/26/2022  Imm Admin: Influenza Vaccine Adult HD    09/17/2021  Imm Admin: Influenza Vaccine Adult HD    10/01/2020  Imm Admin: Influenza Vaccine Adult HD    Only the first 5 history entries have been loaded, but more history exists.              Hepatitis A Vaccine (Hep A) (Series Information) Aged Out      No completion history exists for this topic.              Hepatitis B Vaccine (Hep B) (Series Information) Aged Out      No completion history exists for this topic.              HPV Vaccines (Series Information) Aged Out      No completion history exists for this topic.              Polio Vaccine (Inactivated Polio) (Series Information) Aged Out      No completion history exists for this topic.              Meningococcal Immunization (Series Information) Aged Out      No completion history exists for this topic.                    Patient Care Team:  Jasmyn Ordaz M.D. as PCP - General (Internal Medicine)  Jasmyn Ordaz M.D. as PCP - [facility] Paneled (Internal Medicine)  Abebe Araiza M.D. as Consulting Physician (Gastroenterology)  Larisa Ureña O.D. as Consulting Physician (Optometry)  Geena Harkins as [facility]   Favian Allen M.D. (Cardiovascular Disease (Cardiology))    Financial Resource Strain: Low Risk  (5/29/2024)    Overall Financial Resource Strain (CARDIA)     Difficulty of Paying Living Expenses: Not hard at all      Transportation Needs: No Transportation Needs (5/29/2024)    PRAPARE - Transportation     Lack of Transportation (Medical): No     Lack of  "Transportation (Non-Medical): No      Food Insecurity: No Food Insecurity (5/29/2024)    Hunger Vital Sign     Worried About Running Out of Food in the Last Year: Never true     Ran Out of Food in the Last Year: Never true        Encounter Vitals  Blood Pressure : 110/60  Weight: 68 kg (150 lb)  Height: 180.3 cm (5' 11\")  BMI (Calculated): 20.92  Pain Score: No pain     Alert, oriented in no acute distress.  Eye contact is good, speech goal directed, affect calm.    Assessment and Plan. The following treatment and monitoring plan is recommended:  Atherosclerosis of aorta (HCC)  Chronic, stable. Diagnosis made following incidental finding of disease on imaging. Associated with HLD. Encouraged Mediterranean diet and regular physical exercise. Follow up with PCP at least annually for continued monitoring.     3/2017 US Aorta  \"There is mild atherosclerosis of the visualized abdominal aorta.\"    Cerebral atrophy (HCC)  Chronic, stable. Diagnosed via findings on Brain MRI in 2015. Pt denies any significant memory or cognitive changes. Minicog today completed without error. Follow up with PCP at least annually for continued monitoring and management.    3/2015 MR Brain  \"Mild age-related cerebral atrophy.\"    Gastroesophageal reflux disease  Chronic, stable. Well controlled with diet alone. Follow-up at least annually.    Hyperlipidemia  Chronic, ongoing. Most recent lipid panel from 11/2023 with LDL at 124. Declines statin therapy. He adheres to a Mediterranean diet and exercises daily. Follow up with PCP at least annually for continued monitoring and management.   Lab Results   Component Value Date/Time    CHOLSTRLTOT 208 (H) 11/30/2023 10:54 AM     (H) 11/30/2023 10:54 AM    HDL 72 11/30/2023 10:54 AM    TRIGLYCERIDE 58 11/30/2023 10:54 AM         Age-related osteoporosis without current pathological fracture  Chronic, ongoing. Last DEXA 2023 with osteoporosis of the lumbar spine and osteopenia of the proximal " left femur with T scores of -2.5 and -2.2 respectively. He maintains on alendronate 70mg weekly. Advise calcium and vitamin D supplementation with weightbearing exercises as tolerated. Follow up with PCP at least annually for continued monitoring and management    Severe mitral regurgitation  Chronic, ongoing. Pt follows with cardiology who is monitoring at this time. Pt has noticed some increasing dyspnea but continues to be able to exercise intensely daily without issue. Follow up with cardiology per routine.    Urinary incontinence  Chronic, ongoing, secondary to history of prostatectomy. Pt feels he manages this issue well enough. Follow up with PCP at least annually for continued monitoring and management.    Services suggested: No services needed at this time  Health Care Screening: Age-appropriate preventive services recommended by USPTF and ACIP covered by Medicare were discussed today. Services ordered if indicated and agreed upon by the patient.  Referrals offered: Community-based lifestyle interventions to reduce health risks and promote self-management and wellness, fall prevention, nutrition, physical activity, tobacco-use cessation, weight loss, and mental health services as per orders if indicated.    Discussion today about general wellness and lifestyle habits:    Prevent falls and reduce trip hazards; Cautioned about securing or removing rugs.  Have a working fire alarm and carbon monoxide detector.  Engage in regular physical activity and social activities.    Follow-up: Return for follow up visit with PCP as previously scheduled.

## 2024-05-29 NOTE — ASSESSMENT & PLAN NOTE
Chronic, ongoing. Pt follows with cardiology who is monitoring at this time. Pt has noticed some increasing dyspnea but continues to be able to exercise intensely daily without issue. Follow up with cardiology per routine.

## 2024-06-07 ENCOUNTER — APPOINTMENT (OUTPATIENT)
Dept: MEDICAL GROUP | Facility: MEDICAL CENTER | Age: 74
End: 2024-06-07
Payer: MEDICARE

## 2024-06-11 ENCOUNTER — OFFICE VISIT (OUTPATIENT)
Dept: MEDICAL GROUP | Facility: MEDICAL CENTER | Age: 74
End: 2024-06-11
Payer: MEDICARE

## 2024-06-11 VITALS
BODY MASS INDEX: 21.19 KG/M2 | WEIGHT: 151.4 LBS | OXYGEN SATURATION: 100 % | DIASTOLIC BLOOD PRESSURE: 70 MMHG | HEART RATE: 52 BPM | HEIGHT: 71 IN | SYSTOLIC BLOOD PRESSURE: 128 MMHG | TEMPERATURE: 97 F

## 2024-06-11 DIAGNOSIS — L57.0 ACTINIC KERATOSIS: ICD-10-CM

## 2024-06-11 DIAGNOSIS — Z12.11 COLON CANCER SCREENING: ICD-10-CM

## 2024-06-11 DIAGNOSIS — E78.5 HYPERLIPIDEMIA, UNSPECIFIED HYPERLIPIDEMIA TYPE: ICD-10-CM

## 2024-06-11 DIAGNOSIS — M81.0 AGE-RELATED OSTEOPOROSIS WITHOUT CURRENT PATHOLOGICAL FRACTURE: Primary | ICD-10-CM

## 2024-06-11 DIAGNOSIS — I34.0 SEVERE MITRAL REGURGITATION: ICD-10-CM

## 2024-06-11 DIAGNOSIS — Z00.00 HEALTHCARE MAINTENANCE: ICD-10-CM

## 2024-06-11 DIAGNOSIS — Z12.5 PROSTATE CANCER SCREENING: ICD-10-CM

## 2024-06-11 PROCEDURE — 99214 OFFICE O/P EST MOD 30 MIN: CPT | Performed by: STUDENT IN AN ORGANIZED HEALTH CARE EDUCATION/TRAINING PROGRAM

## 2024-06-11 PROCEDURE — 3074F SYST BP LT 130 MM HG: CPT | Performed by: STUDENT IN AN ORGANIZED HEALTH CARE EDUCATION/TRAINING PROGRAM

## 2024-06-11 PROCEDURE — 3078F DIAST BP <80 MM HG: CPT | Performed by: STUDENT IN AN ORGANIZED HEALTH CARE EDUCATION/TRAINING PROGRAM

## 2024-06-11 RX ORDER — ALENDRONATE SODIUM 70 MG/1
70 TABLET ORAL
Qty: 12 TABLET | Refills: 3 | Status: SHIPPED | OUTPATIENT
Start: 2024-06-11

## 2024-06-11 ASSESSMENT — FIBROSIS 4 INDEX: FIB4 SCORE: 1.67

## 2024-06-11 NOTE — PROGRESS NOTES
"Subjective:     CC:   Chief Complaint   Patient presents with    Follow-Up         HPI:   Sebastien presents today with      Problem   Severe Mitral Regurgitation    Chronic, stable   History of MVP with severe MR   Following up with cardiology   Asymptomatic - no chest pain or SOB  Follow up with cardiology per routine.       Age-Related Osteoporosis Without Current Pathological Fracture    Chronic, stable   Taking alendronate 70 mg once a week.   Started about 05/2021  Tolerating well, denies side effects.   Getting good amount of calcium in diet and vitamin D supplement  Doing Weight bearing exercises as tolerated  Requesting refills      AK (actinic keratosis)    Chronic, intermittent   Following up with dermatology for skin checks yearly        Hyperlipidemia    Chronic, stable   Lab Results   Component Value Date/Time    CHOLSTRLTOT 208 (H) 11/30/2023 1054    TRIGLYCERIDE 58 11/30/2023 1054    HDL 72 11/30/2023 1054     (H) 11/30/2023 1054     The 10-year ASCVD risk score (Randi CABRERA, et al., 2019) is: 18.4%   Counseling and education provided   Declined  statin         Health Maintenance: Completed    ROS:  ROS    Review of systems unremarkable except for concerns noted by patient or items listed.    Please see HPI for additional ROS.      Objective:     Exam:  /70 (BP Location: Left arm, Patient Position: Sitting, BP Cuff Size: Adult)   Pulse (!) 52   Temp 36.1 °C (97 °F) (Temporal)   Ht 1.803 m (5' 11\")   Wt 68.7 kg (151 lb 6.4 oz)   SpO2 100%   BMI 21.12 kg/m²  Body mass index is 21.12 kg/m².    Physical Exam  Constitutional:       General: He is not in acute distress.     Appearance: Normal appearance.   HENT:      Head: Normocephalic.   Cardiovascular:      Rate and Rhythm: Normal rate and regular rhythm.      Pulses: Normal pulses.      Heart sounds: Normal heart sounds.   Pulmonary:      Effort: Pulmonary effort is normal.      Breath sounds: Normal breath sounds.   Skin:     General: " Skin is warm.   Neurological:      Mental Status: He is alert and oriented to person, place, and time.   Psychiatric:         Mood and Affect: Mood normal.         Behavior: Behavior normal.             Labs: reviewed     Assessment & Plan:     73 y.o. male with the following -     1. Age-related osteoporosis without current pathological fracture    Chronic, stable   Last DEXA done in 2023   Currently taking - Fosamax 70 mg weekly once   -alendronate (FOSAMAX) 70 MG Tab; Take 1 Tablet by mouth every 7 days.   - Vitamin D: 2000 units per day, maintain serum vitamin D level > 30   - Calcium 600 mg BID  - Weight-bearing, balance, resistance exercises   - maintain healthy active lifestyle  - avoid or stop smoking  - Limit alcohol consumption to one drink per day  - pt was counseled on fall prevention      - alendronate (FOSAMAX) 70 MG Tab; Take 1 Tablet by mouth every 7 days.  Dispense: 12 Tablet; Refill: 3    2. AK (actinic keratosis)  Chronic, stable   Plan:  Continue follow up with dermatology     3. Severe mitral regurgitation  Chronic, stable  Asymptomatic    Continue follow up with cardiology     4. Colon cancer screening  - Referral to GI for Colonoscopy    5. Hyperlipidemia, unspecified hyperlipidemia type  Chronic, stable   The 10-year ASCVD risk score (Randi DK, et al., 2019) is: 20.9%  Plan:  Recommended statin therapy , declined   Recommended to continue low fat healthy diet and regular exercise           Return in about 6 months (around 12/11/2024) for preventive wellness.    Please note that this dictation was created using voice recognition software. I have made every reasonable attempt to correct obvious errors, but I expect that there are errors of grammar and possibly content that I did not discover before finalizing the note.

## 2024-07-31 ENCOUNTER — PATIENT MESSAGE (OUTPATIENT)
Dept: MEDICAL GROUP | Facility: MEDICAL CENTER | Age: 74
End: 2024-07-31
Payer: MEDICARE

## 2024-07-31 DIAGNOSIS — Z12.11 COLON CANCER SCREENING: ICD-10-CM

## 2024-08-05 ENCOUNTER — TELEPHONE (OUTPATIENT)
Dept: HEALTH INFORMATION MANAGEMENT | Facility: OTHER | Age: 74
End: 2024-08-05
Payer: MEDICARE

## 2024-08-05 DIAGNOSIS — Z12.11 COLON CANCER SCREENING: ICD-10-CM

## 2024-08-05 NOTE — TELEPHONE ENCOUNTER
1. Caller Name: Sebastien Horn                          Call Back Number: [phone] (home) [phone] (work)        How would the patient prefer to be contacted with a response: MyChart message    Pt sent message stating he is uncomfortable at  [facility] as they will not schedule appt with Doctor they just want to schedule the Colonoscopy. Pt has questions if he needs the Colonoscopy but will have to wait 5 months for the appointment. Pt requesting order for the Cologuard.

## 2024-08-21 ENCOUNTER — TELEPHONE (OUTPATIENT)
Dept: HEALTH INFORMATION MANAGEMENT | Facility: OTHER | Age: 74
End: 2024-08-21
Payer: MEDICARE

## 2024-08-21 DIAGNOSIS — Z12.11 COLON CANCER SCREENING: ICD-10-CM

## 2024-08-21 NOTE — TELEPHONE ENCOUNTER
1. Caller Name: Sebastien Horn                          Call Back Number: [phone] (home) [phone] (work)        How would the patient prefer to be contacted with a response: [employer] message    Pt sent a message via [employer] letting us know that he has not received his Cologuard Kit. I called [facility] and they do not have this order. You can resend the order or call [phone] and choose the provider line option to get this ordered for the patient.     Thank you  Renata

## 2024-09-06 DIAGNOSIS — M81.0 AGE-RELATED OSTEOPOROSIS WITHOUT CURRENT PATHOLOGICAL FRACTURE: ICD-10-CM

## 2024-09-06 NOTE — TELEPHONE ENCOUNTER
Received request via: Pharmacy    Was the patient seen in the last year in this department? Yes    Does the patient have an active prescription (recently filled or refills available) for medication(s) requested? No    Pharmacy Name: [facility]     Does the patient have long term Plus and need 100-day supply? (This applies to ALL medications) Yes, quantity updated to 100 days

## 2024-09-09 RX ORDER — ALENDRONATE SODIUM 70 MG/1
70 TABLET ORAL
Qty: 12 TABLET | Refills: 3 | Status: SHIPPED | OUTPATIENT
Start: 2024-09-09

## 2024-09-16 ENCOUNTER — APPOINTMENT (RX ONLY)
Dept: URBAN - METROPOLITAN AREA CLINIC 4 | Facility: CLINIC | Age: 74
Setting detail: DERMATOLOGY
End: 2024-09-16

## 2024-09-16 DIAGNOSIS — L57.8 OTHER SKIN CHANGES DUE TO CHRONIC EXPOSURE TO NONIONIZING RADIATION: ICD-10-CM

## 2024-09-16 DIAGNOSIS — L57.0 ACTINIC KERATOSIS: ICD-10-CM

## 2024-09-16 DIAGNOSIS — D18.0 HEMANGIOMA: ICD-10-CM

## 2024-09-16 DIAGNOSIS — L81.4 OTHER MELANIN HYPERPIGMENTATION: ICD-10-CM

## 2024-09-16 DIAGNOSIS — D22 MELANOCYTIC NEVI: ICD-10-CM

## 2024-09-16 DIAGNOSIS — L82.1 OTHER SEBORRHEIC KERATOSIS: ICD-10-CM

## 2024-09-16 DIAGNOSIS — Z85.828 PERSONAL HISTORY OF OTHER MALIGNANT NEOPLASM OF SKIN: ICD-10-CM

## 2024-09-16 PROBLEM — D22.5 MELANOCYTIC NEVI OF TRUNK: Status: ACTIVE | Noted: 2024-09-16

## 2024-09-16 PROBLEM — D18.01 HEMANGIOMA OF SKIN AND SUBCUTANEOUS TISSUE: Status: ACTIVE | Noted: 2024-09-16

## 2024-09-16 PROCEDURE — 99213 OFFICE O/P EST LOW 20 MIN: CPT | Mod: 25

## 2024-09-16 PROCEDURE — ? LIQUID NITROGEN

## 2024-09-16 PROCEDURE — 17000 DESTRUCT PREMALG LESION: CPT

## 2024-09-16 PROCEDURE — ? COUNSELING

## 2024-09-16 ASSESSMENT — LOCATION DETAILED DESCRIPTION DERM
LOCATION DETAILED: LEFT SUPERIOR UPPER BACK
LOCATION DETAILED: RIGHT RADIAL DORSAL HAND
LOCATION DETAILED: RIGHT SUPERIOR UPPER BACK
LOCATION DETAILED: RIGHT INFERIOR ANTERIOR NECK
LOCATION DETAILED: RIGHT INFERIOR MEDIAL UPPER BACK
LOCATION DETAILED: LEFT ANTERIOR EARLOBE
LOCATION DETAILED: RIGHT INFERIOR CENTRAL MALAR CHEEK
LOCATION DETAILED: LEFT RADIAL DORSAL HAND

## 2024-09-16 ASSESSMENT — LOCATION ZONE DERM
LOCATION ZONE: HAND
LOCATION ZONE: NECK
LOCATION ZONE: FACE
LOCATION ZONE: EAR
LOCATION ZONE: TRUNK

## 2024-09-16 ASSESSMENT — LOCATION SIMPLE DESCRIPTION DERM
LOCATION SIMPLE: LEFT HAND
LOCATION SIMPLE: RIGHT UPPER BACK
LOCATION SIMPLE: LEFT EAR
LOCATION SIMPLE: RIGHT ANTERIOR NECK
LOCATION SIMPLE: RIGHT HAND
LOCATION SIMPLE: LEFT UPPER BACK
LOCATION SIMPLE: RIGHT CHEEK

## 2024-09-16 NOTE — PROCEDURE: LIQUID NITROGEN
Aperture Size (Optional): C
Show Applicator Variable?: Yes
Consent: The patient's verbal consent was obtained including but not limited to risks of crusting, scabbing, blistering, scarring, darker or lighter pigmentary change, recurrence, incomplete removal and infection.
Detail Level: Simple
Application Tool (Optional): Liquid Nitrogen Sprayer
Render Note In Bullet Format When Appropriate: No
Post-Care Instructions: I reviewed with the patient in detail post-care instructions. Patient is to wear sunprotection, and avoid picking at any of the treated lesions. Pt may apply Vaseline to crusted or scabbing areas.
Number Of Freeze-Thaw Cycles: 2 freeze-thaw cycles
Duration Of Freeze Thaw-Cycle (Seconds): 3

## 2024-10-16 ENCOUNTER — PATIENT MESSAGE (OUTPATIENT)
Dept: CARDIOLOGY | Facility: MEDICAL CENTER | Age: 74
End: 2024-10-16
Payer: MEDICARE

## 2024-10-16 ENCOUNTER — APPOINTMENT (OUTPATIENT)
Dept: CARDIOLOGY | Facility: MEDICAL CENTER | Age: 74
End: 2024-10-16
Attending: INTERNAL MEDICINE
Payer: MEDICARE

## 2024-10-17 ENCOUNTER — TELEPHONE (OUTPATIENT)
Dept: CARDIOLOGY | Facility: MEDICAL CENTER | Age: 74
End: 2024-10-17
Payer: MEDICARE

## 2024-10-29 ENCOUNTER — OFFICE VISIT (OUTPATIENT)
Dept: CARDIOLOGY | Facility: MEDICAL CENTER | Age: 74
End: 2024-10-29
Attending: INTERNAL MEDICINE
Payer: MEDICARE

## 2024-10-29 VITALS
SYSTOLIC BLOOD PRESSURE: 118 MMHG | DIASTOLIC BLOOD PRESSURE: 68 MMHG | OXYGEN SATURATION: 99 % | WEIGHT: 150 LBS | RESPIRATION RATE: 16 BRPM | HEART RATE: 47 BPM | HEIGHT: 71 IN | BODY MASS INDEX: 21 KG/M2

## 2024-10-29 DIAGNOSIS — E78.5 HYPERLIPIDEMIA, UNSPECIFIED HYPERLIPIDEMIA TYPE: ICD-10-CM

## 2024-10-29 DIAGNOSIS — I34.1 MITRAL VALVE PROLAPSE: ICD-10-CM

## 2024-10-29 DIAGNOSIS — E78.5 DYSLIPIDEMIA: ICD-10-CM

## 2024-10-29 DIAGNOSIS — Z82.49 FAMILY HISTORY OF PREMATURE CAD: ICD-10-CM

## 2024-10-29 DIAGNOSIS — I34.0 SEVERE MITRAL REGURGITATION: ICD-10-CM

## 2024-10-29 LAB — EKG IMPRESSION: NORMAL

## 2024-10-29 PROCEDURE — 93005 ELECTROCARDIOGRAM TRACING: CPT | Performed by: INTERNAL MEDICINE

## 2024-10-29 PROCEDURE — 99211 OFF/OP EST MAY X REQ PHY/QHP: CPT | Performed by: INTERNAL MEDICINE

## 2024-10-29 ASSESSMENT — ENCOUNTER SYMPTOMS
SHORTNESS OF BREATH: 0
MYALGIAS: 0
DIZZINESS: 0
LOSS OF CONSCIOUSNESS: 0
COUGH: 0
PALPITATIONS: 0

## 2024-10-29 ASSESSMENT — FIBROSIS 4 INDEX: FIB4 SCORE: 1.688388625592417062

## 2024-11-01 ENCOUNTER — APPOINTMENT (OUTPATIENT)
Dept: RADIOLOGY | Facility: MEDICAL CENTER | Age: 74
End: 2024-11-01
Attending: INTERNAL MEDICINE
Payer: COMMERCIAL

## 2024-12-10 ENCOUNTER — PATIENT MESSAGE (OUTPATIENT)
Dept: CARDIOLOGY | Facility: MEDICAL CENTER | Age: 74
End: 2024-12-10
Payer: MEDICARE

## 2024-12-11 ENCOUNTER — HOSPITAL ENCOUNTER (OUTPATIENT)
Dept: LAB | Facility: MEDICAL CENTER | Age: 74
End: 2024-12-11
Attending: STUDENT IN AN ORGANIZED HEALTH CARE EDUCATION/TRAINING PROGRAM
Payer: MEDICARE

## 2024-12-11 DIAGNOSIS — Z00.00 HEALTHCARE MAINTENANCE: ICD-10-CM

## 2024-12-11 DIAGNOSIS — Z12.5 PROSTATE CANCER SCREENING: ICD-10-CM

## 2024-12-11 PROCEDURE — 80061 LIPID PANEL: CPT

## 2024-12-11 PROCEDURE — 80053 COMPREHEN METABOLIC PANEL: CPT

## 2024-12-11 PROCEDURE — 84153 ASSAY OF PSA TOTAL: CPT

## 2024-12-11 PROCEDURE — 36415 COLL VENOUS BLD VENIPUNCTURE: CPT

## 2024-12-12 ENCOUNTER — PATIENT MESSAGE (OUTPATIENT)
Dept: MEDICAL GROUP | Facility: MEDICAL CENTER | Age: 74
End: 2024-12-12
Payer: MEDICARE

## 2024-12-12 LAB
ALBUMIN SERPL BCP-MCNC: 4.6 G/DL (ref 3.2–4.9)
ALBUMIN/GLOB SERPL: 2.1 G/DL
ALP SERPL-CCNC: 46 U/L (ref 30–99)
ALT SERPL-CCNC: 15 U/L (ref 2–50)
ANION GAP SERPL CALC-SCNC: 10 MMOL/L (ref 7–16)
AST SERPL-CCNC: 22 U/L (ref 12–45)
BILIRUB SERPL-MCNC: 0.9 MG/DL (ref 0.1–1.5)
BUN SERPL-MCNC: 14 MG/DL (ref 8–22)
CALCIUM ALBUM COR SERPL-MCNC: 9.3 MG/DL (ref 8.5–10.5)
CALCIUM SERPL-MCNC: 9.8 MG/DL (ref 8.5–10.5)
CHLORIDE SERPL-SCNC: 100 MMOL/L (ref 96–112)
CHOLEST SERPL-MCNC: 226 MG/DL (ref 100–199)
CO2 SERPL-SCNC: 26 MMOL/L (ref 20–33)
CREAT SERPL-MCNC: 0.64 MG/DL (ref 0.5–1.4)
FASTING STATUS PATIENT QL REPORTED: NORMAL
GFR SERPLBLD CREATININE-BSD FMLA CKD-EPI: 98 ML/MIN/1.73 M 2
GLOBULIN SER CALC-MCNC: 2.2 G/DL (ref 1.9–3.5)
GLUCOSE SERPL-MCNC: 97 MG/DL (ref 65–99)
HDLC SERPL-MCNC: 81 MG/DL
LDLC SERPL CALC-MCNC: 130 MG/DL
POTASSIUM SERPL-SCNC: 4.2 MMOL/L (ref 3.6–5.5)
PROT SERPL-MCNC: 6.8 G/DL (ref 6–8.2)
PSA SERPL DL<=0.01 NG/ML-MCNC: 0.03 NG/ML (ref 0–4)
SODIUM SERPL-SCNC: 136 MMOL/L (ref 135–145)
TRIGL SERPL-MCNC: 76 MG/DL (ref 0–149)

## 2024-12-16 ENCOUNTER — OFFICE VISIT (OUTPATIENT)
Dept: MEDICAL GROUP | Facility: MEDICAL CENTER | Age: 74
End: 2024-12-16
Payer: MEDICARE

## 2024-12-16 VITALS
BODY MASS INDEX: 22.12 KG/M2 | HEIGHT: 71 IN | TEMPERATURE: 99.2 F | SYSTOLIC BLOOD PRESSURE: 104 MMHG | DIASTOLIC BLOOD PRESSURE: 68 MMHG | OXYGEN SATURATION: 98 % | RESPIRATION RATE: 18 BRPM | HEART RATE: 54 BPM | WEIGHT: 158 LBS

## 2024-12-16 DIAGNOSIS — E78.00 PURE HYPERCHOLESTEROLEMIA: ICD-10-CM

## 2024-12-16 DIAGNOSIS — M81.0 AGE-RELATED OSTEOPOROSIS WITHOUT CURRENT PATHOLOGICAL FRACTURE: ICD-10-CM

## 2024-12-16 DIAGNOSIS — T45.8X5A ADVERSE EFFECT OF BISPHOSPHONATE, INITIAL ENCOUNTER: ICD-10-CM

## 2024-12-16 PROCEDURE — 3074F SYST BP LT 130 MM HG: CPT | Performed by: STUDENT IN AN ORGANIZED HEALTH CARE EDUCATION/TRAINING PROGRAM

## 2024-12-16 PROCEDURE — 99214 OFFICE O/P EST MOD 30 MIN: CPT | Performed by: STUDENT IN AN ORGANIZED HEALTH CARE EDUCATION/TRAINING PROGRAM

## 2024-12-16 PROCEDURE — 3078F DIAST BP <80 MM HG: CPT | Performed by: STUDENT IN AN ORGANIZED HEALTH CARE EDUCATION/TRAINING PROGRAM

## 2024-12-16 ASSESSMENT — FIBROSIS 4 INDEX: FIB4 SCORE: 2.02

## 2024-12-16 NOTE — PROGRESS NOTES
Subjective:     CC:   Chief Complaint   Patient presents with    Osteoporosis    Hypercholesterolemia Follow-up       HPI:   Sebastien presents today with  Verbal consent was acquired by the patient to use "[employer] ambient listening note generation during this visit Yes   History of Present Illness  The patient presents for evaluation of hypercholesterolemia, osteoporosis, and arthritis.    He has been proactive in managing his health, including scheduling a CT cardiac score appointment. However, he expresses concern about the potential radiation exposure from this procedure. He reports no symptoms such as shortness of breath or fatigue. He acknowledges that his elevated cholesterol levels may be due to suboptimal dietary habits and weight management. He has made dietary modifications, including daily consumption of yogurt and biweekly intake of scrambled eggs. He is open to the initiation of statin therapy if his cholesterol levels reach 240. He has also reduced his intake of processed foods, red meat, butter, and cookies.    He has observed a pattern of back pain occurring within 24 to 36 hours post-administration of alendronate, which began a few months ago. He discontinued the medication, resulting in the cessation of back pain. After a 3-week hiatus, he resumed the medication without any immediate adverse effects. However, upon taking the second dose, the back pain recurred. He has since been off the medication for 2 weeks and took his first dose last Friday without any issues. He is considering discontinuing the medication due to these side effects. He reports no recent falls. He plans to monitor his response to the second dose of alendronate, scheduled for this coming Friday, before deciding on further action. He speculates that the initial back pain could have been due to an unnoticed strain. He reports feeling well after shoveling heavy slush on Saturday, the day after his last dose of alendronate.    He  "experiences pain in his thumb and 2 fingers, which he attributes to arthritis. Currently, he is pain-free but notes that the pain intensifies with overuse. He has managed his hand and wrist arthritis effectively, including discontinuing kayaking due to inability to paddle.    FAMILY HISTORY  His sister has lupus.    MEDICATIONS  Current: alendronate    Results  Laboratory Studies  Cholesterol level is in the 130s.       Health Maintenance: Completed    ROS:  ROS    Review of systems unremarkable except for concerns noted by patient or items listed.    Please see HPI for additional ROS.      Objective:     Exam:  /68 (BP Location: Left arm, Patient Position: Sitting, BP Cuff Size: Adult)   Pulse (!) 54   Temp 37.3 °C (99.2 °F) (Temporal)   Resp 18   Ht 1.803 m (5' 11\")   Wt 71.7 kg (158 lb)   SpO2 98%   BMI 22.04 kg/m²  Body mass index is 22.04 kg/m².    Physical Exam  Constitutional:       General: He is not in acute distress.     Appearance: Normal appearance.   HENT:      Head: Normocephalic.   Cardiovascular:      Rate and Rhythm: Normal rate and regular rhythm.      Pulses: Normal pulses.      Heart sounds: Normal heart sounds.   Pulmonary:      Effort: Pulmonary effort is normal.      Breath sounds: Normal breath sounds.   Skin:     General: Skin is warm.   Neurological:      Mental Status: He is alert and oriented to person, place, and time.   Psychiatric:         Mood and Affect: Mood normal.         Behavior: Behavior normal.           Labs: reviewed       Assessment & Plan:     74 y.o. male with the following -     1. Age-related osteoporosis without current pathological fracture  Chronic , stable  - Experiencing back pain, possibly a side effect of alendronate  - Informed that alendronate can cause bone and joint pain; discontinue if symptoms persist  - Discussed alternative treatments: Prolia infusion every 6 months, Reclast infusion once a year  - Referral to an endocrinologist for further " evaluation and management    - Referral to Endocrinology    2. Pure hypercholesterolemia  Chronic , uncontrolled         Lab Results   Component Value Date/Time     CHOLSTRLTOT 226 (H) 12/11/2024 0611     TRIGLYCERIDE 76 12/11/2024 0611     HDL 81 12/11/2024 0611      (H) 12/11/2024 0611       Cut back on saturated fats in your diet.  Statin therapy would help to bring these numbers down and will have some protection to avoid future risk for heart attacks or strokes.   Discussed risk and benefits . Side effects.   - Comprehensive discussion on potential benefits and risks of statin therapy  - Advised to maintain a healthy diet (yogurt, eggs) and reduce intake of processed foods, red meat, butter, and cookies  - Suggested use of egg whites as a healthier alternative to whole eggs  - Discussed ezetimibe as a potential alternative to statins    Recommended are interested in medication let me know .    Patient declined statins at present   Would like to continue lifestyle modifications    3. Adverse effect of bisphosphonate, initial encounter  This is a new problem.  Patient reports having back pain which coincided with starting bisphosphonate therapy for osteoporosis.  Patient reports that he temporarily stopped the medication when his back pain symptoms improved and then he restarted the medication the symptoms came back.  A possible side effect with bisphosphonates of bone pain.  Recommended patient to hold off Fosamax.  Next options include considering Prolia infusion or switching to a different bisphosphonate.  Discussed with patient.  He is interested in endocrinology referral to discuss treatment options in more details      3. Arthritis  - Reports pain in thumb and fingers, likely due to arthritis  - Managing condition by avoiding activities that exacerbate pain (e.g., kayaking)  - Advised to continue monitoring symptoms and avoid overuse of hands          Follow-up in 6 months or sooner     please note  that this dictation was created using voice recognition software. I have made every reasonable attempt to correct obvious errors, but I expect that there are errors of grammar and possibly content that I did not discover before finalizing the note.

## 2025-01-08 ENCOUNTER — TELEPHONE (OUTPATIENT)
Dept: CARDIOLOGY | Facility: MEDICAL CENTER | Age: 75
End: 2025-01-08
Payer: MEDICARE

## 2025-01-08 NOTE — PROGRESS NOTES
Called and spoke with the patient   He decided not to proceed with coronary calcification scan due to concern about radiation exposure   Will arrange a follow up appt in April or May

## 2025-03-17 ENCOUNTER — APPOINTMENT (OUTPATIENT)
Dept: URBAN - METROPOLITAN AREA CLINIC 4 | Facility: CLINIC | Age: 75
Setting detail: DERMATOLOGY
End: 2025-03-17

## 2025-03-17 DIAGNOSIS — L81.4 OTHER MELANIN HYPERPIGMENTATION: ICD-10-CM

## 2025-03-17 DIAGNOSIS — Z85.828 PERSONAL HISTORY OF OTHER MALIGNANT NEOPLASM OF SKIN: ICD-10-CM

## 2025-03-17 DIAGNOSIS — D18.0 HEMANGIOMA: ICD-10-CM

## 2025-03-17 DIAGNOSIS — L57.8 OTHER SKIN CHANGES DUE TO CHRONIC EXPOSURE TO NONIONIZING RADIATION: ICD-10-CM

## 2025-03-17 DIAGNOSIS — D22 MELANOCYTIC NEVI: ICD-10-CM

## 2025-03-17 DIAGNOSIS — L82.1 OTHER SEBORRHEIC KERATOSIS: ICD-10-CM

## 2025-03-17 DIAGNOSIS — L57.0 ACTINIC KERATOSIS: ICD-10-CM

## 2025-03-17 PROBLEM — D22.5 MELANOCYTIC NEVI OF TRUNK: Status: ACTIVE | Noted: 2025-03-17

## 2025-03-17 PROBLEM — D18.01 HEMANGIOMA OF SKIN AND SUBCUTANEOUS TISSUE: Status: ACTIVE | Noted: 2025-03-17

## 2025-03-17 PROCEDURE — 17000 DESTRUCT PREMALG LESION: CPT

## 2025-03-17 PROCEDURE — 17003 DESTRUCT PREMALG LES 2-14: CPT

## 2025-03-17 PROCEDURE — 99213 OFFICE O/P EST LOW 20 MIN: CPT | Mod: 25

## 2025-03-17 PROCEDURE — ? COUNSELING

## 2025-03-17 PROCEDURE — ? LIQUID NITROGEN

## 2025-03-17 ASSESSMENT — LOCATION ZONE DERM
LOCATION ZONE: NECK
LOCATION ZONE: TRUNK
LOCATION ZONE: HAND
LOCATION ZONE: FACE

## 2025-03-17 ASSESSMENT — LOCATION DETAILED DESCRIPTION DERM
LOCATION DETAILED: RIGHT RADIAL DORSAL HAND
LOCATION DETAILED: RIGHT SUPERIOR MEDIAL FOREHEAD
LOCATION DETAILED: LEFT RADIAL DORSAL HAND
LOCATION DETAILED: LEFT SUPERIOR UPPER BACK
LOCATION DETAILED: RIGHT SUPERIOR UPPER BACK
LOCATION DETAILED: LEFT SUPERIOR MEDIAL FOREHEAD
LOCATION DETAILED: RIGHT INFERIOR CENTRAL MALAR CHEEK
LOCATION DETAILED: RIGHT INFERIOR ANTERIOR NECK
LOCATION DETAILED: RIGHT INFERIOR MEDIAL UPPER BACK

## 2025-03-17 ASSESSMENT — LOCATION SIMPLE DESCRIPTION DERM
LOCATION SIMPLE: LEFT UPPER BACK
LOCATION SIMPLE: RIGHT HAND
LOCATION SIMPLE: RIGHT ANTERIOR NECK
LOCATION SIMPLE: LEFT HAND
LOCATION SIMPLE: LEFT FOREHEAD
LOCATION SIMPLE: RIGHT FOREHEAD
LOCATION SIMPLE: RIGHT CHEEK
LOCATION SIMPLE: RIGHT UPPER BACK

## 2025-03-17 NOTE — PROCEDURE: LIQUID NITROGEN
Suicidality/Homicidality
Aperture Size (Optional): C
Consent: The patient's verbal consent was obtained including but not limited to risks of crusting, scabbing, blistering, scarring, darker or lighter pigmentary change, recurrence, incomplete removal and infection.
Show Aperture Variable?: Yes
Render Note In Bullet Format When Appropriate: No
Number Of Freeze-Thaw Cycles: 2 freeze-thaw cycles
Duration Of Freeze Thaw-Cycle (Seconds): 3
Post-Care Instructions: I reviewed with the patient in detail post-care instructions. Patient is to wear sunprotection, and avoid picking at any of the treated lesions. Pt may apply Vaseline to crusted or scabbing areas.
Detail Level: Simple

## 2025-03-20 ENCOUNTER — PATIENT MESSAGE (OUTPATIENT)
Dept: HEALTH INFORMATION MANAGEMENT | Facility: OTHER | Age: 75
End: 2025-03-20

## 2025-03-20 ASSESSMENT — PATIENT HEALTH QUESTIONNAIRE - PHQ9
1. LITTLE INTEREST OR PLEASURE IN DOING THINGS: SEVERAL DAYS
2. FEELING DOWN, DEPRESSED, IRRITABLE, OR HOPELESS: SEVERAL DAYS

## 2025-03-20 ASSESSMENT — ENCOUNTER SYMPTOMS: GENERAL WELL-BEING: EXCELLENT

## 2025-03-20 ASSESSMENT — ACTIVITIES OF DAILY LIVING (ADL): BATHING_REQUIRES_ASSISTANCE: 0

## 2025-03-25 NOTE — ASSESSMENT & PLAN NOTE
Chronic, ongoing. Most recent lipid panel from 12/2024  with LDL at 130. Declines statin therapy. He adheres to a Mediterranean diet and exercises daily. Follow up with PCP at least annually for continued monitoring and management.   Lab Results   Component Value Date/Time    CHOLSTRLTOT 226 (H) 12/11/2024 06:11 AM     (H) 12/11/2024 06:11 AM    HDL 81 12/11/2024 06:11 AM    TRIGLYCERIDE 76 12/11/2024 06:11 AM

## 2025-03-25 NOTE — ASSESSMENT & PLAN NOTE
Chronic, ongoing. Last DEXA 2023 with osteoporosis of the lumbar spine and osteopenia of the proximal left femur with T scores of -2.5 and -2.2 respectively. Denies hx of fragility fx. He maintains on alendronate 70mg weekly. Advise calcium and vitamin D supplementation with weightbearing exercises as tolerated. Discussed fall risk reduction methods. Follow up with PCP at least annually for continued monitoring and management

## 2025-03-26 ENCOUNTER — OFFICE VISIT (OUTPATIENT)
Dept: FAMILY PLANNING/WOMEN'S HEALTH CLINIC | Facility: PHYSICIAN GROUP | Age: 75
End: 2025-03-26
Attending: FAMILY MEDICINE
Payer: MEDICARE

## 2025-03-26 VITALS
OXYGEN SATURATION: 98 % | SYSTOLIC BLOOD PRESSURE: 122 MMHG | HEART RATE: 58 BPM | RESPIRATION RATE: 14 BRPM | DIASTOLIC BLOOD PRESSURE: 70 MMHG | HEIGHT: 71 IN | WEIGHT: 155 LBS | BODY MASS INDEX: 21.7 KG/M2

## 2025-03-26 DIAGNOSIS — E78.5 HYPERLIPIDEMIA, UNSPECIFIED HYPERLIPIDEMIA TYPE: ICD-10-CM

## 2025-03-26 DIAGNOSIS — I34.0 SEVERE MITRAL REGURGITATION: ICD-10-CM

## 2025-03-26 DIAGNOSIS — F41.1 ANXIETY STATE: ICD-10-CM

## 2025-03-26 DIAGNOSIS — G31.9 CEREBRAL ATROPHY (HCC): ICD-10-CM

## 2025-03-26 DIAGNOSIS — M81.0 AGE-RELATED OSTEOPOROSIS WITHOUT CURRENT PATHOLOGICAL FRACTURE: ICD-10-CM

## 2025-03-26 PROCEDURE — 3078F DIAST BP <80 MM HG: CPT | Performed by: PHYSICIAN ASSISTANT

## 2025-03-26 PROCEDURE — 1126F AMNT PAIN NOTED NONE PRSNT: CPT | Performed by: PHYSICIAN ASSISTANT

## 2025-03-26 PROCEDURE — G0439 PPPS, SUBSEQ VISIT: HCPCS | Performed by: PHYSICIAN ASSISTANT

## 2025-03-26 PROCEDURE — 3074F SYST BP LT 130 MM HG: CPT | Performed by: PHYSICIAN ASSISTANT

## 2025-03-26 SDOH — ECONOMIC STABILITY: FOOD INSECURITY: HOW HARD IS IT FOR YOU TO PAY FOR THE VERY BASICS LIKE FOOD, HOUSING, MEDICAL CARE, AND HEATING?: NOT HARD AT ALL

## 2025-03-26 SDOH — ECONOMIC STABILITY: FOOD INSECURITY: WITHIN THE PAST 12 MONTHS, THE FOOD YOU BOUGHT JUST DIDN'T LAST AND YOU DIDN'T HAVE MONEY TO GET MORE.: NEVER TRUE

## 2025-03-26 SDOH — ECONOMIC STABILITY: FOOD INSECURITY: WITHIN THE PAST 12 MONTHS, YOU WORRIED THAT YOUR FOOD WOULD RUN OUT BEFORE YOU GOT THE MONEY TO BUY MORE.: NEVER TRUE

## 2025-03-26 SDOH — ECONOMIC STABILITY: TRANSPORTATION INSECURITY: IN THE PAST 12 MONTHS, HAS LACK OF TRANSPORTATION KEPT YOU FROM MEDICAL APPOINTMENTS OR FROM GETTING MEDICATIONS?: NO

## 2025-03-26 ASSESSMENT — PATIENT HEALTH QUESTIONNAIRE - PHQ9
SUM OF ALL RESPONSES TO PHQ QUESTIONS 1-9: 6
5. POOR APPETITE OR OVEREATING: 0 - NOT AT ALL
CLINICAL INTERPRETATION OF PHQ2 SCORE: 2

## 2025-03-26 ASSESSMENT — PAIN SCALES - GENERAL: PAINLEVEL_OUTOF10: NO PAIN

## 2025-03-26 ASSESSMENT — ACTIVITIES OF DAILY LIVING (ADL): LACK_OF_TRANSPORTATION: NO

## 2025-03-26 ASSESSMENT — FIBROSIS 4 INDEX: FIB4 SCORE: 2.02

## 2025-03-26 NOTE — ASSESSMENT & PLAN NOTE
Pt endorses concerns he may lose his social security and no longer be able to afford his life. He states without social security he only has enough savings to survive two more years and he questions what measures he would take at that point. Encouraged pt, in the event that changes to his SS occur, seek help from PCP and community.

## 2025-03-26 NOTE — ASSESSMENT & PLAN NOTE
Chronic, ongoing. Pt follows with cardiology who is monitoring at this time. Pt continues to find himself completely asymptomatic. Pt denies dyspnea. He states if this were to worsen, he would not want to pursue any invasive measures. Follow up with cardiology per routine.

## 2025-03-26 NOTE — PROGRESS NOTES
"    Comprehensive Health Assessment Program     Sebastien Horn is a 74 y.o. here for his comprehensive health assessment.    Patient Active Problem List    Diagnosis Date Noted    Anxiety related to social security 03/26/2025    Adverse effect of bisphosphonates 12/16/2024    Severe mitral regurgitation 10/12/2023    Mitral valve prolapse 10/12/2023    BMI 20.0-20.9, adult 09/20/2023    Cerebral atrophy (HCC) 05/30/2023    Gastroesophageal reflux disease 05/04/2023    Spondylosis of cervical region without myelopathy or radiculopathy 10/13/2022    Atherosclerosis of aorta (HCC) 08/26/2021    Multiple joint pain 04/13/2021    H/O nonmelanoma skin cancer 06/24/2019    Age-related osteoporosis without current pathological fracture 07/05/2017    AK (actinic keratosis) 12/14/2016    History of parathyroidectomy 09/08/2016    Urinary incontinence 09/29/2015    Erectile dysfunction following radical prostatectomy 09/29/2015    Vitamin D deficiency 09/29/2015    H/O prostate cancer 06/02/2015    Hyperlipidemia 06/02/2015    Neck pain on left side 06/02/2015       Current Outpatient Medications   Medication Sig Dispense Refill    alendronate (FOSAMAX) 70 MG Tab Take 1 Tablet by mouth every 7 days. 12 Tablet 3    Naproxen Sodium (ALEVE PO) Take  by mouth.      melatonin 5 mg Tab Take 10 mg by mouth at bedtime as needed.       No current facility-administered medications for this visit.          Current supplements as per medication list.     Allergies:   Patient has no known allergies.  Social History     Tobacco Use    Smoking status: Never    Smokeless tobacco: Never   Vaping Use    Vaping status: Never Used   Substance Use Topics    Alcohol use: No     Alcohol/week: 0.0 oz    Drug use: Yes     Types: Inhaled, Marijuana     Comment: Cannabis usage. pt states he takes one \"hit\" in the morning and in the evenings.     Family History   Problem Relation Age of Onset    Hyperlipidemia Mother     Hyperlipidemia Father  "    Cancer Father         Prostate and Lung    Lung Disease Father     Hypertension Father     Heart Disease Father     Other Sister         Lupus    Heart Disease Maternal Grandfather      Sebastien  has a past medical history of Arrhythmia, Arthritis, Atherosclerosis of aorta ([facility]) (08/26/2021), Cancer ([facility]) (07/2013), Cerebral atrophy ([facility]) (5/30/2023), Disorder of thyroid, Heart burn, Heart valve disease, Hiatus hernia syndrome, Infectious disease (12/1/22), Malignant neoplasm of prostate ([facility]) (8/3/2015), Multiple joint pain (04/13/2021), Osteoporosis, Skin lump of leg, left (05/19/2021), Spondylosis of cervical region without myelopathy or radiculopathy (10/13/2022), Unspecified cataract, and Urinary incontinence.   Past Surgical History:   Procedure Laterality Date    IRRIGATION & DEBRIDEMENT ORTHO Left 3/10/2018    Procedure: IRRIGATION & DEBRIDEMENT ORTHO OPEN;  Surgeon: Sergio Daniel M.D.;  Location: [facility];  Service: Orthopedics    SHOULDER ARTHROSCOPY Left 2/6/2018    Procedure: SHOULDER ARTHROSCOPY;  Surgeon: Sergio Daniel M.D.;  Location: [facility];  Service: Orthopedics    IRRIGATION & DEBRIDEMENT ORTHO Left 2/6/2018    Procedure: IRRIGATION & DEBRIDEMENT ORTHO- SHOULDER  ;  Surgeon: Sergio Daniel M.D.;  Location: [facility];  Service: Orthopedics    FOREIGN BODY REMOVAL Left 2/6/2018    Procedure: FOREIGN BODY REMOVAL;  Surgeon: Sergio Daniel M.D.;  Location: [facility];  Service: Orthopedics    SHOULDER ARTHROSCOPY W/ ROTATOR CUFF REPAIR Left 10/18/2017    Procedure: SHOULDER ARTHROSCOPY W/ ROTATOR CUFF REPAIR;  Surgeon: Sergio Daniel M.D.;  Location: [facility];  Service: Orthopedics    SHOULDER DECOMPRESSION ARTHROSCOPIC Left 10/18/2017    Procedure: SHOULDER DECOMPRESSION ARTHROSCOPIC- SUBACROMIAL;  Surgeon: Sergio Daniel M.D.;  Location: [facility];  Service: Orthopedics    BICEPS TENODESIS Left  10/18/2017    Procedure: BICEPS TENODESIS;  Surgeon: Sergio Daniel M.D.;  Location: SURGERY [facility];  Service: Orthopedics    PARATHYROIDECTOMY N/A 7/5/2017    Procedure: PARATHYROIDECTOMY - MINIMALLY INVASIVE W/RECURRENT LARYNGEAL NERVE MONITORING;  Surgeon: Josh Blank M.D.;  Location: SURGERY SAME DAY [facility];  Service:     SPHINCTER PROSTHESIS REMOVAL  3/28/2017    Procedure: URINARY SPHINCTER PROSTHESIS REMOVAL;  Surgeon: Benigno Grier M.D.;  Location: SURGERY [facility];  Service:     CYSTOSCOPY  3/28/2017    Procedure: CYSTOSCOPY;  Surgeon: Benigno Grier M.D.;  Location: SURGERY [facility];  Service:     EVACUATION OF HEMATOMA  3/21/2017    Procedure: EVACUATION OF HEMATOMA-CYSTO CLOT EVACUATION AND SMALL CYSTOSCOPE;  Surgeon: Frank Lamas M.D.;  Location: [facility];  Service:     CYSTOSCOPY  3/21/2017    Procedure: CYSTOSCOPY;  Surgeon: Frank Lamas M.D.;  Location: SURGERY [facility];  Service:     CYSTOSCOPY  3/20/2017    Procedure: CYSTOSCOPY -  FLEXIBLE;  Surgeon: Frank Lamas M.D.;  Location: SURGERY [facility];  Service:     SPHINCTER PROSTHESIS PLACEMENT  3/20/2017    Procedure: SPHINCTER PROSTHESIS PLACEMENT - ARTIFICIAL URINARY SPHINCTER;  Surgeon: Frank Lamas M.D.;  Location: [facility];  Service:     PROSTATECTOMY, RADICAL RETRO  8/3/2015    Procedure: PROSTATECTOMY RADICAL RETROPUBIC;  Surgeon: Sage Ngo M.D.;  Location: [facility];  Service:     NODE DISSECTION Bilateral 8/3/2015    Procedure: NODE DISSECTION LYMPH;  Surgeon: Sage Ngo M.D.;  Location: SURGERY [facility];  Service:     KNEE ARTHROPLASTY TOTAL  7/3/2014    Performed by Theodore San M.D. at [facility]    KNEE ARTHROSCOPY  7/3/2014    Performed by Theodore San M.D. at [facility]    MENISCECTOMY  7/3/2014    Performed by Theodore San M.D. at [facility]    KNEE  REPLACEMENT, TOTAL Right 2014    INGUINAL HERNIA REPAIR  6/1/2009    Performed by RISHI COOK at SURGERY [facility]    OTHER ORTHOPEDIC SURGERY  2003    L Shoulder Repair    INGUINAL HERNIA LAPAROSCOPIC BILATERAL Bilateral     OTHER ORTHOPEDIC SURGERY      R Rotator Cuff repair       Screening:  In the last six months have you experienced any leakage of urine? No    Depression Screening  Little interest or pleasure in doing things?  1 - several days  Feeling down, depressed , or hopeless? 1 - several days  Trouble falling or staying asleep, or sleeping too much?  3 - nearly every day  Feeling tired or having little energy?  0 - not at all  Poor appetite or overeating?  0 - not at all  Feeling bad about yourself - or that you are a failure or have let yourself or your family down? 0 - not at all  Trouble concentrating on things, such as reading the newspaper or watching television? 1 - several days  Moving or speaking so slowly that other people could have noticed.  Or the opposite - being so fidgety or restless that you have been moving around a lot more than usual?  0 - not at all  Thoughts that you would be better off dead, or of hurting yourself?  0 - not at all  Patient Health Questionnaire Score: 6    If depressive symptoms identified deferred to follow up visit unless specifically addressed in assessment and plan.    Interpretation of PHQ-9 Total Score   Score Severity   1-4 No Depression   5-9 Mild Depression   10-14 Moderate Depression   15-19 Moderately Severe Depression   20-27 Severe Depression    Screening for Cognitive Impairment  Do you or any of your friends or family members have any concern about your memory? No  Three Minute Recall (Village, Kitchen, Baby) 2/3    Marvel clock face with all 12 numbers and set the hands to show 10 minutes past 11.  Yes 5  Cognitive concerns identified deferred for follow up unless specifically addressed in assessment and plan.    Fall Risk Assessment  Has the  patient had two or more falls in the last year or any fall with injury in the last year?  No    Safety Assessment  Do you always wear your seatbelt?  Yes  Any changes to home needed to function safely? No  Difficulty hearing.  Yes  Patient counseled about all safety risks that were identified.    Functional Assessment ADLs  Are there any barriers preventing you from cooking for yourself or meeting nutritional needs?  No.    Are there any barriers preventing you from driving safely or obtaining transportation?  No.    Are there any barriers preventing you from using a telephone or calling for help?  No    Are there any barriers preventing you from shopping?  No.    Are there any barriers preventing you from taking care of your own finances?  No    Are there any barriers preventing you from managing your medications?  No    Are there any barriers preventing you from showering, bathing or dressing yourself? No    Are there any barriers preventing you from doing housework or laundry? No    Are there any barriers preventing you from using the toilet?No    Are you currently engaging in any exercise or physical activity?  Yes. Hiking 5-6 miles daily  Lifting weights      Self-Assessment of Health  What is your perception of your health? Excellent    Do you sleep more than six hours a night? Yes    In the past 7 days, how much did pain keep you from doing your normal work? None    Do you spend quality time with family or friends (virtually or in person)? Yes    Do you usually eat a heart healthy diet that constists of a variety of fruits, vegetables, whole grains and fiber? Yes    Do you eat foods high in fat and/or Fast Food more than three times per week? No    How concerned are you that your medical conditions are not being well managed? Not at all    Are you worried that in the next 2 months, you may not have stable housing that you own, rent, or stay in as part of a household? Yes        Advance Care Planning  Do you  have an Advance Directive, Living Will, Durable Power of , or POLST? Yes  Advance Directive   Durable Power of    is on file      Health Maintenance Summary            Needs Review       Hepatitis C Screening  Tentatively Complete      06/18/2019  Hepatitis C Antibody component of HEP C VIRUS ANTIBODY              Colorectal Cancer Screening (Colon Cancer Screening Cologuard Stool (FIT DNA) - Every 3 Years) Tentatively due on 9/10/2027      09/10/2024  COLOGUARD RESULT component of COLOGUARD COLON CANCER SCREENING    09/10/2024  COLOGUARD COLON CANCER SCREENING    11/14/2014  OCCULT BLOOD FECES IMMUNOASSAY    04/10/2014  REFERRAL TO GI FOR COLONOSCOPY    04/10/2014  AMB REFERRAL TO GI FOR COLONOSCOPY     Only the first 5 history entries have been loaded, but more history exists.                    Upcoming       Annual Wellness Visit (Yearly) Next due on 3/26/2026      03/26/2025  Level of Service: SC ANNUAL WELLNESS VISIT-INCLUDES PPPS SUBSEQUE*    05/29/2024  Level of Service: SC ANNUAL WELLNESS VISIT-INCLUDES PPPS SUBSEQUE*    12/07/2023  Level of Service: SC PREVENTIVE VISIT,EST,65 & OVER    09/20/2023  Level of Service: SC ANNUAL WELLNESS VISIT-INCLUDES PPPS SUBSEQUE*    10/13/2022  Subsequent Annual Wellness Visit - Includes PPPS ()      Only the first 5 history entries have been loaded, but more history exists.              IMM DTaP/Tdap/Td Vaccine (2 - Td or Tdap) Next due on 6/24/2029 06/24/2019  Imm Admin: Tdap Vaccine                      Completed or No Longer Recommended       Influenza Vaccine (Series Information) Completed      09/23/2024  Imm Admin: Influenza high-dose trivalent (PF)    10/26/2023  Imm Admin: Influenza Vaccine Adult HD    09/11/2023  Imm Admin: Influenza Vaccine Quad Inj (Pf)    09/26/2022  Imm Admin: Influenza Vaccine Adult HD    09/17/2021  Imm Admin: Influenza Vaccine Adult HD      Only the first 5 history entries have been loaded, but more history  exists.              Zoster (Shingles) Vaccines (Series Information) Completed      10/18/2023  Imm Admin: Zoster Vaccine Recombinant (RZV) (SHINGRIX)    04/13/2021  Imm Admin: Zoster Vaccine Recombinant (RZV) (SHINGRIX)    07/15/2017  Imm Admin: Zoster Vaccine Live (ZVL) (Zostavax) - HISTORICAL DATA              COVID-19 Vaccine (Series Information) Completed      10/07/2024  Imm Admin: Covid-19 Mrna (Spikevax) Moderna 12+ Years    10/06/2023  Imm Admin: COVID-19, mRNA, LNP-S, PF, char-sucrose, 30 mcg/0.3 mL    05/09/2023  Imm Admin: PFIZER BIVALENT SARS-COV-2 VACCINE (12+)    05/26/2022  Imm Admin: PFIZER CORTES CAP SARS-COV-2 VACCINATION (12+)    09/25/2021  Imm Admin: PFIZER PURPLE CAP SARS-COV-2 VACCINATION (12+)      Only the first 5 history entries have been loaded, but more history exists.              Pneumococcal Vaccine: 50+ Years (Series Information) Completed      06/24/2019  Imm Admin: Pneumococcal Conjugate Vaccine (Prevnar/PCV-13)    09/02/2016  Imm Admin: Pneumococcal polysaccharide vaccine (PPSV-23)    07/03/2012  Imm Admin: Pneumococcal polysaccharide vaccine (PPSV-23)              Hepatitis A Vaccine (Hep A) (Series Information) Aged Out      No completion history exists for this topic.              Hepatitis B Vaccine (Hep B) (Series Information) Aged Out     No completion history exists for this topic.              HPV Vaccines (Series Information) Aged Out     No completion history exists for this topic.              Polio Vaccine (Inactivated Polio) (Series Information) Aged Out     No completion history exists for this topic.              Meningococcal Immunization (Series Information) Aged Out     No completion history exists for this topic.                            Patient Care Team:  Jasmyn Ordaz M.D. as PCP - General (Internal Medicine)  Jasmyn Ordaz M.D. as PCP - [facility] Paneled (Internal Medicine)  Abebe Araiza M.D. as Consulting Physician (Gastroenterology)  Larisa Ureña O.D. as  "Consulting Physician (Optometry)  Geena Harkins as [facility]   Favian Allen M.D. (Cardiovascular Disease (Cardiology))    Financial Resource Strain: Low Risk  (3/26/2025)    Overall Financial Resource Strain (CARDIA)     Difficulty of Paying Living Expenses: Not hard at all      Transportation Needs: No Transportation Needs (3/26/2025)    PRAPARE - Transportation     Lack of Transportation (Medical): No     Lack of Transportation (Non-Medical): No      Food Insecurity: No Food Insecurity (3/26/2025)    Hunger Vital Sign     Worried About Running Out of Food in the Last Year: Never true     Ran Out of Food in the Last Year: Never true        Encounter Vitals  Blood Pressure : 122/70  Pulse: (!) 58  Respiration: 14  Pulse Oximetry: 98 %  Weight: 70.3 kg (155 lb)  Height: 180.3 cm (5' 11\")  BMI (Calculated): 21.62  Pain Score: No pain     Physical Exam:  Constitutional: NAD  HENMT: NC/AT, EOMI  Cardiovascular: RRR, murmur  Lungs: CTAB, no w/r/r  Extremities: No c/c/e  Skin: No lesions notes  Neurologic: Alert & oriented x3, CN II-XII grossly intact    Assessment and Plan. The following treatment and monitoring plan is recommended:  Age-related osteoporosis without current pathological fracture  Chronic, ongoing. Last DEXA 2023 with osteoporosis of the lumbar spine and osteopenia of the proximal left femur with T scores of -2.5 and -2.2 respectively. Denies hx of fragility fx. He maintains on alendronate 70mg weekly. Advise calcium and vitamin D supplementation with weightbearing exercises as tolerated. Discussed fall risk reduction methods. Follow up with PCP at least annually for continued monitoring and management    Cerebral atrophy (HCC)  Chronic, stable. Diagnosed via findings on Brain MRI in 2015. Pt denies any significant memory or cognitive changes. Minicog today completed without error. Follow up with PCP at least annually for continued monitoring and management.   " "  3/2015 MR Brain  \"Mild age-related cerebral atrophy.\"    Hyperlipidemia  Chronic, ongoing. Most recent lipid panel from 12/2024  with LDL at 130. Declines statin therapy. He adheres to a Mediterranean diet and exercises daily. Follow up with PCP at least annually for continued monitoring and management.   Lab Results   Component Value Date/Time    CHOLSTRLTOT 226 (H) 12/11/2024 06:11 AM     (H) 12/11/2024 06:11 AM    HDL 81 12/11/2024 06:11 AM    TRIGLYCERIDE 76 12/11/2024 06:11 AM       Severe mitral regurgitation  Chronic, ongoing. Pt follows with cardiology who is monitoring at this time. Pt continues to find himself completely asymptomatic. Pt denies dyspnea. He states if this were to worsen, he would not want to pursue any invasive measures. Follow up with cardiology per routine.    Anxiety related to social security  Pt endorses concerns he may lose his social security and no longer be able to afford his life. He states without social security he only has enough savings to survive two more years and he questions what measures he would take at that point. Encouraged pt, in the event that changes to his SS occur, seek help from PCP and community.      Services suggested: No services needed at this time  Health Care Screening: Age-appropriate preventive services recommended by USPTF and ACIP covered by Medicare were discussed today. Services ordered if indicated and agreed upon by the patient.  Referrals offered: Community-based lifestyle interventions to reduce health risks and promote self-management and wellness, fall prevention, nutrition, physical activity, tobacco-use cessation, weight loss, and mental health services as per orders if indicated.    Discussion today about general wellness and lifestyle habits:    Prevent falls and reduce trip hazards; Cautioned about securing or removing rugs.  Have a working fire alarm and carbon monoxide detector.  Engage in regular physical activity and social " activities.    Follow-up: No follow-ups on file.

## 2025-04-02 ENCOUNTER — TELEPHONE (OUTPATIENT)
Dept: CARDIOLOGY | Facility: MEDICAL CENTER | Age: 75
End: 2025-04-02
Payer: MEDICARE

## 2025-04-02 NOTE — TELEPHONE ENCOUNTER
Called pt in regards to CT SCORING that was ordered at previous OV. Patient states he does not want to do it or have it scheduled. Pt stated he will state his reasoning's with MK on day of appt. Pt has follow up appointment scheduled with MK on 04.18.2025.

## 2025-04-18 ENCOUNTER — OFFICE VISIT (OUTPATIENT)
Dept: CARDIOLOGY | Facility: MEDICAL CENTER | Age: 75
End: 2025-04-18
Attending: INTERNAL MEDICINE
Payer: MEDICARE

## 2025-04-18 VITALS
OXYGEN SATURATION: 98 % | HEIGHT: 71 IN | DIASTOLIC BLOOD PRESSURE: 64 MMHG | HEART RATE: 50 BPM | SYSTOLIC BLOOD PRESSURE: 132 MMHG | RESPIRATION RATE: 12 BRPM | BODY MASS INDEX: 22.12 KG/M2 | WEIGHT: 158 LBS

## 2025-04-18 DIAGNOSIS — E78.5 DYSLIPIDEMIA: ICD-10-CM

## 2025-04-18 DIAGNOSIS — I34.0 SEVERE MITRAL REGURGITATION: ICD-10-CM

## 2025-04-18 DIAGNOSIS — I34.1 MITRAL VALVE PROLAPSE: ICD-10-CM

## 2025-04-18 PROCEDURE — 99214 OFFICE O/P EST MOD 30 MIN: CPT | Performed by: INTERNAL MEDICINE

## 2025-04-18 PROCEDURE — G2211 COMPLEX E/M VISIT ADD ON: HCPCS | Performed by: INTERNAL MEDICINE

## 2025-04-18 PROCEDURE — 99213 OFFICE O/P EST LOW 20 MIN: CPT | Performed by: INTERNAL MEDICINE

## 2025-04-18 PROCEDURE — 3075F SYST BP GE 130 - 139MM HG: CPT | Performed by: INTERNAL MEDICINE

## 2025-04-18 PROCEDURE — 3078F DIAST BP <80 MM HG: CPT | Performed by: INTERNAL MEDICINE

## 2025-04-18 ASSESSMENT — FIBROSIS 4 INDEX: FIB4 SCORE: 2.02

## 2025-04-18 NOTE — PROGRESS NOTES
"    Cardiology Follow Up Consultation Note    Date of note:    4/18/2025    Primary Care Provider: Jasmyn Ordaz M.D.  Referring Provider: No ref. provider found    Patient Name: Sebastien Horn   YOB: 1950  MRN:              [id number]    Chief Complaint   Patient presents with    Hyperlipidemia     F/V DX: Hyperlipidemia      Follow-Up     F/V DX: mitral valve prolapse       History of Present Illness: Mr. Sebastien Horn is a 74-year-old man with past medical history significant for mitral valve prolapse, mitral regurgitation, dyslipidemia, prostate cancer s/p prostatectomy in 2012 who presents to the cardiology office for follow-up.    He is an established patient of our practice and previously saw Dr. Allen.  Last seen in the office on 10/29/2024.  During that visit, he had no major cardiac complaints.  Stays active and continues to hike regularly without cardiac symptoms.    Patient has a known history of mitral valve prolapse with severe mitral regurgitation.  However he has remained asymptomatic.  He is not interested in mitral valve intervention.    According to the patient, he hikes 6-7 miles in the morning, and a few miles in the afternoon.  During these activities of physical exertion, he has been asymptomatic.  He denies having chest pain, dyspnea.  No lightheadedness or dizziness.  Denies having symptoms of heart failure.  No orthopnea, PND or lower extremity edema.  He feels well overall today.    Not interested in starting statins.      Cardiovascular Risk Factors:  1. Smoking status: Denies  2. Type II Diabetes Mellitus: Denies No results found for: \"HBA1C\"  3. Hypertension: Denies  4. Dyslipidemia: yes, mild, not on statins due to patient preference  Cholesterol,Tot   Date Value Ref Range Status   12/11/2024 226 (H) 100 - 199 mg/dL Final     LDL   Date Value Ref Range Status   12/11/2024 130 (H) <100 mg/dL Final     HDL   Date Value Ref Range Status   12/11/2024 81 >=40 " mg/dL Final     Triglycerides   Date Value Ref Range Status   12/11/2024 76 0 - 149 mg/dL Final     5. Family history of early Coronary Artery Disease in a first degree relative (Male less than 55 years of age; Female less than 65 years of age): Denies      Review of Systems:  As per HPI. All other systems reviewed and are negative.      Past Medical History:   Diagnosis Date    Arrhythmia     Arthritis     knees    Atherosclerosis of aorta (HCC) 08/26/2021    Cancer (HCC) 07/2013    Prostate    Cerebral atrophy (HCC) 5/30/2023    Disorder of thyroid     Heart burn     Heart valve disease     Mild mitral valve problem - per patient cardiologist is not concerned at this time.    Hiatus hernia syndrome     Infectious disease 12/1/22    Breakthrough  case of covid    Malignant neoplasm of prostate (HCC) 8/3/2015    Multiple joint pain 04/13/2021    Osteoporosis     Skin lump of leg, left 05/19/2021    Spondylosis of cervical region without myelopathy or radiculopathy 10/13/2022    Unspecified cataract     Bilateral IOL's    Urinary incontinence          Past Surgical History:   Procedure Laterality Date    IRRIGATION & DEBRIDEMENT ORTHO Left 3/10/2018    Procedure: IRRIGATION & DEBRIDEMENT ORTHO OPEN;  Surgeon: Sergio Daniel M.D.;  Location: [facility];  Service: Orthopedics    SHOULDER ARTHROSCOPY Left 2/6/2018    Procedure: SHOULDER ARTHROSCOPY;  Surgeon: Sergio Daniel M.D.;  Location: [facility];  Service: Orthopedics    IRRIGATION & DEBRIDEMENT ORTHO Left 2/6/2018    Procedure: IRRIGATION & DEBRIDEMENT ORTHO- SHOULDER  ;  Surgeon: Sergio Daniel M.D.;  Location: [facility];  Service: Orthopedics    FOREIGN BODY REMOVAL Left 2/6/2018    Procedure: FOREIGN BODY REMOVAL;  Surgeon: Sergio Daniel M.D.;  Location: [facility];  Service: Orthopedics    SHOULDER ARTHROSCOPY W/ ROTATOR CUFF REPAIR Left 10/18/2017    Procedure: SHOULDER ARTHROSCOPY W/ ROTATOR CUFF  REPAIR;  Surgeon: Sergio Daniel M.D.;  Location: [facility];  Service: Orthopedics    SHOULDER DECOMPRESSION ARTHROSCOPIC Left 10/18/2017    Procedure: SHOULDER DECOMPRESSION ARTHROSCOPIC- SUBACROMIAL;  Surgeon: Sergio Daniel M.D.;  Location: [facility];  Service: Orthopedics    BICEPS TENODESIS Left 10/18/2017    Procedure: BICEPS TENODESIS;  Surgeon: Sergio Daniel M.D.;  Location: [facility];  Service: Orthopedics    PARATHYROIDECTOMY N/A 7/5/2017    Procedure: PARATHYROIDECTOMY - MINIMALLY INVASIVE W/RECURRENT LARYNGEAL NERVE MONITORING;  Surgeon: Josh Blank M.D.;  Location: [facility]ERY [facility];  Service:     SPHINCTER PROSTHESIS REMOVAL  3/28/2017    Procedure: URINARY SPHINCTER PROSTHESIS REMOVAL;  Surgeon: Benigno Grier M.D.;  Location: [facility];  Service:     CYSTOSCOPY  3/28/2017    Procedure: CYSTOSCOPY;  Surgeon: Benigno Grier M.D.;  Location: [facility];  Service:     EVACUATION OF HEMATOMA  3/21/2017    Procedure: EVACUATION OF HEMATOMA-CYSTO CLOT EVACUATION AND SMALL CYSTOSCOPE;  Surgeon: Frank Lamas M.D.;  Location: [facility];  Service:     CYSTOSCOPY  3/21/2017    Procedure: CYSTOSCOPY;  Surgeon: Frank Lamas M.D.;  Location: [facility];  Service:     CYSTOSCOPY  3/20/2017    Procedure: CYSTOSCOPY -  FLEXIBLE;  Surgeon: Frank Lamas M.D.;  Location: [facility];  Service:     SPHINCTER PROSTHESIS PLACEMENT  3/20/2017    Procedure: SPHINCTER PROSTHESIS PLACEMENT - ARTIFICIAL URINARY SPHINCTER;  Surgeon: Frank Lamas M.D.;  Location: [facility];  Service:     PROSTATECTOMY, RADICAL RETRO  8/3/2015    Procedure: PROSTATECTOMY RADICAL RETROPUBIC;  Surgeon: Sage Ngo M.D.;  Location: [facility];  Service:     NODE DISSECTION Bilateral 8/3/2015    Procedure: NODE DISSECTION LYMPH;  Surgeon: Sage Ngo M.D.;  Location: [facility]  "[facility];  Service:     KNEE ARTHROPLASTY TOTAL  7/3/2014    Performed by Theodore San M.D. at SURGERY [facility]    KNEE ARTHROSCOPY  7/3/2014    Performed by Theodore San M.D. at SURGERY [facility]    MENISCECTOMY  7/3/2014    Performed by Theodore San M.D. at SURGERY [facility]    KNEE REPLACEMENT, TOTAL Right 2014    INGUINAL HERNIA REPAIR  6/1/2009    Performed by RISHI COOK at SURGERY [facility]    OTHER ORTHOPEDIC SURGERY  2003    L Shoulder Repair    INGUINAL HERNIA LAPAROSCOPIC BILATERAL Bilateral     OTHER ORTHOPEDIC SURGERY      R Rotator Cuff repair         Current Outpatient Medications   Medication Sig Dispense Refill    Naproxen Sodium (ALEVE PO) Take  by mouth.      alendronate (FOSAMAX) 70 MG Tab Take 1 Tablet by mouth every 7 days. 12 Tablet 3    melatonin 5 mg Tab Take 10 mg by mouth at bedtime as needed.       No current facility-administered medications for this visit.         No Known Allergies      Family History   Problem Relation Age of Onset    Hyperlipidemia Mother     Hyperlipidemia Father     Cancer Father         Prostate and Lung    Lung Disease Father     Hypertension Father     Heart Disease Father     Other Sister         Lupus    Heart Disease Maternal Grandfather          Social History     Socioeconomic History    Marital status:      Spouse name: Not on file    Number of children: Not on file    Years of education: Not on file    Highest education level: Some college, no degree   Occupational History    Not on file   Tobacco Use    Smoking status: Never    Smokeless tobacco: Never   Vaping Use    Vaping status: Never Used   Substance and Sexual Activity    Alcohol use: No     Alcohol/week: 0.0 oz    Drug use: Yes     Types: Inhaled, Marijuana     Comment: Cannabis usage. pt states he takes one \"hit\" in the morning and in the evenings.    Sexual activity: Not Currently   Other Topics Concern    Not on file   Social History " "Narrative    Not on file     Social Drivers of Health     Financial Resource Strain: Low Risk  (3/26/2025)    Overall Financial Resource Strain (CARDIA)     Difficulty of Paying Living Expenses: Not hard at all   Food Insecurity: No Food Insecurity (3/26/2025)    Hunger Vital Sign     Worried About Running Out of Food in the Last Year: Never true     Ran Out of Food in the Last Year: Never true   Transportation Needs: No Transportation Needs (3/26/2025)    PRAPARE - Transportation     Lack of Transportation (Medical): No     Lack of Transportation (Non-Medical): No   Physical Activity: Sufficiently Active (8/29/2023)    Exercise Vital Sign     Days of Exercise per Week: 7 days     Minutes of Exercise per Session: 150+ min   Stress: Stress Concern Present (8/29/2023)    [employer] - Occupational Stress Questionnaire     Feeling of Stress : To some extent   Social Connections: Socially Isolated (8/29/2023)    Social Connection and Isolation Panel [NHANES]     Frequency of Communication with Friends and Family: More than three times a week     Frequency of Social Gatherings with Friends and Family: Never     Attends Church Services: Never     Active Member of Clubs or Organizations: No     Attends Club or Organization Meetings: Never     Marital Status:    Intimate Partner Violence: Not on file   Housing Stability: Low Risk  (8/29/2023)    Housing Stability Vital Sign     Unable to Pay for Housing in the Last Year: No     Number of Places Lived in the Last Year: 1     Unstable Housing in the Last Year: No         Physical Exam:  Ambulatory Vitals  /64 (BP Location: Left arm, Patient Position: Sitting, BP Cuff Size: Adult)   Pulse (!) 50   Resp 12   Ht 1.803 m (5' 11\")   Wt 71.7 kg (158 lb)   SpO2 98%    Oxygen Therapy:  Pulse Oximetry: 98 %  BP Readings from Last 4 Encounters:   04/18/25 132/64   03/26/25 122/70   12/16/24 104/68   10/29/24 118/68       Weight/BMI: " "Body mass index is 22.04 kg/m².  Wt Readings from Last 4 Encounters:   04/18/25 71.7 kg (158 lb)   03/26/25 70.3 kg (155 lb)   12/16/24 71.7 kg (158 lb)   10/29/24 68 kg (150 lb)         General: Not in acute distress, appears comfortable  HEENT: OP clear   Neck:  No carotid bruits, No JVD appreciated  CVS:  RRR, Normal S1, S2. +3/6 Systolic murmur at LSB  Resp: Normal respiratory effort, lungs CTA bilaterally. No rales or rhonchi  Abdomen: Soft, non-distended  Neurological: Alert and oriented x3, moves all extremities, no focal neurologic deficits  Psychiatric: Appropriate affect  Extremities:   Extremities warm, 2+ bilateral radial pulses.  2+ bilateral dp pulses, no lower extremity edema bilaterally      Lab Data Review:  Lab Results   Component Value Date/Time    CHOLSTRLTOT 226 (H) 12/11/2024 06:11 AM     (H) 12/11/2024 06:11 AM    HDL 81 12/11/2024 06:11 AM    TRIGLYCERIDE 76 12/11/2024 06:11 AM       Lab Results   Component Value Date/Time    SODIUM 136 12/11/2024 06:11 AM    POTASSIUM 4.2 12/11/2024 06:11 AM    CHLORIDE 100 12/11/2024 06:11 AM    CO2 26 12/11/2024 06:11 AM    GLUCOSE 97 12/11/2024 06:11 AM    BUN 14 12/11/2024 06:11 AM    CREATININE 0.64 12/11/2024 06:11 AM     Lab Results   Component Value Date/Time    ALKPHOSPHAT 46 12/11/2024 06:11 AM    ASTSGOT 22 12/11/2024 06:11 AM    ALTSGPT 15 12/11/2024 06:11 AM    TBILIRUBIN 0.9 12/11/2024 06:11 AM      Lab Results   Component Value Date/Time    WBC 4.4 (L) 11/30/2023 10:54 AM    HEMOGLOBIN 13.6 (L) 11/30/2023 10:54 AM     No results found for: \"HBA1C\"      Cardiac Imaging and Procedures Review:    EKG dated 10/29/2024:   My personal interpretation is sinus , PAC    JUAQUIN dated 5/4/2023:   Normal left ventricular systolic function.  The left ventricular ejection fraction is visually estimated to be 70%.  Mitral valve prolapse predominantly involving the thickened posterior   leaflet.  Severe mitral regurgitation, central regurgitant " jet.  Normal right ventricular systolic function.     TTE 4/19/2023:  Normal left ventricular chamber size.  Normal left ventricular systolic function.  The left ventricular ejection fraction is visually estimated to be 65-  70%.  Aortic valve sclerosis without stenosis.  Myxomatous mitral valve leaflet thickening.  Prolapse of the anterior and posterior mitral leaflets with   malcoaptation of the posterior leaflet.  Severe mitral regurgitation.  Severely dilated left atrium.  Normal right ventricular systolic function.      Assessment & Plan     1. Mitral valve prolapse  EC-ECHOCARDIOGRAM COMPLETE W/O CONT      2. Severe mitral regurgitation  EC-ECHOCARDIOGRAM COMPLETE W/O CONT      3. Dyslipidemia              Medical Decision Making:  Mr. Sebastien Horn is a 74-year-old man with past medical history significant for mitral valve prolapse, mitral regurgitation, dyslipidemia, prostate cancer s/p prostatectomy in 2012 who presents to the cardiology office for follow-up.    1. Mitral valve prolapse  2. 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  This has been known over the past few years.  He has been managed conservatively due to patient preference.  He is currently asymptomatic and has declined mitral valve intervention.  We had an extensive discussion regarding risk/benefits and management options including percutaneous intervention versus mitral valve repair surgically.  He is not currently interested.  - At this time, we will continue routine surveillance.  Will check echocardiogram to assess mitral valve.  He will be seen in the office in 6 months for monitoring.  He has been advised to look out for symptoms of shortness of breath, chest pain, orthopnea and lower extremity edema.    3. Dyslipidemia  - Mildly elevated LDL.  Not currently interested in statin therapy.  Recommend  aggressive lifestyle modifications and diet and exercise.    () Today's E/M visit is associated with medical care services that serve as the continuing focal point for all needed health care services and/or with medical care services that  are part of ongoing care related to a patient's single, serious condition, or a complex condition: This includes  furnishing services to patients on an ongoing basis that result in care that is personalized  to the patient. The services result in a comprehensive, longitudinal, and continuous  relationship with the patient and involve delivery of team-based care that is accessible, coordinated with other practitioners and providers, and integrated with the broader health care landscape.     It was a pleasure seeing Mr. Sebastien Horn in the office today. Return in about 6 months (around 10/18/2025) for Severe mitral regurgitation. Patient is aware to call the cardiology clinic with any questions or concerns.      Connor Pavon MD, [facility]  Cardiologist, [facility], [facility].  [address] [place] [id number]  Phone: [phone]  Fax: [phone]    Please note that this dictation was created using voice recognition software. I have made every reasonable attempt to correct obvious errors, but it is possible there are errors of grammar and possibly content that I did not discover before finalizing the note.

## 2025-05-16 ENCOUNTER — PATIENT MESSAGE (OUTPATIENT)
Dept: CARDIOLOGY | Facility: MEDICAL CENTER | Age: 75
End: 2025-05-16
Payer: MEDICARE

## 2025-05-16 NOTE — PATIENT COMMUNICATION
Connor KOENIG M.D. to Me (Selected Message)  ALON      5/16/25  9:38 AM  This medication is fine.     ALON

## 2025-05-28 ENCOUNTER — PATIENT MESSAGE (OUTPATIENT)
Dept: MEDICAL GROUP | Facility: MEDICAL CENTER | Age: 75
End: 2025-05-28
Payer: MEDICARE

## 2025-05-29 ENCOUNTER — OFFICE VISIT (OUTPATIENT)
Dept: MEDICAL GROUP | Facility: MEDICAL CENTER | Age: 75
End: 2025-05-29
Payer: MEDICARE

## 2025-05-29 VITALS
SYSTOLIC BLOOD PRESSURE: 94 MMHG | HEIGHT: 71 IN | OXYGEN SATURATION: 97 % | HEART RATE: 57 BPM | TEMPERATURE: 97.4 F | WEIGHT: 159 LBS | DIASTOLIC BLOOD PRESSURE: 64 MMHG | BODY MASS INDEX: 22.26 KG/M2

## 2025-05-29 DIAGNOSIS — M81.0 AGE-RELATED OSTEOPOROSIS WITHOUT CURRENT PATHOLOGICAL FRACTURE: ICD-10-CM

## 2025-05-29 DIAGNOSIS — E53.8 VITAMIN B12 DEFICIENCY: ICD-10-CM

## 2025-05-29 DIAGNOSIS — D64.9 ANEMIA, UNSPECIFIED TYPE: ICD-10-CM

## 2025-05-29 DIAGNOSIS — F41.9 ANXIETY: ICD-10-CM

## 2025-05-29 DIAGNOSIS — G47.00 INSOMNIA, UNSPECIFIED TYPE: Primary | ICD-10-CM

## 2025-05-29 DIAGNOSIS — E78.5 HYPERLIPIDEMIA, UNSPECIFIED HYPERLIPIDEMIA TYPE: ICD-10-CM

## 2025-05-29 DIAGNOSIS — E55.9 VITAMIN D DEFICIENCY: ICD-10-CM

## 2025-05-29 DIAGNOSIS — Z13.228 SCREENING FOR METABOLIC DISORDER: ICD-10-CM

## 2025-05-29 PROCEDURE — 3074F SYST BP LT 130 MM HG: CPT | Performed by: STUDENT IN AN ORGANIZED HEALTH CARE EDUCATION/TRAINING PROGRAM

## 2025-05-29 PROCEDURE — 99214 OFFICE O/P EST MOD 30 MIN: CPT | Performed by: STUDENT IN AN ORGANIZED HEALTH CARE EDUCATION/TRAINING PROGRAM

## 2025-05-29 PROCEDURE — 3078F DIAST BP <80 MM HG: CPT | Performed by: STUDENT IN AN ORGANIZED HEALTH CARE EDUCATION/TRAINING PROGRAM

## 2025-05-29 RX ORDER — CYANOCOBALAMIN 1000 UG/ML
1000 INJECTION, SOLUTION INTRAMUSCULAR; SUBCUTANEOUS ONCE
Status: SHIPPED | OUTPATIENT
Start: 2025-05-29 | End: 2025-06-01

## 2025-05-29 RX ORDER — TRAZODONE HYDROCHLORIDE 50 MG/1
25-50 TABLET ORAL NIGHTLY PRN
Qty: 90 TABLET | Refills: 0 | Status: SHIPPED | OUTPATIENT
Start: 2025-05-29

## 2025-05-29 RX ORDER — ALENDRONATE SODIUM 70 MG/1
70 TABLET ORAL
Qty: 12 TABLET | Refills: 3 | Status: SHIPPED | OUTPATIENT
Start: 2025-05-29

## 2025-05-29 ASSESSMENT — FIBROSIS 4 INDEX: FIB4 SCORE: 2.02

## 2025-06-05 NOTE — PROGRESS NOTES
Subjective:     CC:   Chief Complaint   Patient presents with    Follow-Up     Would like sleep medications       HPI:   Sebastien presents today with      Verbal consent was acquired by the patient to use [employer] ambient listening note generation during this visit Yes   History of Present Illness  The patient presents for evaluation of sleep issues, back pain, and a recent cold.    He reports experiencing sleep disturbances, particularly when there is a change in time, which typically lasts for a couple of weeks. He has been using melatonin to manage his sleep issues but has not yet started the new over-the-counter melatonin. He is seeking a prescription for a medication that can be taken at midnight to aid in falling back asleep without causing excessive drowsiness the following morning. He also mentions feeling anxious due to recent events in the news.    He recently experienced a muscle strain in his back, which was initially severe enough to confine him to bed for a week. He sought medical attention from a physician assistant at a neurosurgical group, where hip x-rays were performed and physical adjustments were made. The PA concluded that the issue was muscular rather than skeletal. He also underwent physical therapy, where the therapist agreed with this assessment. He recalls an incident where he slept in an unusual position, which may have contributed to the muscle strain. His back pain has since resolved after approximately 5 weeks.    Following the resolution of his back pain, he developed a severe cold that lasted for 2 weeks. He attributes this to a decrease in his immunity due to age. He reports no fever or body aches but experienced loss of appetite, runny nose, and sneezing. He has been taking zinc supplements, which he believes helped him recover from the cold.    He has a history of low B12 levels but is not currently taking any B12 supplements as he believes his diet provides sufficient B12. He  "does not consume meat but includes a variety of vegetables and seafood in his diet.    He has been on alendronate for 4 to 5 years but discontinued it 2 months ago due to concerns about potential back-related side effects.    Results  Labs   - B12 level: 2022, 200       Health Maintenance: Completed    ROS:  ROS    Review of systems unremarkable except for concerns noted by patient or items listed.    Please see HPI for additional ROS.      Objective:     Exam:  BP 94/64 (BP Location: Right arm, Patient Position: Sitting, BP Cuff Size: Adult)   Pulse (!) 57   Temp 36.3 °C (97.4 °F) (Temporal)   Ht 1.803 m (5' 11\")   Wt 72.1 kg (159 lb)   SpO2 97%   BMI 22.18 kg/m²  Body mass index is 22.18 kg/m².    Physical Exam  Constitutional:       General: He is not in acute distress.     Appearance: Normal appearance.   HENT:      Head: Normocephalic.   Cardiovascular:      Rate and Rhythm: Normal rate and regular rhythm.      Pulses: Normal pulses.      Heart sounds: Normal heart sounds.   Pulmonary:      Effort: Pulmonary effort is normal.      Breath sounds: Normal breath sounds.   Skin:     General: Skin is warm.   Neurological:      Mental Status: He is alert and oriented to person, place, and time.   Psychiatric:         Mood and Affect: Mood normal.         Behavior: Behavior normal.             Labs: reviewed    Assessment & Plan:     74 y.o. male with the following -     1. Age-related osteoporosis without current pathological fracture  Chronic,stable  - Experienced significant back pain due to a muscle pull, which has since resolved  - Physical therapy and evaluation by a PA confirmed the pain was muscular, not osteoporotic  - Advised to restart alendronate (Fosamax) after a month once sleep issues are managed  - Refill for Fosamax sent to pharmacy      -alendronate (FOSAMAX) 70 MG Tab; Take 1 Tablet by mouth every 7 days.   - Vitamin D: 2000 units per day, maintain serum vitamin D level > 30   - Calcium 600 mg " BID  - Weight-bearing, balance, resistance exercises   - maintain healthy active lifestyle  - avoid or stop smoking  - Limit alcohol consumption to one drink per day  - pt was counseled on fall prevention    - home fall-proofing information provided    - alendronate (FOSAMAX) 70 MG Tab; Take 1 Tablet by mouth every 7 days.  Dispense: 12 Tablet; Refill: 3    2. Insomnia, unspecified type (Primary)  Chronic,uncontrolled  - Reports difficulty maintaining sleep, often waking up after 4 hours and unable to return to sleep  - Tried over-the-counter melatonin without success; plans to start a new OTC melatonin regimen  - Discussed trazodone as a potential treatment option, starting with half a pill (25 mg) as needed, increasing to a full pill (50 mg) if necessary  - Informed about potential side effects, including morning grogginess if taken late at night    - traZODone (DESYREL) 50 MG Tab; Take 0.5-1 Tablets by mouth at bedtime as needed for Sleep.  Dispense: 90 Tablet; Refill: 0    3. Vitamin B12 deficiency  Chronic,stable  - History of low B12 levels and does not take B12 supplements  - B12 injection will be administered today  - Lab work, including a B12 level check, will be conducted in a month  - If B12 levels remain low, injections and sublingual B12 supplements can be considered for better absorption  - cyanocobalamin (Vitamin B-12) injection 1,000 mcg  - VITAMIN B12; Future            Please note that this dictation was created using voice recognition software. I have made every reasonable attempt to correct obvious errors, but I expect that there are errors of grammar and possibly content that I did not discover before finalizing the note.

## 2025-07-03 ENCOUNTER — HOSPITAL ENCOUNTER (OUTPATIENT)
Dept: LAB | Facility: MEDICAL CENTER | Age: 75
End: 2025-07-03
Attending: STUDENT IN AN ORGANIZED HEALTH CARE EDUCATION/TRAINING PROGRAM
Payer: MEDICARE

## 2025-07-03 DIAGNOSIS — D64.9 ANEMIA, UNSPECIFIED TYPE: ICD-10-CM

## 2025-07-03 DIAGNOSIS — E55.9 VITAMIN D DEFICIENCY: ICD-10-CM

## 2025-07-03 DIAGNOSIS — F41.9 ANXIETY: ICD-10-CM

## 2025-07-03 DIAGNOSIS — E53.8 VITAMIN B12 DEFICIENCY: ICD-10-CM

## 2025-07-03 LAB
25(OH)D3 SERPL-MCNC: 24 NG/ML (ref 30–100)
ERYTHROCYTE [DISTWIDTH] IN BLOOD BY AUTOMATED COUNT: 46.8 FL (ref 35.9–50)
HCT VFR BLD AUTO: 38.9 % (ref 42–52)
HGB BLD-MCNC: 13.4 G/DL (ref 14–18)
MCH RBC QN AUTO: 33.2 PG (ref 27–33)
MCHC RBC AUTO-ENTMCNC: 34.4 G/DL (ref 32.3–36.5)
MCV RBC AUTO: 96.3 FL (ref 81.4–97.8)
PLATELET # BLD AUTO: 221 K/UL (ref 164–446)
PMV BLD AUTO: 11 FL (ref 9–12.9)
RBC # BLD AUTO: 4.04 M/UL (ref 4.7–6.1)
TSH SERPL DL<=0.005 MIU/L-ACNC: 1.68 UIU/ML (ref 0.38–5.33)
VIT B12 SERPL-MCNC: 244 PG/ML (ref 211–911)
WBC # BLD AUTO: 4.6 K/UL (ref 4.8–10.8)

## 2025-07-03 PROCEDURE — 82607 VITAMIN B-12: CPT

## 2025-07-03 PROCEDURE — 84443 ASSAY THYROID STIM HORMONE: CPT

## 2025-07-03 PROCEDURE — 85027 COMPLETE CBC AUTOMATED: CPT

## 2025-07-03 PROCEDURE — 82306 VITAMIN D 25 HYDROXY: CPT

## 2025-07-03 PROCEDURE — 36415 COLL VENOUS BLD VENIPUNCTURE: CPT

## 2025-07-08 ENCOUNTER — PATIENT MESSAGE (OUTPATIENT)
Dept: MEDICAL GROUP | Facility: MEDICAL CENTER | Age: 75
End: 2025-07-08
Payer: MEDICARE

## 2025-07-25 NOTE — Clinical Note
Sebastien Horn  [address] [place] [place] [address]    July 25, 2025    Member Name: Sebastien Horn   Member Number: [id number]   Reference Number: [id number]   Approved Services: Echos and EKG   Approved Service Dates: 07/18/2025 - 10/16/2025   Requesting Provider: Connor Pavon   Requested Provider: [facility]     Dear Sebastien Quan Jorgeulisespalma:    The following medical service(s) requested by Connor Pavon have been approved:    Procedure Code Procedure Code Name Requested Quantity Approved Quantity Status   20107 (CPT®) AK ECHO HEART XTHORACIC,COMPLETE W DOPPLER 1 1 Authorized       Approved Quantity means the number of visits approved for medication treatments and/or medical services.    The services should be provided by [facility] no later than 10/16/2025. Please contact the provider listed below with any questions. Your plan benefit may require a deductible, co-payment, or co-insurance for these services.    Provider Information:  [facility]  [phone]    Important Note for Members:    This authorization does not guarantee that [facility] or [facility] will pay for your care. Payment depends on your plan details, if you're eligible for coverage on the day you receive care, and whether the service follows plan rules. These include things such as reviewing if the service is medically necessary. We recommend reviewing your Evidence of Coverage before receiving any care.    Important Note for Providers:    Payment by [facility] or [facility] for these services is subject to the terms of the Evidence of Coverage or Summary Plan Description, eligibility at the time of service, standard processing procedures and confirmation of benefit coverage. All claims are subject to standard processing procedures, including but not limited to coding edits, medical necessity determinations, and other plan policies in effect at the  time of claim adjudication. Providers are required to follow all [facility] Administrative Guidelines and requirements.    For any questions or additional information, please contact Customer Service:    [employer] Toll Free: [phone]  TTY users dial: [phone]   Call Center Hours:  Oct 1 - Mar 31, Mon - Fri 7 AM to 8 PM PST  Oct 1 - Mar 31, Sat - Sun 8 AM to 8 PM PST  Apr 1 - Sep 30, Mon - Fri 7 AM to 8 PM PST   Office Hours: Mon - Fri 8 AM to 5 PM PST   E-mail: [email or web address]   Website:  [email or web address]      This information is available for free in other languages. Please contact Customer Service at the phone number above for more information. group home [facility] complies with applicable Federal civil rights laws and does not discriminate on the basis of race, color, national origin, age, disability or sex.    Sincerely,     Healthcare Utilization Management Department     Cc: [facility]   Connor Pavon    Multi-Language Insert  Multi- Services  English: We have free  services to answer any questions you may have about our health or drug plan.  To get an , just call us at [phone].  Someone who speaks English/Language can help you.  This is a free service.  Saudi Arabian: Tenemos servicios de intérprete sin costo alguno  para responder cualquier pregunta que pueda tener sobre nuestro plan de abhijit o medicamentos. Para hablar con un intérprete, por favor llame al [phone]. Alguien que hable español le podrá ayudar. [facility] es un servicio gratuito.  Chinese Mandarin: ?????????????????????????????? ???????????????? [phone]????????????????? ?????????  Chinese Cantonese: ?????????????????????????????? ????????????? [phone]???????????????????? ????????  Tagalog:  Stephany aguilar  john.  Marylou quirozconhca wendy zavaleta  [phone]. Leonardo conchamiguelangeline alisonreginaldo fahad Patel.  Brendan saunders.  Citizen of [place]:  Nous proposons benjamin services gratuits d'interprétation pour répondre à toutes elan questions relatives à notre régime de santé ou d'assurance-médicaments. Pour accéder au service d'interprétation, il vous suffit de nous appeler au [phone]. Un interlocuteur [facility] pourra vous aider. Ce service est gratuit.  Telugu:  Kenia tôi có d?ch v? thông d?ch mi?n phí ð? tr? l?i các câu h?i v? chýõng s?c kh?e và chýõng trình thu?c men. N?u quí v? c?n thông d?ch viên celestino g?i [phone] s? có nhân viên nói ti?ng Vi?t giúp ð? quí v?. Ðây là d?ch v? mi?n phí .  Greenlandic:  Unser kosSaint Louis University Health Science Centerser DolmetschersDayton VA Medical Centerice beantwCox Monettet Ihren Fragen zu unserem Gesundheits- und Arzneimittelplan. Unsere Dolmetscher erreichen Sie [phone]. Man larry Deann yu auCarbon County Memorial Hospital. Dieser Service ist kostenlos.  Hebrew:  ??? ?? ?? ?? ?? ??? ?? ??? ?? ???? ?? ?? ???? ???? ????. ?? ???? ????? ?? [phone] ??? ??? ????.  ???? ?? ???? ?? ?? ????. ? ???? ??? ?????.   Tanzanian: Carla manuel âîçíèêíóò âîïðîñû îòíîñèòåëüíî ñòðàõîâîãî èëè ìåäèêàìåíòíîãî ïëàíà, âû ìîæåòå âîñïîëüçîâàòüñÿ íàøèìè áåñïëàòíûìè óñëóãàìè ïåðåâîä÷èêîâ. ×òîáû âîñïîëüçîâàòüñÿ óñëóãàìè ïåðåâîä÷èêà, ïîçâîíèòå íàì ïî òåëåôîíó [phone]. Âàì îêàæåò ïîìîùü ñîòðóäíèê, êîòîðûé ãîâîðèò ïî-póññêè. Äàííàÿ óñëóãà áåñïëàòíàÿ.  Italian: ÅääÇ äÞÏã ÎÏãÇÊ ÇáãÊÑÌã ÇáÝæÑí ÇáãÌÇäíÉ ááÅÌÇÈÉ Úä Ãí ÃÓÆáÉ ÊÊÚáÞ ÈÇáÕÍÉ Ãæ ÌÏæá ÇáÃÏæíÉ áÏíäÇ. ááÍÕæá Úáì ãÊÑÌã ÝæÑí¡ áíÓ Úáíß Óæì ÇáÇÊÕÇá ÈäÇ Úáì [phone] . ÓíÞæã ÔÎÕ ãÇ íÊÍÏË ÇáÚÑÈíÉ ÈãÓÇÚÏÊß. åÐå ÎÏãÉ ãÌÇäíÉ.  Ayse: ????? ????????? ?? ??? ?? ????? ?? ???? ??? ???? ???? ?? ?????? ?? ???? ???? ?? ??? ????? ??? ????? ???????? ?????? ?????? ???. ?? ???????? ??????? ???? ?? ???, ?? ???? [phone] ?? ??? ????. ??? ??????? ?? ?????? ????? ?? ????  ??? ?? ???? ??. ?? ?? ????? ???? ??.   Cook Islander:  È disponibile un servizio di interpretariato gratuito per rispondere a eventuali domande sul nostro piano sanitario e farmaceutico. Per un interprete, contattare il sidney [phone]. Un nostro incaricato graciela parla Italianovi fornirà l'assistenza necessaria. È un servizio gratuito.  Portugués:  Dispomos de serviços de interpretação gratuitos para responder a qualquer questão que tenha acerca do nosso plano de saúde ou de medicação. Para obter um intérprete, contacte-nos através do número [phone]. Irá encontrar alguém que fale o idioma  Português para o ajudar. [facility] serviço é gratuito.  Romansh Creole:  Nou genyen sèvis entèprèt gratis david reponn tout kesyon ou ta genyen konsènan plan medikal oswa dwòg nou an.  David jwenn yon олег andersons rele nou nan [phone]. Yon moun ki pale Kreyòl kapab dolores w.  Sa a se yon sèvis ki gratis.  Polish:  Umo¿liwiamy bezp³atne skorzystanie z us³ug t³umacza ustnego, który pomo¿e w uzyskaniu odpowiedzi na temat planu zdrowotnego lub dawkowania leków. Yodit skorzystaæ z pomocy t³umacza znaj¹cego josafat patel¿y zadzwoniæ pod numer [phone]. Ta us³uga jest bezp³atna.  Maltese: ????? ??????? ????????????????????? ??????????????????????????????????[phone] ???????????????? ? ????????????????? ?????

## 2025-08-10 ENCOUNTER — RESULTS FOLLOW-UP (OUTPATIENT)
Dept: MEDICAL GROUP | Facility: MEDICAL CENTER | Age: 75
End: 2025-08-10
Payer: MEDICARE

## (undated) DEVICE — LEGGING LITHOTOMY 31 X 48 IN - (2EA/PK 20PK/CA)

## (undated) DEVICE — SLEEVE, VASO, THIGH, MED

## (undated) DEVICE — SUT NABSB 4-0 P-3 18IN PRLN - (12/BX)

## (undated) DEVICE — KIT ROOM DECONTAMINATION

## (undated) DEVICE — SUTURE 3-0 PROLENE PS-1 (12PK/BX)

## (undated) DEVICE — STERI STRIP COMPOUND BENZOIN - TINCTURE 0.6ML WITH APPLICATOR (40EA/BX)

## (undated) DEVICE — SHAVER4.0 AGGRESSIVE + FORMLA (5EA/BX)

## (undated) DEVICE — CLOSURE WOUND 1/4 X 4 (STERI - STRIP) (50/BX 4BX/CA)

## (undated) DEVICE — SWAB ANAEROBIC SPEC.COLLECTOR - (25/PK 4PK/CA 100EA/CA)

## (undated) DEVICE — GLOVE BIOGEL SZ 7 SURGICAL PF LTX - (50PR/BX 4BX/CA)

## (undated) DEVICE — SODIUM CHL IRRIGATION 0.9% 1000ML (12EA/CA)

## (undated) DEVICE — PACK SHOULDER ARTHROSCOPY SM - (2EA/CA)

## (undated) DEVICE — SHAVER, 5.5 RESECTOR

## (undated) DEVICE — SET IRRIGATION CYSTOSCOPY TUBE L80 IN (20EA/CA)

## (undated) DEVICE — MASK ANESTHESIA ADULT  - (100/CA)

## (undated) DEVICE — TRAY SKIN SCRUB PVP WET (20EA/CA) PART #DYND70356 DISCONTINUED

## (undated) DEVICE — ARTHROWAND TURBOVAC 3.5/90 SCT

## (undated) DEVICE — SYRINGE 30 ML LL (56/BX)

## (undated) DEVICE — SENSOR SPO2 NEO LNCS ADHESIVE (20/BX) SEE USER NOTES

## (undated) DEVICE — PROTECTOR ULNA NERVE - (36PR/CA)

## (undated) DEVICE — NEPTUNE 4 PORT MANIFOLD - (20/PK)

## (undated) DEVICE — PACK CYSTOSCOPY III - (8/CA)

## (undated) DEVICE — TUBE CONNECT SUCTION CLEAR 120 X 1/4" (50EA/CA)"

## (undated) DEVICE — NEEDLE EXPRESSEW III (5EA/PK)

## (undated) DEVICE — SET EXTENSION WITH 2 PORTS (48EA/CA) ***PART #2C8610 IS A SUBSTITUTE*****

## (undated) DEVICE — DRESSING ABDOMINAL PAD STERILE 8 X 10" (360EA/CA)"

## (undated) DEVICE — SET LEADWIRE 5 LEAD BEDSIDE DISPOSABLE ECG (1SET OF 5/EA)

## (undated) DEVICE — SPONGE DRAIN 4 X 4IN 6-PLY - (2/PK25PK/BX12BX/CS)

## (undated) DEVICE — JELLY, KY 2 0Z STERILE

## (undated) DEVICE — SUTURE GENERAL

## (undated) DEVICE — KIT DISPOSABLE HIP 2.8MM IMPLANT INCLUDES DRILL DRILL GUIDE AND OBTURATOR

## (undated) DEVICE — GLOVE, LITE (PAIR)

## (undated) DEVICE — STAPLER SKIN DISP - (6/BX 10BX/CA) VISISTAT

## (undated) DEVICE — TUBING PUMP W/CONNECTOR REDEUCE (10EA/CA)

## (undated) DEVICE — GLOVE BIOGEL SZ 7.5 SURGICAL PF LTX - (50PR/BX 4BX/CA)

## (undated) DEVICE — DRESSING TRANSPARENT FILM TEGADERM 2.375 X 2.75"  (100EA/BX)"

## (undated) DEVICE — TOWELS CLOTH SURGICAL - (4/PK 20PK/CA)

## (undated) DEVICE — GOWN WARMING STANDARD FLEX - (30/CA)

## (undated) DEVICE — GLOVE BIOGEL SZ 8 SURGICAL PF LTX - (50PR/BX 4BX/CA)

## (undated) DEVICE — SUTURE PRELOADED #2 ULTRABRAID COBRAID (10EA/BX)

## (undated) DEVICE — FOAM FACEHOLDER SPIDER (8EA/BX)

## (undated) DEVICE — GLOVE BIOGEL INDICATOR SZ 7SURGICAL PF LTX - (50/BX 4BX/CA)

## (undated) DEVICE — PLUG CATHETER DRAIN TUBE PROTECTOR CAP

## (undated) DEVICE — GLOVE BIOGEL PI INDICATOR SZ 6.5 SURGICAL PF LF - (50/BX 4BX/CA)

## (undated) DEVICE — DRAPE STRLE REG TOWEL 18X24 - (10/BX 4BX/CA)"

## (undated) DEVICE — IMMOBILIZER SHOULDER LARGE - UNIVERSAL (1/EA)

## (undated) DEVICE — DRAPE SURGICAL U 77X120 - (10/CA)

## (undated) DEVICE — HEAD HOLDER JUNIOR/ADULT

## (undated) DEVICE — LACTATED RINGERS INJ 1000 ML - (14EA/CA 60CA/PF)

## (undated) DEVICE — KIT EVACUATER 3 SPRING PVC LF 1/8 DRAIN SIZE (10EA/CA)"

## (undated) DEVICE — TUBING CLEARLINK DUO-VENT - C-FLO (48EA/CA)

## (undated) DEVICE — BAG DECANTER (50EA/CS)

## (undated) DEVICE — DERMABOND ADVANCED - (12EA/BX)

## (undated) DEVICE — ELECTRODE DUAL RETURN W/ CORD - (50/PK)

## (undated) DEVICE — SHEAR HS FOCUS 9CM CVD - (6/BX)

## (undated) DEVICE — BAG DRAINAGE URINARY CLOSED 2000ML (20EA/CA)

## (undated) DEVICE — BAG URODRAIN WITH TUBING - (20/CA)

## (undated) DEVICE — GLOVE BIOGEL PI INDICATOR SZ 7.0 SURGICAL PF LF - (50/BX 4BX/CA)

## (undated) DEVICE — GLOVE BIOGEL INDICATOR SZ 8 SURGICAL PF LTX - (50/BX 4BX/CA)

## (undated) DEVICE — BLADE SURGICAL #15 - (50/BX 3BX/CA)

## (undated) DEVICE — SUTURE 2-0 SILK 12 X 18" (36PK/BX)"

## (undated) DEVICE — NEEDLE SAFETY 18 GA X 1 1/2 IN (100EA/BX)

## (undated) DEVICE — TUBE CONNECTING SUCTION - CLEAR PLASTIC STERILE 72 IN (50EA/CA)

## (undated) DEVICE — DRAPETIBURON SHOULDER W/POUCH - (5EA/CA)

## (undated) DEVICE — CANISTER SUCTION RIGID RED 1500CC (40EA/CA)

## (undated) DEVICE — WATER IRRIGATION STERILE 1000ML (12EA/CA)

## (undated) DEVICE — CATHETER URETHRAL FOLEY SILICONE OD10 FR 3 ML (10EA/CA)

## (undated) DEVICE — LEAD SET 6 DISP. EKG NIHON KOHDEN

## (undated) DEVICE — TUBING PATIENT W/CONNECTOR REDEUCE (1EA)

## (undated) DEVICE — CANISTER SUCTION 3000ML MECHANICAL FILTER AUTO SHUTOFF MEDI-VAC NONSTERILE LF DISP  (40EA/CA)

## (undated) DEVICE — ELECTRODE 850 FOAM ADHESIVE - HYDROGEL RADIOTRNSPRNT (50/PK)

## (undated) DEVICE — DRAPE MAGNETIC (INSTRA-MAG) - (30/CA)

## (undated) DEVICE — SUTURE 3-0 PROLENE RB-1 D/A 36 (36PK/BX)"

## (undated) DEVICE — KIT ANESTHESIA W/CIRCUIT & 3/LT BAG W/FILTER (20EA/CA)

## (undated) DEVICE — CHLORAPREP 26 ML APPLICATOR - ORANGE TINT(25/CA)

## (undated) DEVICE — PAD PREP 24 X 48 CUFFED - (100/CA)

## (undated) DEVICE — DRAPE LAPAROTOMY T SHEET - (12EA/CA)

## (undated) DEVICE — GLOVE BIOGEL ECLIPSE PF LATEX SIZE 9.0

## (undated) DEVICE — GLOVE BIOGEL SZ 6.5 SURGICAL PF LTX (50PR/BX 4BX/CA)

## (undated) DEVICE — BAG, SPONGE COUNT 50600

## (undated) DEVICE — HUMID-VENT HEAT AND MOISTURE EXCHANGE- (50/BX)

## (undated) DEVICE — TUBE E-T HI-LO CUFF 7.0MM (10EA/PK)

## (undated) DEVICE — SUTURE 3-0 SILK 12 X 18 IN - (36/BX)

## (undated) DEVICE — DRAPE LARGE 3 QUARTER - (20/CA)

## (undated) DEVICE — PACK LOWER EXTREMITY - (2/CA)

## (undated) DEVICE — SUTURE 4-0 VICRYL PLUS RB-1 - 8 X 18 INCH (12/BX)

## (undated) DEVICE — SUTURE 4-0 PROLENE PS-2 18 (36PK/BX)"

## (undated) DEVICE — GLOVE BIOGEL PI INDICATOR SZ 7.5 SURGICAL PF LF -(50/BX 4BX/CA)

## (undated) DEVICE — SUCTION INSTRUMENT YANKAUER BULBOUS TIP W/O VENT (50EA/CA)

## (undated) DEVICE — PROBE PRASS STAND STIMULATING (5EA/PK)

## (undated) DEVICE — SUTURE 3-0 ETHILON FS-1 - (36/BX) 30 INCH

## (undated) DEVICE — GLOVE BIOGEL INDICATOR SZ 6.5 SURGICAL PF LTX - (50PR/BX 4BX/CA)

## (undated) DEVICE — SPONGE GAUZESTER 4 X 4 4PLY - (128PK/CA)

## (undated) DEVICE — GOWN SURGICAL X-LARGE ULTRA - FILM-REINFORCED (20/CA)

## (undated) DEVICE — DRAPE IOBAN II INCISE 23X17 - (10EA/BX 4BX/CA)

## (undated) DEVICE — TRAY SRGPRP PVP IOD WT PRP - (20/CA)

## (undated) DEVICE — BOVIE BLADE COATED &INSULATED (50EA/PK)

## (undated) DEVICE — SYRINGE DISP. 12 CC LL - (100/BX)

## (undated) DEVICE — PACK MINOR BASIN - (2EA/CA)

## (undated) DEVICE — TUBE E-T HI-LO CUFF 7.5MM (10EA/PK)

## (undated) DEVICE — WATER IRRIG. STER. 1500 ML - (9/CA)

## (undated) DEVICE — NEEDLE W/FACET TIP DULL VERSION W/STIMULATION CABLE SONOPLEX 21G X 4 (10/EA)"

## (undated) DEVICE — SUTURE 2-0 VICRYL PLUS CT-1 - 8 X 18 INCH(12/BX)

## (undated) DEVICE — HANDPIECE 10FT INTPLS SCT PLS IRRIGATION HAND CONTROL SET (6/PK)

## (undated) DEVICE — SUTURE 3-0 VICRYL PLUS SH - 8X 18 INCH (12/BX)

## (undated) DEVICE — COVER FOOT UNIVERSAL DISP. - (25EA/CA)

## (undated) DEVICE — CATHETER URTH 18FR 5ML FOLEY 2W (12/CA)

## (undated) DEVICE — GLOVE BIOGEL PI ULTRATOUCH SZ 7.5 SURGICAL PF LF -(50/BX 4BX/CA)

## (undated) DEVICE — SUTURE 2-0 SILK SH (36PK/BX)

## (undated) DEVICE — CONTAINER SPECIMEN BAG OR - STERILE 4 OZ W/LID (100EA/CA)

## (undated) DEVICE — GLOVE SZ 7.5 BIOGEL PI MICRO - PF LF (50PR/BX)

## (undated) DEVICE — WATER IRRIG. STER 3000 ML - (4/CA)

## (undated) DEVICE — GLOVE SZ 7 BIOGEL PI MICRO - PF LF (50PR/BX 4BX/CA)

## (undated) DEVICE — SPONGE PEANUT - (5/PK 50PK/CA)

## (undated) DEVICE — LEAD SET 6 DISP. EKG NIHON KOHDEN (100EA/CA) [9859].

## (undated) DEVICE — CONNECTOR HOSE NEPTUNE FOR CYSTO ROOM

## (undated) DEVICE — WIRE GUIDE SENSOR DUAL FLEX - 5/BX

## (undated) DEVICE — GLOVE BIOGEL PI ORTHO SZ 7 PF LF (40PR/BX)

## (undated) DEVICE — SOD. CHL. INJ. 0.9% 1000 ML - (14EA/CA 60CA/PF)

## (undated) DEVICE — SODIUM CHL. IRRIGATION 0.9% 3000ML (4EA/CA 65CA/PF)

## (undated) DEVICE — BOVIE BLADE COATED - (50/PK)

## (undated) DEVICE — GLOVE BIOGEL ECLIPSE  PF LATEX SIZE 6.5 (50PR/BX)

## (undated) DEVICE — GOWN SURGEONS X-LARGE - DISP. (30/CA)

## (undated) DEVICE — DRESSING XEROFORM 1X8 - (50/BX 4BX/CA)

## (undated) DEVICE — TAPE CLOTH MEDIPORE 6 INCH - (12RL/CA)

## (undated) DEVICE — SPONGE GAUZE NON-STERILE 2X2 - 4-PLY (200/PK 40PK/CA)

## (undated) DEVICE — CLOSURE SKIN STRIP 1/2 X 4 IN - (STERI STRIP) (50/BX 4BX/CA)

## (undated) DEVICE — DRAPE MAYO STAND - (30/CA)

## (undated) DEVICE — TUBING PATIENT W/CONNECTOR REDEUCE (20EA/CA)

## (undated) DEVICE — TUBING PUMP WITH CONNECTOR REDEUCE (1EA)

## (undated) DEVICE — CATHETER IV 20 GA X 1-1/4 ---SURG.& SDS ONLY--- (50EA/BX)

## (undated) DEVICE — TUBE, CULTURE AEROBIC

## (undated) DEVICE — DRAPE UNDER BUTTOCK LRG (40/CA)

## (undated) DEVICE — DRAIN PENROSE STERILE 1/4 X - 18 IN  (25EA/BX)

## (undated) DEVICE — TUBE EMG NIM TRIVANTAGE 7MM (3EA/PK)

## (undated) DEVICE — SUTURE PLASTIC

## (undated) DEVICE — GLOVE BIOGEL PI INDICATOR SZ 8.0 SURGICAL PF LF -(50/BX 4BX/CA)

## (undated) DEVICE — ACCESSORY KIT

## (undated) DEVICE — DRESSING TRANSPARENT FILM TEGADERM 4 X 4.75" (50EA/BX)"

## (undated) DEVICE — TIP INTPLS HFLO ML ORFC BTRY - (12/CS)  FOR SURGILAV

## (undated) DEVICE — SUTURE 4-0 MONOCRYL PLUS PS-2 - 27 INCH (36/BX)

## (undated) DEVICE — MASK, LARYNGEAL AIRWAY #4

## (undated) DEVICE — DETERGENT RENUZYME PLUS 10 OZ PACKET (50/BX)

## (undated) DEVICE — GLOVE BIOGEL SZ 6 PF LATEX - (50EA/BX 4BX/CA)

## (undated) DEVICE — SYRINGE TOOMEY (50EA/CA)

## (undated) DEVICE — LEAD SET 6 DISP. EKG NIHON KOHDEN (100EA/CA)

## (undated) DEVICE — SLING ORTH UNV TIETX VLFM ARM

## (undated) DEVICE — BLADE SURGICAL CLIPPER - (50EA/CA)

## (undated) DEVICE — CATHETER URETHRAL FOLEY SILICONE OD14 FR 10 ML (10EA/CA)

## (undated) DEVICE — FIBRILLAR SURGICEL 4X4 - 10/CA

## (undated) DEVICE — BLOCK

## (undated) DEVICE — BOVIE BLADE COATED &INSULATED - 25/PK

## (undated) DEVICE — SPIDER SHOULDER HOLDER (12EA/BX)

## (undated) DEVICE — SUTURE 3-0 CHROMIC GUT SH 27 (36PK/BX)"

## (undated) DEVICE — SUTURE 3-0 VICRYL PLUS SH - 27 INCH (36/BX)

## (undated) DEVICE — SYRINGE DISP. 60 CC LL - (30/BX, 12BX/CA)**WHEN THESE ARE GONE ORDER #500206**

## (undated) DEVICE — CATH, COUNCIL TIP 16 FR

## (undated) DEVICE — CANNULA GEMINI PASSPORT 20MM - (5/BX)

## (undated) DEVICE — SYRINGE 10 ML CONTROL LL (25EA/BX 4BX/CA)

## (undated) DEVICE — MEDICINE CUP STERILE 2 OZ - (100/CA)

## (undated) DEVICE — SET IRRIGATION CYSTOSCOPY Y-TYPE L81 IN (20EA/CA)

## (undated) DEVICE — KIT  I.V. START (100EA/CA)